# Patient Record
Sex: FEMALE | Race: WHITE | NOT HISPANIC OR LATINO | Employment: OTHER | URBAN - NONMETROPOLITAN AREA
[De-identification: names, ages, dates, MRNs, and addresses within clinical notes are randomized per-mention and may not be internally consistent; named-entity substitution may affect disease eponyms.]

---

## 2017-02-21 RX ORDER — LANCETS 33 GAUGE
EACH MISCELLANEOUS
Qty: 100 EACH | Refills: 3 | Status: SHIPPED | OUTPATIENT
Start: 2017-02-21 | End: 2018-04-10 | Stop reason: SDUPTHER

## 2017-04-18 ENCOUNTER — LAB (OUTPATIENT)
Dept: LAB | Facility: OTHER | Age: 67
End: 2017-04-18

## 2017-04-18 DIAGNOSIS — D51.8 VITAMIN B12 DEFICIENCY (DIETARY) ANEMIA: ICD-10-CM

## 2017-04-18 DIAGNOSIS — D50.9 IRON DEFICIENCY ANEMIA, UNSPECIFIED IRON DEFICIENCY ANEMIA TYPE: ICD-10-CM

## 2017-04-18 DIAGNOSIS — E78.5 HYPERLIPIDEMIA, UNSPECIFIED HYPERLIPIDEMIA TYPE: ICD-10-CM

## 2017-04-18 DIAGNOSIS — E11.9 TYPE 2 DIABETES MELLITUS WITHOUT COMPLICATION, WITHOUT LONG-TERM CURRENT USE OF INSULIN (HCC): ICD-10-CM

## 2017-04-18 LAB
ALBUMIN SERPL-MCNC: 4.1 G/DL (ref 3.2–5.5)
ALBUMIN/GLOB SERPL: 1.4 G/DL (ref 1–3)
ALP SERPL-CCNC: 51 U/L (ref 15–121)
ALT SERPL W P-5'-P-CCNC: 20 U/L (ref 10–60)
ANION GAP SERPL CALCULATED.3IONS-SCNC: 10 MMOL/L (ref 5–15)
AST SERPL-CCNC: 24 U/L (ref 10–60)
BASOPHILS # BLD AUTO: 0.02 10*3/MM3 (ref 0–0.2)
BASOPHILS NFR BLD AUTO: 0.4 % (ref 0–2)
BILIRUB SERPL-MCNC: 0.4 MG/DL (ref 0.2–1)
BUN BLD-MCNC: 36 MG/DL (ref 8–25)
BUN/CREAT SERPL: 30 (ref 7–25)
CALCIUM SPEC-SCNC: 9.2 MG/DL (ref 8.4–10.8)
CHLORIDE SERPL-SCNC: 100 MMOL/L (ref 100–112)
CHOLEST SERPL-MCNC: 228 MG/DL (ref 150–200)
CO2 SERPL-SCNC: 28 MMOL/L (ref 20–32)
CREAT BLD-MCNC: 1.2 MG/DL (ref 0.4–1.3)
DEPRECATED RDW RBC AUTO: 42.5 FL (ref 36.4–46.3)
EOSINOPHIL # BLD AUTO: 0.13 10*3/MM3 (ref 0–0.7)
EOSINOPHIL NFR BLD AUTO: 2.6 % (ref 0–7)
ERYTHROCYTE [DISTWIDTH] IN BLOOD BY AUTOMATED COUNT: 13 % (ref 11.5–14.5)
GFR SERPL CREATININE-BSD FRML MDRD: 45 ML/MIN/1.73 (ref 45–104)
GLOBULIN UR ELPH-MCNC: 2.9 GM/DL (ref 2.5–4.6)
GLUCOSE BLD-MCNC: 157 MG/DL (ref 70–100)
HCT VFR BLD AUTO: 37.1 % (ref 35–45)
HDLC SERPL-MCNC: 63 MG/DL (ref 35–100)
HGB BLD-MCNC: 12.1 G/DL (ref 12–15.5)
LDLC SERPL CALC-MCNC: 143 MG/DL
LDLC/HDLC SERPL: 2.28 {RATIO}
LYMPHOCYTES # BLD AUTO: 1.45 10*3/MM3 (ref 0.6–4.2)
LYMPHOCYTES NFR BLD AUTO: 28.7 % (ref 10–50)
MCH RBC QN AUTO: 30 PG (ref 26.5–34)
MCHC RBC AUTO-ENTMCNC: 32.6 G/DL (ref 31.4–36)
MCV RBC AUTO: 91.8 FL (ref 80–98)
MONOCYTES # BLD AUTO: 0.39 10*3/MM3 (ref 0–0.9)
MONOCYTES NFR BLD AUTO: 7.7 % (ref 0–12)
NEUTROPHILS # BLD AUTO: 3.07 10*3/MM3 (ref 2–8.6)
NEUTROPHILS NFR BLD AUTO: 60.6 % (ref 37–80)
PLATELET # BLD AUTO: 344 10*3/MM3 (ref 150–450)
PMV BLD AUTO: 10.6 FL (ref 8–12)
POTASSIUM BLD-SCNC: 4.1 MMOL/L (ref 3.4–5.4)
PROT SERPL-MCNC: 7 G/DL (ref 6.7–8.2)
RBC # BLD AUTO: 4.04 10*6/MM3 (ref 3.77–5.16)
SODIUM BLD-SCNC: 138 MMOL/L (ref 134–146)
TRIGL SERPL-MCNC: 108 MG/DL (ref 35–160)
VLDLC SERPL-MCNC: 21.6 MG/DL
WBC NRBC COR # BLD: 5.06 10*3/MM3 (ref 3.2–9.8)

## 2017-04-18 PROCEDURE — 80061 LIPID PANEL: CPT | Performed by: INTERNAL MEDICINE

## 2017-04-18 PROCEDURE — 82607 VITAMIN B-12: CPT | Performed by: INTERNAL MEDICINE

## 2017-04-18 PROCEDURE — 36415 COLL VENOUS BLD VENIPUNCTURE: CPT | Performed by: INTERNAL MEDICINE

## 2017-04-18 PROCEDURE — 82728 ASSAY OF FERRITIN: CPT | Performed by: INTERNAL MEDICINE

## 2017-04-18 PROCEDURE — 83036 HEMOGLOBIN GLYCOSYLATED A1C: CPT | Performed by: INTERNAL MEDICINE

## 2017-04-18 PROCEDURE — 85025 COMPLETE CBC W/AUTO DIFF WBC: CPT | Performed by: INTERNAL MEDICINE

## 2017-04-18 PROCEDURE — 80053 COMPREHEN METABOLIC PANEL: CPT | Performed by: INTERNAL MEDICINE

## 2017-04-19 LAB
FERRITIN SERPL-MCNC: 23.5 NG/ML (ref 11.1–264)
HBA1C MFR BLD: 7.73 % (ref 4–5.6)
VIT B12 BLD-MCNC: 710 PG/ML (ref 239–931)

## 2017-04-26 ENCOUNTER — OFFICE VISIT (OUTPATIENT)
Dept: FAMILY MEDICINE CLINIC | Facility: CLINIC | Age: 67
End: 2017-04-26

## 2017-04-26 VITALS
SYSTOLIC BLOOD PRESSURE: 120 MMHG | TEMPERATURE: 98.8 F | BODY MASS INDEX: 30.97 KG/M2 | DIASTOLIC BLOOD PRESSURE: 70 MMHG | WEIGHT: 181.4 LBS | HEIGHT: 64 IN | HEART RATE: 72 BPM

## 2017-04-26 DIAGNOSIS — D50.9 IRON DEFICIENCY ANEMIA, UNSPECIFIED IRON DEFICIENCY ANEMIA TYPE: ICD-10-CM

## 2017-04-26 DIAGNOSIS — E78.1 HYPERTRIGLYCERIDEMIA: ICD-10-CM

## 2017-04-26 DIAGNOSIS — F32.5 MAJOR DEPRESSION IN COMPLETE REMISSION (HCC): ICD-10-CM

## 2017-04-26 DIAGNOSIS — Z13.820 OSTEOPOROSIS SCREENING: ICD-10-CM

## 2017-04-26 DIAGNOSIS — D51.8 VITAMIN B12 DEFICIENCY (DIETARY) ANEMIA: ICD-10-CM

## 2017-04-26 DIAGNOSIS — E11.9 TYPE 2 DIABETES MELLITUS WITHOUT COMPLICATION, WITHOUT LONG-TERM CURRENT USE OF INSULIN (HCC): Primary | ICD-10-CM

## 2017-04-26 DIAGNOSIS — Z12.31 ENCOUNTER FOR SCREENING MAMMOGRAM FOR MALIGNANT NEOPLASM OF BREAST: ICD-10-CM

## 2017-04-26 DIAGNOSIS — N18.2 CHRONIC KIDNEY DISEASE, STAGE II (MILD): ICD-10-CM

## 2017-04-26 DIAGNOSIS — K21.9 GASTROESOPHAGEAL REFLUX DISEASE WITHOUT ESOPHAGITIS: ICD-10-CM

## 2017-04-26 DIAGNOSIS — E78.01 FAMILIAL HYPERCHOLESTEROLEMIA: ICD-10-CM

## 2017-04-26 DIAGNOSIS — I10 ESSENTIAL HYPERTENSION: ICD-10-CM

## 2017-04-26 PROCEDURE — 99214 OFFICE O/P EST MOD 30 MIN: CPT | Performed by: INTERNAL MEDICINE

## 2017-04-26 RX ORDER — LISINOPRIL 20 MG/1
20 TABLET ORAL DAILY
Qty: 90 TABLET | Refills: 3 | Status: SHIPPED | OUTPATIENT
Start: 2017-04-26 | End: 2018-04-06 | Stop reason: SDUPTHER

## 2017-04-26 RX ORDER — AMLODIPINE BESYLATE 5 MG/1
5 TABLET ORAL DAILY
COMMUNITY
End: 2017-09-17 | Stop reason: SDUPTHER

## 2017-04-26 RX ORDER — ROSUVASTATIN CALCIUM 20 MG/1
20 TABLET, COATED ORAL DAILY
Qty: 90 TABLET | Refills: 3 | Status: SHIPPED | OUTPATIENT
Start: 2017-04-26 | End: 2018-04-06 | Stop reason: SDUPTHER

## 2017-04-26 NOTE — PROGRESS NOTES
Subjective     Otilia Kenny is a 67 y.o. female.     History of Present Illness   And is here for six-month follow-up of multiple medical issues including type 2 diabetes, high cholesterol, high triglycerides, hypertension, GERD, depression, and other issues.    In fall 2016, I added low-dose Actos.  Her A1c has improved, but she feels that Actos is increasing her appetite and producing weight gain.  She describes a sedentary lifestyle and describes excessive calories.  I recommended medication change, but also strongly recommended the patient intensify her efforts at compliance with diabetic diet, exercise, and weight loss.  She feels that she cannot tolerate metformin, which makes her management difficult.    Her BUN and creatinine are creeping higher on the Invokana.  I recommend discontinuing the HCTZ from her lisinopril.  Her blood pressure is well controlled today.    Her LDL cholesterol is not at goal with Zocor.  She could not tolerate Lipitor.  We are going to try Crestor.    He scored 8 on depression screening today.  I discussed this with the patient.  She feels that her depression symptoms are actually well controlled with Celexa.    Her labs are all reviewed with results listed below.  A1c has improved from 8.6-7.7.  LDL is 143 and HDL was 63.  BUN and creatinine are 36 and 1.2 respectively.  Triglycerides are 108 on fenofibrate.    Review of Systems   Constitutional: Negative for chills, fatigue and fever.   HENT: Negative for congestion, ear pain, postnasal drip, sinus pressure and sore throat.    Respiratory: Negative for cough, shortness of breath and wheezing.    Cardiovascular: Negative for chest pain, palpitations and leg swelling.   Gastrointestinal: Negative for abdominal pain, blood in stool, constipation, diarrhea, nausea and vomiting.   Endocrine: Negative for cold intolerance, heat intolerance, polydipsia and polyuria.   Genitourinary: Negative for dysuria, frequency, hematuria and  "urgency.   Skin: Negative for rash.   Neurological: Negative for syncope and weakness.       Objective     /70  Pulse 72  Temp 98.8 °F (37.1 °C) (Oral)   Ht 64\" (162.6 cm)  Wt 181 lb 6.4 oz (82.3 kg)  BMI 31.14 kg/m2    Physical Exam   Constitutional: She is oriented to person, place, and time. She appears well-developed and well-nourished. No distress.   HENT:   Head: Normocephalic and atraumatic.   Nose: Right sinus exhibits no maxillary sinus tenderness and no frontal sinus tenderness. Left sinus exhibits no maxillary sinus tenderness and no frontal sinus tenderness.   Mouth/Throat: Uvula is midline, oropharynx is clear and moist and mucous membranes are normal. No oral lesions. No tonsillar exudate.   Eyes: Conjunctivae and EOM are normal. Pupils are equal, round, and reactive to light.   Neck: Trachea normal. Neck supple. No JVD present. Carotid bruit is not present. No tracheal deviation present. No thyroid mass and no thyromegaly present.   Cardiovascular: Normal rate, regular rhythm and normal heart sounds.   No extrasystoles are present. PMI is not displaced.    No murmur heard.  Pulmonary/Chest: Effort normal and breath sounds normal. No accessory muscle usage. No respiratory distress. She has no decreased breath sounds. She has no wheezes. She has no rhonchi. She has no rales.   Abdominal: Soft. Bowel sounds are normal. She exhibits no distension. There is no hepatosplenomegaly. There is no tenderness.   Overweight abdomen       Vascular Status -  Her exam exhibits right foot vasculature normal. Her exam exhibits no right foot edema. Her exam exhibits left foot vasculature normal. Her exam exhibits no left foot edema.  Lymphadenopathy:     She has no cervical adenopathy.   Neurological: She is alert and oriented to person, place, and time. No cranial nerve deficit. Coordination normal.   Skin: Skin is warm, dry and intact. No rash noted. No cyanosis. Nails show no clubbing.   Psychiatric: She " has a normal mood and affect. Her speech is normal and behavior is normal. Judgment and thought content normal.   Vitals reviewed.      PHQ-9 Depression Screening 4/26/2017   Little interest or pleasure in doing things 1   Feeling down, depressed, or hopeless 1   Trouble falling or staying asleep, or sleeping too much 0   Feeling tired or having little energy 3   Poor appetite or overeating 3   Feeling bad about yourself - or that you are a failure or have let yourself or your family down 0   Trouble concentrating on things, such as reading the newspaper or watching television 0   Moving or speaking so slowly that other people could have noticed. Or the opposite - being so fidgety or restless that you have been moving around a lot more than usual 0   Thoughts that you would be better off dead, or of hurting yourself in some way 0   PHQ-9 Total Score 8   If you checked off any problems, how difficult have these problems made it for you to do your work, take care of things at home, or get along with other people? Somewhat difficult       Assessment/Plan   Change lisinopril HCT to lisinopril due to the increasing BUN and creatinine.  Avoid NSAIDs and other nephrotoxic drugs.    Change Zocor to Crestor 20 mg daily.  Continue current dose for now.  Intensify dietary efforts.  Pursue weight loss.  If renal function continues to deteriorate, I would want to reduce or eliminate the fenofibrate.    Stop the pioglitazone, as the patient feels that is promoting weight gain and increased appetite.  Continue the current dose of Invokana and add Trulicity 0.75 mg weekly.  We instructed the patient on how to administer her first dose of Trulicity today.  She feels that she cannot tolerate any metformin or Amaryl.  Monitor glucose 1-2 times daily and notify me if not consistently at goal.  We could add some Januvia.      Diagnoses and all orders for this visit:    Type 2 diabetes mellitus without complication, without long-term  current use of insulin  -     CBC Auto Differential; Future  -     Comprehensive Metabolic Panel; Future  -     Hemoglobin A1c; Future  -     LDL Cholesterol, Direct; Future  -     Microalbumin / Creatinine Urine Ratio; Future  -     TSH; Future    Hypertriglyceridemia  -     Triglycerides; Future    Familial hypercholesterolemia  -     LDL Cholesterol, Direct; Future    Essential hypertension    Gastroesophageal reflux disease without esophagitis    Vitamin B12 deficiency (dietary) anemia  -     Vitamin B12; Future    Iron deficiency anemia, unspecified iron deficiency anemia type  -     Ferritin; Future    Major depression in complete remission    Encounter for screening mammogram for malignant neoplasm of breast  -     Mammo Screening Digital Tomosynthesis Bilateral With CAD; Future    Osteoporosis screening  -     DEXA Bone Density Axial; Future    Chronic kidney disease, stage II (mild)    Other orders  -     amLODIPine (NORVASC) 5 MG tablet; Take 5 mg by mouth Daily. 1/2 tab daily  -     lisinopril (PRINIVIL,ZESTRIL) 20 MG tablet; Take 1 tablet by mouth Daily.  -     Dulaglutide 0.75 MG/0.5ML solution pen-injector; Inject 1 pen under the skin 1 (One) Time Per Week.  -     rosuvastatin (CRESTOR) 20 MG tablet; Take 1 tablet by mouth Daily.        Lab on 04/18/2017   Component Date Value Ref Range Status   • WBC 04/18/2017 5.06  3.20 - 9.80 10*3/mm3 Final   • RBC 04/18/2017 4.04  3.77 - 5.16 10*6/mm3 Final   • Hemoglobin 04/18/2017 12.1  12.0 - 15.5 g/dL Final   • Hematocrit 04/18/2017 37.1  35.0 - 45.0 % Final   • MCV 04/18/2017 91.8  80.0 - 98.0 fL Final   • MCH 04/18/2017 30.0  26.5 - 34.0 pg Final   • MCHC 04/18/2017 32.6  31.4 - 36.0 g/dL Final   • RDW 04/18/2017 13.0  11.5 - 14.5 % Final   • RDW-SD 04/18/2017 42.5  36.4 - 46.3 fl Final   • MPV 04/18/2017 10.6  8.0 - 12.0 fL Final   • Platelets 04/18/2017 344  150 - 450 10*3/mm3 Final   • Neutrophil % 04/18/2017 60.6  37.0 - 80.0 % Final   • Lymphocyte %  04/18/2017 28.7  10.0 - 50.0 % Final   • Monocyte % 04/18/2017 7.7  0.0 - 12.0 % Final   • Eosinophil % 04/18/2017 2.6  0.0 - 7.0 % Final   • Basophil % 04/18/2017 0.4  0.0 - 2.0 % Final   • Neutrophils, Absolute 04/18/2017 3.07  2.00 - 8.60 10*3/mm3 Final   • Lymphocytes, Absolute 04/18/2017 1.45  0.60 - 4.20 10*3/mm3 Final   • Monocytes, Absolute 04/18/2017 0.39  0.00 - 0.90 10*3/mm3 Final   • Eosinophils, Absolute 04/18/2017 0.13  0.00 - 0.70 10*3/mm3 Final   • Basophils, Absolute 04/18/2017 0.02  0.00 - 0.20 10*3/mm3 Final   • Vitamin B-12 04/18/2017 710  239 - 931 pg/mL Final   • Ferritin 04/18/2017 23.50  11.10 - 264.00 ng/mL Final   • Glucose 04/18/2017 157* 70 - 100 mg/dL Final   • BUN 04/18/2017 36* 8 - 25 mg/dL Final   • Creatinine 04/18/2017 1.20  0.40 - 1.30 mg/dL Final   • Sodium 04/18/2017 138  134 - 146 mmol/L Final   • Potassium 04/18/2017 4.1  3.4 - 5.4 mmol/L Final   • Chloride 04/18/2017 100  100 - 112 mmol/L Final   • CO2 04/18/2017 28.0  20.0 - 32.0 mmol/L Final   • Calcium 04/18/2017 9.2  8.4 - 10.8 mg/dL Final   • Total Protein 04/18/2017 7.0  6.7 - 8.2 g/dL Final   • Albumin 04/18/2017 4.10  3.20 - 5.50 g/dL Final   • ALT (SGPT) 04/18/2017 20  10 - 60 U/L Final   • AST (SGOT) 04/18/2017 24  10 - 60 U/L Final   • Alkaline Phosphatase 04/18/2017 51  15 - 121 U/L Final   • Total Bilirubin 04/18/2017 0.4  0.2 - 1.0 mg/dL Final   • eGFR Non African Amer 04/18/2017 45  45 - 104 mL/min/1.73 Final   • Globulin 04/18/2017 2.9  2.5 - 4.6 gm/dL Final   • A/G Ratio 04/18/2017 1.4  1.0 - 3.0 g/dL Final   • BUN/Creatinine Ratio 04/18/2017 30.0* 7.0 - 25.0 Final   • Anion Gap 04/18/2017 10.0  5.0 - 15.0 mmol/L Final   • Hemoglobin A1C 04/18/2017 7.73* 4 - 5.6 % Final   • Total Cholesterol 04/18/2017 228* 150 - 200 mg/dL Final   • Triglycerides 04/18/2017 108  35 - 160 mg/dL Final   • HDL Cholesterol 04/18/2017 63  35 - 100 mg/dL Final   • LDL Cholesterol  04/18/2017 143  mg/dL Final   • VLDL Cholesterol  04/18/2017 21.6  mg/dL Final   • LDL/HDL Ratio 04/18/2017 2.28   Final   ]

## 2017-06-02 ENCOUNTER — OFFICE VISIT (OUTPATIENT)
Dept: FAMILY MEDICINE CLINIC | Facility: CLINIC | Age: 67
End: 2017-06-02

## 2017-06-02 VITALS
TEMPERATURE: 98.6 F | BODY MASS INDEX: 30.73 KG/M2 | HEART RATE: 80 BPM | SYSTOLIC BLOOD PRESSURE: 150 MMHG | DIASTOLIC BLOOD PRESSURE: 78 MMHG | WEIGHT: 180 LBS | HEIGHT: 64 IN

## 2017-06-02 DIAGNOSIS — Z00.00 INITIAL MEDICARE ANNUAL WELLNESS VISIT: Primary | ICD-10-CM

## 2017-06-02 PROCEDURE — G0438 PPPS, INITIAL VISIT: HCPCS | Performed by: INTERNAL MEDICINE

## 2017-06-02 NOTE — PATIENT INSTRUCTIONS
ppp    Medicare Wellness  Personal Prevention Plan of Service     Date of Office Visit:  2017  Encounter Provider:  Alexis Zabala MD  Place of Service:  [facility]  Patient Name: Otilia Kenny  :  1950    As part of the Medicare Wellness portion of your visit today, we are providing you with this personalized preventive plan of services (PPPS). This plan is based upon recommendations of the [place] Preventive Services Task Force (USPSTF) and the Advisory Committee on Immunization Practices (ACIP).    This lists the preventive care services that should be considered, and provides dates of when you are due. Items listed as completed are up-to-date and do not require any further intervention.    Health Maintenance   Topic Date Due   • TDAP/TD VACCINES (1 - Tdap) 01/10/1967   • HEPATITIS C SCREENING  2016   • ZOSTER VACCINE  2016   • DIABETIC FOOT EXAM  2016   • INFLUENZA VACCINE  2017   • URINE MICROALBUMIN  10/13/2017   • HEMOGLOBIN A1C  10/18/2017   • PNEUMOCOCCAL VACCINES (65+ LOW/MEDIUM RISK) (2 of 2 - PPSV23) 10/24/2017   • DIABETIC EYE EXAM  2018   • LIPID PANEL  2018   • MAMMOGRAM  2019   • COLONOSCOPY  2026       No orders of the defined types were placed in this encounter.      No Follow-up on file.        Exercising to Lose Weight  Exercising can help you to lose weight. In order to lose weight through exercise, you need to do vigorous-intensity exercise. You can tell that you are exercising with vigorous intensity if you are breathing very hard and fast and cannot hold a conversation while exercising.  Moderate-intensity exercise helps to maintain your current weight. You can tell that you are exercising at a moderate level if you have a higher heart rate and faster breathing, but you are still able to hold a conversation.  HOW OFTEN SHOULD I EXERCISE?  Choose an activity that you enjoy and set  realistic goals. Your health care provider can help you to make an activity plan that works for you. Exercise regularly as directed by your health care provider. This may include:  · Doing resistance training twice each week, such as:    Push-ups.    Sit-ups.    Lifting weights.    Using resistance bands.  · Doing a given intensity of exercise for a given amount of time. Choose from these options:    150 minutes of moderate-intensity exercise every week.    75 minutes of vigorous-intensity exercise every week.    A mix of moderate-intensity and vigorous-intensity exercise every week.  Children, pregnant women, people who are out of shape, people who are overweight, and older adults may need to consult a health care provider for individual recommendations. If you have any sort of medical condition, be sure to consult your health care provider before starting a new exercise program.  WHAT ARE SOME ACTIVITIES THAT CAN HELP ME TO LOSE WEIGHT?   · Walking at a rate of at least 4.5 miles an hour.  · Jogging or running at a rate of 5 miles per hour.  · Biking at a rate of at least 10 miles per hour.  · Lap swimming.  · Roller-skating or in-line skating.  · Cross-country skiing.  · Vigorous competitive sports, such as football, basketball, and soccer.  · Jumping rope.  · Aerobic dancing.  HOW CAN I BE MORE ACTIVE IN MY DAY-TO-DAY ACTIVITIES?  · Use the stairs instead of the elevator.  · Take a walk during your lunch break.  · If you drive, park your car farther away from work or school.  · If you take public transportation, get off one stop early and walk the rest of the way.  · Make all of your phone calls while standing up and walking around.  · Get up, stretch, and walk around every 30 minutes throughout the day.  WHAT GUIDELINES SHOULD I FOLLOW WHILE EXERCISING?  · Do not exercise so much that you hurt yourself, feel dizzy, or get very short of breath.  · Consult your health care provider prior to starting a new  exercise program.  · Wear comfortable clothes and shoes with good support.  · Drink plenty of water while you exercise to prevent dehydration or heat stroke. Body water is lost during exercise and must be replaced.  · Work out until you breathe faster and your heart beats faster.     This information is not intended to replace advice given to you by your health care provider. Make sure you discuss any questions you have with your health care provider.     Document Released: 01/20/2012 Document Revised: 01/08/2016 Document Reviewed: 05/21/2015  [employer] Interactive Patient Education ©2017 [employer]  Calorie Counting for Weight Loss  Calories are energy you get from the things you eat and drink. Your body uses this energy to keep you going throughout the day. The number of calories you eat affects your weight. When you eat more calories than your body needs, your body stores the extra calories as fat. When you eat fewer calories than your body needs, your body burns fat to get the energy it needs.  Calorie counting means keeping track of how many calories you eat and drink each day. If you make sure to eat fewer calories than your body needs, you should lose weight. In order for calorie counting to work, you will need to eat the number of calories that are right for you in a day to lose a healthy amount of weight per week. A healthy amount of weight to lose per week is usually 1-2 lb (0.5-0.9 kg). A dietitian can determine how many calories you need in a day and give you suggestions on how to reach your calorie goal.   WHAT IS MY MY PLAN?  My goal is to have __________ calories per day.   If I have this many calories per day, I should lose around __________ pounds per week.  WHAT DO I NEED TO KNOW ABOUT CALORIE COUNTING?  In order to meet your daily calorie goal, you will need to:  · Find out how many calories are in each food you would like to eat. Try to do this before you eat.  · Decide how much of the food you  can eat.  · Write down what you ate and how many calories it had. Doing this is called keeping a food log.  WHERE DO I FIND CALORIE INFORMATION?  The number of calories in a food can be found on a Nutrition Facts label. Note that all the information on a label is based on a specific serving of the food. If a food does not have a Nutrition Facts label, try to look up the calories online or ask your dietitian for help.  HOW DO I DECIDE HOW MUCH TO EAT?  To decide how much of the food you can eat, you will need to consider both the number of calories in one serving and the size of one serving. This information can be found on the Nutrition Facts label. If a food does not have a Nutrition Facts label, look up the information online or ask your dietitian for help.  Remember that calories are listed per serving. If you choose to have more than one serving of a food, you will have to multiply the calories per serving by the amount of servings you plan to eat. For example, the label on a package of bread might say that a serving size is 1 slice and that there are 90 calories in a serving. If you eat 1 slice, you will have eaten 90 calories. If you eat 2 slices, you will have eaten 180 calories.  HOW DO I KEEP A FOOD LOG?  After each meal, record the following information in your food log:  · What you ate.  · How much of it you ate.  · How many calories it had.  · Then, add up your calories.  Keep your food log near you, such as in a small notebook in your pocket. Another option is to use a mobile katerin or website. Some programs will calculate calories for you and show you how many calories you have left each time you add an item to the log.  WHAT ARE SOME CALORIE COUNTING TIPS?  · Use your calories on foods and drinks that will fill you up and not leave you hungry. Some examples of this include foods like nuts and nut butters, vegetables, lean proteins, and high-fiber foods (more than 5 g fiber per serving).  · Eat nutritious  foods and avoid empty calories. Empty calories are calories you get from foods or beverages that do not have many nutrients, such as candy and soda. It is better to have a nutritious high-calorie food (such as an avocado) than a food with few nutrients (such as a bag of chips).  · Know how many calories are in the foods you eat most often. This way, you do not have to look up how many calories they have each time you eat them.  · Look out for foods that may seem like low-calorie foods but are really high-calorie foods, such as baked goods, soda, and fat-free candy.  · Pay attention to calories in drinks. Drinks such as sodas, specialty coffee drinks, alcohol, and juices have a lot of calories yet do not fill you up. Choose low-calorie drinks like water and diet drinks.  · Focus your calorie counting efforts on higher calorie items. Logging the calories in a garden salad that contains only vegetables is less important than calculating the calories in a milk shake.  · Find a way of tracking calories that works for you. Get creative. Most people who are successful find ways to keep track of how much they eat in a day, even if they do not count every calorie.  WHAT ARE SOME PORTION CONTROL TIPS?  · Know how many calories are in a serving. This will help you know how many servings of a certain food you can have.  · Use a measuring cup to measure serving sizes. This is helpful when you start out. With time, you will be able to estimate serving sizes for some foods.  · Take some time to put servings of different foods on your favorite plates, bowls, and cups so you know what a serving looks like.  · Try not to eat straight from a bag or box. Doing this can lead to overeating. Put the amount you would like to eat in a cup or on a plate to make sure you are eating the right portion.  · Use smaller plates, glasses, and bowls to prevent overeating. This is a quick and easy way to practice portion control. If your plate is  "smaller, less food can fit on it.  · Try not to multitask while eating, such as watching TV or using your computer. If it is time to eat, sit down at a table and enjoy your food. Doing this will help you to start recognizing when you are full. It will also make you more aware of what and how much you are eating.  HOW CAN I CALORIE COUNT WHEN EATING OUT?  · Ask for smaller portion sizes or child-sized portions.  · Consider sharing an entree and sides instead of getting your own entree.  · If you get your own entree, eat only half. Ask for a box at the beginning of your meal and put the rest of your entree in it so you are not tempted to eat it.  · Look for the calories on the menu. If calories are listed, choose the lower calorie options.  · Choose dishes that include vegetables, fruits, whole grains, low-fat dairy products, and lean protein. Focusing on smart food choices from each of the 5 food groups can help you stay on track at restaurants.  · Choose items that are boiled, broiled, grilled, or steamed.  · Choose water, milk, unsweetened iced tea, or other drinks without added sugars. If you want an alcoholic beverage, choose a lower calorie option. For example, a regular alvaro can have up to 700 calories and a glass of wine has around 150.  · Stay away from items that are buttered, battered, fried, or served with cream sauce. Items labeled \"crispy\" are usually fried, unless stated otherwise.  · Ask for dressings, sauces, and syrups on the side. These are usually very high in calories, so do not eat much of them.  · Watch out for salads. Many people think salads are a healthy option, but this is often not the case. Many salads come with yu, fried chicken, lots of cheese, fried chips, and dressing. All of these items have a lot of calories. If you want a salad, choose a garden salad and ask for grilled meats or steak. Ask for the dressing on the side, or ask for olive oil and vinegar or lemon to use as " dressing.  · Estimate how many servings of a food you are given. For example, a serving of cooked rice is ½ cup or about the size of half a tennis ball or one cupcake wrapper. Knowing serving sizes will help you be aware of how much food you are eating at restaurants. The list below tells you how big or small some common portion sizes are based on everyday objects.    1 oz--4 stacked dice.    3 oz--1 deck of cards.    1 tsp--1 dice.    1 Tbsp--½ a Ping-Pong ball.    2 Tbsp--1 Ping-Pong ball.    ½ cup--1 tennis ball or 1 cupcake wrapper.    1 cup--1 baseball.     This information is not intended to replace advice given to you by your health care provider. Make sure you discuss any questions you have with your health care provider.     Document Released: 2006 Document Revised: 2016 Document Reviewed: 10/23/2014  [employer] Interactive Patient Education © [employer]    Medicare Wellness  Personal Prevention Plan of Service     Date of Office Visit:  2017  Encounter Provider:  Alexis Zabala MD  Place of Service:  [facility]  Patient Name: Otilia Kenny  :  1950    As part of the Medicare Wellness portion of your visit today, we are providing you with this personalized preventive plan of services (PPPS). This plan is based upon recommendations of the [place] Preventive Services Task Force (USPSTF) and the Advisory Committee on Immunization Practices (ACIP).    This lists the preventive care services that should be considered, and provides dates of when you are due. Items listed as completed are up-to-date and do not require any further intervention.    Health Maintenance   Topic Date Due   • TDAP/TD VACCINES (1 - Tdap) 01/10/1967   • HEPATITIS C SCREENING  2016   • ZOSTER VACCINE  2016   • DIABETIC FOOT EXAM  2016   • INFLUENZA VACCINE  2017   • URINE MICROALBUMIN  10/13/2017   • HEMOGLOBIN A1C  10/18/2017   •  PNEUMOCOCCAL VACCINES (65+ LOW/MEDIUM RISK) (2 of 2 - PPSV23) 10/24/2017   • DIABETIC EYE EXAM  03/29/2018   • LIPID PANEL  04/18/2018   • MAMMOGRAM  05/16/2019   • COLONOSCOPY  01/05/2026       No orders of the defined types were placed in this encounter.      Return in about 1 year (around 6/2/2018) for Medicare Wellness.

## 2017-06-06 ENCOUNTER — OFFICE VISIT (OUTPATIENT)
Dept: ORTHOPEDIC SURGERY | Facility: CLINIC | Age: 67
End: 2017-06-06

## 2017-06-06 VITALS — HEIGHT: 64 IN | BODY MASS INDEX: 30.56 KG/M2 | WEIGHT: 179 LBS

## 2017-06-06 DIAGNOSIS — G89.29 CHRONIC PAIN OF BOTH KNEES: ICD-10-CM

## 2017-06-06 DIAGNOSIS — M25.562 CHRONIC PAIN OF BOTH KNEES: ICD-10-CM

## 2017-06-06 DIAGNOSIS — M17.0 PRIMARY OSTEOARTHRITIS OF BOTH KNEES: Primary | ICD-10-CM

## 2017-06-06 DIAGNOSIS — M25.561 CHRONIC PAIN OF BOTH KNEES: ICD-10-CM

## 2017-06-06 PROCEDURE — 20610 DRAIN/INJ JOINT/BURSA W/O US: CPT | Performed by: NURSE PRACTITIONER

## 2017-06-06 PROCEDURE — 99214 OFFICE O/P EST MOD 30 MIN: CPT | Performed by: NURSE PRACTITIONER

## 2017-06-06 RX ORDER — TRIAMCINOLONE ACETONIDE 40 MG/ML
40 INJECTION, SUSPENSION INTRA-ARTICULAR; INTRAMUSCULAR
Status: COMPLETED | OUTPATIENT
Start: 2017-06-06 | End: 2017-06-06

## 2017-06-06 RX ORDER — LIDOCAINE HYDROCHLORIDE 10 MG/ML
2 INJECTION, SOLUTION INFILTRATION; PERINEURAL
Status: COMPLETED | OUTPATIENT
Start: 2017-06-06 | End: 2017-06-06

## 2017-06-06 RX ADMIN — TRIAMCINOLONE ACETONIDE 40 MG: 40 INJECTION, SUSPENSION INTRA-ARTICULAR; INTRAMUSCULAR at 14:20

## 2017-06-06 RX ADMIN — LIDOCAINE HYDROCHLORIDE 2 ML: 10 INJECTION, SOLUTION INFILTRATION; PERINEURAL at 14:20

## 2017-06-06 NOTE — PROGRESS NOTES
"Otilia Kenny is a 67 y.o. female returns for     Chief Complaint   Patient presents with   • Right Knee - Follow-up   • Left Knee - Follow-up       HISTORY OF PRESENT ILLNESS: patient requesting steroid injection, last injection done on 12/9/2016 left knee by [facility] Rapp. HPI Comments: Complains of bilateral knee pain that has been worse over the last 2 weeks since starting a walking exercise program. Complains of pain to lateral aspect of right knee that is milder. Complains of more severe pain to left knee, worse with weight-bearing and ambulation. Improved with rest.          CONCURRENT MEDICAL HISTORY:    Past Medical History:   Diagnosis Date   • Abdominal pain     Most likely related to functional colonic spasm     • Abnormal liver enzymes     Improved, 3/2015      • Allergic rhinitis     seasonal   • Anemia    • Cervical disc disorder     S/P cervical surgeries x2-3. Extensive fusion C 3-7. [place] [facility]      • Diarrhea, unspecified     Resolved with discontinuation of metformin.    • Diverticular disease of colon    • Dysfunction of eustachian tube    • Essential hypertension     Increase lisinopril HCT, 10/12.5.      • Gastritis    • Generalized anxiety disorder    • GERD (gastroesophageal reflux disease)    • Hyperlipidemia     Intolerant of Lipitor. The patient stopped Zocor due to \"liver pain\", summer 2015. patient instructed to reattempt Zocor every other day, 10/2015    • Hypertriglyceridemia     Holding simvastatin 2/2015 due to slightly elevated LFTs.      • Iron deficiency anemia    • Irritable bowel syndrome     Dr. Estrada   • Osteoarthritis of knee     Dr. Reynolds    • Sleep disorder    • Spasm of back muscles     Dr. Castro changed Soma to Zanaflex.     • Vitamin B12 deficiency (dietary) anemia     Currently on multivitamin daily         Allergies   Allergen Reactions   • Allegra [Fexofenadine]    • Amaryl [Glimepiride] Myalgia   • Dyazide [Triamterene-Hctz]    • Hydrochlorothiazide " W-Triamterene    • Lipitor [Atorvastatin]    • Metformin And Related Diarrhea   • Naproxen          Current Outpatient Prescriptions:   •  amLODIPine (NORVASC) 5 MG tablet, Take 5 mg by mouth Daily. 1/2 tab daily, Disp: , Rfl:   •  aspirin 81 MG chewable tablet, Chew 81 mg daily., Disp: , Rfl:   •  Blood Glucose Monitoring Suppl (ONETOUCH VERIO IQ SYSTEM) W/DEVICE kit, daily., Disp: , Rfl:   •  CALCIUM CARB-CHOLECALCIFEROL PO, Take 1 tablet by mouth 2 (two) times a day., Disp: , Rfl:   •  Canagliflozin (INVOKANA) 300 MG tablet, 1/2-1 qam (Patient taking differently: 300 mg Daily.), Disp: 90 tablet, Rfl: 3  •  citalopram (CELEXA) 20 MG tablet, Take 1 tablet by mouth daily., Disp: 90 tablet, Rfl: 3  •  Dulaglutide 0.75 MG/0.5ML solution pen-injector, Inject 1 pen under the skin 1 (One) Time Per Week., Disp: 13 pen, Rfl: 3  •  FENOFIBRATE PO, Take 145 mg by mouth Daily., Disp: , Rfl:   •  glucose blood (ONETOUCH VERIO) test strip, 1 each by Other route daily. Use as instructed, Disp: , Rfl:   •  lisinopril (PRINIVIL,ZESTRIL) 20 MG tablet, Take 1 tablet by mouth Daily., Disp: 90 tablet, Rfl: 3  •  Multiple Vitamins-Minerals (MULTIVITAMIN PO), Take 1 tablet by mouth daily., Disp: , Rfl:   •  omeprazole (PriLOSEC) 40 MG capsule, Take 40 mg by mouth daily., Disp: , Rfl:   •  ONETOUCH DELICA LANCETS 33G misc, Test every day, Disp: 100 each, Rfl: 3  •  rosuvastatin (CRESTOR) 20 MG tablet, Take 1 tablet by mouth Daily., Disp: 90 tablet, Rfl: 3  •  VITAMINS A & D PO, Take 1 tablet by mouth 2 (two) times a day., Disp: , Rfl:     Past Surgical History:   Procedure Laterality Date   • BACK SURGERY      Laminectomy   • CERVICAL DISC SURGERY      Laminectomy 2011. Fusion 2013.   • COLONOSCOPY      Diverticulosis found in the sigmoid colon.A single diminutive polyp found in the colon.Mul.biopsies taken.Hemorrhoids found in the anus.   • ENDOSCOPY      Normal hypopharynx and esophagus.Marked irregularity of the Z-line,compatable with  "chronic reflux.Mul.biopsies.A hiatus hernia found in GE junction.Mild nonerosive gastritis.Mul.biopsies.Polyps in fundus.Mul.biopsies.Normal pylorus.Normal duodenum.   • ENDOSCOPY AND COLONOSCOPY     • HYSTERECTOMY     • KNEE ARTHROSCOPY      Arthroscopy of the left knee with debridement of medial meniscus.   • KNEE SURGERY      Right unicompartmental arthroplasty.       ROS  No fevers or chills.  No chest pain or shortness of air.  No GI or  disturbances.    PHYSICAL EXAMINATION:       Ht 64\" (162.6 cm)  Wt 179 lb (81.2 kg)  BMI 30.73 kg/m2    Physical Exam   Constitutional: She is oriented to person, place, and time. Vital signs are normal. She appears well-developed and well-nourished.   HENT:   Head: Normocephalic.   Pulmonary/Chest: Effort normal. No respiratory distress.   Abdominal: Soft. She exhibits no distension.   Musculoskeletal:        Right knee: She exhibits no effusion.        Left knee: She exhibits no effusion.   Neurological: She is alert and oriented to person, place, and time. GCS eye subscore is 4. GCS verbal subscore is 5. GCS motor subscore is 6.   Skin: Skin is warm, dry and intact.   Psychiatric: She has a normal mood and affect. Her speech is normal and behavior is normal. Judgment and thought content normal. Cognition and memory are normal.   Vitals reviewed.      GAIT:     []  Normal  [x]  Antalgic    Assistive device: [x]  None  []  Walker     []  Crutches  []  Cane     []  Wheelchair  []  Stretcher    Right Knee Exam     Tenderness   The patient is experiencing tenderness in the lateral joint line.    Range of Motion   The patient has normal right knee ROM.    Muscle Strength     The patient has normal right knee strength.    Other   Erythema: absent  Scars: present (Post hemiarthroplasty right knee, well-healed surgical incision noted)  Sensation: normal  Pulse: present  Swelling: none  Other tests: no effusion present      Left Knee Exam     Tenderness   The patient is " experiencing tenderness in the medial joint line, patella and pes anserinus.    Range of Motion   Extension: abnormal   Flexion: normal     Muscle Strength     The patient has normal left knee strength.    Other   Erythema: absent  Scars: absent  Sensation: normal  Pulse: present  Swelling: mild  Effusion: no effusion present        Large Joint Arthrocentesis  Date/Time: 6/6/2017 2:20 PM  Consent given by: patient  Timeout: Immediately prior to procedure a time out was called to verify the correct patient, procedure, equipment, support staff and site/side marked as required   Supporting Documentation  Indications: pain   Procedure Details  Location: knee - L knee  Preparation: Patient was prepped and draped in the usual sterile fashion  Needle size: 22 G  Approach: anterolateral  Medications administered: 2 mL lidocaine 1 %; 40 mg triamcinolone acetonide 40 MG/ML  Patient tolerance: patient tolerated the procedure well with no immediate complications                      ASSESSMENT:    Diagnoses and all orders for this visit:    Primary osteoarthritis of both knees  -     XR Knee Bilateral AP Standing  -     XR Knee 1 or 2 View Bilateral    Chronic pain of both knees  -     XR Knee Bilateral AP Standing  -     XR Knee 1 or 2 View Bilateral    Other orders  -     Large Joint Arthrocentesis        PLAN    Recommend intra articular injection today of left knee. Recommend progressive activity and weight-bearing based on pain level. Follow up when needed for exacerbations of pain.     Return as needed.    Morena Garcia APRN

## 2017-07-03 RX ORDER — CANAGLIFLOZIN 300 MG/1
TABLET, FILM COATED ORAL
Qty: 90 TABLET | Refills: 1 | Status: SHIPPED | OUTPATIENT
Start: 2017-07-03 | End: 2017-12-14 | Stop reason: SDUPTHER

## 2017-07-03 RX ORDER — OMEPRAZOLE 40 MG/1
CAPSULE, DELAYED RELEASE ORAL
Qty: 90 CAPSULE | Refills: 1 | Status: SHIPPED | OUTPATIENT
Start: 2017-07-03 | End: 2017-12-14 | Stop reason: SDUPTHER

## 2017-08-22 RX ORDER — CITALOPRAM 20 MG/1
TABLET ORAL
Qty: 90 TABLET | Refills: 1 | Status: SHIPPED | OUTPATIENT
Start: 2017-08-22 | End: 2018-02-22 | Stop reason: SDUPTHER

## 2017-08-22 RX ORDER — FENOFIBRATE 145 MG/1
TABLET, COATED ORAL
Qty: 90 TABLET | Refills: 1 | Status: SHIPPED | OUTPATIENT
Start: 2017-08-22 | End: 2017-12-14 | Stop reason: SDUPTHER

## 2017-08-22 RX ORDER — BLOOD SUGAR DIAGNOSTIC
STRIP MISCELLANEOUS
Qty: 100 EACH | Refills: 11 | Status: SHIPPED | OUTPATIENT
Start: 2017-08-22 | End: 2018-04-10 | Stop reason: SDUPTHER

## 2017-09-18 RX ORDER — AMLODIPINE BESYLATE 5 MG/1
TABLET ORAL
Qty: 90 TABLET | Refills: 3 | Status: SHIPPED | OUTPATIENT
Start: 2017-09-18 | End: 2018-04-06 | Stop reason: SDUPTHER

## 2017-10-23 ENCOUNTER — LAB (OUTPATIENT)
Dept: LAB | Facility: OTHER | Age: 67
End: 2017-10-23

## 2017-10-23 DIAGNOSIS — E78.01 FAMILIAL HYPERCHOLESTEROLEMIA: ICD-10-CM

## 2017-10-23 DIAGNOSIS — D50.9 IRON DEFICIENCY ANEMIA, UNSPECIFIED IRON DEFICIENCY ANEMIA TYPE: ICD-10-CM

## 2017-10-23 DIAGNOSIS — E11.9 TYPE 2 DIABETES MELLITUS WITHOUT COMPLICATION, WITHOUT LONG-TERM CURRENT USE OF INSULIN (HCC): ICD-10-CM

## 2017-10-23 DIAGNOSIS — D51.8 VITAMIN B12 DEFICIENCY (DIETARY) ANEMIA: ICD-10-CM

## 2017-10-23 DIAGNOSIS — E78.1 HYPERTRIGLYCERIDEMIA: ICD-10-CM

## 2017-10-23 LAB
ALBUMIN SERPL-MCNC: 4.3 G/DL (ref 3.2–5.5)
ALBUMIN UR-MCNC: 2 MG/L
ALBUMIN/GLOB SERPL: 1.3 G/DL (ref 1–3)
ALP SERPL-CCNC: 48 U/L (ref 15–121)
ALT SERPL W P-5'-P-CCNC: 23 U/L (ref 10–60)
ANION GAP SERPL CALCULATED.3IONS-SCNC: 12 MMOL/L (ref 5–15)
ARTICHOKE IGE QN: 124 MG/DL (ref 0–129)
AST SERPL-CCNC: 27 U/L (ref 10–60)
BASOPHILS # BLD AUTO: 0.02 10*3/MM3 (ref 0–0.2)
BASOPHILS NFR BLD AUTO: 0.5 % (ref 0–2)
BILIRUB SERPL-MCNC: 0.8 MG/DL (ref 0.2–1)
BUN BLD-MCNC: 26 MG/DL (ref 8–25)
BUN/CREAT SERPL: 23.6 (ref 7–25)
CALCIUM SPEC-SCNC: 9.4 MG/DL (ref 8.4–10.8)
CHLORIDE SERPL-SCNC: 103 MMOL/L (ref 100–112)
CO2 SERPL-SCNC: 26 MMOL/L (ref 20–32)
CREAT BLD-MCNC: 1.1 MG/DL (ref 0.4–1.3)
CREAT UR-MCNC: 119.1 MG/DL
DEPRECATED RDW RBC AUTO: 40.9 FL (ref 36.4–46.3)
EOSINOPHIL # BLD AUTO: 0.11 10*3/MM3 (ref 0–0.7)
EOSINOPHIL NFR BLD AUTO: 2.5 % (ref 0–7)
ERYTHROCYTE [DISTWIDTH] IN BLOOD BY AUTOMATED COUNT: 12.5 % (ref 11.5–14.5)
FERRITIN SERPL-MCNC: 27.7 NG/ML (ref 11.1–264)
GFR SERPL CREATININE-BSD FRML MDRD: 49 ML/MIN/1.73 (ref 45–104)
GLOBULIN UR ELPH-MCNC: 3.2 GM/DL (ref 2.5–4.6)
GLUCOSE BLD-MCNC: 133 MG/DL (ref 70–100)
HBA1C MFR BLD: 6.8 % (ref 4–5.6)
HCT VFR BLD AUTO: 39.2 % (ref 35–45)
HGB BLD-MCNC: 12.7 G/DL (ref 12–15.5)
LYMPHOCYTES # BLD AUTO: 1.44 10*3/MM3 (ref 0.6–4.2)
LYMPHOCYTES NFR BLD AUTO: 32.9 % (ref 10–50)
MCH RBC QN AUTO: 30.1 PG (ref 26.5–34)
MCHC RBC AUTO-ENTMCNC: 32.4 G/DL (ref 31.4–36)
MCV RBC AUTO: 92.9 FL (ref 80–98)
MICROALBUMIN/CREAT UR: 16.8 MG/G (ref 0–30)
MONOCYTES # BLD AUTO: 0.34 10*3/MM3 (ref 0–0.9)
MONOCYTES NFR BLD AUTO: 7.8 % (ref 0–12)
NEUTROPHILS # BLD AUTO: 2.47 10*3/MM3 (ref 2–8.6)
NEUTROPHILS NFR BLD AUTO: 56.3 % (ref 37–80)
PLATELET # BLD AUTO: 325 10*3/MM3 (ref 150–450)
PMV BLD AUTO: 10.8 FL (ref 8–12)
POTASSIUM BLD-SCNC: 4 MMOL/L (ref 3.4–5.4)
PROT SERPL-MCNC: 7.5 G/DL (ref 6.7–8.2)
RBC # BLD AUTO: 4.22 10*6/MM3 (ref 3.77–5.16)
SODIUM BLD-SCNC: 141 MMOL/L (ref 134–146)
TRIGL SERPL-MCNC: 112 MG/DL (ref 35–160)
TSH SERPL DL<=0.05 MIU/L-ACNC: 1.69 MIU/ML (ref 0.46–4.68)
VIT B12 BLD-MCNC: 806 PG/ML (ref 239–931)
WBC NRBC COR # BLD: 4.38 10*3/MM3 (ref 3.2–9.8)

## 2017-10-23 PROCEDURE — 82043 UR ALBUMIN QUANTITATIVE: CPT | Performed by: INTERNAL MEDICINE

## 2017-10-23 PROCEDURE — 36415 COLL VENOUS BLD VENIPUNCTURE: CPT | Performed by: INTERNAL MEDICINE

## 2017-10-23 PROCEDURE — 85025 COMPLETE CBC W/AUTO DIFF WBC: CPT | Performed by: INTERNAL MEDICINE

## 2017-10-23 PROCEDURE — 82728 ASSAY OF FERRITIN: CPT | Performed by: INTERNAL MEDICINE

## 2017-10-23 PROCEDURE — 83721 ASSAY OF BLOOD LIPOPROTEIN: CPT | Performed by: INTERNAL MEDICINE

## 2017-10-23 PROCEDURE — 82570 ASSAY OF URINE CREATININE: CPT | Performed by: INTERNAL MEDICINE

## 2017-10-23 PROCEDURE — 82607 VITAMIN B-12: CPT | Performed by: INTERNAL MEDICINE

## 2017-10-23 PROCEDURE — 84443 ASSAY THYROID STIM HORMONE: CPT | Performed by: INTERNAL MEDICINE

## 2017-10-23 PROCEDURE — 80053 COMPREHEN METABOLIC PANEL: CPT | Performed by: INTERNAL MEDICINE

## 2017-10-23 PROCEDURE — 83036 HEMOGLOBIN GLYCOSYLATED A1C: CPT | Performed by: INTERNAL MEDICINE

## 2017-10-23 PROCEDURE — 84478 ASSAY OF TRIGLYCERIDES: CPT | Performed by: INTERNAL MEDICINE

## 2017-10-25 PROBLEM — N18.2 CHRONIC KIDNEY DISEASE, STAGE II (MILD): Chronic | Status: ACTIVE | Noted: 2017-04-26

## 2017-10-25 NOTE — PROGRESS NOTES
Subjective        History of Present Illness     Otilia Kenny is a 67 y.o. female who presents for six-month follow-up of multiple medical issues including type 2 diabetes, high cholesterol, high triglycerides, hypertension, GERD, depression, and other issues.    When she was here six months ago, I changed lisinopril HCT to lisinopril due to the increasing BUN and creatinine.  Renal function has improved as well as BUN.     Her blood pressure control is reasonable today.  He reports systolic blood pressure usually in the 120s and 130s at home.    She reports that she has been exercising with her recumbent exercise bike 5-6 days weekly.  She has lost 11 pounds in the past 6 months.  Her energy is better.  Her strength and conditioning are better.  She helped her  rebuild the deck recently.    Diabetic control has improved since her visit six months ago.  At that time, I had her stop the pioglitazone, as the patient felt this was causing weight gain and increased appetite.  I had her continue the current dose of Invokana and add Trulicity 0.75 mg weekly.    She feels that depression and anxiety symptoms are well controlled with the Celexa.    Past about any symptoms of memory loss, but she denies any memory difficulties.    The patient's relevant past medical, surgical, and social history was reviewed in Epic.   Lab results are reviewed with the patient today.  CBC unremarkable. Fasting glucose 133.  A1c is 6.8.  Liver function normal.  Renal function improved with creatinine 1.1 improved from 1.2.     .    Review of Systems   Constitutional: Negative for chills, fatigue and fever.   HENT: Negative for congestion, ear pain, postnasal drip, sinus pressure and sore throat.    Respiratory: Negative for cough, shortness of breath and wheezing.    Cardiovascular: Negative for chest pain, palpitations and leg swelling.   Gastrointestinal: Negative for abdominal pain, blood in stool, constipation, diarrhea, nausea  "and vomiting.   Endocrine: Negative for cold intolerance, heat intolerance, polydipsia and polyuria.   Genitourinary: Negative for dysuria, frequency, hematuria and urgency.   Skin: Negative for rash.   Neurological: Negative for syncope and weakness.        Objective     /70  Pulse 68  Temp 98.9 °F (37.2 °C) (Oral)   Ht 64\" (162.6 cm)  Wt 168 lb 3.2 oz (76.3 kg)  BMI 28.87 kg/m2    Physical Exam   Constitutional: She is oriented to person, place, and time. She appears well-developed and well-nourished. No distress.   Obese male.    HENT:   Head: Normocephalic and atraumatic.   Nose: Right sinus exhibits no maxillary sinus tenderness and no frontal sinus tenderness. Left sinus exhibits no maxillary sinus tenderness and no frontal sinus tenderness.   Mouth/Throat: Uvula is midline, oropharynx is clear and moist and mucous membranes are normal. No oral lesions. No tonsillar exudate.   Eyes: Conjunctivae and EOM are normal. Pupils are equal, round, and reactive to light.   Neck: Trachea normal. Neck supple. No JVD present. Carotid bruit is not present. No tracheal deviation present. No thyroid mass and no thyromegaly present.   Cardiovascular: Normal rate, regular rhythm and normal heart sounds.   No extrasystoles are present. PMI is not displaced.    No murmur heard.  Pulmonary/Chest: Effort normal and breath sounds normal. No accessory muscle usage. No respiratory distress. She has no decreased breath sounds. She has no wheezes. She has no rhonchi. She has no rales.   Abdominal: Soft. Bowel sounds are normal. She exhibits no distension. There is no hepatosplenomegaly. There is no tenderness.   Obese abdomen limits exam.        Vascular Status -  Her exam exhibits right foot vasculature normal. Her exam exhibits no right foot edema. Her exam exhibits left foot vasculature normal. Her exam exhibits no left foot edema.  Lymphadenopathy:     She has no cervical adenopathy.   Neurological: She is alert and " oriented to person, place, and time. No cranial nerve deficit. Coordination normal.   Skin: Skin is warm, dry and intact. No rash noted. No cyanosis. Nails show no clubbing.   Psychiatric: She has a normal mood and affect. Her speech is normal and behavior is normal. Thought content normal.   Vitals reviewed.      Assessment/Plan      Continue the current diabetic management  with Invokana and Trulicity.  She had loose stool with moderate doses of metformin previously.  Continue the diabetic diet, exercise, and weight loss efforts.    Continue Crestor and dietary efforts.    Continue the lisinopril.  I encouraged her to continue to drink adequate amounts of water and liquids.    Continue other medications and vitamin and mineral supplements to treat additional medical problems which we addressed today.  She will return in six months for follow up with fasting labs on week prior.         Diagnoses and all orders for this visit:    Type 2 diabetes mellitus without complication, without long-term current use of insulin  -     CBC Auto Differential; Future  -     Comprehensive Metabolic Panel; Future  -     Ferritin; Future  -     Hemoglobin A1c; Future  -     Lipid Panel; Future  -     Vitamin D 25 Hydroxy; Future    Essential hypertension    Mixed hyperlipidemia  -     Lipid Panel; Future    Gastroesophageal reflux disease without esophagitis    Chronic kidney disease, stage II (mild)    Iron deficiency anemia secondary to inadequate dietary iron intake  -     Ferritin; Future    Vitamin B12 deficiency (dietary) anemia    Generalized anxiety disorder    Major depression in complete remission    Sleep disorder    Vitamin D deficiency  -     Vitamin D 25 Hydroxy; Future      Lab on 10/23/2017   Component Date Value Ref Range Status   • Vitamin B-12 10/23/2017 806  239 - 931 pg/mL Final   • WBC 10/23/2017 4.38  3.20 - 9.80 10*3/mm3 Final   • RBC 10/23/2017 4.22  3.77 - 5.16 10*6/mm3 Final   • Hemoglobin 10/23/2017 12.7   12.0 - 15.5 g/dL Final   • Hematocrit 10/23/2017 39.2  35.0 - 45.0 % Final   • MCV 10/23/2017 92.9  80.0 - 98.0 fL Final   • MCH 10/23/2017 30.1  26.5 - 34.0 pg Final   • MCHC 10/23/2017 32.4  31.4 - 36.0 g/dL Final   • RDW 10/23/2017 12.5  11.5 - 14.5 % Final   • RDW-SD 10/23/2017 40.9  36.4 - 46.3 fl Final   • MPV 10/23/2017 10.8  8.0 - 12.0 fL Final   • Platelets 10/23/2017 325  150 - 450 10*3/mm3 Final   • Neutrophil % 10/23/2017 56.3  37.0 - 80.0 % Final   • Lymphocyte % 10/23/2017 32.9  10.0 - 50.0 % Final   • Monocyte % 10/23/2017 7.8  0.0 - 12.0 % Final   • Eosinophil % 10/23/2017 2.5  0.0 - 7.0 % Final   • Basophil % 10/23/2017 0.5  0.0 - 2.0 % Final   • Neutrophils, Absolute 10/23/2017 2.47  2.00 - 8.60 10*3/mm3 Final   • Lymphocytes, Absolute 10/23/2017 1.44  0.60 - 4.20 10*3/mm3 Final   • Monocytes, Absolute 10/23/2017 0.34  0.00 - 0.90 10*3/mm3 Final   • Eosinophils, Absolute 10/23/2017 0.11  0.00 - 0.70 10*3/mm3 Final   • Basophils, Absolute 10/23/2017 0.02  0.00 - 0.20 10*3/mm3 Final   • Glucose 10/23/2017 133* 70 - 100 mg/dL Final   • BUN 10/23/2017 26* 8 - 25 mg/dL Final   • Creatinine 10/23/2017 1.10  0.40 - 1.30 mg/dL Final   • Sodium 10/23/2017 141  134 - 146 mmol/L Final   • Potassium 10/23/2017 4.0  3.4 - 5.4 mmol/L Final   • Chloride 10/23/2017 103  100 - 112 mmol/L Final   • CO2 10/23/2017 26.0  20.0 - 32.0 mmol/L Final   • Calcium 10/23/2017 9.4  8.4 - 10.8 mg/dL Final   • Total Protein 10/23/2017 7.5  6.7 - 8.2 g/dL Final   • Albumin 10/23/2017 4.30  3.20 - 5.50 g/dL Final   • ALT (SGPT) 10/23/2017 23  10 - 60 U/L Final   • AST (SGOT) 10/23/2017 27  10 - 60 U/L Final   • Alkaline Phosphatase 10/23/2017 48  15 - 121 U/L Final   • Total Bilirubin 10/23/2017 0.8  0.2 - 1.0 mg/dL Final   • eGFR Non  Amer 10/23/2017 49  45 - 104 mL/min/1.73 Final   • Globulin 10/23/2017 3.2  2.5 - 4.6 gm/dL Final   • A/G Ratio 10/23/2017 1.3  1.0 - 3.0 g/dL Final   • BUN/Creatinine Ratio 10/23/2017 23.6   7.0 - 25.0 Final   • Anion Gap 10/23/2017 12.0  5.0 - 15.0 mmol/L Final   • Ferritin 10/23/2017 27.70  11.10 - 264.00 ng/mL Final   • Hemoglobin A1C 10/23/2017 6.8* 4 - 5.6 % Final   • LDL Cholesterol  10/23/2017 124  0 - 129 mg/dL Final   • Microalbumin/Creatinine Ratio 10/23/2017 16.8  0.0 - 30.0 mg/g Final   • Creatinine, Urine 10/23/2017 119.1  mg/dL Final   • Microalbumin, Urine 10/23/2017 2.0  mg/L Final   • TSH 10/23/2017 1.690  0.460 - 4.680 mIU/mL Final   • Triglycerides 10/23/2017 112  35 - 160 mg/dL Final   ]

## 2017-10-25 NOTE — PATIENT INSTRUCTIONS
Exercising to Lose Weight  Exercising can help you to lose weight. In order to lose weight through exercise, you need to do vigorous-intensity exercise. You can tell that you are exercising with vigorous intensity if you are breathing very hard and fast and cannot hold a conversation while exercising.  Moderate-intensity exercise helps to maintain your current weight. You can tell that you are exercising at a moderate level if you have a higher heart rate and faster breathing, but you are still able to hold a conversation.  HOW OFTEN SHOULD I EXERCISE?  Choose an activity that you enjoy and set realistic goals. Your health care provider can help you to make an activity plan that works for you. Exercise regularly as directed by your health care provider. This may include:  · Doing resistance training twice each week, such as:    Push-ups.    Sit-ups.    Lifting weights.    Using resistance bands.  · Doing a given intensity of exercise for a given amount of time. Choose from these options:    150 minutes of moderate-intensity exercise every week.    75 minutes of vigorous-intensity exercise every week.    A mix of moderate-intensity and vigorous-intensity exercise every week.  Children, pregnant women, people who are out of shape, people who are overweight, and older adults may need to consult a health care provider for individual recommendations. If you have any sort of medical condition, be sure to consult your health care provider before starting a new exercise program.  WHAT ARE SOME ACTIVITIES THAT CAN HELP ME TO LOSE WEIGHT?   · Walking at a rate of at least 4.5 miles an hour.  · Jogging or running at a rate of 5 miles per hour.  · Biking at a rate of at least 10 miles per hour.  · Lap swimming.  · Roller-skating or in-line skating.  · Cross-country skiing.  · Vigorous competitive sports, such as football, basketball, and soccer.  · Jumping rope.  · Aerobic dancing.  HOW CAN I BE MORE ACTIVE IN MY DAY-TO-DAY  ACTIVITIES?  · Use the stairs instead of the elevator.  · Take a walk during your lunch break.  · If you drive, park your car farther away from work or school.  · If you take public transportation, get off one stop early and walk the rest of the way.  · Make all of your phone calls while standing up and walking around.  · Get up, stretch, and walk around every 30 minutes throughout the day.  WHAT GUIDELINES SHOULD I FOLLOW WHILE EXERCISING?  · Do not exercise so much that you hurt yourself, feel dizzy, or get very short of breath.  · Consult your health care provider prior to starting a new exercise program.  · Wear comfortable clothes and shoes with good support.  · Drink plenty of water while you exercise to prevent dehydration or heat stroke. Body water is lost during exercise and must be replaced.  · Work out until you breathe faster and your heart beats faster.     This information is not intended to replace advice given to you by your health care provider. Make sure you discuss any questions you have with your health care provider.     Document Released: 01/20/2012 Document Revised: 01/08/2016 Document Reviewed: 05/21/2015  [employer] Interactive Patient Education ©2017 [employer]      Calorie Counting for Weight Loss  Calories are energy you get from the things you eat and drink. Your body uses this energy to keep you going throughout the day. The number of calories you eat affects your weight. When you eat more calories than your body needs, your body stores the extra calories as fat. When you eat fewer calories than your body needs, your body burns fat to get the energy it needs.  Calorie counting means keeping track of how many calories you eat and drink each day. If you make sure to eat fewer calories than your body needs, you should lose weight. In order for calorie counting to work, you will need to eat the number of calories that are right for you in a day to lose a healthy amount of weight per week.  A healthy amount of weight to lose per week is usually 1-2 lb (0.5-0.9 kg). A dietitian can determine how many calories you need in a day and give you suggestions on how to reach your calorie goal.   WHAT IS MY MY PLAN?  My goal is to have __________ calories per day.   If I have this many calories per day, I should lose around __________ pounds per week.  WHAT DO I NEED TO KNOW ABOUT CALORIE COUNTING?  In order to meet your daily calorie goal, you will need to:  · Find out how many calories are in each food you would like to eat. Try to do this before you eat.  · Decide how much of the food you can eat.  · Write down what you ate and how many calories it had. Doing this is called keeping a food log.  WHERE DO I FIND CALORIE INFORMATION?  The number of calories in a food can be found on a Nutrition Facts label. Note that all the information on a label is based on a specific serving of the food. If a food does not have a Nutrition Facts label, try to look up the calories online or ask your dietitian for help.  HOW DO I DECIDE HOW MUCH TO EAT?  To decide how much of the food you can eat, you will need to consider both the number of calories in one serving and the size of one serving. This information can be found on the Nutrition Facts label. If a food does not have a Nutrition Facts label, look up the information online or ask your dietitian for help.  Remember that calories are listed per serving. If you choose to have more than one serving of a food, you will have to multiply the calories per serving by the amount of servings you plan to eat. For example, the label on a package of bread might say that a serving size is 1 slice and that there are 90 calories in a serving. If you eat 1 slice, you will have eaten 90 calories. If you eat 2 slices, you will have eaten 180 calories.  HOW DO I KEEP A FOOD LOG?  After each meal, record the following information in your food log:  · What you ate.  · How much of it you  ate.  · How many calories it had.  · Then, add up your calories.  Keep your food log near you, such as in a small notebook in your pocket. Another option is to use a mobile katerin or website. Some programs will calculate calories for you and show you how many calories you have left each time you add an item to the log.  WHAT ARE SOME CALORIE COUNTING TIPS?  · Use your calories on foods and drinks that will fill you up and not leave you hungry. Some examples of this include foods like nuts and nut butters, vegetables, lean proteins, and high-fiber foods (more than 5 g fiber per serving).  · Eat nutritious foods and avoid empty calories. Empty calories are calories you get from foods or beverages that do not have many nutrients, such as candy and soda. It is better to have a nutritious high-calorie food (such as an avocado) than a food with few nutrients (such as a bag of chips).  · Know how many calories are in the foods you eat most often. This way, you do not have to look up how many calories they have each time you eat them.  · Look out for foods that may seem like low-calorie foods but are really high-calorie foods, such as baked goods, soda, and fat-free candy.  · Pay attention to calories in drinks. Drinks such as sodas, specialty coffee drinks, alcohol, and juices have a lot of calories yet do not fill you up. Choose low-calorie drinks like water and diet drinks.  · Focus your calorie counting efforts on higher calorie items. Logging the calories in a garden salad that contains only vegetables is less important than calculating the calories in a milk shake.  · Find a way of tracking calories that works for you. Get creative. Most people who are successful find ways to keep track of how much they eat in a day, even if they do not count every calorie.  WHAT ARE SOME PORTION CONTROL TIPS?  · Know how many calories are in a serving. This will help you know how many servings of a certain food you can have.  · Use a  measuring cup to measure serving sizes. This is helpful when you start out. With time, you will be able to estimate serving sizes for some foods.  · Take some time to put servings of different foods on your favorite plates, bowls, and cups so you know what a serving looks like.  · Try not to eat straight from a bag or box. Doing this can lead to overeating. Put the amount you would like to eat in a cup or on a plate to make sure you are eating the right portion.  · Use smaller plates, glasses, and bowls to prevent overeating. This is a quick and easy way to practice portion control. If your plate is smaller, less food can fit on it.  · Try not to multitask while eating, such as watching TV or using your computer. If it is time to eat, sit down at a table and enjoy your food. Doing this will help you to start recognizing when you are full. It will also make you more aware of what and how much you are eating.  HOW CAN I CALORIE COUNT WHEN EATING OUT?  · Ask for smaller portion sizes or child-sized portions.  · Consider sharing an entree and sides instead of getting your own entree.  · If you get your own entree, eat only half. Ask for a box at the beginning of your meal and put the rest of your entree in it so you are not tempted to eat it.  · Look for the calories on the menu. If calories are listed, choose the lower calorie options.  · Choose dishes that include vegetables, fruits, whole grains, low-fat dairy products, and lean protein. Focusing on smart food choices from each of the 5 food groups can help you stay on track at restaurants.  · Choose items that are boiled, broiled, grilled, or steamed.  · Choose water, milk, unsweetened iced tea, or other drinks without added sugars. If you want an alcoholic beverage, choose a lower calorie option. For example, a regular alvaro can have up to 700 calories and a glass of wine has around 150.  · Stay away from items that are buttered, battered, fried, or served with  "cream sauce. Items labeled \"crispy\" are usually fried, unless stated otherwise.  · Ask for dressings, sauces, and syrups on the side. These are usually very high in calories, so do not eat much of them.  · Watch out for salads. Many people think salads are a healthy option, but this is often not the case. Many salads come with yu, fried chicken, lots of cheese, fried chips, and dressing. All of these items have a lot of calories. If you want a salad, choose a garden salad and ask for grilled meats or steak. Ask for the dressing on the side, or ask for olive oil and vinegar or lemon to use as dressing.  · Estimate how many servings of a food you are given. For example, a serving of cooked rice is ½ cup or about the size of half a tennis ball or one cupcake wrapper. Knowing serving sizes will help you be aware of how much food you are eating at restaurants. The list below tells you how big or small some common portion sizes are based on everyday objects.    1 oz--4 stacked dice.    3 oz--1 deck of cards.    1 tsp--1 dice.    1 Tbsp--½ a Ping-Pong ball.    2 Tbsp--1 Ping-Pong ball.    ½ cup--1 tennis ball or 1 cupcake wrapper.    1 cup--1 baseball.     This information is not intended to replace advice given to you by your health care provider. Make sure you discuss any questions you have with your health care provider.     Document Released: 12/18/2006 Document Revised: 01/08/2016 Document Reviewed: 10/23/2014  [employer] Interactive Patient Education ©2017 [employer]    "

## 2017-10-30 ENCOUNTER — OFFICE VISIT (OUTPATIENT)
Dept: FAMILY MEDICINE CLINIC | Facility: CLINIC | Age: 67
End: 2017-10-30

## 2017-10-30 VITALS
HEART RATE: 68 BPM | BODY MASS INDEX: 28.71 KG/M2 | TEMPERATURE: 98.9 F | HEIGHT: 64 IN | SYSTOLIC BLOOD PRESSURE: 140 MMHG | WEIGHT: 168.2 LBS | DIASTOLIC BLOOD PRESSURE: 70 MMHG

## 2017-10-30 DIAGNOSIS — E78.2 MIXED HYPERLIPIDEMIA: Chronic | ICD-10-CM

## 2017-10-30 DIAGNOSIS — D50.8 IRON DEFICIENCY ANEMIA SECONDARY TO INADEQUATE DIETARY IRON INTAKE: Chronic | ICD-10-CM

## 2017-10-30 DIAGNOSIS — G47.9 SLEEP DISORDER: Chronic | ICD-10-CM

## 2017-10-30 DIAGNOSIS — E55.9 VITAMIN D DEFICIENCY: ICD-10-CM

## 2017-10-30 DIAGNOSIS — N18.2 CHRONIC KIDNEY DISEASE, STAGE II (MILD): Chronic | ICD-10-CM

## 2017-10-30 DIAGNOSIS — K21.9 GASTROESOPHAGEAL REFLUX DISEASE WITHOUT ESOPHAGITIS: Chronic | ICD-10-CM

## 2017-10-30 DIAGNOSIS — D51.8 VITAMIN B12 DEFICIENCY (DIETARY) ANEMIA: Chronic | ICD-10-CM

## 2017-10-30 DIAGNOSIS — F41.1 GENERALIZED ANXIETY DISORDER: Chronic | ICD-10-CM

## 2017-10-30 DIAGNOSIS — I10 ESSENTIAL HYPERTENSION: Chronic | ICD-10-CM

## 2017-10-30 DIAGNOSIS — F32.5 MAJOR DEPRESSION IN COMPLETE REMISSION (HCC): Chronic | ICD-10-CM

## 2017-10-30 DIAGNOSIS — E11.9 TYPE 2 DIABETES MELLITUS WITHOUT COMPLICATION, WITHOUT LONG-TERM CURRENT USE OF INSULIN (HCC): Primary | Chronic | ICD-10-CM

## 2017-10-30 PROCEDURE — 99214 OFFICE O/P EST MOD 30 MIN: CPT | Performed by: INTERNAL MEDICINE

## 2017-12-14 RX ORDER — OMEPRAZOLE 40 MG/1
CAPSULE, DELAYED RELEASE ORAL
Qty: 90 CAPSULE | Refills: 3 | Status: SHIPPED | OUTPATIENT
Start: 2017-12-14 | End: 2018-04-06 | Stop reason: SDUPTHER

## 2017-12-14 RX ORDER — FENOFIBRATE 145 MG/1
TABLET, COATED ORAL
Qty: 90 TABLET | Refills: 3 | Status: SHIPPED | OUTPATIENT
Start: 2017-12-14 | End: 2018-01-17 | Stop reason: SDUPTHER

## 2017-12-14 RX ORDER — CANAGLIFLOZIN 300 MG/1
TABLET, FILM COATED ORAL
Qty: 90 TABLET | Refills: 3 | Status: SHIPPED | OUTPATIENT
Start: 2017-12-14 | End: 2018-01-17 | Stop reason: SDUPTHER

## 2017-12-21 ENCOUNTER — OFFICE VISIT (OUTPATIENT)
Dept: ORTHOPEDIC SURGERY | Facility: CLINIC | Age: 67
End: 2017-12-21

## 2017-12-21 VITALS — HEIGHT: 64 IN | BODY MASS INDEX: 28.51 KG/M2 | WEIGHT: 167 LBS

## 2017-12-21 DIAGNOSIS — M23.92 INTERNAL DERANGEMENT OF LEFT KNEE: ICD-10-CM

## 2017-12-21 DIAGNOSIS — G89.29 CHRONIC PAIN OF BOTH KNEES: Primary | ICD-10-CM

## 2017-12-21 DIAGNOSIS — M25.562 CHRONIC PAIN OF BOTH KNEES: Primary | ICD-10-CM

## 2017-12-21 DIAGNOSIS — M17.12 PRIMARY OSTEOARTHRITIS OF LEFT KNEE: ICD-10-CM

## 2017-12-21 DIAGNOSIS — M25.561 CHRONIC PAIN OF BOTH KNEES: Primary | ICD-10-CM

## 2017-12-21 PROCEDURE — 20610 DRAIN/INJ JOINT/BURSA W/O US: CPT | Performed by: ORTHOPAEDIC SURGERY

## 2017-12-21 PROCEDURE — 99214 OFFICE O/P EST MOD 30 MIN: CPT | Performed by: ORTHOPAEDIC SURGERY

## 2017-12-21 RX ADMIN — TRIAMCINOLONE ACETONIDE 40 MG: 40 INJECTION, SUSPENSION INTRA-ARTICULAR; INTRAMUSCULAR at 15:20

## 2017-12-21 RX ADMIN — LIDOCAINE HYDROCHLORIDE 2 ML: 20 INJECTION, SOLUTION INFILTRATION; PERINEURAL at 15:20

## 2017-12-21 NOTE — PROGRESS NOTES
"Otilia Kenny is a 67 y.o. female returns for     Chief Complaint   Patient presents with   • Left Knee - Follow-up       HISTORY OF PRESENT ILLNESS:   steroid injection done on 6/6/2017 by Morena merrill but is worn off.   Knee just gives out at times  Dull aches and sharp pains in left knee  Using cane for balance most of the time.  Intermittent pain that is severe at times.       CONCURRENT MEDICAL HISTORY:    Past Medical History:   Diagnosis Date   • Abdominal pain     Most likely related to functional colonic spasm     • Abnormal liver enzymes     Improved, 3/2015      • Allergic rhinitis     seasonal   • Anemia    • Cervical disc disorder     S/P cervical surgeries x2-3. Extensive fusion C 3-7. [facility]      • Diarrhea, unspecified     Resolved with discontinuation of metformin.    • Diverticular disease of colon    • Dysfunction of eustachian tube    • Essential hypertension     Increase lisinopril HCT, 10/12.5.      • Gastritis    • Generalized anxiety disorder    • GERD (gastroesophageal reflux disease)    • Hyperlipidemia     Intolerant of Lipitor. The patient stopped Zocor due to \"liver pain\", summer 2015. patient instructed to reattempt Zocor every other day, 10/2015    • Hypertriglyceridemia     Holding simvastatin 2/2015 due to slightly elevated LFTs.      • Iron deficiency anemia    • Irritable bowel syndrome     Dr. Estrada   • Osteoarthritis of knee     Dr. Reynolds    • Sleep disorder    • Spasm of back muscles     Dr. Castro changed Soma to Zanaflex.     • Vitamin B12 deficiency (dietary) anemia     Currently on multivitamin daily         Allergies   Allergen Reactions   • Allegra [Fexofenadine]    • Amaryl [Glimepiride] Myalgia   • Dyazide [Triamterene-Hctz]    • Hydrochlorothiazide W-Triamterene    • Lipitor [Atorvastatin]    • Metformin And Related Diarrhea   • Naproxen          Current Outpatient Prescriptions:   •  amLODIPine (NORVASC) 5 MG tablet, Take 1 tablet by mouth once " daily, Disp: 90 tablet, Rfl: 3  •  aspirin 81 MG chewable tablet, Chew 81 mg daily., Disp: , Rfl:   •  Blood Glucose Monitoring Suppl (ONETOUCH VERIO IQ SYSTEM) W/DEVICE kit, daily., Disp: , Rfl:   •  CALCIUM CARB-CHOLECALCIFEROL PO, Take 1 tablet by mouth 2 (two) times a day., Disp: , Rfl:   •  citalopram (CeleXA) 20 MG tablet, Take 1 tablet by mouth  daily, Disp: 90 tablet, Rfl: 1  •  Dulaglutide 0.75 MG/0.5ML solution pen-injector, Inject 1 pen under the skin 1 (One) Time Per Week., Disp: 13 pen, Rfl: 3  •  fenofibrate (TRICOR) 145 MG tablet, TAKE 1/2 TO 1 TABLET BY  MOUTH DAILY, Disp: 90 tablet, Rfl: 3  •  INVOKANA 300 MG tablet, TAKE 1/2 TO 1 TABLET BY  MOUTH EVERY MORNING, Disp: 90 tablet, Rfl: 3  •  lisinopril (PRINIVIL,ZESTRIL) 20 MG tablet, Take 1 tablet by mouth Daily., Disp: 90 tablet, Rfl: 3  •  Multiple Vitamins-Minerals (MULTIVITAMIN PO), Take 1 tablet by mouth daily., Disp: , Rfl:   •  omeprazole (priLOSEC) 40 MG capsule, TAKE 1 CAPSULE BY MOUTH  ONCE DAILY IN THE MORNING, Disp: 90 capsule, Rfl: 3  •  ONETOUCH DELICA LANCETS 33G misc, Test every day, Disp: 100 each, Rfl: 3  •  ONETOUCH VERIO test strip, Test every day, Disp: 100 each, Rfl: 11  •  rosuvastatin (CRESTOR) 20 MG tablet, Take 1 tablet by mouth Daily., Disp: 90 tablet, Rfl: 3  •  VITAMINS A & D PO, Take 1 tablet by mouth 2 (two) times a day., Disp: , Rfl:     Past Surgical History:   Procedure Laterality Date   • BACK SURGERY      Laminectomy   • CERVICAL DISC SURGERY      Laminectomy 2011. Fusion 2013.   • COLONOSCOPY      Diverticulosis found in the sigmoid colon.A single diminutive polyp found in the colon.Mul.biopsies taken.Hemorrhoids found in the anus.   • ENDOSCOPY      Normal hypopharynx and esophagus.Marked irregularity of the Z-line,compatable with chronic reflux.Mul.biopsies.A hiatus hernia found in GE junction.Mild nonerosive gastritis.Mul.biopsies.Polyps in fundus.Mul.biopsies.Normal pylorus.Normal duodenum.   • ENDOSCOPY AND  "COLONOSCOPY     • HYSTERECTOMY     • KNEE ARTHROSCOPY      Arthroscopy of the left knee with debridement of medial meniscus.   • KNEE SURGERY      Right unicompartmental arthroplasty.       ROS  No fevers or chills.  No chest pain or shortness of air.  No GI or  disturbances.    PHYSICAL EXAMINATION:       Ht 162.6 cm (64\")  Wt 75.8 kg (167 lb)  BMI 28.67 kg/m2    Physical Exam   Constitutional: She is oriented to person, place, and time. She appears well-developed and well-nourished.   Neurological: She is alert and oriented to person, place, and time.   Psychiatric: She has a normal mood and affect. Her behavior is normal. Judgment and thought content normal.       GAIT:     []  Normal  [x]  Antalgic    Assistive device: []  None  []  Walker     []  Crutches  [x]  Cane     []  Wheelchair  []  Stretcher    Left Knee Exam     Tenderness   The patient is experiencing tenderness in the medial joint line and medial retinaculum.    Range of Motion   Extension: 0   Flexion: 110     Muscle Strength     The patient has normal left knee strength.    Tests   Damaris:  Medial - positive   Drawer:       Anterior - negative       Varus: negative  Valgus: negative    Other   Erythema: absent  Sensation: normal  Pulse: present  Swelling: none              Large Joint Arthrocentesis  Date/Time: 12/21/2017 3:20 PM  Consent given by: patient  Site marked: site marked  Timeout: Immediately prior to procedure a time out was called to verify the correct patient, procedure, equipment, support staff and site/side marked as required   Supporting Documentation  Indications: pain   Procedure Details  Location: knee - L knee  Preparation: Patient was prepped and draped in the usual sterile fashion  Needle size: 22 G  Approach: anteromedial  Medications administered: 40 mg triamcinolone acetonide 40 MG/ML; 2 mL lidocaine 2%  Patient tolerance: patient tolerated the procedure well with no immediate " complications                  ASSESSMENT:    Diagnoses and all orders for this visit:    Chronic pain of both knees  -     MRI Knee Left Without Contrast; Future  -     Large Joint Arthrocentesis    Primary osteoarthritis of left knee  -     MRI Knee Left Without Contrast; Future  -     Large Joint Arthrocentesis    Internal derangement of left knee  -     MRI Knee Left Without Contrast; Future  -     Large Joint Arthrocentesis          PLAN    Try a steroid injection today for some immediate relief.  Also discussed MRI to assess for internal derangement or possible stress fracture around the knee.  Use of cane or walker as needed to limit weight bearing  Reassess after MRI.     Return for recheck for MRI results.    Baron Reynolds MD

## 2017-12-23 RX ORDER — TRIAMCINOLONE ACETONIDE 40 MG/ML
40 INJECTION, SUSPENSION INTRA-ARTICULAR; INTRAMUSCULAR
Status: COMPLETED | OUTPATIENT
Start: 2017-12-21 | End: 2017-12-21

## 2017-12-23 RX ORDER — LIDOCAINE HYDROCHLORIDE 20 MG/ML
2 INJECTION, SOLUTION INFILTRATION; PERINEURAL
Status: COMPLETED | OUTPATIENT
Start: 2017-12-21 | End: 2017-12-21

## 2017-12-27 DIAGNOSIS — G89.29 CHRONIC PAIN OF BOTH KNEES: ICD-10-CM

## 2017-12-27 DIAGNOSIS — M17.12 PRIMARY OSTEOARTHRITIS OF LEFT KNEE: ICD-10-CM

## 2017-12-27 DIAGNOSIS — M25.562 CHRONIC PAIN OF BOTH KNEES: ICD-10-CM

## 2017-12-27 DIAGNOSIS — M23.92 INTERNAL DERANGEMENT OF LEFT KNEE: ICD-10-CM

## 2017-12-27 DIAGNOSIS — M25.561 CHRONIC PAIN OF BOTH KNEES: ICD-10-CM

## 2018-01-17 RX ORDER — FENOFIBRATE 145 MG/1
TABLET, COATED ORAL
Qty: 90 TABLET | Refills: 3 | Status: SHIPPED | OUTPATIENT
Start: 2018-01-17 | End: 2018-05-30

## 2018-01-30 ENCOUNTER — OFFICE VISIT (OUTPATIENT)
Dept: ORTHOPEDIC SURGERY | Facility: CLINIC | Age: 68
End: 2018-01-30

## 2018-01-30 VITALS — BODY MASS INDEX: 28.85 KG/M2 | WEIGHT: 169 LBS | HEIGHT: 64 IN

## 2018-01-30 DIAGNOSIS — M25.561 CHRONIC PAIN OF BOTH KNEES: Primary | ICD-10-CM

## 2018-01-30 DIAGNOSIS — M17.12 PRIMARY OSTEOARTHRITIS OF LEFT KNEE: ICD-10-CM

## 2018-01-30 DIAGNOSIS — M25.562 CHRONIC PAIN OF BOTH KNEES: Primary | ICD-10-CM

## 2018-01-30 DIAGNOSIS — G89.29 CHRONIC PAIN OF BOTH KNEES: Primary | ICD-10-CM

## 2018-01-30 PROCEDURE — 20610 DRAIN/INJ JOINT/BURSA W/O US: CPT | Performed by: ORTHOPAEDIC SURGERY

## 2018-01-30 PROCEDURE — 99213 OFFICE O/P EST LOW 20 MIN: CPT | Performed by: ORTHOPAEDIC SURGERY

## 2018-01-30 RX ORDER — LIDOCAINE HYDROCHLORIDE 10 MG/ML
2 INJECTION, SOLUTION INFILTRATION; PERINEURAL
Status: COMPLETED | OUTPATIENT
Start: 2018-01-30 | End: 2018-01-30

## 2018-01-30 RX ORDER — TRIAMCINOLONE ACETONIDE 40 MG/ML
40 INJECTION, SUSPENSION INTRA-ARTICULAR; INTRAMUSCULAR
Status: COMPLETED | OUTPATIENT
Start: 2018-01-30 | End: 2018-01-30

## 2018-01-30 RX ADMIN — LIDOCAINE HYDROCHLORIDE 2 ML: 10 INJECTION, SOLUTION INFILTRATION; PERINEURAL at 10:36

## 2018-01-30 RX ADMIN — TRIAMCINOLONE ACETONIDE 40 MG: 40 INJECTION, SUSPENSION INTRA-ARTICULAR; INTRAMUSCULAR at 10:36

## 2018-01-30 NOTE — PROGRESS NOTES
"Otilia Kenny is a 68 y.o. female returns for     Chief Complaint   Patient presents with   • Left Knee - Follow-up   • Results     mri left knee done on 12/26/2017 @ [facility].        HISTORY OF PRESENT ILLNESS:  Still having pain with activity.  Pain with weightbearing.  No numbness or tingling.     CONCURRENT MEDICAL HISTORY:    Past Medical History:   Diagnosis Date   • Abdominal pain     Most likely related to functional colonic spasm     • Abnormal liver enzymes     Improved, 3/2015      • Allergic rhinitis     seasonal   • Anemia    • Cervical disc disorder     S/P cervical surgeries x2-3. Extensive fusion C 3-7. [facility]      • Diarrhea, unspecified     Resolved with discontinuation of metformin.    • Diverticular disease of colon    • Dysfunction of eustachian tube    • Essential hypertension     Increase lisinopril HCT, 10/12.5.      • Gastritis    • Generalized anxiety disorder    • GERD (gastroesophageal reflux disease)    • Hyperlipidemia     Intolerant of Lipitor. The patient stopped Zocor due to \"liver pain\", summer 2015. patient instructed to reattempt Zocor every other day, 10/2015    • Hypertriglyceridemia     Holding simvastatin 2/2015 due to slightly elevated LFTs.      • Iron deficiency anemia    • Irritable bowel syndrome     Dr. Estrada   • Osteoarthritis of knee     Dr. Reynolds    • Sleep disorder    • Spasm of back muscles     Dr. Castro changed Soma to Zanaflex.     • Vitamin B12 deficiency (dietary) anemia     Currently on multivitamin daily         Allergies   Allergen Reactions   • Allegra [Fexofenadine]    • Amaryl [Glimepiride] Myalgia   • Dyazide [Triamterene-Hctz]    • Hydrochlorothiazide W-Triamterene    • Lipitor [Atorvastatin]    • Metformin And Related Diarrhea   • Naproxen          Current Outpatient Prescriptions:   •  amLODIPine (NORVASC) 5 MG tablet, Take 1 tablet by mouth once daily, Disp: 90 tablet, Rfl: 3  •  aspirin 81 MG chewable tablet, Chew 81 mg daily., " Disp: , Rfl:   •  Blood Glucose Monitoring Suppl (ONETOUCH VERIO IQ SYSTEM) W/DEVICE kit, daily., Disp: , Rfl:   •  CALCIUM CARB-CHOLECALCIFEROL PO, Take 1 tablet by mouth 2 (two) times a day., Disp: , Rfl:   •  Canagliflozin (INVOKANA) 300 MG tablet, Take 1/2 to 1 tab daily, Disp: 90 tablet, Rfl: 3  •  citalopram (CeleXA) 20 MG tablet, Take 1 tablet by mouth  daily, Disp: 90 tablet, Rfl: 1  •  Dulaglutide 0.75 MG/0.5ML solution pen-injector, Inject 1 pen under the skin 1 (One) Time Per Week., Disp: 13 pen, Rfl: 3  •  fenofibrate (TRICOR) 145 MG tablet, Take 1/2 to 1 tab daily, Disp: 90 tablet, Rfl: 3  •  lisinopril (PRINIVIL,ZESTRIL) 20 MG tablet, Take 1 tablet by mouth Daily., Disp: 90 tablet, Rfl: 3  •  Multiple Vitamins-Minerals (MULTIVITAMIN PO), Take 1 tablet by mouth daily., Disp: , Rfl:   •  omeprazole (priLOSEC) 40 MG capsule, TAKE 1 CAPSULE BY MOUTH  ONCE DAILY IN THE MORNING, Disp: 90 capsule, Rfl: 3  •  ONETOUCH DELICA LANCETS 33G misc, Test every day, Disp: 100 each, Rfl: 3  •  ONETOUCH VERIO test strip, Test every day, Disp: 100 each, Rfl: 11  •  rosuvastatin (CRESTOR) 20 MG tablet, Take 1 tablet by mouth Daily., Disp: 90 tablet, Rfl: 3  •  VITAMINS A & D PO, Take 1 tablet by mouth 2 (two) times a day., Disp: , Rfl:     Past Surgical History:   Procedure Laterality Date   • BACK SURGERY      Laminectomy   • CERVICAL DISC SURGERY      Laminectomy 2011. Fusion 2013.   • COLONOSCOPY      Diverticulosis found in the sigmoid colon.A single diminutive polyp found in the colon.Mul.biopsies taken.Hemorrhoids found in the anus.   • ENDOSCOPY      Normal hypopharynx and esophagus.Marked irregularity of the Z-line,compatable with chronic reflux.Mul.biopsies.A hiatus hernia found in GE junction.Mild nonerosive gastritis.Mul.biopsies.Polyps in fundus.Mul.biopsies.Normal pylorus.Normal duodenum.   • ENDOSCOPY AND COLONOSCOPY     • HYSTERECTOMY     • KNEE ARTHROSCOPY      Arthroscopy of the left knee with debridement  "of medial meniscus.   • KNEE SURGERY      Right unicompartmental arthroplasty.       ROS  No fevers or chills.  No chest pain or shortness of air.  No GI or  disturbances.    PHYSICAL EXAMINATION:       Ht 162.6 cm (64\")  Wt 76.7 kg (169 lb)  BMI 29.01 kg/m2    Physical Exam   Constitutional: She is oriented to person, place, and time. She appears well-developed and well-nourished.   Neurological: She is alert and oriented to person, place, and time.   Psychiatric: She has a normal mood and affect. Her behavior is normal. Judgment and thought content normal.       GAIT:     []  Normal  [x]  Antalgic    Assistive device: []  None  []  Walker     []  Crutches  [x]  Cane     []  Wheelchair  []  Stretcher    Left Knee Exam     Tenderness   The patient is experiencing tenderness in the medial joint line (diffuse).    Range of Motion   Extension: -5   Flexion: 120     Muscle Strength     The patient has normal left knee strength.    Tests   Drawer:       Anterior - negative     Posterior - negative  Varus: negative  Valgus: negative    Other   Sensation: normal  Pulse: present  Swelling: none    Comments:  Crepitation with motion  Mild to moderate pain through arc of motion                [facility]  Result Narrative   Earlier surgery in the medial left knee . No specific incident but progressive pain aggravated with weightbearing .     MRI left knee: No marrow edema or sign of bony injury . Anterior cruciate ligament is chronically torn at its femoral attachment. On the May 2013 study there was no redundance of the posterior cruciate ligament but this is changed and the posterior   cruciate ligament now has significant redundance . The lateral meniscus has a normal appearance . Most of the posterior half of the medial meniscus is been resected and there is subchondral irregularity and signal change in the medial aspect of the   medial compartment and there is less " cartilage in the compartment now than on the earlier study . The patella is normally seated and there is thinning of the cartilage  without  subchondral irregularity or signal change . There is a small joint effusion      impression  1. Since May 2013 much of the posterior half of the medial meniscus has been resected and there is now less cartilage and more degenerative change than on the earlier study .  2. The patient has a chronic anterior cruciate ligament tear and since May 2013 the posterior cruciate ligament has become redundant  3. The patellofemoral compartment of the knee has very little cartilage but as of yet there is no signal change in the underlying bone             ASSESSMENT:    Diagnoses and all orders for this visit:    Chronic pain of both knees    Primary osteoarthritis of left knee    Other orders  -     Large Joint Arthrocentesis      Large Joint Arthrocentesis  Date/Time: 1/30/2018 10:36 AM  Consent given by: patient  Site marked: site marked  Timeout: Immediately prior to procedure a time out was called to verify the correct patient, procedure, equipment, support staff and site/side marked as required   Supporting Documentation  Indications: pain   Procedure Details  Location: knee - L knee  Preparation: Patient was prepped and draped in the usual sterile fashion  Needle size: 22 G  Approach: anteromedial  Medications administered: 40 mg triamcinolone acetonide 40 MG/ML; 2 mL lidocaine 1 %  Patient tolerance: patient tolerated the procedure well with no immediate complications            PLAN    Activity as tolerated.  Continue with range of motion and strengthening as pain allows.  Strongly encouraged to continuing endurance and conditioning exercises.  Encouraged her to continue to use her cane for balance and support.  We discussed using a Visco supplementation for continued attempts at pain relief.  We also began the discussion for total knee arthroplasty on the left.  Repeat steroid  injection on today's date.    Return for visco-supplementation.    Baron Reynolds MD

## 2018-02-22 RX ORDER — CITALOPRAM 20 MG/1
20 TABLET ORAL DAILY
Qty: 90 TABLET | Refills: 3 | Status: SHIPPED | OUTPATIENT
Start: 2018-02-22 | End: 2018-06-07 | Stop reason: SDUPTHER

## 2018-03-01 ENCOUNTER — CLINICAL SUPPORT (OUTPATIENT)
Dept: ORTHOPEDIC SURGERY | Facility: CLINIC | Age: 68
End: 2018-03-01

## 2018-03-01 VITALS — BODY MASS INDEX: 28.85 KG/M2 | HEIGHT: 64 IN | WEIGHT: 169 LBS

## 2018-03-01 DIAGNOSIS — M25.562 CHRONIC PAIN OF LEFT KNEE: ICD-10-CM

## 2018-03-01 DIAGNOSIS — M17.12 PRIMARY OSTEOARTHRITIS OF LEFT KNEE: Primary | ICD-10-CM

## 2018-03-01 DIAGNOSIS — G89.29 CHRONIC PAIN OF LEFT KNEE: ICD-10-CM

## 2018-03-01 DIAGNOSIS — M23.92 INTERNAL DERANGEMENT OF LEFT KNEE: ICD-10-CM

## 2018-03-01 PROCEDURE — 20610 DRAIN/INJ JOINT/BURSA W/O US: CPT | Performed by: ORTHOPAEDIC SURGERY

## 2018-03-01 NOTE — PROGRESS NOTES
"Knee Joint Injection      Patient: Otilia Kenny    YOB: 1950    Chief Complaint   Patient presents with   • Left Knee - Follow-up     Synvisc One injection         History of Present Illness:  Patient is here for an injection.  No new complaints.    Physical Exam: 68 y.o. female  General Appearance:    Alert, cooperative, in no acute distress                   Vitals:    03/01/18 0802   Weight: 76.7 kg (169 lb)   Height: 162.6 cm (64\")        Patient is alert and oriented, ×3 no acute distress.  Affect is normal respiratory rate is normal unlabored.  Exam and complaints are unchanged.    Procedure:  Large Joint Arthrocentesis  Date/Time: 3/1/2018 8:02 AM  Consent given by: patient  Site marked: site marked  Timeout: Immediately prior to procedure a time out was called to verify the correct patient, procedure, equipment, support staff and site/side marked as required   Supporting Documentation  Indications: pain   Procedure Details  Location: knee - L knee  Preparation: Patient was prepped and draped in the usual sterile fashion  Needle size: 20 G  Approach: anteromedial  Medications administered: 48 mg hylan 48 MG/6ML  Patient tolerance: patient tolerated the procedure well with no immediate complications          Assessment:    Diagnoses and all orders for this visit:    Primary osteoarthritis of left knee  -     Large Joint Arthrocentesis    Chronic pain of left knee  -     Large Joint Arthrocentesis    Internal derangement of left knee  -     Large Joint Arthrocentesis    Other orders  -     Dulaglutide (TRULICITY) 0.75 MG/0.5ML solution pen-injector; Inject  under the skin.        Plan:   Slowly increase activity as tolerated.  Discussed importance of leg strengthening and general conditioning.  Discussed warning signs of injection.  Discussed that purpose of injections was symptom improvement and improved activity.  Also discussed that further treatment options depended on symptoms at followup " and length of time of improvement after injections.    Return if symptoms worsen or fail to improve, for recheck.    Baron Reynolds MD

## 2018-04-06 RX ORDER — LISINOPRIL 20 MG/1
20 TABLET ORAL DAILY
Qty: 90 TABLET | Refills: 0 | Status: SHIPPED | OUTPATIENT
Start: 2018-04-06 | End: 2018-07-25 | Stop reason: SDUPTHER

## 2018-04-06 RX ORDER — OMEPRAZOLE 40 MG/1
40 CAPSULE, DELAYED RELEASE ORAL DAILY
Qty: 90 CAPSULE | Refills: 0 | Status: SHIPPED | OUTPATIENT
Start: 2018-04-06 | End: 2018-06-30 | Stop reason: SDUPTHER

## 2018-04-06 RX ORDER — ROSUVASTATIN CALCIUM 20 MG/1
20 TABLET, COATED ORAL DAILY
Qty: 90 TABLET | Refills: 0 | Status: SHIPPED | OUTPATIENT
Start: 2018-04-06 | End: 2018-07-14 | Stop reason: SDUPTHER

## 2018-04-06 RX ORDER — AMLODIPINE BESYLATE 5 MG/1
5 TABLET ORAL DAILY
Qty: 90 TABLET | Refills: 0 | Status: SHIPPED | OUTPATIENT
Start: 2018-04-06 | End: 2018-07-14 | Stop reason: SDUPTHER

## 2018-04-10 RX ORDER — LANCETS 33 GAUGE
EACH MISCELLANEOUS
Qty: 100 EACH | Refills: 3 | Status: SHIPPED | OUTPATIENT
Start: 2018-04-10 | End: 2019-04-03 | Stop reason: SDUPTHER

## 2018-04-19 ENCOUNTER — OFFICE VISIT (OUTPATIENT)
Dept: ORTHOPEDIC SURGERY | Facility: CLINIC | Age: 68
End: 2018-04-19

## 2018-04-19 VITALS — BODY MASS INDEX: 29.11 KG/M2 | WEIGHT: 170.5 LBS | HEIGHT: 64 IN

## 2018-04-19 DIAGNOSIS — N18.2 CHRONIC KIDNEY DISEASE, STAGE II (MILD): ICD-10-CM

## 2018-04-19 DIAGNOSIS — F41.1 GENERALIZED ANXIETY DISORDER: ICD-10-CM

## 2018-04-19 DIAGNOSIS — G89.29 CHRONIC PAIN OF LEFT KNEE: ICD-10-CM

## 2018-04-19 DIAGNOSIS — M23.92 INTERNAL DERANGEMENT OF LEFT KNEE: ICD-10-CM

## 2018-04-19 DIAGNOSIS — E11.9 TYPE 2 DIABETES MELLITUS WITHOUT COMPLICATION, WITHOUT LONG-TERM CURRENT USE OF INSULIN (HCC): ICD-10-CM

## 2018-04-19 DIAGNOSIS — M25.562 CHRONIC PAIN OF LEFT KNEE: ICD-10-CM

## 2018-04-19 DIAGNOSIS — F32.5 MAJOR DEPRESSION IN COMPLETE REMISSION (HCC): ICD-10-CM

## 2018-04-19 DIAGNOSIS — M17.12 PRIMARY OSTEOARTHRITIS OF LEFT KNEE: Primary | ICD-10-CM

## 2018-04-19 DIAGNOSIS — I10 ESSENTIAL HYPERTENSION: ICD-10-CM

## 2018-04-19 PROCEDURE — 99214 OFFICE O/P EST MOD 30 MIN: CPT | Performed by: ORTHOPAEDIC SURGERY

## 2018-04-19 NOTE — PROGRESS NOTES
"Otilia Kenny is a 68 y.o. female returns for     Chief Complaint   Patient presents with   • Left Knee - Follow-up       HISTORY OF PRESENT ILLNESS:  Patient here for recheck following a Synvisc One injection on 3/1/18.  Injection did not improve the pain.  She has also had steroid shots which did not improve her pain.She has pain with activity of daily living.  She has a chronic dull ache all the time and has occasional sharp stabbing pains as well.  She has tried use of a walker or cane, use of home exercise program, has been through physical therapy, tried anti-inflammatory medications, intra-articular steroid injections, as well as viscose supplementation.  She is unhappy with her quality of life and wishes to pursue other options that would afford her improve her lifestyle.  She is having pain at night that is affecting her sleeping.     CONCURRENT MEDICAL HISTORY:    Past Medical History:   Diagnosis Date   • Abdominal pain     Most likely related to functional colonic spasm     • Abnormal liver enzymes     Improved, 3/2015      • Allergic rhinitis     seasonal   • Anemia    • Cervical disc disorder     S/P cervical surgeries x2-3. Extensive fusion C 3-7. [place] [facility]      • Diarrhea, unspecified     Resolved with discontinuation of metformin.    • Diverticular disease of colon    • Dysfunction of eustachian tube    • Essential hypertension     Increase lisinopril HCT, 10/12.5.      • Gastritis    • Generalized anxiety disorder    • GERD (gastroesophageal reflux disease)    • Hyperlipidemia     Intolerant of Lipitor. The patient stopped Zocor due to \"liver pain\", summer 2015. patient instructed to reattempt Zocor every other day, 10/2015    • Hypertriglyceridemia     Holding simvastatin 2/2015 due to slightly elevated LFTs.      • Iron deficiency anemia    • Irritable bowel syndrome     Dr. Estrada   • Osteoarthritis of knee     Dr. Reynolds    • Sleep disorder    • Spasm of back muscles     Dr." Gloria changed Soma to Zanaflex.     • Vitamin B12 deficiency (dietary) anemia     Currently on multivitamin daily         Allergies   Allergen Reactions   • Allegra [Fexofenadine]    • Amaryl [Glimepiride] Myalgia   • Dyazide [Triamterene-Hctz]    • Hydrochlorothiazide W-Triamterene    • Lipitor [Atorvastatin]    • Metformin And Related Diarrhea   • Naproxen          Current Outpatient Prescriptions:   •  amLODIPine (NORVASC) 5 MG tablet, Take 1 tablet by mouth Daily., Disp: 90 tablet, Rfl: 0  •  aspirin 81 MG chewable tablet, Chew 81 mg daily., Disp: , Rfl:   •  Blood Glucose Monitoring Suppl (ONETOUCH VERIO IQ SYSTEM) W/DEVICE kit, daily., Disp: , Rfl:   •  CALCIUM CARB-CHOLECALCIFEROL PO, Take 1 tablet by mouth 2 (two) times a day., Disp: , Rfl:   •  Canagliflozin (INVOKANA) 300 MG tablet, Take 1/2 to 1 tab daily, Disp: 90 tablet, Rfl: 3  •  citalopram (CeleXA) 20 MG tablet, Take 1 tablet by mouth Daily., Disp: 90 tablet, Rfl: 3  •  Dulaglutide (TRULICITY) 0.75 MG/0.5ML solution pen-injector, Inject 0.75 mg under the skin 1 (One) Time Per Week., Disp: 12 pen, Rfl: 3  •  Dulaglutide 0.75 MG/0.5ML solution pen-injector, Inject 1 pen under the skin 1 (One) Time Per Week., Disp: 13 pen, Rfl: 3  •  fenofibrate (TRICOR) 145 MG tablet, Take 1/2 to 1 tab daily, Disp: 90 tablet, Rfl: 3  •  glucose blood (ONETOUCH VERIO) test strip, Test daily as directed. DX: E11.9, Disp: 100 each, Rfl: 3  •  lisinopril (PRINIVIL,ZESTRIL) 20 MG tablet, Take 1 tablet by mouth Daily., Disp: 90 tablet, Rfl: 0  •  Multiple Vitamins-Minerals (MULTIVITAMIN PO), Take 1 tablet by mouth daily., Disp: , Rfl:   •  omeprazole (priLOSEC) 40 MG capsule, Take 1 capsule by mouth Daily., Disp: 90 capsule, Rfl: 0  •  ONETOUCH DELICA LANCETS 33G misc, Test daily as directed DX: E11.9, Disp: 100 each, Rfl: 3  •  rosuvastatin (CRESTOR) 20 MG tablet, Take 1 tablet by mouth Daily., Disp: 90 tablet, Rfl: 0  •  VITAMINS A & D PO, Take 1 tablet by mouth 2 (two)  "times a day., Disp: , Rfl:     Past Surgical History:   Procedure Laterality Date   • BACK SURGERY      Laminectomy   • CERVICAL DISC SURGERY      Laminectomy 2011. Fusion 2013.   • COLONOSCOPY      Diverticulosis found in the sigmoid colon.A single diminutive polyp found in the colon.Mul.biopsies taken.Hemorrhoids found in the anus.   • ENDOSCOPY      Normal hypopharynx and esophagus.Marked irregularity of the Z-line,compatable with chronic reflux.Mul.biopsies.A hiatus hernia found in GE junction.Mild nonerosive gastritis.Mul.biopsies.Polyps in fundus.Mul.biopsies.Normal pylorus.Normal duodenum.   • ENDOSCOPY AND COLONOSCOPY     • HYSTERECTOMY     • KNEE ARTHROSCOPY      Arthroscopy of the left knee with debridement of medial meniscus.   • KNEE SURGERY      Right unicompartmental arthroplasty.     Family History   Problem Relation Age of Onset   • Cancer Other      lung   • Diabetes Other    • Hypertension Other    • Stroke Other      Social History   Substance Use Topics   • Smoking status: Former Smoker     Types: Cigarettes     Quit date: 6/2/1985   • Smokeless tobacco: Never Used   • Alcohol use No       ROS  No fevers or chills.  No chest pain or shortness of air.  No GI or  disturbances.  All other systems reviewed as negative    PHYSICAL EXAMINATION:       Ht 162.6 cm (64\")   Wt 77.3 kg (170 lb 8 oz)   BMI 29.27 kg/m²     Physical Exam   Constitutional: She is oriented to person, place, and time. She appears well-developed and well-nourished. No distress.   Eyes: Conjunctivae are normal. Pupils are equal, round, and reactive to light.   Neck: Neck supple. No tracheal deviation present. No thyromegaly present.   Cardiovascular: Normal rate, regular rhythm, normal heart sounds and intact distal pulses.    Pulmonary/Chest: Effort normal and breath sounds normal. She exhibits no tenderness.   Abdominal: Soft. Bowel sounds are normal. She exhibits no distension and no mass. There is no tenderness. "   Neurological: She is alert and oriented to person, place, and time.   Skin: Skin is warm. No rash noted.   Psychiatric: She has a normal mood and affect. Her behavior is normal. Judgment and thought content normal.   Vitals reviewed.      GAIT:     [x]  Normal  []  Antalgic    Assistive device: [x]  None  []  Walker     []  Crutches  []  Cane     []  Wheelchair  []  Stretcher    Right Knee Exam     Tenderness   The patient is experiencing no tenderness.         Range of Motion   Extension: -5   Flexion: 120     Muscle Strength     The patient has normal right knee strength.    Tests   Drawer:       Anterior - negative    Posterior - negative  Varus: negative  Valgus: negative    Other   Sensation: normal  Pulse: present  Swelling: mild      Left Knee Exam     Tenderness   Left knee tenderness location: diffuse.    Range of Motion   Extension: -5   Flexion: 110     Muscle Strength     The patient has normal left knee strength.    Tests   Drawer:       Anterior - negative     Posterior - negative  Varus: negative  Valgus: negative    Other   Sensation: normal  Pulse: present  Swelling: none    Comments:  Crepitation with motion  Mild to moderate pain through arc of motion              Xr Knee 1 Or 2 View Left    Result Date: 4/20/2018  Narrative: AP bilateral standing with lateral of the left knee shows essentially complete bony collapse of the medial compartment of the left knee with tricompartmental osteoarthritic changes.  No acute bony abnormality is noted.  The right knee shows acceptable position and alignment of a medial compartment unicompartmental arthroplasty.  She shows degenerative changes in the lateral compartment with marginal osteophytes. Lateral of the left knee shows moderate to severe arthritic changes in the patellofemoral compartment and posterior osteophytes. Progressive change is noted in both knees in comparison to x-rays June 6, 2017. 04/20/18 at 6:48 AM by Baron Reynolds MD      Xr  Knee Bilateral Ap Standing    Result Date: 4/20/2018  Narrative: AP bilateral standing with lateral of the left knee shows essentially complete bony collapse of the medial compartment of the left knee with tricompartmental osteoarthritic changes.  No acute bony abnormality is noted.  The right knee shows acceptable position and alignment of a medial compartment unicompartmental arthroplasty.  She shows degenerative changes in the lateral compartment with marginal osteophytes. Lateral of the left knee shows moderate to severe arthritic changes in the patellofemoral compartment and posterior osteophytes. Progressive change is noted in both knees in comparison to x-rays June 6, 2017. 04/20/18 at 6:48 AM by Baron Reynolds MD              ASSESSMENT:    Diagnoses and all orders for this visit:    Primary osteoarthritis of left knee  -     XR Knee 1 or 2 View Left  -     XR Knee Bilateral AP Standing  -     Case Request; Standing  -     Type and screen; Future  -     Tranexamic Acid 1,000 mg in sodium chloride 0.9 % 100 mL; Infuse 1,000 mg into a venous catheter 1 (One) Time.  -     Tranexamic Acid 1,000 mg in sodium chloride 0.9 % 100 mL; Infuse 1,000 mg into a venous catheter 1 (One) Time.  -     sodium chloride 0.9 % flush 1-10 mL; Infuse 1-10 mL into a venous catheter As Needed for Line Care.  -     ceFAZolin (ANCEF) 2 g in sodium chloride 0.9 % 100 mL IVPB; Infuse 2 g into a venous catheter 1 (One) Time.  -     acetaminophen (TYLENOL) tablet 975 mg; Take 3 tablets by mouth 1 (One) Time.  -     pregabalin (LYRICA) capsule 75 mg; Take 3 capsules by mouth 1 (One) Time.  -     oxyCODONE (oxyCONTIN) 12 hr tablet 10 mg; Take 1 tablet by mouth 1 (One) Time.  -     Case Request    Chronic pain of left knee  -     XR Knee 1 or 2 View Left  -     XR Knee Bilateral AP Standing  -     Case Request; Standing  -     Type and screen; Future  -     Tranexamic Acid 1,000 mg in sodium chloride 0.9 % 100 mL; Infuse 1,000 mg  into a venous catheter 1 (One) Time.  -     Tranexamic Acid 1,000 mg in sodium chloride 0.9 % 100 mL; Infuse 1,000 mg into a venous catheter 1 (One) Time.  -     sodium chloride 0.9 % flush 1-10 mL; Infuse 1-10 mL into a venous catheter As Needed for Line Care.  -     ceFAZolin (ANCEF) 2 g in sodium chloride 0.9 % 100 mL IVPB; Infuse 2 g into a venous catheter 1 (One) Time.  -     acetaminophen (TYLENOL) tablet 975 mg; Take 3 tablets by mouth 1 (One) Time.  -     pregabalin (LYRICA) capsule 75 mg; Take 3 capsules by mouth 1 (One) Time.  -     oxyCODONE (oxyCONTIN) 12 hr tablet 10 mg; Take 1 tablet by mouth 1 (One) Time.  -     Case Request    Internal derangement of left knee  -     XR Knee 1 or 2 View Left  -     XR Knee Bilateral AP Standing    Essential hypertension  -     Case Request; Standing  -     Type and screen; Future  -     Tranexamic Acid 1,000 mg in sodium chloride 0.9 % 100 mL; Infuse 1,000 mg into a venous catheter 1 (One) Time.  -     Tranexamic Acid 1,000 mg in sodium chloride 0.9 % 100 mL; Infuse 1,000 mg into a venous catheter 1 (One) Time.  -     sodium chloride 0.9 % flush 1-10 mL; Infuse 1-10 mL into a venous catheter As Needed for Line Care.  -     ceFAZolin (ANCEF) 2 g in sodium chloride 0.9 % 100 mL IVPB; Infuse 2 g into a venous catheter 1 (One) Time.  -     acetaminophen (TYLENOL) tablet 975 mg; Take 3 tablets by mouth 1 (One) Time.  -     pregabalin (LYRICA) capsule 75 mg; Take 3 capsules by mouth 1 (One) Time.  -     oxyCODONE (oxyCONTIN) 12 hr tablet 10 mg; Take 1 tablet by mouth 1 (One) Time.  -     Case Request    Chronic kidney disease, stage II (mild)  -     Case Request; Standing  -     Type and screen; Future  -     Tranexamic Acid 1,000 mg in sodium chloride 0.9 % 100 mL; Infuse 1,000 mg into a venous catheter 1 (One) Time.  -     Tranexamic Acid 1,000 mg in sodium chloride 0.9 % 100 mL; Infuse 1,000 mg into a venous catheter 1 (One) Time.  -     sodium chloride 0.9 % flush 1-10  mL; Infuse 1-10 mL into a venous catheter As Needed for Line Care.  -     ceFAZolin (ANCEF) 2 g in sodium chloride 0.9 % 100 mL IVPB; Infuse 2 g into a venous catheter 1 (One) Time.  -     acetaminophen (TYLENOL) tablet 975 mg; Take 3 tablets by mouth 1 (One) Time.  -     pregabalin (LYRICA) capsule 75 mg; Take 3 capsules by mouth 1 (One) Time.  -     oxyCODONE (oxyCONTIN) 12 hr tablet 10 mg; Take 1 tablet by mouth 1 (One) Time.  -     Case Request    Major depression in complete remission  -     Case Request; Standing  -     Type and screen; Future  -     Tranexamic Acid 1,000 mg in sodium chloride 0.9 % 100 mL; Infuse 1,000 mg into a venous catheter 1 (One) Time.  -     Tranexamic Acid 1,000 mg in sodium chloride 0.9 % 100 mL; Infuse 1,000 mg into a venous catheter 1 (One) Time.  -     sodium chloride 0.9 % flush 1-10 mL; Infuse 1-10 mL into a venous catheter As Needed for Line Care.  -     ceFAZolin (ANCEF) 2 g in sodium chloride 0.9 % 100 mL IVPB; Infuse 2 g into a venous catheter 1 (One) Time.  -     acetaminophen (TYLENOL) tablet 975 mg; Take 3 tablets by mouth 1 (One) Time.  -     pregabalin (LYRICA) capsule 75 mg; Take 3 capsules by mouth 1 (One) Time.  -     oxyCODONE (oxyCONTIN) 12 hr tablet 10 mg; Take 1 tablet by mouth 1 (One) Time.  -     Case Request    Generalized anxiety disorder  -     Case Request; Standing  -     Type and screen; Future  -     Tranexamic Acid 1,000 mg in sodium chloride 0.9 % 100 mL; Infuse 1,000 mg into a venous catheter 1 (One) Time.  -     Tranexamic Acid 1,000 mg in sodium chloride 0.9 % 100 mL; Infuse 1,000 mg into a venous catheter 1 (One) Time.  -     sodium chloride 0.9 % flush 1-10 mL; Infuse 1-10 mL into a venous catheter As Needed for Line Care.  -     ceFAZolin (ANCEF) 2 g in sodium chloride 0.9 % 100 mL IVPB; Infuse 2 g into a venous catheter 1 (One) Time.  -     acetaminophen (TYLENOL) tablet 975 mg; Take 3 tablets by mouth 1 (One) Time.  -     pregabalin (LYRICA)  capsule 75 mg; Take 3 capsules by mouth 1 (One) Time.  -     oxyCODONE (oxyCONTIN) 12 hr tablet 10 mg; Take 1 tablet by mouth 1 (One) Time.  -     Case Request    Type 2 diabetes mellitus without complication, without long-term current use of insulin  -     Case Request; Standing  -     Type and screen; Future  -     Tranexamic Acid 1,000 mg in sodium chloride 0.9 % 100 mL; Infuse 1,000 mg into a venous catheter 1 (One) Time.  -     Tranexamic Acid 1,000 mg in sodium chloride 0.9 % 100 mL; Infuse 1,000 mg into a venous catheter 1 (One) Time.  -     sodium chloride 0.9 % flush 1-10 mL; Infuse 1-10 mL into a venous catheter As Needed for Line Care.  -     ceFAZolin (ANCEF) 2 g in sodium chloride 0.9 % 100 mL IVPB; Infuse 2 g into a venous catheter 1 (One) Time.  -     acetaminophen (TYLENOL) tablet 975 mg; Take 3 tablets by mouth 1 (One) Time.  -     pregabalin (LYRICA) capsule 75 mg; Take 3 capsules by mouth 1 (One) Time.  -     oxyCODONE (oxyCONTIN) 12 hr tablet 10 mg; Take 1 tablet by mouth 1 (One) Time.  -     Case Request    Other orders  -     Outpatient In A Bed; Standing  -     Follow Anesthesia Guidelines / Standing Orders; Future  -     Orthopedic Discharge Planning for PT & Case Management - Inpatient With Post-Op Day 2 or 3 Discharge  -     Provide instructions to patient regarding NPO status  -     Follow Anesthesia Guidelines / Standing Orders; Standing  -     Verify NPO Status; Standing  -     FRACISCO hose- To be placed on patient in pre-op; Standing  -     POC Glucose Once; Standing  -     Clip operative site; Standing  -     Obtain informed consent (if not collected inpatient or PAT); Standing  -     MRSA Screen, PCR - Swab, Nares; Standing  -     Insert Peripheral IV; Standing  -     Saline Lock & Maintain IV Access; Standing  -     PT Consult: Eval & Treat; Standing  -     OT Consult: Eval & Treat ADL's, mobility; Standing  -     Inpatient Consult to Case Management ;  Standing          PLAN    Patient's Body mass index is 29.27 kg/m². BMI is above normal parameters. Follow-up plan includes:  exercise counseling and nutrition counseling.    The patient voiced understanding of the risks, benefits, and alternative forms of treatment that were discussed and the patient consents to proceed with surgery.  All risks, benefits and alternatives were discussed.  Risks including to but not exclusive to anesthetic complications, including death, MI, CVA, infection, bleeding DVT, fracture, residual pain and need for future surgery.  This discussion was held with the patient by Baron Reynolds MD and all questions were answered.    Plan total knee arthroplasty on the left  Good candidate for TXA    She is more complicated due to diabetes mellitus type 2, essential hypertension, stage II chronic renal disease, and anxiety.    We went over the total joint booklet and all questions were answered.  She'll see her primary care physician and a couple of weeks and will ensure that she is in good medical health for surgical intervention.    Return for Post-operative eval.    Baron Reynolds MD

## 2018-04-20 RX ORDER — PREGABALIN 75 MG/1
75 CAPSULE ORAL ONCE
Status: CANCELLED | OUTPATIENT
Start: 2018-06-04 | End: 2018-06-04

## 2018-04-20 RX ORDER — SODIUM CHLORIDE 0.9 % (FLUSH) 0.9 %
1-10 SYRINGE (ML) INJECTION AS NEEDED
Status: CANCELLED | OUTPATIENT
Start: 2018-06-04

## 2018-04-20 RX ORDER — OXYCODONE HCL 10 MG/1
10 TABLET, FILM COATED, EXTENDED RELEASE ORAL ONCE
Status: CANCELLED | OUTPATIENT
Start: 2018-06-04 | End: 2018-06-04

## 2018-04-20 RX ORDER — ACETAMINOPHEN 325 MG/1
1000 TABLET ORAL ONCE
Status: CANCELLED | OUTPATIENT
Start: 2018-06-04 | End: 2018-06-04

## 2018-04-26 ENCOUNTER — LAB (OUTPATIENT)
Dept: LAB | Facility: OTHER | Age: 68
End: 2018-04-26

## 2018-04-26 DIAGNOSIS — N18.2 CHRONIC KIDNEY DISEASE, STAGE II (MILD): ICD-10-CM

## 2018-04-26 DIAGNOSIS — D50.8 IRON DEFICIENCY ANEMIA SECONDARY TO INADEQUATE DIETARY IRON INTAKE: Chronic | ICD-10-CM

## 2018-04-26 DIAGNOSIS — M17.12 PRIMARY OSTEOARTHRITIS OF LEFT KNEE: ICD-10-CM

## 2018-04-26 DIAGNOSIS — M25.562 CHRONIC PAIN OF LEFT KNEE: ICD-10-CM

## 2018-04-26 DIAGNOSIS — E55.9 VITAMIN D DEFICIENCY: ICD-10-CM

## 2018-04-26 DIAGNOSIS — E78.2 MIXED HYPERLIPIDEMIA: Chronic | ICD-10-CM

## 2018-04-26 DIAGNOSIS — F41.1 GENERALIZED ANXIETY DISORDER: ICD-10-CM

## 2018-04-26 DIAGNOSIS — G89.29 CHRONIC PAIN OF LEFT KNEE: ICD-10-CM

## 2018-04-26 DIAGNOSIS — I10 ESSENTIAL HYPERTENSION: ICD-10-CM

## 2018-04-26 DIAGNOSIS — E11.9 TYPE 2 DIABETES MELLITUS WITHOUT COMPLICATION, WITHOUT LONG-TERM CURRENT USE OF INSULIN (HCC): ICD-10-CM

## 2018-04-26 DIAGNOSIS — F32.5 MAJOR DEPRESSION IN COMPLETE REMISSION (HCC): ICD-10-CM

## 2018-04-26 LAB
25(OH)D3 SERPL-MCNC: 33.9 NG/ML (ref 30–100)
ABO GROUP BLD: NORMAL
ALBUMIN SERPL-MCNC: 4.2 G/DL (ref 3.2–5.5)
ALBUMIN/GLOB SERPL: 1.5 G/DL (ref 1–3)
ALP SERPL-CCNC: 53 U/L (ref 15–121)
ALT SERPL W P-5'-P-CCNC: 20 U/L (ref 10–60)
ANION GAP SERPL CALCULATED.3IONS-SCNC: 8 MMOL/L (ref 5–15)
AST SERPL-CCNC: 24 U/L (ref 10–60)
BASOPHILS # BLD AUTO: 0.04 10*3/MM3 (ref 0–0.2)
BASOPHILS NFR BLD AUTO: 0.8 % (ref 0–2)
BILIRUB SERPL-MCNC: 0.7 MG/DL (ref 0.2–1)
BLD GP AB SCN SERPL QL: NEGATIVE
BUN BLD-MCNC: 26 MG/DL (ref 8–25)
BUN/CREAT SERPL: 23.6 (ref 7–25)
CALCIUM SPEC-SCNC: 9.1 MG/DL (ref 8.4–10.8)
CHLORIDE SERPL-SCNC: 102 MMOL/L (ref 100–112)
CHOLEST SERPL-MCNC: 197 MG/DL (ref 150–200)
CO2 SERPL-SCNC: 27 MMOL/L (ref 20–32)
CREAT BLD-MCNC: 1.1 MG/DL (ref 0.4–1.3)
DEPRECATED RDW RBC AUTO: 39.7 FL (ref 36.4–46.3)
EOSINOPHIL # BLD AUTO: 0.14 10*3/MM3 (ref 0–0.7)
EOSINOPHIL NFR BLD AUTO: 2.7 % (ref 0–7)
ERYTHROCYTE [DISTWIDTH] IN BLOOD BY AUTOMATED COUNT: 12 % (ref 11.5–14.5)
FERRITIN SERPL-MCNC: 31.1 NG/ML (ref 11.1–264)
GFR SERPL CREATININE-BSD FRML MDRD: 49 ML/MIN/1.73 (ref 39–90)
GLOBULIN UR ELPH-MCNC: 2.8 GM/DL (ref 2.5–4.6)
GLUCOSE BLD-MCNC: 132 MG/DL (ref 70–100)
HBA1C MFR BLD: 6.9 % (ref 4–5.6)
HCT VFR BLD AUTO: 38.8 % (ref 35–45)
HDLC SERPL-MCNC: 49 MG/DL (ref 35–100)
HGB BLD-MCNC: 12.7 G/DL (ref 12–15.5)
LDLC SERPL CALC-MCNC: 124 MG/DL
LDLC/HDLC SERPL: 2.53 {RATIO}
LYMPHOCYTES # BLD AUTO: 1.63 10*3/MM3 (ref 0.6–4.2)
LYMPHOCYTES NFR BLD AUTO: 31.7 % (ref 10–50)
Lab: NORMAL
MCH RBC QN AUTO: 30.4 PG (ref 26.5–34)
MCHC RBC AUTO-ENTMCNC: 32.7 G/DL (ref 31.4–36)
MCV RBC AUTO: 92.8 FL (ref 80–98)
MONOCYTES # BLD AUTO: 0.52 10*3/MM3 (ref 0–0.9)
MONOCYTES NFR BLD AUTO: 10.1 % (ref 0–12)
NEUTROPHILS # BLD AUTO: 2.82 10*3/MM3 (ref 2–8.6)
NEUTROPHILS NFR BLD AUTO: 54.7 % (ref 37–80)
PLATELET # BLD AUTO: 361 10*3/MM3 (ref 150–450)
PMV BLD AUTO: 9.8 FL (ref 8–12)
POTASSIUM BLD-SCNC: 4 MMOL/L (ref 3.4–5.4)
PROT SERPL-MCNC: 7 G/DL (ref 6.7–8.2)
RBC # BLD AUTO: 4.18 10*6/MM3 (ref 3.77–5.16)
RH BLD: POSITIVE
SODIUM BLD-SCNC: 137 MMOL/L (ref 134–146)
T&S EXPIRATION DATE: NORMAL
TRIGL SERPL-MCNC: 121 MG/DL (ref 35–160)
VLDLC SERPL-MCNC: 24.2 MG/DL
WBC NRBC COR # BLD: 5.15 10*3/MM3 (ref 3.2–9.8)

## 2018-04-26 PROCEDURE — 85025 COMPLETE CBC W/AUTO DIFF WBC: CPT | Performed by: INTERNAL MEDICINE

## 2018-04-26 PROCEDURE — 82728 ASSAY OF FERRITIN: CPT | Performed by: INTERNAL MEDICINE

## 2018-04-26 PROCEDURE — 86850 RBC ANTIBODY SCREEN: CPT | Performed by: ORTHOPAEDIC SURGERY

## 2018-04-26 PROCEDURE — 80053 COMPREHEN METABOLIC PANEL: CPT | Performed by: INTERNAL MEDICINE

## 2018-04-26 PROCEDURE — 86900 BLOOD TYPING SEROLOGIC ABO: CPT | Performed by: ORTHOPAEDIC SURGERY

## 2018-04-26 PROCEDURE — 86901 BLOOD TYPING SEROLOGIC RH(D): CPT | Performed by: ORTHOPAEDIC SURGERY

## 2018-04-26 PROCEDURE — 80061 LIPID PANEL: CPT | Performed by: INTERNAL MEDICINE

## 2018-04-26 PROCEDURE — 82306 VITAMIN D 25 HYDROXY: CPT | Performed by: INTERNAL MEDICINE

## 2018-04-26 PROCEDURE — 83036 HEMOGLOBIN GLYCOSYLATED A1C: CPT | Performed by: INTERNAL MEDICINE

## 2018-04-26 PROCEDURE — 36415 COLL VENOUS BLD VENIPUNCTURE: CPT | Performed by: INTERNAL MEDICINE

## 2018-05-02 NOTE — PROGRESS NOTES
Subjective        History of Present Illness     Otilia Kenny is a 68 y.o. female who presents for six-month follow-up of multiple medical issues including type 2 diabetes, high cholesterol, high triglycerides, hypertension, GERD, depression, and other issues.  A combination of Trulicity and Invokana has helped her lose weight and maintain weight loss.    Dr. Reynolds, orthopedist, plans to do total knee arthroplasty on the left later this month.  Prior steroid shots and Synvisc One injection on 3/1/18 have not improved her knee pain.  We discussed her operative risk.  It is my opinion, that she is average risk for perioperative complications.  She specifically denies chest pains, palpitations, PND or orthopnea, syncope or presyncope.  She understands the risks of surgery and voices her opinion to proceed.    Her diabetic management includes Invokana and Trulicity.  She had tolerability issues due to loose stool with moderate doses of metformin previously.  She is requesting a cheaper GLP-1 to replace Trulicity, which is now tier 3 on her plan.  Victoza is clear to.  Ozempic is not covered.    The patient's relevant past medical, surgical, and social history was reviewed in Epic.   Lab results are reviewed with the patient today.  CBC unremarkable.  Fasting glucose 132.  A1c is 6.9.  Renal and liver function normal.  Vitamin D is 34 on her current calcium/vitamin D supplement..  Total cholesterol 197. HDL 49.  .  Triglycerides 121.        Review of Systems   Constitutional: Negative for chills, fatigue and fever.   HENT: Negative for congestion, ear pain, postnasal drip, sinus pressure and sore throat.    Respiratory: Negative for cough, shortness of breath and wheezing.    Cardiovascular: Negative for chest pain, palpitations and leg swelling.   Gastrointestinal: Negative for abdominal pain, blood in stool, constipation, diarrhea, nausea and vomiting.   Endocrine: Negative for cold intolerance, heat  "intolerance, polydipsia and polyuria.   Genitourinary: Negative for dysuria, frequency, hematuria and urgency.   Musculoskeletal: Positive for arthralgias.   Skin: Negative for rash.   Neurological: Negative for syncope and weakness.          Objective     /70   Pulse 64   Temp 99.2 °F (37.3 °C) (Oral)   Ht 162.6 cm (64\")   Wt 75.8 kg (167 lb)   BMI 28.67 kg/m²     Physical Exam   Constitutional: She is oriented to person, place, and time. She appears well-developed and well-nourished. No distress.   Pleasant female.  Currently ambulating with a cane.   HENT:   Head: Normocephalic and atraumatic.   Nose: Right sinus exhibits no maxillary sinus tenderness and no frontal sinus tenderness. Left sinus exhibits no maxillary sinus tenderness and no frontal sinus tenderness.   Mouth/Throat: Uvula is midline, oropharynx is clear and moist and mucous membranes are normal. No oral lesions. No tonsillar exudate.   Eyes: Conjunctivae and EOM are normal. Pupils are equal, round, and reactive to light.   Neck: Trachea normal. Neck supple. No JVD present. Carotid bruit is not present. No tracheal deviation present. No thyroid mass and no thyromegaly present.   Cardiovascular: Normal rate, regular rhythm, normal heart sounds and intact distal pulses.   No extrasystoles are present. PMI is not displaced.    No murmur heard.  Pulmonary/Chest: Effort normal and breath sounds normal. No accessory muscle usage. No respiratory distress. She has no decreased breath sounds. She has no wheezes. She has no rhonchi. She has no rales.   Abdominal: Soft. Bowel sounds are normal. She exhibits no distension. There is no hepatosplenomegaly. There is no tenderness.   Obese abdomen limits exam.    Musculoskeletal:   Right knee is status post TKR.  Left knee with arthritic changes     Vascular Status -  Her right foot exhibits normal foot vasculature  and no edema. Her left foot exhibits normal foot vasculature  and no " edema.  Lymphadenopathy:     She has no cervical adenopathy.   Neurological: She is alert and oriented to person, place, and time. No cranial nerve deficit. Coordination normal.   Skin: Skin is warm, dry and intact. No rash noted. No cyanosis. Nails show no clubbing.   Psychiatric: She has a normal mood and affect. Her speech is normal and behavior is normal. Judgment and thought content normal.   Vitals reviewed.          Assessment/Plan      Change Trulicity to Victoza, gradually working up to 1.2 mg daily.  Continue Invokana  as well as intensified diabetic diet, exercise, and weight loss efforts.  I encouraged her to start walking daily for exercise after she has recovered from her left TKR.     Continue Crestor and TriCor, and intensify dietary efforts.     Continue the lisinopril and Norvasc.  Pursue sodium restriction and weight loss.  She denies orthostatic symptoms, but she loses more weight, we will probably be able to stop the Norvasc.     Continue other medications and vitamin and mineral supplements to treat additional medical problems which we addressed today.      She will return in six months for follow up with fasting labs on week prior.   In my opinion, she is a reasonable surgical risk regarding the upcoming left TKR.    Diagnoses and all orders for this visit:    Type 2 diabetes mellitus without complication, without long-term current use of insulin  -     CBC Auto Differential; Future  -     TSH; Future  -     Microalbumin / Creatinine Urine Ratio - Urine, Clean Catch; Future    Mixed hyperlipidemia  -     LDL Cholesterol, Direct; Future    Hypertriglyceridemia  -     Triglycerides; Future    Essential hypertension    Gastroesophageal reflux disease without esophagitis    Primary osteoarthritis of both knees    Chronic kidney disease, stage II (mild)    Vitamin B12 deficiency (dietary) anemia  -     Vitamin B12; Future    Iron deficiency anemia secondary to inadequate dietary iron intake  -      Ferritin; Future  -     Iron Profile; Future    Vitamin D deficiency  -     Vitamin D 25 Hydroxy; Future    Other orders  -     Liraglutide (VICTOZA) 18 MG/3ML solution pen-injector injection; Inject 1.2 mg under the skin Daily.        Lab on 04/26/2018   Component Date Value Ref Range Status   • WBC 04/26/2018 5.15  3.20 - 9.80 10*3/mm3 Final   • RBC 04/26/2018 4.18  3.77 - 5.16 10*6/mm3 Final   • Hemoglobin 04/26/2018 12.7  12.0 - 15.5 g/dL Final   • Hematocrit 04/26/2018 38.8  35.0 - 45.0 % Final   • MCV 04/26/2018 92.8  80.0 - 98.0 fL Final   • MCH 04/26/2018 30.4  26.5 - 34.0 pg Final   • MCHC 04/26/2018 32.7  31.4 - 36.0 g/dL Final   • RDW 04/26/2018 12.0  11.5 - 14.5 % Final   • RDW-SD 04/26/2018 39.7  36.4 - 46.3 fl Final   • MPV 04/26/2018 9.8  8.0 - 12.0 fL Final   • Platelets 04/26/2018 361  150 - 450 10*3/mm3 Final   • Neutrophil % 04/26/2018 54.7  37.0 - 80.0 % Final   • Lymphocyte % 04/26/2018 31.7  10.0 - 50.0 % Final   • Monocyte % 04/26/2018 10.1  0.0 - 12.0 % Final   • Eosinophil % 04/26/2018 2.7  0.0 - 7.0 % Final   • Basophil % 04/26/2018 0.8  0.0 - 2.0 % Final   • Neutrophils, Absolute 04/26/2018 2.82  2.00 - 8.60 10*3/mm3 Final   • Lymphocytes, Absolute 04/26/2018 1.63  0.60 - 4.20 10*3/mm3 Final   • Monocytes, Absolute 04/26/2018 0.52  0.00 - 0.90 10*3/mm3 Final   • Eosinophils, Absolute 04/26/2018 0.14  0.00 - 0.70 10*3/mm3 Final   • Basophils, Absolute 04/26/2018 0.04  0.00 - 0.20 10*3/mm3 Final   • Glucose 04/26/2018 132* 70 - 100 mg/dL Final   • BUN 04/26/2018 26* 8 - 25 mg/dL Final   • Creatinine 04/26/2018 1.10  0.40 - 1.30 mg/dL Final   • Sodium 04/26/2018 137  134 - 146 mmol/L Final   • Potassium 04/26/2018 4.0  3.4 - 5.4 mmol/L Final   • Chloride 04/26/2018 102  100 - 112 mmol/L Final   • CO2 04/26/2018 27.0  20.0 - 32.0 mmol/L Final   • Calcium 04/26/2018 9.1  8.4 - 10.8 mg/dL Final   • Total Protein 04/26/2018 7.0  6.7 - 8.2 g/dL Final   • Albumin 04/26/2018 4.20  3.20 - 5.50 g/dL  Final   • ALT (SGPT) 04/26/2018 20  10 - 60 U/L Final   • AST (SGOT) 04/26/2018 24  10 - 60 U/L Final   • Alkaline Phosphatase 04/26/2018 53  15 - 121 U/L Final   • Total Bilirubin 04/26/2018 0.7  0.2 - 1.0 mg/dL Final   • eGFR Non African Amer 04/26/2018 49  39 - 90 mL/min/1.73 Final   • Globulin 04/26/2018 2.8  2.5 - 4.6 gm/dL Final   • A/G Ratio 04/26/2018 1.5  1.0 - 3.0 g/dL Final   • BUN/Creatinine Ratio 04/26/2018 23.6  7.0 - 25.0 Final   • Anion Gap 04/26/2018 8.0  5.0 - 15.0 mmol/L Final   • Ferritin 04/26/2018 31.10  11.10 - 264.00 ng/mL Final   • Hemoglobin A1C 04/26/2018 6.9* 4 - 5.6 % Final   • Total Cholesterol 04/26/2018 197  150 - 200 mg/dL Final   • Triglycerides 04/26/2018 121  35 - 160 mg/dL Final   • HDL Cholesterol 04/26/2018 49  35 - 100 mg/dL Final   • LDL Cholesterol  04/26/2018 124  mg/dL Final   • VLDL Cholesterol 04/26/2018 24.2  mg/dL Final   • LDL/HDL Ratio 04/26/2018 2.53   Final   • 25 Hydroxy, Vitamin D 04/26/2018 33.9  30.0 - 100.0 ng/ml Final   • ABO Type 04/26/2018 O   Final   • RH type 04/26/2018 Positive   Final   • Antibody Screen 04/26/2018 Negative   Final   • T&S Expiration Date 04/26/2018 4/29/2018 11:59:59 PM   Final   • Previous History 04/26/2018 No record on File   Final   ]

## 2018-05-03 ENCOUNTER — OFFICE VISIT (OUTPATIENT)
Dept: FAMILY MEDICINE CLINIC | Facility: CLINIC | Age: 68
End: 2018-05-03

## 2018-05-03 VITALS
WEIGHT: 167 LBS | BODY MASS INDEX: 28.51 KG/M2 | DIASTOLIC BLOOD PRESSURE: 70 MMHG | SYSTOLIC BLOOD PRESSURE: 118 MMHG | TEMPERATURE: 99.2 F | HEIGHT: 64 IN | HEART RATE: 64 BPM

## 2018-05-03 DIAGNOSIS — I10 ESSENTIAL HYPERTENSION: Chronic | ICD-10-CM

## 2018-05-03 DIAGNOSIS — E11.9 TYPE 2 DIABETES MELLITUS WITHOUT COMPLICATION, WITHOUT LONG-TERM CURRENT USE OF INSULIN (HCC): Primary | Chronic | ICD-10-CM

## 2018-05-03 DIAGNOSIS — N18.2 CHRONIC KIDNEY DISEASE, STAGE II (MILD): Chronic | ICD-10-CM

## 2018-05-03 DIAGNOSIS — E78.2 MIXED HYPERLIPIDEMIA: Chronic | ICD-10-CM

## 2018-05-03 DIAGNOSIS — K21.9 GASTROESOPHAGEAL REFLUX DISEASE WITHOUT ESOPHAGITIS: Chronic | ICD-10-CM

## 2018-05-03 DIAGNOSIS — E78.1 HYPERTRIGLYCERIDEMIA: Chronic | ICD-10-CM

## 2018-05-03 DIAGNOSIS — D51.8 VITAMIN B12 DEFICIENCY (DIETARY) ANEMIA: Chronic | ICD-10-CM

## 2018-05-03 DIAGNOSIS — M17.0 PRIMARY OSTEOARTHRITIS OF BOTH KNEES: ICD-10-CM

## 2018-05-03 DIAGNOSIS — D50.8 IRON DEFICIENCY ANEMIA SECONDARY TO INADEQUATE DIETARY IRON INTAKE: Chronic | ICD-10-CM

## 2018-05-03 DIAGNOSIS — E55.9 VITAMIN D DEFICIENCY: ICD-10-CM

## 2018-05-03 PROCEDURE — 99214 OFFICE O/P EST MOD 30 MIN: CPT | Performed by: INTERNAL MEDICINE

## 2018-05-07 PROBLEM — G89.29 CHRONIC PAIN OF LEFT KNEE: Status: ACTIVE | Noted: 2018-05-07

## 2018-05-07 PROBLEM — M17.12 PRIMARY OSTEOARTHRITIS OF LEFT KNEE: Status: ACTIVE | Noted: 2018-05-07

## 2018-05-07 PROBLEM — E11.9 TYPE 2 DIABETES MELLITUS WITHOUT COMPLICATION, WITHOUT LONG-TERM CURRENT USE OF INSULIN: Status: ACTIVE | Noted: 2018-05-07

## 2018-05-07 PROBLEM — M25.562 CHRONIC PAIN OF LEFT KNEE: Status: ACTIVE | Noted: 2018-05-07

## 2018-05-30 ENCOUNTER — OFFICE VISIT (OUTPATIENT)
Dept: ORTHOPEDIC SURGERY | Facility: CLINIC | Age: 68
End: 2018-05-30

## 2018-05-30 ENCOUNTER — APPOINTMENT (OUTPATIENT)
Dept: PREADMISSION TESTING | Facility: HOSPITAL | Age: 68
End: 2018-05-30

## 2018-05-30 VITALS
BODY MASS INDEX: 27.66 KG/M2 | OXYGEN SATURATION: 96 % | HEART RATE: 75 BPM | HEIGHT: 65 IN | DIASTOLIC BLOOD PRESSURE: 64 MMHG | WEIGHT: 166 LBS | SYSTOLIC BLOOD PRESSURE: 100 MMHG | RESPIRATION RATE: 14 BRPM

## 2018-05-30 VITALS — WEIGHT: 166 LBS | HEIGHT: 65 IN | BODY MASS INDEX: 27.66 KG/M2

## 2018-05-30 DIAGNOSIS — E11.9 TYPE 2 DIABETES MELLITUS WITHOUT COMPLICATION, WITHOUT LONG-TERM CURRENT USE OF INSULIN (HCC): ICD-10-CM

## 2018-05-30 DIAGNOSIS — N18.2 CHRONIC KIDNEY DISEASE, STAGE II (MILD): ICD-10-CM

## 2018-05-30 DIAGNOSIS — G89.29 CHRONIC PAIN OF LEFT KNEE: ICD-10-CM

## 2018-05-30 DIAGNOSIS — I10 ESSENTIAL HYPERTENSION: ICD-10-CM

## 2018-05-30 DIAGNOSIS — M25.562 CHRONIC PAIN OF LEFT KNEE: ICD-10-CM

## 2018-05-30 DIAGNOSIS — M17.12 PRIMARY OSTEOARTHRITIS OF LEFT KNEE: Primary | ICD-10-CM

## 2018-05-30 DIAGNOSIS — M23.92 INTERNAL DERANGEMENT OF LEFT KNEE: ICD-10-CM

## 2018-05-30 LAB
ABO GROUP BLD: NORMAL
ANION GAP SERPL CALCULATED.3IONS-SCNC: 12 MMOL/L (ref 5–15)
BLD GP AB SCN SERPL QL: NEGATIVE
BUN BLD-MCNC: 21 MG/DL (ref 7–21)
BUN/CREAT SERPL: 20.6 (ref 7–25)
CALCIUM SPEC-SCNC: 10 MG/DL (ref 8.4–10.2)
CHLORIDE SERPL-SCNC: 101 MMOL/L (ref 95–110)
CO2 SERPL-SCNC: 26 MMOL/L (ref 22–31)
CREAT BLD-MCNC: 1.02 MG/DL (ref 0.5–1)
DEPRECATED RDW RBC AUTO: 38.7 FL (ref 36.4–46.3)
ERYTHROCYTE [DISTWIDTH] IN BLOOD BY AUTOMATED COUNT: 12.1 % (ref 11.5–14.5)
GFR SERPL CREATININE-BSD FRML MDRD: 54 ML/MIN/1.73 (ref 39–90)
GLUCOSE BLD-MCNC: 85 MG/DL (ref 60–100)
HCT VFR BLD AUTO: 39.4 % (ref 35–45)
HGB BLD-MCNC: 13.4 G/DL (ref 12–15.5)
Lab: NORMAL
MCH RBC QN AUTO: 30 PG (ref 26.5–34)
MCHC RBC AUTO-ENTMCNC: 34 G/DL (ref 31.4–36)
MCV RBC AUTO: 88.1 FL (ref 80–98)
MRSA DNA SPEC QL NAA+PROBE: NEGATIVE
PLATELET # BLD AUTO: 351 10*3/MM3 (ref 150–450)
PMV BLD AUTO: 10.5 FL (ref 8–12)
POTASSIUM BLD-SCNC: 4.5 MMOL/L (ref 3.5–5.1)
RBC # BLD AUTO: 4.47 10*6/MM3 (ref 3.77–5.16)
RH BLD: POSITIVE
SODIUM BLD-SCNC: 139 MMOL/L (ref 137–145)
T&S EXPIRATION DATE: NORMAL
WBC NRBC COR # BLD: 5.56 10*3/MM3 (ref 3.2–9.8)

## 2018-05-30 PROCEDURE — 99214 OFFICE O/P EST MOD 30 MIN: CPT | Performed by: ORTHOPAEDIC SURGERY

## 2018-05-30 PROCEDURE — 93005 ELECTROCARDIOGRAM TRACING: CPT

## 2018-05-30 PROCEDURE — 80048 BASIC METABOLIC PNL TOTAL CA: CPT | Performed by: ANESTHESIOLOGY

## 2018-05-30 PROCEDURE — 86850 RBC ANTIBODY SCREEN: CPT | Performed by: ANESTHESIOLOGY

## 2018-05-30 PROCEDURE — 36415 COLL VENOUS BLD VENIPUNCTURE: CPT

## 2018-05-30 PROCEDURE — 86900 BLOOD TYPING SEROLOGIC ABO: CPT | Performed by: ANESTHESIOLOGY

## 2018-05-30 PROCEDURE — 87641 MR-STAPH DNA AMP PROBE: CPT | Performed by: ORTHOPAEDIC SURGERY

## 2018-05-30 PROCEDURE — 93010 ELECTROCARDIOGRAM REPORT: CPT | Performed by: INTERNAL MEDICINE

## 2018-05-30 PROCEDURE — 86901 BLOOD TYPING SEROLOGIC RH(D): CPT | Performed by: ANESTHESIOLOGY

## 2018-05-30 PROCEDURE — 85027 COMPLETE CBC AUTOMATED: CPT | Performed by: ANESTHESIOLOGY

## 2018-05-30 RX ORDER — ASPIRIN 81 MG/1
81 TABLET ORAL DAILY
COMMUNITY

## 2018-05-30 RX ORDER — SODIUM CHLORIDE 9 MG/ML
1000 INJECTION, SOLUTION INTRAVENOUS CONTINUOUS
Status: CANCELLED | OUTPATIENT
Start: 2018-06-04

## 2018-05-30 RX ORDER — FENOFIBRATE 145 MG/1
145 TABLET, COATED ORAL DAILY
COMMUNITY
End: 2019-02-08 | Stop reason: SDUPTHER

## 2018-05-30 ASSESSMENT — KOOS JR
KOOS JR SCORE: 21
KOOS JR SCORE: 34.174

## 2018-06-01 ENCOUNTER — ANESTHESIA EVENT (OUTPATIENT)
Dept: PERIOP | Facility: HOSPITAL | Age: 68
End: 2018-06-01

## 2018-06-04 ENCOUNTER — ANESTHESIA (OUTPATIENT)
Dept: PERIOP | Facility: HOSPITAL | Age: 68
End: 2018-06-04

## 2018-06-04 ENCOUNTER — HOSPITAL ENCOUNTER (INPATIENT)
Facility: HOSPITAL | Age: 68
LOS: 1 days | Discharge: HOME | End: 2018-06-05
Attending: ORTHOPAEDIC SURGERY | Admitting: ORTHOPAEDIC SURGERY

## 2018-06-04 ENCOUNTER — APPOINTMENT (OUTPATIENT)
Dept: GENERAL RADIOLOGY | Facility: HOSPITAL | Age: 68
End: 2018-06-04

## 2018-06-04 DIAGNOSIS — N18.2 CHRONIC KIDNEY DISEASE, STAGE II (MILD): ICD-10-CM

## 2018-06-04 DIAGNOSIS — Z78.9 IMPAIRED MOBILITY AND ADLS: ICD-10-CM

## 2018-06-04 DIAGNOSIS — G89.29 CHRONIC PAIN OF LEFT KNEE: ICD-10-CM

## 2018-06-04 DIAGNOSIS — Z79.01 LONG-TERM (CURRENT) USE OF ANTICOAGULANTS: ICD-10-CM

## 2018-06-04 DIAGNOSIS — F32.5 MAJOR DEPRESSION IN COMPLETE REMISSION (HCC): ICD-10-CM

## 2018-06-04 DIAGNOSIS — M17.12 PRIMARY OSTEOARTHRITIS OF LEFT KNEE: Primary | ICD-10-CM

## 2018-06-04 DIAGNOSIS — I10 ESSENTIAL HYPERTENSION: ICD-10-CM

## 2018-06-04 DIAGNOSIS — M25.562 CHRONIC PAIN OF LEFT KNEE: ICD-10-CM

## 2018-06-04 DIAGNOSIS — Z74.09 IMPAIRED PHYSICAL MOBILITY: ICD-10-CM

## 2018-06-04 DIAGNOSIS — Z74.09 IMPAIRED MOBILITY AND ADLS: ICD-10-CM

## 2018-06-04 DIAGNOSIS — F41.1 GENERALIZED ANXIETY DISORDER: ICD-10-CM

## 2018-06-04 DIAGNOSIS — E11.9 TYPE 2 DIABETES MELLITUS WITHOUT COMPLICATION, WITHOUT LONG-TERM CURRENT USE OF INSULIN (HCC): ICD-10-CM

## 2018-06-04 LAB
ABO GROUP BLD: NORMAL
BLD GP AB SCN SERPL QL: NEGATIVE
GLUCOSE BLDC GLUCOMTR-MCNC: 128 MG/DL (ref 70–130)
GLUCOSE BLDC GLUCOMTR-MCNC: 133 MG/DL (ref 70–130)
GLUCOSE BLDC GLUCOMTR-MCNC: 220 MG/DL (ref 70–130)
HCT VFR BLD AUTO: 33.9 % (ref 35–45)
HGB BLD-MCNC: 11.3 G/DL (ref 12–15.5)
Lab: NORMAL
RH BLD: POSITIVE
T&S EXPIRATION DATE: NORMAL

## 2018-06-04 PROCEDURE — A9270 NON-COVERED ITEM OR SERVICE: HCPCS | Performed by: ORTHOPAEDIC SURGERY

## 2018-06-04 PROCEDURE — 0SRD0J9 REPLACEMENT OF LEFT KNEE JOINT WITH SYNTHETIC SUBSTITUTE, CEMENTED, OPEN APPROACH: ICD-10-PCS | Performed by: ORTHOPAEDIC SURGERY

## 2018-06-04 PROCEDURE — 63710000001 INSULIN ASPART PER 5 UNITS: Performed by: ORTHOPAEDIC SURGERY

## 2018-06-04 PROCEDURE — 86901 BLOOD TYPING SEROLOGIC RH(D): CPT | Performed by: ANESTHESIOLOGY

## 2018-06-04 PROCEDURE — 25010000003 CEFAZOLIN PER 500 MG: Performed by: ORTHOPAEDIC SURGERY

## 2018-06-04 PROCEDURE — 25010000002 ROPIVACAINE PER 1 MG: Performed by: NURSE ANESTHETIST, CERTIFIED REGISTERED

## 2018-06-04 PROCEDURE — C1713 ANCHOR/SCREW BN/BN,TIS/BN: HCPCS | Performed by: ORTHOPAEDIC SURGERY

## 2018-06-04 PROCEDURE — 85018 HEMOGLOBIN: CPT | Performed by: ORTHOPAEDIC SURGERY

## 2018-06-04 PROCEDURE — 25010000002 ONDANSETRON PER 1 MG: Performed by: NURSE ANESTHETIST, CERTIFIED REGISTERED

## 2018-06-04 PROCEDURE — G8978 MOBILITY CURRENT STATUS: HCPCS | Performed by: PHYSICAL THERAPIST

## 2018-06-04 PROCEDURE — 27447 TOTAL KNEE ARTHROPLASTY: CPT | Performed by: ORTHOPAEDIC SURGERY

## 2018-06-04 PROCEDURE — 63710000001 OXYCODONE 10 MG TABLET EXTENDED-RELEASE 12 HOUR: Performed by: ORTHOPAEDIC SURGERY

## 2018-06-04 PROCEDURE — 86850 RBC ANTIBODY SCREEN: CPT | Performed by: ANESTHESIOLOGY

## 2018-06-04 PROCEDURE — 82962 GLUCOSE BLOOD TEST: CPT

## 2018-06-04 PROCEDURE — 63710000001 OXYCODONE 5 MG TABLET: Performed by: ORTHOPAEDIC SURGERY

## 2018-06-04 PROCEDURE — 63710000001 ASPIRIN 81 MG TABLET DELAYED-RELEASE: Performed by: ORTHOPAEDIC SURGERY

## 2018-06-04 PROCEDURE — 63710000001 ACETAMINOPHEN 325 MG TABLET: Performed by: ORTHOPAEDIC SURGERY

## 2018-06-04 PROCEDURE — 25010000002 MIDAZOLAM PER 1 MG: Performed by: NURSE ANESTHETIST, CERTIFIED REGISTERED

## 2018-06-04 PROCEDURE — 25010000002 PROPOFOL 10 MG/ML EMULSION: Performed by: NURSE ANESTHETIST, CERTIFIED REGISTERED

## 2018-06-04 PROCEDURE — 97162 PT EVAL MOD COMPLEX 30 MIN: CPT | Performed by: PHYSICAL THERAPIST

## 2018-06-04 PROCEDURE — 88305 TISSUE EXAM BY PATHOLOGIST: CPT | Performed by: PATHOLOGY

## 2018-06-04 PROCEDURE — C1776 JOINT DEVICE (IMPLANTABLE): HCPCS | Performed by: ORTHOPAEDIC SURGERY

## 2018-06-04 PROCEDURE — 25010000002 FENTANYL CITRATE (PF) 100 MCG/2ML SOLUTION: Performed by: NURSE ANESTHETIST, CERTIFIED REGISTERED

## 2018-06-04 PROCEDURE — 97165 OT EVAL LOW COMPLEX 30 MIN: CPT

## 2018-06-04 PROCEDURE — 25010000002 PHENYLEPHRINE PER 1 ML: Performed by: NURSE ANESTHETIST, CERTIFIED REGISTERED

## 2018-06-04 PROCEDURE — 97535 SELF CARE MNGMENT TRAINING: CPT

## 2018-06-04 PROCEDURE — 63710000001 ACETAMINOPHEN 500 MG TABLET: Performed by: ORTHOPAEDIC SURGERY

## 2018-06-04 PROCEDURE — 73560 X-RAY EXAM OF KNEE 1 OR 2: CPT

## 2018-06-04 PROCEDURE — 85014 HEMATOCRIT: CPT | Performed by: ORTHOPAEDIC SURGERY

## 2018-06-04 PROCEDURE — G8979 MOBILITY GOAL STATUS: HCPCS | Performed by: PHYSICAL THERAPIST

## 2018-06-04 PROCEDURE — G8987 SELF CARE CURRENT STATUS: HCPCS

## 2018-06-04 PROCEDURE — 63710000001 PREGABALIN 75 MG CAPSULE: Performed by: ORTHOPAEDIC SURGERY

## 2018-06-04 PROCEDURE — G8988 SELF CARE GOAL STATUS: HCPCS

## 2018-06-04 PROCEDURE — 63710000001 WARFARIN 2.5 MG TABLET: Performed by: ORTHOPAEDIC SURGERY

## 2018-06-04 PROCEDURE — 63710000001 ROSUVASTATIN 20 MG TABLET: Performed by: ORTHOPAEDIC SURGERY

## 2018-06-04 PROCEDURE — 86900 BLOOD TYPING SEROLOGIC ABO: CPT | Performed by: ANESTHESIOLOGY

## 2018-06-04 PROCEDURE — 94799 UNLISTED PULMONARY SVC/PX: CPT

## 2018-06-04 PROCEDURE — 88305 TISSUE EXAM BY PATHOLOGIST: CPT | Performed by: ORTHOPAEDIC SURGERY

## 2018-06-04 DEVICE — ATTUNE PATELLA MEDIALIZED DOME 32MM CEMENTED AOX
Type: IMPLANTABLE DEVICE | Site: KNEE | Status: FUNCTIONAL
Brand: ATTUNE

## 2018-06-04 DEVICE — ATTUNE KNEE SYSTEM FEMORAL POSTERIOR STABILIZED SIZE 4 LEFT CEMENTED
Type: IMPLANTABLE DEVICE | Site: KNEE | Status: FUNCTIONAL
Brand: ATTUNE

## 2018-06-04 DEVICE — TOTL KN ATTUNE DEPUY 9527038: Type: IMPLANTABLE DEVICE | Site: KNEE | Status: FUNCTIONAL

## 2018-06-04 DEVICE — ATTUNE KNEE SYSTEM TIBIAL INSERT ROTATING PLATFORM POSTERIOR STABILIZED 4 6MM AOX
Type: IMPLANTABLE DEVICE | Site: KNEE | Status: FUNCTIONAL
Brand: ATTUNE

## 2018-06-04 DEVICE — SMARTSET HV HIGH VISCOSITY BONE CEMENT 40G
Type: IMPLANTABLE DEVICE | Site: KNEE | Status: FUNCTIONAL
Brand: SMARTSET

## 2018-06-04 DEVICE — ATTUNE KNEE SYSTEM TIBIAL BASE ROTATING PLATFORM SIZE 4 CEMENTED
Type: IMPLANTABLE DEVICE | Site: KNEE | Status: FUNCTIONAL
Brand: ATTUNE

## 2018-06-04 RX ORDER — PREGABALIN 75 MG/1
75 CAPSULE ORAL ONCE
Status: COMPLETED | OUTPATIENT
Start: 2018-06-04 | End: 2018-06-04

## 2018-06-04 RX ORDER — LISINOPRIL 20 MG/1
20 TABLET ORAL DAILY
Status: DISCONTINUED | OUTPATIENT
Start: 2018-06-04 | End: 2018-06-05 | Stop reason: HOSPADM

## 2018-06-04 RX ORDER — OXYCODONE HCL 10 MG/1
10 TABLET, FILM COATED, EXTENDED RELEASE ORAL ONCE
Status: COMPLETED | OUTPATIENT
Start: 2018-06-04 | End: 2018-06-04

## 2018-06-04 RX ORDER — ONDANSETRON 2 MG/ML
INJECTION INTRAMUSCULAR; INTRAVENOUS AS NEEDED
Status: DISCONTINUED | OUTPATIENT
Start: 2018-06-04 | End: 2018-06-04 | Stop reason: SURG

## 2018-06-04 RX ORDER — OXYCODONE HCL 10 MG/1
10 TABLET, FILM COATED, EXTENDED RELEASE ORAL EVERY 12 HOURS SCHEDULED
Status: DISCONTINUED | OUTPATIENT
Start: 2018-06-04 | End: 2018-06-05 | Stop reason: HOSPADM

## 2018-06-04 RX ORDER — MEPERIDINE HYDROCHLORIDE 50 MG/ML
12.5 INJECTION INTRAMUSCULAR; INTRAVENOUS; SUBCUTANEOUS
Status: DISCONTINUED | OUTPATIENT
Start: 2018-06-04 | End: 2018-06-04 | Stop reason: HOSPADM

## 2018-06-04 RX ORDER — NICOTINE POLACRILEX 4 MG
15 LOZENGE BUCCAL
Status: DISCONTINUED | OUTPATIENT
Start: 2018-06-04 | End: 2018-06-05 | Stop reason: HOSPADM

## 2018-06-04 RX ORDER — ONDANSETRON 2 MG/ML
4 INJECTION INTRAMUSCULAR; INTRAVENOUS ONCE AS NEEDED
Status: DISCONTINUED | OUTPATIENT
Start: 2018-06-04 | End: 2018-06-04 | Stop reason: HOSPADM

## 2018-06-04 RX ORDER — FERROUS SULFATE TAB EC 324 MG (65 MG FE EQUIVALENT) 324 (65 FE) MG
324 TABLET DELAYED RESPONSE ORAL
Status: DISCONTINUED | OUTPATIENT
Start: 2018-06-05 | End: 2018-06-05 | Stop reason: HOSPADM

## 2018-06-04 RX ORDER — OXYCODONE HYDROCHLORIDE 5 MG/1
5 TABLET ORAL EVERY 4 HOURS PRN
Status: DISCONTINUED | OUTPATIENT
Start: 2018-06-04 | End: 2018-06-05 | Stop reason: HOSPADM

## 2018-06-04 RX ORDER — SODIUM CHLORIDE 0.9 % (FLUSH) 0.9 %
1-10 SYRINGE (ML) INJECTION AS NEEDED
Status: DISCONTINUED | OUTPATIENT
Start: 2018-06-04 | End: 2018-06-04 | Stop reason: HOSPADM

## 2018-06-04 RX ORDER — ONDANSETRON 2 MG/ML
4 INJECTION INTRAMUSCULAR; INTRAVENOUS EVERY 6 HOURS PRN
Status: DISCONTINUED | OUTPATIENT
Start: 2018-06-04 | End: 2018-06-05 | Stop reason: HOSPADM

## 2018-06-04 RX ORDER — CITALOPRAM 20 MG/1
20 TABLET ORAL DAILY
Status: DISCONTINUED | OUTPATIENT
Start: 2018-06-05 | End: 2018-06-05 | Stop reason: HOSPADM

## 2018-06-04 RX ORDER — ACETAMINOPHEN 325 MG/1
650 TABLET ORAL ONCE AS NEEDED
Status: DISCONTINUED | OUTPATIENT
Start: 2018-06-04 | End: 2018-06-04 | Stop reason: HOSPADM

## 2018-06-04 RX ORDER — ROPIVACAINE HYDROCHLORIDE 5 MG/ML
INJECTION, SOLUTION EPIDURAL; INFILTRATION; PERINEURAL AS NEEDED
Status: DISCONTINUED | OUTPATIENT
Start: 2018-06-04 | End: 2018-06-04 | Stop reason: SURG

## 2018-06-04 RX ORDER — AMLODIPINE BESYLATE 5 MG/1
5 TABLET ORAL DAILY
Status: DISCONTINUED | OUTPATIENT
Start: 2018-06-05 | End: 2018-06-05 | Stop reason: HOSPADM

## 2018-06-04 RX ORDER — LIDOCAINE HYDROCHLORIDE 20 MG/ML
INJECTION, SOLUTION INFILTRATION; PERINEURAL AS NEEDED
Status: DISCONTINUED | OUTPATIENT
Start: 2018-06-04 | End: 2018-06-04 | Stop reason: SURG

## 2018-06-04 RX ORDER — BISACODYL 5 MG/1
10 TABLET, DELAYED RELEASE ORAL DAILY PRN
Status: DISCONTINUED | OUTPATIENT
Start: 2018-06-05 | End: 2018-06-05 | Stop reason: HOSPADM

## 2018-06-04 RX ORDER — FLUMAZENIL 0.1 MG/ML
0.2 INJECTION INTRAVENOUS AS NEEDED
Status: DISCONTINUED | OUTPATIENT
Start: 2018-06-04 | End: 2018-06-04 | Stop reason: HOSPADM

## 2018-06-04 RX ORDER — ONDANSETRON 4 MG/1
4 TABLET, FILM COATED ORAL EVERY 6 HOURS PRN
Status: DISCONTINUED | OUTPATIENT
Start: 2018-06-04 | End: 2018-06-05 | Stop reason: HOSPADM

## 2018-06-04 RX ORDER — ROSUVASTATIN CALCIUM 20 MG/1
20 TABLET, COATED ORAL DAILY
Status: DISCONTINUED | OUTPATIENT
Start: 2018-06-04 | End: 2018-06-05 | Stop reason: HOSPADM

## 2018-06-04 RX ORDER — ACETAMINOPHEN 650 MG/1
650 SUPPOSITORY RECTAL ONCE AS NEEDED
Status: DISCONTINUED | OUTPATIENT
Start: 2018-06-04 | End: 2018-06-04 | Stop reason: HOSPADM

## 2018-06-04 RX ORDER — SODIUM CHLORIDE 0.9 % (FLUSH) 0.9 %
1-10 SYRINGE (ML) INJECTION AS NEEDED
Status: DISCONTINUED | OUTPATIENT
Start: 2018-06-04 | End: 2018-06-05 | Stop reason: HOSPADM

## 2018-06-04 RX ORDER — PROPOFOL 10 MG/ML
VIAL (ML) INTRAVENOUS AS NEEDED
Status: DISCONTINUED | OUTPATIENT
Start: 2018-06-04 | End: 2018-06-04 | Stop reason: SURG

## 2018-06-04 RX ORDER — ONDANSETRON 4 MG/1
4 TABLET, ORALLY DISINTEGRATING ORAL EVERY 6 HOURS PRN
Status: DISCONTINUED | OUTPATIENT
Start: 2018-06-04 | End: 2018-06-05 | Stop reason: HOSPADM

## 2018-06-04 RX ORDER — WARFARIN SODIUM 2.5 MG/1
2.5 TABLET ORAL
Status: DISCONTINUED | OUTPATIENT
Start: 2018-06-04 | End: 2018-06-05 | Stop reason: HOSPADM

## 2018-06-04 RX ORDER — DOCUSATE SODIUM 100 MG/1
100 CAPSULE, LIQUID FILLED ORAL 2 TIMES DAILY PRN
Status: DISCONTINUED | OUTPATIENT
Start: 2018-06-04 | End: 2018-06-05 | Stop reason: HOSPADM

## 2018-06-04 RX ORDER — MIDAZOLAM HYDROCHLORIDE 1 MG/ML
INJECTION INTRAMUSCULAR; INTRAVENOUS AS NEEDED
Status: DISCONTINUED | OUTPATIENT
Start: 2018-06-04 | End: 2018-06-04 | Stop reason: SURG

## 2018-06-04 RX ORDER — FENTANYL CITRATE 50 UG/ML
INJECTION, SOLUTION INTRAMUSCULAR; INTRAVENOUS AS NEEDED
Status: DISCONTINUED | OUTPATIENT
Start: 2018-06-04 | End: 2018-06-04 | Stop reason: SURG

## 2018-06-04 RX ORDER — EPHEDRINE SULFATE 50 MG/ML
5 INJECTION, SOLUTION INTRAVENOUS ONCE AS NEEDED
Status: DISCONTINUED | OUTPATIENT
Start: 2018-06-04 | End: 2018-06-04 | Stop reason: HOSPADM

## 2018-06-04 RX ORDER — DIPHENHYDRAMINE HYDROCHLORIDE 50 MG/ML
12.5 INJECTION INTRAMUSCULAR; INTRAVENOUS
Status: DISCONTINUED | OUTPATIENT
Start: 2018-06-04 | End: 2018-06-04 | Stop reason: HOSPADM

## 2018-06-04 RX ORDER — DEXTROSE MONOHYDRATE 25 G/50ML
25 INJECTION, SOLUTION INTRAVENOUS
Status: DISCONTINUED | OUTPATIENT
Start: 2018-06-04 | End: 2018-06-05 | Stop reason: HOSPADM

## 2018-06-04 RX ORDER — SODIUM CHLORIDE 9 MG/ML
1000 INJECTION, SOLUTION INTRAVENOUS CONTINUOUS
Status: DISCONTINUED | OUTPATIENT
Start: 2018-06-04 | End: 2018-06-05 | Stop reason: HOSPADM

## 2018-06-04 RX ORDER — ASPIRIN 81 MG/1
81 TABLET ORAL DAILY
Status: DISCONTINUED | OUTPATIENT
Start: 2018-06-04 | End: 2018-06-05 | Stop reason: HOSPADM

## 2018-06-04 RX ORDER — ACETAMINOPHEN 500 MG
1000 TABLET ORAL 4 TIMES DAILY
Status: DISCONTINUED | OUTPATIENT
Start: 2018-06-04 | End: 2018-06-05 | Stop reason: HOSPADM

## 2018-06-04 RX ORDER — FAMOTIDINE 40 MG/1
40 TABLET, FILM COATED ORAL DAILY
Status: DISCONTINUED | OUTPATIENT
Start: 2018-06-04 | End: 2018-06-05 | Stop reason: HOSPADM

## 2018-06-04 RX ORDER — SODIUM CHLORIDE 9 MG/ML
100 INJECTION, SOLUTION INTRAVENOUS CONTINUOUS
Status: DISCONTINUED | OUTPATIENT
Start: 2018-06-04 | End: 2018-06-05 | Stop reason: HOSPADM

## 2018-06-04 RX ORDER — ACETAMINOPHEN 325 MG/1
1000 TABLET ORAL ONCE
Status: COMPLETED | OUTPATIENT
Start: 2018-06-04 | End: 2018-06-04

## 2018-06-04 RX ORDER — NALOXONE HCL 0.4 MG/ML
0.1 VIAL (ML) INJECTION
Status: DISCONTINUED | OUTPATIENT
Start: 2018-06-04 | End: 2018-06-05 | Stop reason: HOSPADM

## 2018-06-04 RX ORDER — LABETALOL HYDROCHLORIDE 5 MG/ML
5 INJECTION, SOLUTION INTRAVENOUS
Status: DISCONTINUED | OUTPATIENT
Start: 2018-06-04 | End: 2018-06-04 | Stop reason: HOSPADM

## 2018-06-04 RX ORDER — NALOXONE HCL 0.4 MG/ML
0.2 VIAL (ML) INJECTION AS NEEDED
Status: DISCONTINUED | OUTPATIENT
Start: 2018-06-04 | End: 2018-06-04 | Stop reason: HOSPADM

## 2018-06-04 RX ADMIN — FENTANYL CITRATE 25 MCG: 50 INJECTION, SOLUTION INTRAMUSCULAR; INTRAVENOUS at 11:22

## 2018-06-04 RX ADMIN — MIDAZOLAM 1 MG: 1 INJECTION INTRAMUSCULAR; INTRAVENOUS at 09:55

## 2018-06-04 RX ADMIN — OXYCODONE HYDROCHLORIDE 5 MG: 5 TABLET ORAL at 17:35

## 2018-06-04 RX ADMIN — ACETAMINOPHEN 975 MG: 325 TABLET ORAL at 07:51

## 2018-06-04 RX ADMIN — FENTANYL CITRATE 25 MCG: 50 INJECTION, SOLUTION INTRAMUSCULAR; INTRAVENOUS at 10:43

## 2018-06-04 RX ADMIN — CEFAZOLIN SODIUM 2 G: 1 INJECTION, POWDER, FOR SOLUTION INTRAMUSCULAR; INTRAVENOUS at 10:30

## 2018-06-04 RX ADMIN — INSULIN ASPART 2 UNITS: 100 INJECTION, SOLUTION INTRAVENOUS; SUBCUTANEOUS at 20:51

## 2018-06-04 RX ADMIN — FENTANYL CITRATE 25 MCG: 50 INJECTION, SOLUTION INTRAMUSCULAR; INTRAVENOUS at 11:34

## 2018-06-04 RX ADMIN — PHENYLEPHRINE HYDROCHLORIDE 50 MCG: 10 INJECTION INTRAVENOUS at 11:58

## 2018-06-04 RX ADMIN — OXYCODONE HYDROCHLORIDE 10 MG: 10 TABLET, FILM COATED, EXTENDED RELEASE ORAL at 15:03

## 2018-06-04 RX ADMIN — OXYCODONE HYDROCHLORIDE 10 MG: 10 TABLET, FILM COATED, EXTENDED RELEASE ORAL at 20:51

## 2018-06-04 RX ADMIN — ACETAMINOPHEN 1000 MG: 500 TABLET ORAL at 17:36

## 2018-06-04 RX ADMIN — PHENYLEPHRINE HYDROCHLORIDE 100 MCG: 10 INJECTION INTRAVENOUS at 11:44

## 2018-06-04 RX ADMIN — LIDOCAINE HYDROCHLORIDE 60 MG: 20 INJECTION, SOLUTION INFILTRATION; PERINEURAL at 10:15

## 2018-06-04 RX ADMIN — ONDANSETRON 4 MG: 2 INJECTION INTRAMUSCULAR; INTRAVENOUS at 11:52

## 2018-06-04 RX ADMIN — PHENYLEPHRINE HYDROCHLORIDE 50 MCG: 10 INJECTION INTRAVENOUS at 12:15

## 2018-06-04 RX ADMIN — WARFARIN SODIUM 2.5 MG: 2.5 TABLET ORAL at 17:36

## 2018-06-04 RX ADMIN — PHENYLEPHRINE HYDROCHLORIDE 50 MCG: 10 INJECTION INTRAVENOUS at 12:07

## 2018-06-04 RX ADMIN — ROSUVASTATIN CALCIUM 20 MG: 20 TABLET, FILM COATED ORAL at 17:35

## 2018-06-04 RX ADMIN — PREGABALIN 75 MG: 75 CAPSULE ORAL at 07:52

## 2018-06-04 RX ADMIN — SODIUM CHLORIDE: 900 INJECTION, SOLUTION INTRAVENOUS at 11:30

## 2018-06-04 RX ADMIN — INSULIN ASPART 4 UNITS: 100 INJECTION, SOLUTION INTRAVENOUS; SUBCUTANEOUS at 17:36

## 2018-06-04 RX ADMIN — CEFAZOLIN SODIUM 2 G: 1 INJECTION, POWDER, FOR SOLUTION INTRAMUSCULAR; INTRAVENOUS at 17:37

## 2018-06-04 RX ADMIN — OXYCODONE HYDROCHLORIDE 10 MG: 10 TABLET, FILM COATED, EXTENDED RELEASE ORAL at 07:52

## 2018-06-04 RX ADMIN — SODIUM CHLORIDE 100 ML/HR: 9 INJECTION, SOLUTION INTRAVENOUS at 14:48

## 2018-06-04 RX ADMIN — PROPOFOL 120 MG: 10 INJECTION, EMULSION INTRAVENOUS at 10:15

## 2018-06-04 RX ADMIN — TRANEXAMIC ACID 1000 MG: 100 INJECTION, SOLUTION INTRAVENOUS at 11:35

## 2018-06-04 RX ADMIN — ACETAMINOPHEN 1000 MG: 500 TABLET ORAL at 20:51

## 2018-06-04 RX ADMIN — MIDAZOLAM 1 MG: 1 INJECTION INTRAMUSCULAR; INTRAVENOUS at 10:07

## 2018-06-04 RX ADMIN — FENTANYL CITRATE 25 MCG: 50 INJECTION, SOLUTION INTRAMUSCULAR; INTRAVENOUS at 11:02

## 2018-06-04 RX ADMIN — ASPIRIN 81 MG: 81 TABLET, COATED ORAL at 15:03

## 2018-06-04 RX ADMIN — SODIUM CHLORIDE 1000 ML: 900 INJECTION, SOLUTION INTRAVENOUS at 07:57

## 2018-06-04 RX ADMIN — ROPIVACAINE HYDROCHLORIDE 30 ML: 5 INJECTION, SOLUTION EPIDURAL; INFILTRATION; PERINEURAL at 10:10

## 2018-06-04 RX ADMIN — TRANEXAMIC ACID 1000 MG: 100 INJECTION, SOLUTION INTRAVENOUS at 08:05

## 2018-06-04 NOTE — PLAN OF CARE
Problem: Patient Care Overview  Goal: Plan of Care Review  Outcome: Ongoing (interventions implemented as appropriate)   06/04/18 1070   Coping/Psychosocial   Plan of Care Reviewed With patient   OTHER   Outcome Summary admited to floor after surgery, no falls, drain intact, no s/s infection, up ambullating to bathroom with assist pain controled with meds   Plan of Care Review   Progress improving

## 2018-06-04 NOTE — INTERVAL H&P NOTE
H&P reviewed. The patient was examined and there are no changes to the H&P.     The patient voiced understanding of the risks, benefits, and alternative forms of treatment that were discussed and the patient consents to proceed with surgery.  All risks, benefits and alternatives were discussed.  Risks including to but not exclusive to anesthetic complications, including death, MI, CVA, infection, bleeding DVT, fracture, residual pain and need for future surgery.  This discussion was held with the patient by Baron Reynolds MD and all questions were answered.

## 2018-06-04 NOTE — PLAN OF CARE
Problem: Patient Care Overview  Goal: Plan of Care Review  Outcome: Ongoing (interventions implemented as appropriate)   06/04/18 1501   Coping/Psychosocial   Plan of Care Reviewed With spouse;patient   OTHER   Outcome Summary PT alice completed, was able to come to sit EOB with HOB up and bedrails with supervision , able to come to stand with rolling wlaker and CGA of one, able to ambulate to and from toilet and sit ontoilet and back to stand with rolling wlaker with CGA to supervision of one, able to ambulate totatl of 12 feet and then get dressed in her pajamas, could benefit from skilled PT to regain and return to highest level of function in bed mobility, transfers, gait and ROM, ROM L knee wea - 4 extension and 104 flexion.     Goal: Discharge Needs Assessment  Outcome: Ongoing (interventions implemented as appropriate)   06/04/18 1501   Discharge Needs Assessment   Concerns to be Addressed no discharge needs identified   Patient/Family Anticipates Transition to home with family   Patient/Family Anticipated Services at Transition rehabilitation services   Transportation Concerns car, none   Transportation Anticipated family or friend will provide   Anticipated Changes Related to Illness none   Equipment Needed After Discharge (has equipment to use at home including roll walker)   Outpatient/Agency/Support Group Needs other (see comments)  (outpatient PT)   Discharge Facility/Level of Care Needs (outpatient PT)   Current Discharge Risk physical impairment   Disability   Equipment Currently Used at Home cane, straight

## 2018-06-04 NOTE — OP NOTE
TOTAL KNEE ARTHROPLASTY ATTUNE  Procedure Note    Name:    Otilia Kenny  YOB: 1950  Date of surgery:   6/4/2018    Pre-op Diagnosis:   Generalized anxiety disorder [F41.1]  Chronic kidney disease, stage II (mild) [N18.2]  Major depression in complete remission [F32.5]  Essential hypertension [I10]  Primary osteoarthritis of left knee [M17.12]  Chronic pain of left knee [M25.562, G89.29]  Type 2 diabetes mellitus without complication, without long-term current use of insulin [E11.9]    Post-op Diagnosis:    Post-Op Diagnosis Codes:     * Generalized anxiety disorder [F41.1]     * Chronic kidney disease, stage II (mild) [N18.2]     * Major depression in complete remission [F32.5]     * Essential hypertension [I10]     * Primary osteoarthritis of left knee [M17.12]     * Chronic pain of left knee [M25.562, G89.29]     * Type 2 diabetes mellitus without complication, without long-term current use of insulin [E11.9]    Procedure:  Procedure(s):  TOTAL KNEE ARTHROPLASTY ATTUNE with adductor canal block - left    Surgeon(s):  Baron Reynolds MD    SURGEON: Baron Reynolds MD    ASSISTANT:  LUIS ANGEL Mosquera    Anesthesia: Spinal with regional    Staff:   Circulator: Jennifer Dodson RN; Shireen Joel RN  Scrub Person: Margarette Scott  Vendor Representative: Blake Morse  Assistant: Margarette Glover CSA    Estimated Blood Loss: 200 mL    Specimens:                ID Type Source Tests Collected by Time   A : bone and soft tissue from left knee Bone Knee, Left TISSUE PATHOLOGY EXAM Baron Reynolds MD 6/4/2018 1059         Drains:   Closed/Suction Drain 1 Left;Lateral Knee Accordion 10 Fr. (Active)   Dressing Status Clean;Dry;Intact 6/4/2018 11:57 AM       Findings:  End-stage osteoarthritis Left knee    Complications: None    IMPLANTS:     Implant Name Type Inv. Item Serial No.  Lot No. LRB No. Used   CMT BONE SMARTSET  40GM - [id number] Implant CMT BONE SMARTSET  HV 40GM  DEPUY [id number] Left 2   BASE TIB ATTUNE CMT RP SZ4 - [id number] Implant BASE TIB ATTUNE CMT RP SZ4  DEPUY [id number] Left 1   COMP PAT ATTUNE CMT MEDL/DOME 32MM - [id number] Implant COMP PAT ATTUNE CMT MEDL/DOME 32MM  DEPUY [id number] Left 1   COMP FEM ATTUNE CMT PS SZ4 LT - [id number] Implant COMP FEM ATTUNE CMT PS SZ4 LT  DEPUY [id number] Left 1   INSRT TIB ATTUNE PS RP SZ4 6MM - [id number] Implant INSRT TIB ATTUNE PS RP SZ4 6MM   DEPUY [id number] Left 1         PROCEDURE:    The patient was taken to the operating room and placed in the supine position. Preoperative antibiotics were administered. Surgical time out was performed. After adequate induction of anesthesia, the leg was prepped and draped in the usual sterile fashion, exsanguinated with an Ace bandage and the tourniquet inflated to 300 mmHg. A midline incision was performed followed by a medial parapatellar arthrotomy.    Attention was then placed to the patella. The patella was noted to track centrally through range of motion. The patella was then sized with the trials. The thickness of the patella was then measured and the cut was made in a free-hand technique. The patella protector was then placed and the surrounding osteophytes were removed.   The patella was subluxed laterally in a non-everted position.  A portion of the fat pad, ACL, and anterior horns of the meniscus were excised.     The drill hole was placed in the distal femur and the canal was the irrigated and suctioned. The IM андрей was placed and a 5 degree distal valgus cut was performed on the femur. The femur was then sized with a sizing guide. The femoral cutting block was placed and all femoral cuts were performed. The proximal tibia was exposed. We used the extramedullary tibial cutting guide set for removal of an appropriate amount of proximal tibia. The tibial cut was performed. The posterior horns of the menisci were excised. The posterior osteophytes were removed. Flexion extension  blocks were then used to balance the knee. The tibial cut surface was then sized with the sizing templates and the tibial and femoral trial were then placed. The knee was placed in full extension and then the patella was re-addressed. The patella was resurfaced and the preoperative thickness was reproduced. The patella tracked centrally through range of motion.     At this point all trial components were removed, the knee was copiously irrigated with pulsed lavage.. The cut surfaces were then dried with clean lap sponges, and the components were cemented tibia, followed by femur, then patella. The knee was held in full extension and all excess cement was removed. The knee was held still until the cement had completely hardened. We then placed the trial polyethylene spacer which resulted in full extension and excellent flexion-extension balance. We placed the final polyethylene spacer.    The knee was then copiously irrigated. The tourniquet was then released. There was excellent hemostasis. We placed a 10 [place] Hemovac drain. We closed the knee in multiple layers in standard fashion. Sterile dressing were applied. At the end of the case, the sponge and needle counts were reported as being correct. There were no known complications. The patient was then transported to the recovery room in satisfactory condition..      Baron Reynolds MD     Date: 6/4/2018  Time: 12:30 PM

## 2018-06-04 NOTE — ANESTHESIA PREPROCEDURE EVALUATION
Anesthesia Evaluation     no history of anesthetic complications:  NPO Solid Status: > 8 hours  NPO Liquid Status: > 2 hours           Airway   Mallampati: II  TM distance: >3 FB  Neck ROM: limited  Possible difficult intubation  Dental    (+) lower dentures and upper dentures    Pulmonary     breath sounds clear to auscultation  (+) decreased breath sounds,   Cardiovascular   Exercise tolerance: good (4-7 METS)    ECG reviewed  Rhythm: regular  Rate: normal    (+) hypertension 2 medications or greater, hyperlipidemia,   (-) angina, murmur    ROS comment: Normal sinus rhythm  Normal ECG  When compared with ECG of 14-MAY-2014 13:16,  No significant change was found  Confirmed by VEJEANETTE     Neuro/Psych  (+) psychiatric history Anxiety and Depression,     GI/Hepatic/Renal/Endo    (+)  GERD well controlled,  diabetes mellitus type 2,     Musculoskeletal     (+) arthralgias, back pain, neck pain,   Abdominal    Substance History      OB/GYN          Other   (+) arthritis (left knee)                     Anesthesia Plan    ASA 3     MAC, regional and spinal   (Previous back and neck surgery.  Will attempt spinal with Gen as a back up)  intravenous induction   Anesthetic plan and risks discussed with patient and spouse/significant other.

## 2018-06-04 NOTE — PLAN OF CARE
Problem: Patient Care Overview  Goal: Plan of Care Review  Outcome: Ongoing (interventions implemented as appropriate)   06/04/18 7975   Coping/Psychosocial   Plan of Care Reviewed With patient;spouse   OTHER   Outcome Summary OT eval complete. Pt required min safety vc throughout session. Pt CGA for sit-stand-sit, toilet t/f, & grooming at sink. Pt SBA for upper/lower body dressing EOB & toileting. Pt would benefit from skilled OT services to increase independence and safety with ADLs. Anticipate return home with assistance and outpatient therapy.

## 2018-06-04 NOTE — THERAPY EVALUATION
Acute Care - Physical Therapy Initial Evaluation  [facility]     Patient Name: Otilia Kenny  : 1950  MRN: [id number]  Today's Date: 2018   Onset of Illness/Injury or Date of Surgery: 18  Date of Referral to PT: 18  Referring Physician: Dr Reynolds      Admit Date: 2018    Visit Dx:     ICD-10-CM ICD-9-CM   1. Primary osteoarthritis of left knee M17.12 715.16   2. Chronic pain of left knee M25.562 719.46    G89.29 338.29   3. Essential hypertension I10 401.9   4. Chronic kidney disease, stage II (mild) N18.2 585.2   5. Major depression in complete remission F32.5 296.26   6. Generalized anxiety disorder F41.1 300.02   7. Type 2 diabetes mellitus without complication, without long-term current use of insulin E11.9 250.00   8. Impaired physical mobility Z74.09 781.99     Patient Active Problem List   Diagnosis   • Vitamin B12 deficiency (dietary) anemia   • Type 2 diabetes mellitus without complication   • Spasm of back muscles   • Sleep disorder   • Osteoarthritis of knee   • Irritable bowel syndrome   • Iron deficiency anemia   • Hypertriglyceridemia   • Hyperlipidemia   • GERD (gastroesophageal reflux disease)   • Generalized anxiety disorder   • Gastritis   • Essential hypertension   • Dysfunction of eustachian tube   • Diverticular disease of colon   • Cervical disc disorder   • Diarrhea, unspecified   • Anemia   • Allergic rhinitis   • Abnormal liver enzymes   • Abdominal pain   • Major depression in complete remission   • Chronic kidney disease, stage II (mild)   • Chronic pain of both knees   • Internal derangement of left knee   • Primary osteoarthritis of left knee   • Chronic pain of left knee   • Type 2 diabetes mellitus without complication, without long-term current use of insulin     Past Medical History:   Diagnosis Date   • Abdominal pain     Most likely related to functional colonic spasm     • Abnormal liver enzymes     Improved, 3/2015      • Allergic rhinitis      "seasonal   • Allergic rhinitis    • Anemia    • Anesthesia complication     states sometime B/P drops   • Arthritis    • Cervical disc disorder     S/P cervical surgeries x2-3. Extensive fusion C 3-7. [facility]      • Depression    • Diarrhea, unspecified     Resolved with discontinuation of metformin.    • Diverticular disease of colon    • Dysfunction of eustachian tube    • Essential hypertension     Increase lisinopril HCT, 10/12.5.      • Gastritis    • Generalized anxiety disorder    • GERD (gastroesophageal reflux disease)    • Hyperlipidemia     Intolerant of Lipitor. The patient stopped Zocor due to \"liver pain\", summer 2015. patient instructed to reattempt Zocor every other day, 10/2015    • Hypertriglyceridemia     Holding simvastatin 2/2015 due to slightly elevated LFTs.      • Iron deficiency anemia    • Irritable bowel syndrome     Dr. Estrada   • Osteoarthritis of knee     Dr. Reynolds    • Sleep disorder    • Spasm of back muscles     Dr. Castro changed Soma to Zanaflex.     • Vitamin B12 deficiency (dietary) anemia     Currently on multivitamin daily       Past Surgical History:   Procedure Laterality Date   • BACK SURGERY      Laminectomy   • BUNIONECTOMY     • CARPAL TUNNEL RELEASE Bilateral    • CERVICAL DISC SURGERY      Laminectomy 2011. Fusion 2013.   • CHOLECYSTECTOMY     • COLONOSCOPY      Diverticulosis found in the sigmoid colon.A single diminutive polyp found in the colon.Mul.biopsies taken.Hemorrhoids found in the anus.   • ENDOSCOPY      Normal hypopharynx and esophagus.Marked irregularity of the Z-line,compatable with chronic reflux.Mul.biopsies.A hiatus hernia found in GE junction.Mild nonerosive gastritis.Mul.biopsies.Polyps in fundus.Mul.biopsies.Normal pylorus.Normal duodenum.   • ENDOSCOPY AND COLONOSCOPY     • FINGER/THUMB LESION/CYST EXCISION     • HEEL SPUR EXCISION     • HYSTERECTOMY     • KNEE ARTHROSCOPY Bilateral     Arthroscopy of the left knee with debridement " of medial meniscus.   • KNEE SURGERY      Right unicompartmental arthroplasty.        PT ASSESSMENT (last 12 hours)      Physical Therapy Evaluation     Row Name 06/04/18 1501          PT Evaluation Time/Intention    Subjective Information complains of;pain  -CB     Document Type evaluation  -CB     Mode of Treatment co-treatment;physical therapy;occupational therapy  -CB     Total Evaluation Minutes, Physical Therapy 37  -CB     Patient Effort excellent  -CB     Symptoms Noted During/After Treatment none  -CB     Row Name 06/04/18 1501          General Information    Patient Profile Reviewed? yes  -CB     Onset of Illness/Injury or Date of Surgery 06/04/18  -CB     Referring Physician Dr Reynolds  -CB     Patient Observations alert;cooperative;agree to therapy  -CB     Patient/Family Observations  and son present  -CB     General Observations of Patient laying in bed with IV, drain and SCD in place  -CB     Prior Level of Function independent:;gait;ADL's  -CB     Equipment Currently Used at Home cane, straight   has rolling walker, w/c, tall toilet and long handle sponge   -CB     Existing Precautions/Restrictions fall   total knee precautions  -CB     Equipment Issued to Patient gait belt  -CB     Risks Reviewed increased discomfort;LOB  -CB     Benefits Reviewed patient and family:;improve function;decrease risk of DVT  -CB     Row Name 06/04/18 1501          Relationship/Environment    Lives With spouse  -CB     Row Name 06/04/18 1501          Resource/Environmental Concerns    Current Living Arrangements home/apartment/condo  -CB     Transportation Concerns car, none  -CB     Row Name 06/04/18 1501          Cognitive Assessment/Interventions    Additional Documentation Cognitive Assessment/Intervention (Group)  -CB     Row Name 06/04/18 1501          Cognitive Assessment/Intervention- PT/OT    Affect/Mental Status (Cognitive) WFL  -CB     Orientation Status (Cognition) oriented x 4  -CB     Follows Commands  (Cognition) WFL  -CB     Cognitive Function (Cognitive) WFL  -CB     Personal Safety Interventions fall prevention program maintained;gait belt;nonskid shoes/slippers when out of bed  -CB     Row Name 06/04/18 1501          Safety Issues, Functional Mobility    Impairments Affecting Function (Mobility) endurance/activity tolerance;pain  -CB     Row Name 06/04/18 1501          Mobility Assessment/Treatment    Extremity Weight-bearing Status left lower extremity;right lower extremity  -CB     Left Lower Extremity (Weight-bearing Status) weight-bearing as tolerated (WBAT)  -CB     Right Lower Extremity (Weight-bearing Status) weight-bearing as tolerated (WBAT)  -CB     Row Name 06/04/18 1501          Bed Mobility Assessment/Treatment    Bed Mobility Assessment/Treatment supine-sit;sit-supine  -CB     Supine-Sit [place] (Bed Mobility) contact guard  -CB     Sit-Supine [place] (Bed Mobility) contact guard  -CB     Supine-Sit-Supine [place] (Bed Mobility) contact guard  -CB     Assistive Device (Bed Mobility) bed rails;head of bed elevated  -CB     Row Name 06/04/18 1501          Transfer Assessment/Treatment    Transfer Assessment/Treatment sit-stand transfer;stand-sit transfer  -CB     Sit-Stand [place] (Transfers) contact guard  -CB     Stand-Sit [place] (Transfers) contact guard  -CB     [place] Level (Toilet Transfer) contact guard;verbal cues  -CB     Assistive Device (Toilet Transfer) walker, front-wheeled  -CB     Row Name 06/04/18 1501          Sit-Stand Transfer    Assistive Device (Sit-Stand Transfers) walker, front-wheeled  -CB     Row Name 06/04/18 1501          Stand-Sit Transfer    Assistive Device (Stand-Sit Transfers) walker, front-wheeled  -CB     Row Name 06/04/18 1501          Toilet Transfer    Type (Toilet Transfer) sit-stand;stand-sit  -CB     Row Name 06/04/18 1501          Gait/Stairs Assessment/Training    Gait/Stairs Assessment/Training gait/ambulation  independence;gait/ambulation assistive device;distance ambulated  -CB     [place] Level (Gait) contact guard;1 person assist  -CB     Assistive Device (Gait) walker, front-wheeled  -CB     Distance in Feet (Gait) 12  -CB     Row Name 06/04/18 1501          General ROM    GENERAL ROM COMMENTS AROM WFL hip and ankle, knee -4 to 104 flexion  -CB     Row Name 06/04/18 1501          General Assessment (Manual Muscle Testing)    Comment, General Manual Muscle Testing (MMT) Assessment grossly 3/5 hip and knee,4/5 ankle on L  -CB     Row Name 06/04/18 1501          Sensory Assessment/Intervention    Sensory General Assessment light touch sensation deficits identified  -CB     Row Name 06/04/18 1501          Vision Assessment/Intervention    Visual Impairment/Limitations corrective lenses full time  -CB     Row Name 06/04/18 1501          Light Touch Sensation Assessment    Left Lower Extremity: Light Touch Sensation Assessment mild impairment, 75% or more correct responses  -CB     Row Name 06/04/18 1501          Pain Assessment    Additional Documentation Pain Scale: Numbers Pre/Post-Treatment (Group)  -CB     Row Name 06/04/18 1501          Pain Scale: Numbers Pre/Post-Treatment    Pain Scale: Numbers, Pretreatment 6/10  -CB     Pain Scale: Numbers, Post-Treatment 4/10  -CB     Pain Location - Side Left  -CB     Pain Location knee  -CB     Row Name             Wound 06/04/18 1107 Left knee incision;surgical    Wound - Properties Group Date first assessed: 06/04/18  -CF Time first assessed: 1107  -CF Present On Admission : no  -CF Side: Left  -CF Location: knee  -CF Type: incision;surgical  -CF    Row Name 06/04/18 1501          Physical Therapy Clinical Impression    Date of Referral to PT 06/04/18  -CB     PT Diagnosis (PT Clinical Impression) impaired physical mobility due to L TKA  -CB     Criteria for Skilled Interventions Met (PT Clinical Impression) treatment indicated;yes  -CB     Pathology/Pathophysiology  Noted (Describe Specifically for Each System) musculoskeletal  -CB     Impairments Found (describe specific impairments) aerobic capacity/endurance;gait, locomotion, and balance;ROM  -CB     Rehab Potential (PT Clinical Summary) good, to achieve stated therapy goals  -CB     Predicted Duration of Therapy (PT) until goals met or discharged  -CB     Care Plan Review (PT) care plan/treatment goals reviewed  -CB     Referral Needed to Another Service (PT) other (see comments)   outpatient PT  -CB     Row Name 06/04/18 1501          Vital Signs    Pre Systolic BP Rehab 101  -CB     Pre Treatment Diastolic BP 71  -CB     Post Systolic BP Rehab 113  -CB     Post Treatment Diastolic BP 64  -CB     Pretreatment Heart Rate (beats/min) 87  -CB     Intratreatment Heart Rate (beats/min) 105  -CB     Posttreatment Heart Rate (beats/min) 92  -CB     Pre SpO2 (%) 96  -CB     O2 Delivery Pre Treatment supplemental O2  -CB     Intra SpO2 (%) 99  -CB     O2 Delivery Intra Treatment room air  -CB     Post SpO2 (%) 95  -CB     O2 Delivery Post Treatment room air  -CB     Pre Patient Position Supine  -CB     Intra Patient Position Standing  -CB     Post Patient Position Supine  -CB     Row Name 06/04/18 1501          Physical Therapy Goals    Bed Mobility Goal Selection (PT) bed mobility, PT goal 1  -CB     Transfer Goal Selection (PT) transfer, PT goal 1  -CB     Gait Training Goal Selection (PT) gait training, PT goal 1  -CB     ROM Goal Selection (PT) ROM, PT goal 1  -CB     Additional Documentation ROM Goal Selection (PT) (Row)  -CB     Row Name 06/04/18 1501          Bed Mobility Goal 1 (PT)    Activity/Assistive Device (Bed Mobility Goal 1, PT) sit to supine;supine to sit   HOB down and no rails  -CB     [place] Level/Cues Needed (Bed Mobility Goal 1, PT) independent  -CB     Time Frame (Bed Mobility Goal 1, PT) 3 days  -CB     Progress/Outcomes (Bed Mobility Goal 1, PT) goal not met  -CB     Row Name 06/04/18 1501           Transfer Goal 1 (PT)    Activity/Assistive Device (Transfer Goal 1, PT) sit-to-stand/stand-to-sit;bed-to-chair/chair-to-bed;walker, rolling  -CB     [place] Level/Cues Needed (Transfer Goal 1, PT) supervision required  -CB     Time Frame (Transfer Goal 1, PT) 2 - 3 days  -CB     Progress/Outcome (Transfer Goal 1, PT) goal not met  -CB     Row Name 06/04/18 1501          Gait Training Goal 1 (PT)    Activity/Assistive Device (Gait Training Goal 1, PT) gait (walking locomotion);assistive device use;increase endurance/gait distance;turning, left;turning, right;walker, rolling  -CB     [place] Level (Gait Training Goal 1, PT) supervision required  -CB     Distance (Gait Goal 1, PT) 200 feet  -CB     Time Frame (Gait Training Goal 1, PT) 2 - 3 days  -CB     Progress/Outcome (Gait Training Goal 1, PT) goal not met  -CB     Row Name 06/04/18 1501          ROM Goal 1 (PT)    ROM Goal 1 (PT) 0-90 L knee  -CB     Time Frame (ROM Goal 1, PT) 1 day  -CB     Progress/Outcome (ROM Goal 1, PT) goal not met  -CB     Row Name 06/04/18 1501          Patient Education Goal (PT)    Activity (Patient Education Goal, PT) HEP  -CB     [place]/Cues/Accuracy (Memory Goal 2, PT) demonstrates adequately;verbalizes understanding  -CB     Time Frame (Patient Education Goal, PT) 2 days  -CB     Progress/Outcome (Patient Education Goal, PT) goal not met  -CB     Row Name 06/04/18 1501          Positioning and Restraints    Pre-Treatment Position in bed  -CB     Post Treatment Position bed  -CB     In Bed supine;call light within reach;encouraged to call for assist;exit alarm on;with family/caregiver;side rails up x2  -CB     Row Name 06/04/18 1501          Living Environment    Home Accessibility wheelchair accessible  -CB       User Key  (r) = Recorded By, (t) = Taken By, (c) = Cosigned By    Initials Name Provider Type    CB Suzanne Morse, PT Physical Therapist    CF Shireen Joel, RN Registered Nurse          Physical  Therapy Education     Title: PT OT SLP Therapies (Active)     Topic: Physical Therapy (Active)     Point: Mobility training (Active)    Learning Progress Summary     Learner Status Readiness Method Response Comment Documented by    Patient Active Acceptance D,E NR   06/04/18 1611          Point: Precautions (Active)    Learning Progress Summary     Learner Status Readiness Method Response Comment Documented by    Patient Active Acceptance D,E NR   06/04/18 1611                      User Key     Initials Effective Dates Name Provider Type Discipline     04/06/17 -  Suzanne Morse, PT Physical Therapist PT                PT Recommendation and Plan  Anticipated Discharge Disposition (PT): home with OP services  Planned Therapy Interventions (PT Eval): bed mobility training, gait training, home exercise program, transfer training, strengthening, ROM (range of motion), patient/family education  Therapy Frequency (PT Clinical Impression): other (see comments) (5-14)  Outcome Summary/Treatment Plan (PT)  Anticipated Equipment Needs at Discharge (PT):  (none)  Anticipated Discharge Disposition (PT): home with OP services  Referral Needed to Another Service (PT): other (see comments) (outpatient PT)  Plan of Care Reviewed With: spouse, patient  Outcome Summary: PT eval completed, was able to come to sit EOB with HOB up and bedrails with supervision , able to come to stand with rolling wlaker and CGA of one, able to ambulate to and from toilet and sit ontoilet and back to stand with rolling wlaker with CGA to supervision of one, able to ambulate totatl of 12 feet and then get dressed in her pajamas, could benefit from skilled PT to regain and return to highest level of function in bed mobility, transfers, gait and ROM, ROM L knee wea - 4 extension and 104 flexion.          Outcome Measures     Row Name 06/04/18 1501 06/04/18 1500          How much help from another person do you currently need...    Turning from your  back to your side while in flat bed without using bedrails? 3  -CB  --     Moving from lying on back to sitting on the side of a flat bed without bedrails? 3  -CB  --     Moving to and from a bed to a chair (including a wheelchair)? 3  -CB  --     Standing up from a chair using your arms (e.g., wheelchair, bedside chair)? 3  -CB  --     Climbing 3-5 steps with a railing? 3  -CB  --     To walk in hospital room? 3  -CB  --     AM-PAC 6 Clicks Score 18  -CB  --        How much help from another is currently needed...    Putting on and taking off regular lower body clothing?  -- (P)  3  -AK     Bathing (including washing, rinsing, and drying)  -- (P)  3  -AK     Toileting (which includes using toilet bed pan or urinal)  -- (P)  3  -AK     Putting on and taking off regular upper body clothing  -- (P)  3  -AK     Taking care of personal grooming (such as brushing teeth)  -- (P)  4  -AK     Eating meals  -- (P)  4  -AK     Score  -- (P)  20  -AK        Functional Assessment    Outcome Measure Options AM-PAC 6 Clicks Basic Mobility (PT)  -CB (P)  AM-PAC 6 Clicks Daily Activity (OT)  -AK       User Key  (r) = Recorded By, (t) = Taken By, (c) = Cosigned By    Initials Name Provider Type    CB Suzanne Morse, PT Physical Therapist    GRABIEL Bentley, OT Student OT Student           Time Calculation:         PT Charges     Row Name 06/04/18 1617             Time Calculation    Start Time 1501  -CB      Stop Time 1538  -CB      Time Calculation (min) 37 min  -CB      PT Received On 06/04/18  -CB      PT Goal Re-Cert Due Date 06/17/18  -CB        User Key  (r) = Recorded By, (t) = Taken By, (c) = Cosigned By    Initials Name Provider Type    JOSE Morse PT Physical Therapist          Therapy Charges for Today     Code Description Service Date Service Provider Modifiers Qty    91820510201 HC PT MOBILITY CURRENT 6/4/2018 Suzanne Morse, PT GP, CK 1    94634059423 HC PT MOBILITY PROJECTED 6/4/2018 Suzanne Morse, PT  GP, CJ 1    [id number]  PT EVAL MOD COMPLEXITY 2 6/4/2018 Suzanne Morse, PT GP 1          PT G-Codes  PT Professional Judgement Used?: Yes  Outcome Measure Options: AM-PAC 6 Clicks Basic Mobility (PT)  Score: 18  Functional Limitation: Mobility: Walking and moving around  Mobility: Walking and Moving Around Current Status (): At least 40 percent but less than 60 percent impaired, limited or restricted  Mobility: Walking and Moving Around Goal Status (): At least 20 percent but less than 40 percent impaired, limited or restricted      Suzanne Morse, PT  6/4/2018

## 2018-06-04 NOTE — H&P (VIEW-ONLY)
"Otilia Kenny is a 68 y.o. female   Primary Care Provider Alexis Zabala MD     Chief Complaint   Patient presents with   • Left Knee - Follow-up       HISTORY OF PRESENT ILLNESS:  Otilia Kenny is here for followup of left knee pain.  The pain has not improved despite long term treatment with multiple conservative management trials.  Surgery is scheduled on 06/04/2018    Prior Arthritis treatments: [x]  PT    [x]  HEP      [x]  Attempt weight loss  [x]  NSAIDS      [x]  Cane   [x]  Intra-articular steroid inj      [x]  Viscosupplementation    Pain is chronic dull ache that is severe at times and worse with activity.  Difficulty with ADL's.  Patient is not happy with current quality of life and wants to discuss proceeding with surgical intervention.     CONCURRENT MEDICAL HISTORY:    Past Medical History:   Diagnosis Date   • Abdominal pain     Most likely related to functional colonic spasm     • Abnormal liver enzymes     Improved, 3/2015      • Allergic rhinitis     seasonal   • Allergic rhinitis    • Anemia    • Anesthesia complication     states sometime B/P drops   • Arthritis    • Cervical disc disorder     S/P cervical surgeries x2-3. Extensive fusion C 3-7. [facility]      • Depression    • Diarrhea, unspecified     Resolved with discontinuation of metformin.    • Diverticular disease of colon    • Dysfunction of eustachian tube    • Essential hypertension     Increase lisinopril HCT, 10/12.5.      • Gastritis    • Generalized anxiety disorder    • GERD (gastroesophageal reflux disease)    • Hyperlipidemia     Intolerant of Lipitor. The patient stopped Zocor due to \"liver pain\", summer 2015. patient instructed to reattempt Zocor every other day, 10/2015    • Hypertriglyceridemia     Holding simvastatin 2/2015 due to slightly elevated LFTs.      • Iron deficiency anemia    • Irritable bowel syndrome     Dr. Estrada   • Osteoarthritis of knee     Dr. Reynolds    • Sleep disorder    • Spasm of " back muscles     Dr. Castro changed Soma to Zanaflex.     • Vitamin B12 deficiency (dietary) anemia     Currently on multivitamin daily         Allergies   Allergen Reactions   • Allegra [Fexofenadine] Itching   • Amaryl [Glimepiride] Myalgia   • Dyazide [Triamterene-Hctz] Itching   • Hydrochlorothiazide W-Triamterene Itching   • Lipitor [Atorvastatin] Myalgia   • Metformin And Related Diarrhea   • Naproxen Itching   • Sulfamethoxazole-Trimethoprim Rash     dizziness       No current outpatient prescriptions on file.    Past Surgical History:   Procedure Laterality Date   • BACK SURGERY      Laminectomy   • BUNIONECTOMY     • CARPAL TUNNEL RELEASE Bilateral    • CERVICAL DISC SURGERY      Laminectomy 2011. Fusion 2013.   • CHOLECYSTECTOMY     • COLONOSCOPY      Diverticulosis found in the sigmoid colon.A single diminutive polyp found in the colon.Mul.biopsies taken.Hemorrhoids found in the anus.   • ENDOSCOPY      Normal hypopharynx and esophagus.Marked irregularity of the Z-line,compatable with chronic reflux.Mul.biopsies.A hiatus hernia found in GE junction.Mild nonerosive gastritis.Mul.biopsies.Polyps in fundus.Mul.biopsies.Normal pylorus.Normal duodenum.   • ENDOSCOPY AND COLONOSCOPY     • FINGER/THUMB LESION/CYST EXCISION     • HEEL SPUR EXCISION     • HYSTERECTOMY     • KNEE ARTHROSCOPY Bilateral     Arthroscopy of the left knee with debridement of medial meniscus.   • KNEE SURGERY      Right unicompartmental arthroplasty.       Family History   Problem Relation Age of Onset   • Cancer Other         lung   • Diabetes Other    • Hypertension Other    • Stroke Other        Social History     Social History   • Marital status:      Spouse name: N/A   • Number of children: N/A   • Years of education: N/A     Occupational History   • Not on file.     Social History Main Topics   • Smoking status: Former Smoker     Types: Cigarettes     Quit date: 6/2/1985   • Smokeless tobacco: Never Used   • Alcohol use No   •  "Drug use: No   • Sexual activity: Defer     Other Topics Concern   • Not on file     Social History Narrative   • No narrative on file        Review of Systems   Constitutional: Negative for chills and fever.   Respiratory: Negative.    Cardiovascular: Negative.    Gastrointestinal: Negative.    Musculoskeletal: Positive for arthralgias.   All other systems reviewed and are negative.      PHYSICAL EXAMINATION:       Ht 163.8 cm (64.5\")   Wt 75.3 kg (166 lb)   BMI 28.05 kg/m²     Physical Exam   Constitutional: She is oriented to person, place, and time. She appears well-developed and well-nourished. No distress.   Cardiovascular: Normal rate and regular rhythm.    Pulmonary/Chest: Effort normal and breath sounds normal.   Abdominal: Soft. Bowel sounds are normal.   Neurological: She is alert and oriented to person, place, and time.   Psychiatric: She has a normal mood and affect. Her behavior is normal. Judgment and thought content normal.   Vitals reviewed.      GAIT:     []  Normal  [x]  Antalgic    Assistive device: []  None  []  Walker     []  Crutches  [x]  Cane     []  Wheelchair  []  Stretcher    Left Knee Exam     Tenderness   Left knee tenderness location: diffuse.    Range of Motion   Extension: -5   Flexion: 120     Muscle Strength     The patient has normal left knee strength.    Tests   Drawer:       Anterior - negative     Posterior - negative  Varus: negative  Valgus: negative    Other   Sensation: normal  Pulse: present  Swelling: none    Comments:  Crepitation with motion  Mild to moderate pain through arc of motion                      PRIOR XRAYS FOR REVIEW:     AP bilateral standing with lateral of the left knee shows essentially complete bony collapse of the medial compartment of the left knee with tricompartmental osteoarthritic changes.  No acute bony abnormality is noted.  The right knee shows acceptable position and alignment of a medial compartment unicompartmental arthroplasty.  She " shows degenerative changes in the lateral compartment with marginal osteophytes.  Lateral of the left knee shows moderate to severe arthritic changes in the patellofemoral compartment and posterior osteophytes.  Progressive change is noted in both knees in comparison to x-rays June 6, 2017.  04/20/18 at 6:48 AM by Baron Reynolds MD      MRI knee left without [id number]  [facility]  Result Narrative   Earlier surgery in the medial left knee . No specific incident but progressive pain aggravated with weightbearing .     MRI left knee: No marrow edema or sign of bony injury . Anterior cruciate ligament is chronically torn at its femoral attachment. On the May 2013 study there was no redundance of the posterior cruciate ligament but this is changed and the posterior   cruciate ligament now has significant redundance . The lateral meniscus has a normal appearance . Most of the posterior half of the medial meniscus is been resected and there is subchondral irregularity and signal change in the medial aspect of the   medial compartment and there is less cartilage in the compartment now than on the earlier study . The patella is normally seated and there is thinning of the cartilage  without  subchondral irregularity or signal change . There is a small joint effusion      impression  1. Since May 2013 much of the posterior half of the medial meniscus has been resected and there is now less cartilage and more degenerative change than on the earlier study .  2. The patient has a chronic anterior cruciate ligament tear and since May 2013 the posterior cruciate ligament has become redundant  3. The patellofemoral compartment of the knee has very little cartilage but as of yet there is no signal change in the underlying bone          Previous or Active DVT/PE: NO  Cardiac Stent within 1 year: NO  Embolic/Ischemic stroke within 1 year: NO  NOTE:  TXA  tranexemic acid summary: THIS PATIENT IS A CANDIDATE FOR  IV TXA DURING SURGERY.    ASSESSMENT:    Diagnoses and all orders for this visit:    Primary osteoarthritis of left knee    Chronic pain of left knee    Internal derangement of left knee    Type 2 diabetes mellitus without complication, without long-term current use of insulin    Chronic kidney disease, stage II (mild)    Essential hypertension          PLAN    The patient voiced understanding of the risks, benefits, and alternative forms of treatment that were discussed and the patient consents to proceed with surgery.  All risks, benefits and alternatives were discussed.  Risks including to but not exclusive to anesthetic complications, including death, MI, CVA, infection, bleeding DVT, fracture, residual pain and need for future surgery.    Plan TKA left knee    Return for Post-operative eval.    Baron Reynolds MD

## 2018-06-04 NOTE — ANESTHESIA PROCEDURE NOTES
Peripheral Block    Patient location during procedure: OR  Start time: 6/4/2018 10:12 AM  Stop time: 6/4/2018 10:20 AM  Reason for block: at surgeon's request and post-op pain management  Performed by  Anesthesiologist: PETRA LOPEZ  Assisted by: EMMIE MERCADO  Preanesthetic Checklist  Completed: patient identified, site marked, surgical consent, pre-op evaluation, timeout performed, IV checked, risks and benefits discussed and monitors and equipment checked  Prep:  Pt Position: supine  Sterile barriers:cap, gloves and mask  Prep: ChloraPrep  Patient monitoring: blood pressure monitoring, continuous pulse oximetry and EKG  Procedure  Sedation:yes  Performed under: MAC  Guidance:ultrasound guided  ULTRASOUND INTERPRETATION.  Using ultrasound guidance a 21 G gauge needle was placed in close proximity to the femoral nerve, at which point, under ultrasound guidance anesthetic was injected in the area of the nerve and spread of the anesthesia was seen on ultrasound in close proximity thereto.  There were no abnormalities seen on ultrasound; a digital image was taken; and the patient tolerated the procedure with no complications. Images:still images obtained    Laterality:left  Block Type:adductor canal block  Injection Technique:single-shot  Needle Type:echogenic  Needle Gauge:21 G    Medications  Comment:Pt ID'd  Ultrasound guided  Needle seen throughout  Local infiltration appropriate  Local Injected:ropivacaine 0.5% Local Amount Injected:30mL  Post Assessment  Injection Assessment: negative aspiration for heme, no paresthesia on injection and incremental injection  Patient Tolerance:comfortable throughout block  Complications:no

## 2018-06-04 NOTE — THERAPY EVALUATION
Acute Care - Occupational Therapy Initial Evaluation  [facility]     Patient Name: Otilia Kenny  : 1950  MRN: [id number]  Today's Date: 2018  Onset of Illness/Injury or Date of Surgery: 18  Date of Referral to OT: 18  Referring Physician: Dr Reynolds    Admit Date: 2018       ICD-10-CM ICD-9-CM   1. Primary osteoarthritis of left knee M17.12 715.16   2. Chronic pain of left knee M25.562 719.46    G89.29 338.29   3. Essential hypertension I10 401.9   4. Chronic kidney disease, stage II (mild) N18.2 585.2   5. Major depression in complete remission F32.5 296.26   6. Generalized anxiety disorder F41.1 300.02   7. Type 2 diabetes mellitus without complication, without long-term current use of insulin E11.9 250.00   8. Impaired physical mobility Z74.09 781.99   9. Impaired mobility and ADLs Z74.09 799.89     Patient Active Problem List   Diagnosis   • Vitamin B12 deficiency (dietary) anemia   • Type 2 diabetes mellitus without complication   • Spasm of back muscles   • Sleep disorder   • Osteoarthritis of knee   • Irritable bowel syndrome   • Iron deficiency anemia   • Hypertriglyceridemia   • Hyperlipidemia   • GERD (gastroesophageal reflux disease)   • Generalized anxiety disorder   • Gastritis   • Essential hypertension   • Dysfunction of eustachian tube   • Diverticular disease of colon   • Cervical disc disorder   • Diarrhea, unspecified   • Anemia   • Allergic rhinitis   • Abnormal liver enzymes   • Abdominal pain   • Major depression in complete remission   • Chronic kidney disease, stage II (mild)   • Chronic pain of both knees   • Internal derangement of left knee   • Primary osteoarthritis of left knee   • Chronic pain of left knee   • Type 2 diabetes mellitus without complication, without long-term current use of insulin     Past Medical History:   Diagnosis Date   • Abdominal pain     Most likely related to functional colonic spasm     • Abnormal liver enzymes     Improved,  "3/2015      • Allergic rhinitis     seasonal   • Allergic rhinitis    • Anemia    • Anesthesia complication     states sometime B/P drops   • Arthritis    • Cervical disc disorder     S/P cervical surgeries x2-3. Extensive fusion C 3-7. [facility]      • Depression    • Diarrhea, unspecified     Resolved with discontinuation of metformin.    • Diverticular disease of colon    • Dysfunction of eustachian tube    • Essential hypertension     Increase lisinopril HCT, 10/12.5.      • Gastritis    • Generalized anxiety disorder    • GERD (gastroesophageal reflux disease)    • Hyperlipidemia     Intolerant of Lipitor. The patient stopped Zocor due to \"liver pain\", summer 2015. patient instructed to reattempt Zocor every other day, 10/2015    • Hypertriglyceridemia     Holding simvastatin 2/2015 due to slightly elevated LFTs.      • Iron deficiency anemia    • Irritable bowel syndrome     Dr. Estrada   • Osteoarthritis of knee     Dr. Reynolds    • Sleep disorder    • Spasm of back muscles     Dr. Castro changed Soma to Zanaflex.     • Vitamin B12 deficiency (dietary) anemia     Currently on multivitamin daily       Past Surgical History:   Procedure Laterality Date   • BACK SURGERY      Laminectomy   • BUNIONECTOMY     • CARPAL TUNNEL RELEASE Bilateral    • CERVICAL DISC SURGERY      Laminectomy 2011. Fusion 2013.   • CHOLECYSTECTOMY     • COLONOSCOPY      Diverticulosis found in the sigmoid colon.A single diminutive polyp found in the colon.Mul.biopsies taken.Hemorrhoids found in the anus.   • ENDOSCOPY      Normal hypopharynx and esophagus.Marked irregularity of the Z-line,compatable with chronic reflux.Mul.biopsies.A hiatus hernia found in GE junction.Mild nonerosive gastritis.Mul.biopsies.Polyps in fundus.Mul.biopsies.Normal pylorus.Normal duodenum.   • ENDOSCOPY AND COLONOSCOPY     • FINGER/THUMB LESION/CYST EXCISION     • HEEL SPUR EXCISION     • HYSTERECTOMY     • KNEE ARTHROSCOPY Bilateral     " Arthroscopy of the left knee with debridement of medial meniscus.   • KNEE SURGERY      Right unicompartmental arthroplasty.          OT ASSESSMENT FLOWSHEET (last 72 hours)      Occupational Therapy Evaluation     Row Name 06/04/18 1500                   OT Evaluation Time/Intention    Subjective Information complains of;pain  -RW (r) AK (t) RW (c)        Document Type evaluation  -RW (r) AK (t) RW (c)        Mode of Treatment co-treatment;occupational therapy  -RW (r) AK (t) RW (c)        Total Evaluation Minutes, Occupational Therapy 38  -RW (r) AK (t) RW (c)        Patient Effort excellent  -RW (r) AK (t) RW (c)           General Information    Patient Profile Reviewed? yes  -RW (r) AK (t) RW (c)        Onset of Illness/Injury or Date of Surgery 06/04/18  -RW (r) AK (t) RW (c)        Referring Physician Dr. Reynolds  -RW (r) AK (t) RW (c)        Patient Observations alert;cooperative;agree to therapy  -RW (r) AK (t) RW (c)        Patient/Family Observations  and son present  -RW (r) AK (t) RW (c)        General Observations of Patient supine in bed, IV, O2 on 2L, Hemovac, SCD disconnected  -RW (r) AK (t) RW (c)        Prior Level of Function independent:;ADL's;driving;all household mobility;community mobility  -RW (r) AK (t) RW (c)        Equipment Currently Used at Home shower chair;cane, straight;raised toilet   have a rolling walker, wheelchairs x2  -RW (r) AK (t) RW (c)        Pertinent History of Current Functional Problem L TKA  -RW (r) AK (t) RW (c)        Existing Precautions/Restrictions fall  -RW (r) AK (t) RW (c)        Limitations/Impairments safety/cognitive  -RW (r) AK (t) RW (c)        Equipment Issued to Patient gait belt  -RW (r) AK (t) RW (c)        Risks Reviewed patient and family:;LOB;nausea/vomiting;dizziness;increased discomfort;lines disloged  -RW (r) AK (t) RW (c)        Benefits Reviewed patient and family:;improve function;increase independence;increase strength;increase  balance;decrease pain;decrease risk of DVT;increase knowledge  -RW (r) AK (t) RW (c)        Barriers to Rehab none identified  -RW (r) AK (t) RW (c)           Relationship/Environment    Primary Source of Support/Comfort spouse  -RW (r) AK (t) RW (c)        Lives With spouse  -RW (r) AK (t) RW (c)        Family Caregiver if Needed spouse  -RW (r) AK (t) RW (c)        Primary Roles/Responsibilities homemaker  -RW (r) AK (t) RW (c)           Resource/Environmental Concerns    Current Living Arrangements home/apartment/condo  -RW (r) AK (t) RW (c)        Resource/Environmental Concerns none  -RW (r) AK (t) RW (c)        Transportation Concerns car, none  -RW (r) AK (t) RW (c)           Cognitive Assessment/Interventions    Additional Documentation Cognitive Assessment/Intervention (Group)  -RW (r) AK (t) RW (c)           Cognitive Assessment/Intervention- PT/OT    Affect/Mental Status (Cognitive) WFL  -RW (r) AK (t) RW (c)        Orientation Status (Cognition) oriented x 4  -RW (r) AK (t) RW (c)        Follows Commands (Cognition) WFL  -RW (r) AK (t) RW (c)        Cognitive Function (Cognitive) WFL  -RW (r) AK (t) RW (c)        Safety Deficit (Cognitive) mild deficit;awareness of need for assistance  -RW (r) AK (t) RW (c)        Personal Safety Interventions fall prevention program maintained;gait belt;supervised activity;nonskid shoes/slippers when out of bed  -RW (r) AK (t) RW (c)           Safety Issues, Functional Mobility    Safety Issues Affecting Function (Mobility) awareness of need for assistance  -RW (r) AK (t) RW (c)        Impairments Affecting Function (Mobility) pain;strength  -RW (r) AK (t) RW (c)           Mobility Assessment/Treatment    Extremity Weight-bearing Status left lower extremity  -RW (r) AK (t) RW (c)        Left Lower Extremity (Weight-bearing Status) weight-bearing as tolerated (WBAT)  -RW (r) AK (t) RW (c)           Bed Mobility Assessment/Treatment    Bed Mobility Assessment/Treatment  supine-sit-supine  -RW (r) AK (t) RW (c)        Supine-Sit-Supine [place] (Bed Mobility) supervision  -RW (r) AK (t) RW (c)        Assistive Device (Bed Mobility) bed rails;head of bed elevated  -RW (r) AK (t) RW (c)           Functional Mobility    Functional Mobility- Ind. Level contact guard assist  -RW (r) AK (t) RW (c)        Functional Mobility- Device rolling walker  -RW (r) AK (t) RW (c)        Functional Mobility-Distance (Feet) 12  -RW (r) AK (t) RW (c)        Functional Mobility-Maintain WBing able to maintain weight bearing status  -RW (r) AK (t) RW (c)           Transfer Assessment/Treatment    Transfer Assessment/Treatment stand-sit transfer;sit-stand transfer;toilet transfer  -RW (r) AK (t) RW (c)        Maintains Weight-bearing Status (Transfers) able to maintain  -RW (r) AK (t) RW (c)        Sit-Stand [place] (Transfers) contact guard  -RW (r) AK (t) RW (c)        Stand-Sit [place] (Transfers) contact guard  -RW (r) AK (t) RW (c)        [place] Level (Toilet Transfer) contact guard  -RW (r) AK (t) RW (c)        Assistive Device (Toilet Transfer) commode, 3-in-1;walker, front-wheeled  -RW (r) AK (t) RW (c)           Sit-Stand Transfer    Assistive Device (Sit-Stand Transfers) walker, front-wheeled  -RW (r) AK (t) RW (c)           Stand-Sit Transfer    Assistive Device (Stand-Sit Transfers) walker, front-wheeled  -RW (r) AK (t) RW (c)           Toilet Transfer    Type (Toilet Transfer) sit-stand;stand-sit  -RW (r) AK (t) RW (c)           ADL Assessment/Intervention    BADL Assessment/Intervention upper body dressing;lower body dressing;grooming;toileting;clothing fastener management  -RW (r) AK (t) RW (c)           Upper Body Dressing Assessment/Training    Upper Body Dressing [place] Level set up;supervision   OT obtained clothes for pt  -RW (r) AK (t) RW (c)        Upper Body Dressing Position edge of bed sitting  -RW (r) AK (t) RW (c)           Lower Body Dressing  Assessment/Training    Lower Body Dressing [place] Level set up;supervision   OT obtained clothes  -RW (r) AK (t) RW (c)        Lower Body Dressing Position edge of bed sitting  -RW (r) AK (t) RW (c)           Clothes Fastener Management    [place] Level (Clothes Fastener Management) buttons;supervision   untied back of gown, top string  -RW (r) AK (t) RW (c)        Position (Clothes Fastener Management) edge of bed sitting  -RW (r) AK (t) RW (c)           Grooming Assessment/Training    [place] Level (Grooming) supervision;wash face, hands  -RW (r) AK (t) RW (c)        Grooming Position supported standing  -RW (r) AK (t) RW (c)           Toileting Assessment/Training    [place] Level (Toileting) supervision  -RW (r) AK (t) RW (c)        Assistive Devices (Toileting) commode, 3-in-1  -RW (r) AK (t) RW (c)        Toileting Position unsupported sitting  -RW (r) AK (t) RW (c)           BADL Safety/Performance    Impairments, BADL Safety/Performance pain;range of motion;strength  -RW (r) AK (t) RW (c)        Skilled BADL Treatment/Intervention BADL process/adaptation training  -RW (r) AK (t) RW (c)           General ROM    GENERAL ROM COMMENTS BUE WFL  -RW (r) AK (t) RW (c)           MMT (Manual Muscle Testing)    Additional Documentation General Assessment (Manual Muscle Testing) (Group)  -RW (r) AK (t) RW (c)           General Assessment (Manual Muscle Testing)    General Manual Muscle Testing (MMT) Assessment upper extremity strength deficits identified  -RW (r) AK (t) RW (c)        Comment, General Manual Muscle Testing (MMT) Assessment BUE grossly 4-/5  -RW (r) AK (t) RW (c)           Motor Assessment/Interventions    Additional Documentation Balance (Group)  -RW (r) AK (t) RW (c)           Balance    Balance static sitting balance;static standing balance;dynamic standing balance;dynamic sitting balance  -RW (r) AK (t) RW (c)           Static Sitting Balance    Level of [place] (Unsupported  Sitting, Static Balance) supervision  -RW (r) AK (t) RW (c)        Sitting Position (Unsupported Sitting, Static Balance) sitting on edge of bed  -RW (r) AK (t) RW (c)        Time Able to Maintain Position (Unsupported Sitting, Static Balance) more than 5 minutes  -RW (r) AK (t) RW (c)           Dynamic Sitting Balance    Level of [place], Reaches Outside Midline (Sitting, Dynamic Balance) supervision  -RW (r) AK (t) RW (c)        Sitting Position, Reaches Outside Midline (Sitting, Dynamic Balance) sitting on edge of bed  -RW (r) AK (t) RW (c)        Comment, Reaches Outside Midline (Sitting, Dynamic Balance) while dressing  -RW (r) AK (t) RW (c)           Static Standing Balance    Level of [place] (Supported Standing, Static Balance) supervision  -RW (r) AK (t) RW (c)        Time Able to Maintain Position (Supported Standing, Static Balance) 15 to 30 seconds  -RW (r) AK (t) RW (c)        Assistive Device Utilized (Supported Standing, Static Balance) rolling walker  -RW (r) AK (t) RW (c)           Dynamic Standing Balance    Level of [place], Reaches Outside Midline (Standing, Dynamic Balance) supervision  -RW (r) AK (t) RW (c)        Time Able to Maintain Position, Reaches Outside Midline (Standing, Dynamic Balance) 45 to 60 seconds  -RW (r) AK (t) RW (c)        Comment, Reaches Outside Midline (Standing, Dynamic Balance) washing hands at sink  -RW (r) AK (t) RW (c)           Sensory Assessment/Intervention    Sensory General Assessment other (see comments)   no BUE light touch sensory deficits identified  -RW (r) AK (t) RW (c)        Additional Documentation Vision Assessment/Intervention (Group)  -RW (r) AK (t) RW (c)           Vision Assessment/Intervention    Visual Impairment/Limitations corrective lenses full time  -RW (r) AK (t) RW (c)           Positioning and Restraints    Pre-Treatment Position in bed  -RW (r) AK (t) RW (c)        Post Treatment Position bed  -RW (r) AK (t) RW (c)         In Bed supine;fowlers;call light within reach;encouraged to call for assist;exit alarm on;with family/caregiver;side rails up x2  -RW (r) AK (t) RW (c)           Pain Assessment    Additional Documentation Pain Scale: Numbers Pre/Post-Treatment (Group)  -RW (r) AK (t) RW (c)           Pain Scale: Numbers Pre/Post-Treatment    Pain Scale: Numbers, Pretreatment 6/10  -RW (r) AK (t) RW (c)        Pain Scale: Numbers, Post-Treatment 4/10  -RW (r) AK (t) RW (c)        Pain Location - Side Left  -RW (r) AK (t) RW (c)        Pain Location knee  -RW (r) AK (t) RW (c)           Wound 06/04/18 1107 Left knee incision;surgical    Wound - Properties Group Date first assessed: 06/04/18  -CF Time first assessed: 1107  -CF Present On Admission : no  -CF Side: Left  -CF Location: knee  -CF Type: incision;surgical  -CF       Clinical Impression (OT)    Date of Referral to OT 06/04/18  -RW (r) AK (t) RW (c)        OT Diagnosis impaired mobility and ADLs  -RW (r) AK (t) RW (c)        Patient/Family Goals Statement (OT Eval) return home  -RW (r) AK (t) RW (c)        Criteria for Skilled Therapeutic Interventions Met (OT Eval) yes;treatment indicated  -RW (r) AK (t) RW (c)        Rehab Potential (OT Eval) good, to achieve stated therapy goals  -RW (r) AK (t) RW (c)        Therapy Frequency (OT Eval) daily  -RW (r) AK (t) RW (c)        Predicted Duration of Therapy Intervention (OT Eval) until discharge from facility  -RW (r) AK (t) RW (c)        Care Plan Review (OT) evaluation/treatment results reviewed;care plan/treatment goals reviewed;risks/benefits reviewed;current/potential barriers reviewed;patient/other agree to care plan  -RW (r) AK (t) RW (c)        Care Plan Review, Other Participant (OT Eval) spouse  -RW (r) AK (t) RW (c)        Anticipated Equipment Needs at Discharge (OT) tub bench  -RW (r) AK (t) RW (c)        Anticipated Discharge Disposition (OT) home with 24/7 care;home with home health  -RW (r) AK (t) RW (c)            Vital Signs    Pre Systolic BP Rehab 101  -RW (r) AK (t) RW (c)        Pre Treatment Diastolic BP 71  -RW (r) AK (t) RW (c)        Post Systolic BP Rehab 113  -RW (r) AK (t) RW (c)        Post Treatment Diastolic BP 64  -RW (r) AK (t) RW (c)        Pretreatment Heart Rate (beats/min) 91  -RW (r) AK (t) RW (c)        Intratreatment Heart Rate (beats/min) 105  -RW (r) AK (t) RW (c)        Posttreatment Heart Rate (beats/min) 92  -RW (r) AK (t) RW (c)        Pre SpO2 (%) 96  -RW (r) AK (t) RW (c)        O2 Delivery Pre Treatment supplemental O2  -RW (r) AK (t) RW (c)        Intra SpO2 (%) 99  -RW (r) AK (t) RW (c)        O2 Delivery Intra Treatment room air  -RW (r) AK (t) RW (c)        Post SpO2 (%) 95  -RW (r) AK (t) RW (c)        O2 Delivery Post Treatment room air  -RW (r) AK (t) RW (c)        Pre Patient Position Supine  -RW (r) AK (t) RW (c)        Intra Patient Position Standing  -RW (r) AK (t) RW (c)        Post Patient Position Supine  -RW (r) AK (t) RW (c)           Planned OT Interventions    Planned Therapy Interventions (OT Eval) adaptive equipment training;activity tolerance training;BADL retraining;functional balance retraining;occupation/activity based interventions;ROM/therapeutic exercise;strengthening exercise;transfer/mobility retraining  -RW (r) AK (t) RW (c)           OT Goals    Transfer Goal Selection (OT) transfer, OT goal 1  -RW (r) AK (t) RW (c)        Bathing Goal Selection (OT) bathing, OT goal 1  -RW (r) AK (t) RW (c)        Dressing Goal Selection (OT) dressing, OT goal 1  -RW (r) AK (t) RW (c)        Toileting Goal Selection (OT) toileting, OT goal 1  -RW (r) AK (t) RW (c)           Transfer Goal 1 (OT)    Activity/Assistive Device (Transfer Goal 1, OT) toilet  -RW (r) AK (t) RW (c)        [place] Level/Cues Needed (Transfer Goal 1, OT) conditional independence  -RW (r) AK (t) RW (c)        Time Frame (Transfer Goal 1, OT) long term goal (LTG);by discharge  -RW (r) AK (t) RW (c)         Progress/Outcome (Transfer Goal 1, OT) goal not met  -RW (r) AK (t) RW (c)           Bathing Goal 1 (OT)    Activity/Assistive Device (Bathing Goal 1, OT) bathing skills, all  -RW (r) AK (t) RW (c)        [place] Level/Cues Needed (Bathing Goal 1, OT) conditional independence  -RW (r) AK (t) RW (c)        Time Frame (Bathing Goal 1, OT) long term goal (LTG);by discharge  -RW (r) AK (t) RW (c)        Progress/Outcomes (Bathing Goal 1, OT) goal not met  -RW (r) AK (t) RW (c)           Dressing Goal 1 (OT)    Activity/Assistive Device (Dressing Goal 1, OT) dressing skills, all  -RW (r) AK (t) RW (c)        [place]/Cues Needed (Dressing Goal 1, OT) conditional independence  -RW (r) AK (t) RW (c)        Time Frame (Dressing Goal 1, OT) long term goal (LTG);by discharge  -RW (r) AK (t) RW (c)        Progress/Outcome (Dressing Goal 1, OT) goal not met  -RW (r) AK (t) RW (c)           Toileting Goal 1 (OT)    Activity/Device (Toileting Goal 1, OT) toileting skills, all  -RW (r) AK (t) RW (c)        [place] Level/Cues Needed (Toileting Goal 1, OT) conditional independence  -RW (r) AK (t) RW (c)        Time Frame (Toileting Goal 1, OT) long term goal (LTG);by discharge  -RW (r) AK (t) RW (c)        Progress/Outcome (Toileting Goal 1, OT) goal not met  -RW (r) AK (t) RW (c)           Living Environment    Home Accessibility tub/shower is not walk in  -RW (r) AK (t) RW (c)          User Key  (r) = Recorded By, (t) = Taken By, (c) = Cosigned By    Initials Name Effective Dates    RW Prisca Calle, OTSANTINO/L 04/03/18 -     CF Shireen Joel RN 04/17/17 -     AK Bessie Bentley OT Student 05/09/18 -            Non-skid socks and gait belt in place. Toileting offered. Call light and needs within reach. Pt advised to not get up alone and call the nurse for assistance.  Bed alarm on.       Occupational Therapy Education     Title: PT OT SLP Therapies (Active)     Topic: Occupational Therapy (Active)      Point: ADL training (Active)     Description: Instruct learner(s) on proper safety adaptation and remediation techniques during self care or transfers.   Instruct in proper use of assistive devices.   Learning Progress Summary     Learner Status Readiness Method Response Comment Documented by    Patient Active Acceptance E NR fall precautions, transfer safety/technique, ADL safety/technique, OT role/POC AK 06/04/18 1618    Family Active Acceptance E NR fall precautions, transfer safety/technique, ADL safety/technique, OT role/POC AK 06/04/18 1618          Point: Precautions (Active)     Description: Instruct learner(s) on prescribed precautions during self-care and functional transfers.   Learning Progress Summary     Learner Status Readiness Method Response Comment Documented by    Patient Active Acceptance E NR fall precautions, transfer safety/technique, ADL safety/technique, OT role/POC AK 06/04/18 1618    Family Active Acceptance E NR fall precautions, transfer safety/technique, ADL safety/technique, OT role/POC AK 06/04/18 1618                      User Key     Initials Effective Dates Name Provider Type Discipline    AK 05/09/18 -  eBssie Bentley, OT Student OT Student OT                  OT Recommendation and Plan                Outcome Measures     Row Name 06/04/18 1501 06/04/18 1500          How much help from another person do you currently need...    Turning from your back to your side while in flat bed without using bedrails? 3  -CB  --     Moving from lying on back to sitting on the side of a flat bed without bedrails? 3  -CB  --     Moving to and from a bed to a chair (including a wheelchair)? 3  -CB  --     Standing up from a chair using your arms (e.g., wheelchair, bedside chair)? 3  -CB  --     Climbing 3-5 steps with a railing? 3  -CB  --     To walk in hospital room? 3  -CB  --     AM-PAC 6 Clicks Score 18  -CB  --        How much help from another is currently needed...    Putting on and  taking off regular lower body clothing?  -- 3  -RW (r) AK (t) RW (c)     Bathing (including washing, rinsing, and drying)  -- 3  -RW (r) AK (t) RW (c)     Toileting (which includes using toilet bed pan or urinal)  -- 3  -RW (r) AK (t) RW (c)     Putting on and taking off regular upper body clothing  -- 3  -RW (r) AK (t) RW (c)     Taking care of personal grooming (such as brushing teeth)  -- 4  -RW (r) AK (t) RW (c)     Eating meals  -- 4  -RW (r) AK (t) RW (c)     Score  -- 20  -RW (r) AK (t)        Functional Assessment    Outcome Measure Options AM-PAC 6 Clicks Basic Mobility (PT)  -CB AM-PAC 6 Clicks Daily Activity (OT)  -RW (r) AK (t) RW (c)       User Key  (r) = Recorded By, (t) = Taken By, (c) = Cosigned By    Initials Name Provider Type    CB Suzanne Morse, PT Physical Therapist    RW Prisca Calle, OTR/L Occupational Therapist    GRABIEL Bentley, OT Student OT Student          Time Calculation:   OT Start Time: 1500  OT Stop Time: 1538  OT Time Calculation (min): 38 min    Therapy Charges for Today     Code Description Service Date Service Provider Modifiers Qty    [id number]  OT SELFCARE CURRENT 6/4/2018 Prisca Calle, OTR/L GO, CJ 1    [id number]  OT SELFCARE PROJECTED 6/4/2018 Prisca Calle, OTR/L GO, CI 1    [id number]  OT EVAL LOW COMPLEXITY 2 6/4/2018 Prisca Calle, OTR/L GO 1    [id number]  OT SELF CARE/MGMT/TRAIN EA 15 MIN 6/4/2018 Prisca Calle, OTR/L GO 1          OT G-codes  OT Professional Judgement Used?: Yes  OT Functional Scales Options: AM-PAC 6 Clicks Daily Activity (OT)  Score: 20  Functional Limitation: Self care  Self Care Current Status (): At least 20 percent but less than 40 percent impaired, limited or restricted  Self Care Goal Status (): At least 1 percent but less than 20 percent impaired, limited or restricted    Prisca E Wieling, OTR/L  6/4/2018

## 2018-06-05 ENCOUNTER — ANTICOAGULATION VISIT (OUTPATIENT)
Dept: PHARMACY | Facility: HOSPITAL | Age: 68
End: 2018-06-05

## 2018-06-05 VITALS
SYSTOLIC BLOOD PRESSURE: 169 MMHG | RESPIRATION RATE: 18 BRPM | HEIGHT: 65 IN | OXYGEN SATURATION: 95 % | BODY MASS INDEX: 27.22 KG/M2 | DIASTOLIC BLOOD PRESSURE: 70 MMHG | WEIGHT: 163.36 LBS | HEART RATE: 79 BPM | TEMPERATURE: 99.5 F

## 2018-06-05 LAB
GLUCOSE BLDC GLUCOMTR-MCNC: 138 MG/DL (ref 70–130)
GLUCOSE BLDC GLUCOMTR-MCNC: 155 MG/DL (ref 70–130)
GLUCOSE BLDC GLUCOMTR-MCNC: 166 MG/DL (ref 70–130)
HOLD SPECIMEN: NORMAL
INR PPP: 1.03 (ref 0.8–1.2)
LAB AP CASE REPORT: NORMAL
Lab: NORMAL
PATH REPORT.FINAL DX SPEC: NORMAL
PATH REPORT.GROSS SPEC: NORMAL
PROTHROMBIN TIME: 13.3 SECONDS (ref 11.1–15.3)

## 2018-06-05 PROCEDURE — 25010000003 CEFAZOLIN PER 500 MG: Performed by: ORTHOPAEDIC SURGERY

## 2018-06-05 PROCEDURE — 99024 POSTOP FOLLOW-UP VISIT: CPT | Performed by: ORTHOPAEDIC SURGERY

## 2018-06-05 PROCEDURE — 97110 THERAPEUTIC EXERCISES: CPT

## 2018-06-05 PROCEDURE — 85610 PROTHROMBIN TIME: CPT | Performed by: ORTHOPAEDIC SURGERY

## 2018-06-05 PROCEDURE — 97535 SELF CARE MNGMENT TRAINING: CPT

## 2018-06-05 PROCEDURE — 82962 GLUCOSE BLOOD TEST: CPT

## 2018-06-05 PROCEDURE — 97116 GAIT TRAINING THERAPY: CPT

## 2018-06-05 PROCEDURE — 97530 THERAPEUTIC ACTIVITIES: CPT

## 2018-06-05 PROCEDURE — 63710000001 INSULIN ASPART PER 5 UNITS: Performed by: ORTHOPAEDIC SURGERY

## 2018-06-05 RX ORDER — OXYCODONE AND ACETAMINOPHEN 7.5; 325 MG/1; MG/1
1 TABLET ORAL EVERY 4 HOURS PRN
Qty: 40 TABLET | Refills: 0 | Status: SHIPPED | OUTPATIENT
Start: 2018-06-05 | End: 2018-08-20

## 2018-06-05 RX ORDER — WARFARIN SODIUM 2.5 MG/1
2.5 TABLET ORAL NIGHTLY
Qty: 30 TABLET | Refills: 1 | Status: SHIPPED | OUTPATIENT
Start: 2018-06-05 | End: 2018-07-17

## 2018-06-05 RX ADMIN — FAMOTIDINE 40 MG: 40 TABLET ORAL at 08:21

## 2018-06-05 RX ADMIN — INSULIN ASPART 2 UNITS: 100 INJECTION, SOLUTION INTRAVENOUS; SUBCUTANEOUS at 12:08

## 2018-06-05 RX ADMIN — SODIUM CHLORIDE 100 ML/HR: 9 INJECTION, SOLUTION INTRAVENOUS at 01:49

## 2018-06-05 RX ADMIN — CITALOPRAM HYDROBROMIDE 20 MG: 20 TABLET ORAL at 08:21

## 2018-06-05 RX ADMIN — SODIUM CHLORIDE 100 ML/HR: 9 INJECTION, SOLUTION INTRAVENOUS at 08:20

## 2018-06-05 RX ADMIN — ACETAMINOPHEN 1000 MG: 500 TABLET ORAL at 08:21

## 2018-06-05 RX ADMIN — OXYCODONE HYDROCHLORIDE 5 MG: 5 TABLET ORAL at 04:57

## 2018-06-05 RX ADMIN — OXYCODONE HYDROCHLORIDE 10 MG: 10 TABLET, FILM COATED, EXTENDED RELEASE ORAL at 08:21

## 2018-06-05 RX ADMIN — ASPIRIN 81 MG: 81 TABLET, COATED ORAL at 08:21

## 2018-06-05 RX ADMIN — OXYCODONE HYDROCHLORIDE 5 MG: 5 TABLET ORAL at 14:46

## 2018-06-05 RX ADMIN — ACETAMINOPHEN 1000 MG: 500 TABLET ORAL at 12:08

## 2018-06-05 RX ADMIN — CEFAZOLIN SODIUM 2 G: 1 INJECTION, POWDER, FOR SOLUTION INTRAMUSCULAR; INTRAVENOUS at 01:45

## 2018-06-05 RX ADMIN — FERROUS SULFATE TAB EC 324 MG (65 MG FE EQUIVALENT) 324 MG: 324 (65 FE) TABLET DELAYED RESPONSE at 08:21

## 2018-06-05 NOTE — THERAPY TREATMENT NOTE
Acute Care - Physical Therapy Treatment Note  [facility]     Patient Name: Otilia Kenny  : 1950  MRN: [id number]  Today's Date: 2018  Onset of Illness/Injury or Date of Surgery: 18  Date of Referral to PT: 18  Referring Physician: Dr Reynolds    Admit Date: 2018    Visit Dx:    ICD-10-CM ICD-9-CM   1. Primary osteoarthritis of left knee M17.12 715.16   2. Chronic pain of left knee M25.562 719.46    G89.29 338.29   3. Essential hypertension I10 401.9   4. Chronic kidney disease, stage II (mild) N18.2 585.2   5. Major depression in complete remission F32.5 296.26   6. Generalized anxiety disorder F41.1 300.02   7. Type 2 diabetes mellitus without complication, without long-term current use of insulin E11.9 250.00   8. Impaired physical mobility Z74.09 781.99   9. Impaired mobility and ADLs Z74.09 799.89     Patient Active Problem List   Diagnosis   • Vitamin B12 deficiency (dietary) anemia   • Type 2 diabetes mellitus without complication   • Spasm of back muscles   • Sleep disorder   • Osteoarthritis of knee   • Irritable bowel syndrome   • Iron deficiency anemia   • Hypertriglyceridemia   • Hyperlipidemia   • GERD (gastroesophageal reflux disease)   • Generalized anxiety disorder   • Gastritis   • Essential hypertension   • Dysfunction of eustachian tube   • Diverticular disease of colon   • Cervical disc disorder   • Diarrhea, unspecified   • Anemia   • Allergic rhinitis   • Abnormal liver enzymes   • Abdominal pain   • Major depression in complete remission   • Chronic kidney disease, stage II (mild)   • Chronic pain of both knees   • Internal derangement of left knee   • Primary osteoarthritis of left knee   • Chronic pain of left knee   • Type 2 diabetes mellitus without complication, without long-term current use of insulin       Therapy Treatment          Rehabilitation Treatment Summary     Row Name 18 1008 18 0906          Treatment Time/Intention    Discipline  occupational therapy assistant  -CS physical therapy assistant  -CA     Document Type therapy note (daily note)  -CS therapy note (daily note)  -CA     Subjective Information no complaints  -CS  --     Mode of Treatment occupational therapy  -CS individual therapy;physical therapy  -CA     Patient/Family Observations  -- no family present  -CA     Therapy Frequency (PT Clinical Impression) daily  -CS 2 times/day  -CA     Total Minutes, Occupational Therapy Treatment 53  -CS2  --     Patient Effort --  -CA excellent  -CA2     Existing Precautions/Restrictions fall  -CS fall  -CA     Recorded by [CA] Tima Hanna, PTA 06/05/18 1208  [CS] Adriana Bravo BROWNING/L 06/05/18 1012  [CS2] Adriana Bravo BROWNING/L 06/05/18 1158 [CA] Tima Hanna, PTA 06/05/18 0921  [CA2] Tima Hanna, PTA 06/05/18 1208     Row Name 06/05/18 1008 06/05/18 0906          Vital Signs    Pre Systolic BP Rehab  -- 122  -CA     Pre Treatment Diastolic BP  -- 66  -CA     Post Systolic BP Rehab 141  -  -CA2     Post Treatment Diastolic BP 70  -CS 66  -CA2     Pretreatment Heart Rate (beats/min) 79  -CS2 87  -CA     Posttreatment Heart Rate (beats/min) 83  -CS 85  -CA2     Pre SpO2 (%) 95  -CS2 94  -CA     O2 Delivery Pre Treatment room air  -CS2 supplemental O2  -CA     Post SpO2 (%) 95  -CS 94  -CA2     O2 Delivery Post Treatment room air  -CS room air  -CA2     Pre Patient Position Sitting  -CS2 Supine  -CA     Intra Patient Position Standing  -CS  --     Post Patient Position Sitting  -CS  --     Recorded by [CS] Adriana Bravo BROWNING/L 06/05/18 1046  [CS2] Adriana Bravo BROWNING/L 06/05/18 1012 [CA] Tima Hanna, PTA 06/05/18 0922  [CA2] Tima Hanna, PTA 06/05/18 1003     Row Name 06/05/18 1008 06/05/18 0906          Cognitive Assessment/Intervention- PT/OT    Affect/Mental Status (Cognitive) --  -CA WFL  -CA     Orientation Status (Cognition) --  -CA oriented x 4  -CA     Follows Commands (Cognition) --  -CA WFL  -CA     Cognitive  Function (Cognitive) --  -CA WFL  -CA     Safety Deficit (Cognitive) --  -CA ability to follow commands  -CA     Personal Safety Interventions --  -CA fall prevention program maintained;gait belt;nonskid shoes/slippers when out of bed  -CA     Recorded by [CA] Tima Hanna, PTA 06/05/18 1208 [CA] Tima Hanna, PTA 06/05/18 1208     Row Name 06/05/18 1008 06/05/18 0906          Mobility Assessment/Intervention    Extremity Weight-bearing Status --  -CA left lower extremity  -CA     Left Lower Extremity (Weight-bearing Status) --  -CA weight-bearing as tolerated (WBAT)  -CA     Right Lower Extremity (Weight-bearing Status) --  -CA weight-bearing as tolerated (WBAT)  -CA     Recorded by [CA] Tima Hanna, PTA 06/05/18 1208 [CA] Tima Hanna, PTA 06/05/18 1208     Row Name 06/05/18 1008 06/05/18 0906          Bed Mobility Assessment/Treatment    Bed Mobility Assessment/Treatment --  -CA supine-sit;sit-supine  -CA     Supine-Sit [place] (Bed Mobility) --  -CA contact guard  -CA     Assistive Device (Bed Mobility) --  -CA overhead trapeze;head of bed elevated  -CA     Recorded by [CA] Tima Hanna, PTA 06/05/18 1208 [CA] Tima Hanna, PTA 06/05/18 1208     Row Name 06/05/18 1008 06/05/18 0906          Transfer Assessment/Treatment    Transfer Assessment/Treatment --  -CA sit-stand transfer;stand-sit transfer;bed-chair transfer  -CA     Maintains Weight-bearing Status (Transfers) --  -CA  --     Bed-Chair [place] (Transfers) --  -CA stand by assist  -CA     Assistive Device (Bed-Chair Transfers) --  -CA walker, front-wheeled  -CA     Sit-Stand [place] (Transfers) --  -CA stand by assist  -CA     Stand-Sit [place] (Transfers) --  -CA stand by assist  -CA     Recorded by [CA] Tima Hanna, PTA 06/05/18 1208 [CA] Tima Hanna, PTA 06/05/18 1208     Row Name 06/05/18 1008 06/05/18 8036          Sit-Stand Transfer    Assistive Device (Sit-Stand Transfers) --  -CA walker, front-wheeled  -CA     Recorded by  [CA] Tima Hanna, PTA 06/05/18 1208 [CA] Tima Hanna, PTA 06/05/18 1208     Row Name 06/05/18 1008 06/05/18 0906          Stand-Sit Transfer    Assistive Device (Stand-Sit Transfers) --  -CA walker, front-wheeled  -CA     Recorded by [CA] Tima Hanna, PTA 06/05/18 1208 [CA] Tima Hanna, [facility] 06/05/18 1208     Row Name 06/05/18 1008 06/05/18 0906          Gait/Stairs Assessment/Training    Gait/Stairs Assessment/Training --  -CA gait/ambulation independence  -CA     [place] Level (Gait) --  -CA stand by assist  -CA     Assistive Device (Gait) --  -CA walker, front-wheeled  -CA     Distance in Feet (Gait) --  -  -CA     Recorded by [CA] Tima Hanna, [facility] 06/05/18 1208 [CA] Tima Hanna, [facility] 06/05/18 1208     Row Name 06/05/18 1008             Bathing Assessment/Intervention    Bathing [place] Level bathing skills;independent  -CS      Assistive Devices (Bathing) bath mitt  -CS      Bathing Position supported sitting;supported standing  -CS      Recorded by [CS] DANY Jules 06/05/18 1158      Row Name 06/05/18 1008             Upper Body Dressing Assessment/Training    Upper Body Dressing [place] doff;don;pull-over garment;independent  -CS      Upper Body Dressing Position supported sitting  -CS      Recorded by [CS] DANY Jules 06/05/18 1158      Row Name 06/05/18 1008             Lower Body Dressing Assessment/Training    Lower Body Dressing [place] doff;don;pants/bottoms;undergarment;socks;supervision  -CS      Lower Body Dressing Position supported sitting;supported standing  -CS      Recorded by [CS] DANY Jules 06/05/18 1158      Row Name 06/05/18 1008             Grooming Assessment/Training    [place] Level (Grooming) grooming skills;hair care, combing/brushing;oral care regimen;wash face, hands;independent  -CS      Grooming Position supported sitting  -CS      Recorded by [CS] NALLELY Jules/L 06/05/18 1158      Row  Name 06/05/18 1008             Toileting Assessment/Training    [place] Level (Toileting) toileting skills;supervision  -CS      Assistive Devices (Toileting) raised toilet seat  -CS      Toileting Position supported sitting;supported standing  -CS      Recorded by [CS] DANY Jules 06/05/18 1158      Row Name 06/05/18 0906             General ROM    RT Lower Ext Rt Knee Extension/Flexion  -CA      Recorded by [CA] Tima Hanna PTA 06/05/18 0954      Row Name 06/05/18 0906             Right Lower Ext    Rt Knee Extension/Flexion AROM 8  -CA      Rt Knee Extension/Flexion PROM 104  -CA      RT Lower Extremity Comments pt. was a little more stiff this am and increased pain  -CA      Recorded by [CA] Tima Hanna PTA 06/05/18 1138      Row Name 06/05/18 0906             Therapeutic Exercise    Therapeutic Exercise seated, lower extremities;supine, lower extremities  -CA      Recorded by [CA] Tima Hanna PTA 06/05/18 1138      Row Name 06/05/18 0906             Lower Extremity Seated Therapeutic Exercise    Performed, Seated Lower Extremity (Therapeutic Exercise) knee flexion/extension  -CA      Exercise Type, Seated Lower Extremity (Therapeutic Exercise) AROM (active range of motion)  -CA      Sets/Reps Detail, Seated Lower Extremity (Therapeutic Exercise) --   pt. performed for 2 mins  -CA      Recorded by [CA] Tima Hanna PTA 06/05/18 1138      Row Name 06/05/18 0906             Lower Extremity Supine Therapeutic Exercise    Performed, Supine Lower Extremity (Therapeutic Exercise) quadriceps sets  -CA      Exercise Type, Supine Lower Extremity (Therapeutic Exercise) AROM (active range of motion)  -CA      Sets/Reps Detail, Supine Lower Extremity (Therapeutic Exercise) 1x20  -CA      Recorded by [CA] Tima Hanna PTA 06/05/18 1138      Row Name 06/05/18 1008 06/05/18 0906          Positioning and Restraints    Pre-Treatment Position sitting in chair/recliner  -CS in bed  -CA     Post Treatment  Position chair  -CS chair  -CA     In Bed  -- sitting;call light within reach;encouraged to call for assist  -CA     In Chair reclined;call light within reach;with family/caregiver;encouraged to call for assist  -CS  --     Recorded by [CS] NALLELY Jules/L 06/05/18 1158 [CA] Tima Hanna, PTA 06/05/18 1138     Row Name 06/05/18 1008 06/05/18 0906          Pain Scale: Numbers Pre/Post-Treatment    Pain Scale: Numbers, Pretreatment 5/10  -CS 7/10  -CA     Pain Scale: Numbers, Post-Treatment 5/10  -CS2  --     Pain Location - Side Left  -CS Left  -CA     Pain Location knee  -CS knee  -CA     Recorded by [CS] NALLELY Jules/L 06/05/18 1012  [CS2] NALLELY Jules/L 06/05/18 1158 [CA] Tima Hanna, PTA 06/05/18 0954     Row Name                Wound 06/04/18 1107 Left knee incision;surgical    Wound - Properties Group Date first assessed: 06/04/18 [CF] Time first assessed: 1107 [CF] Present On Admission : no [CF] Side: Left [CF] Location: knee [CF] Type: incision;surgical [CF] Recorded by:  [CF] Shireen Joel RN 06/04/18 1107    Row Name 06/05/18 1008             Outcome Summary/Treatment Plan (OT)    Daily Summary of Progress (OT) progress toward functional goals as expected  -CS      Plan for Continued Treatment (OT) cont OT POC  -CS      Anticipated Discharge Disposition (OT) home with home health  -CS      Recorded by [CS] NALLELY Jules/RUIZ 06/05/18 1158        User Key  (r) = Recorded By, (t) = Taken By, (c) = Cosigned By    Initials Name Effective Dates Discipline    CA Tima Hanna, PTA 03/07/18 -  PT    CS NALLELY Jules/L 03/07/18 -  OT    CF Shireen Joel RN 04/17/17 -  Nurse          Wound 06/04/18 1107 Left knee incision;surgical (Active)   Dressing Appearance dry;intact 6/5/2018  8:21 AM   Closure ASHLEE 6/5/2018  8:21 AM   Base dressing in place, unable to visualize 6/5/2018  8:21 AM   Drainage Amount none 6/5/2018  8:21 AM             Physical Therapy Education      Title: PT OT SLP Therapies (Active)     Topic: Physical Therapy (Active)     Point: Mobility training (Active)    Learning Progress Summary     Learner Status Readiness Method Response Comment Documented by    Patient Active Acceptance D,E NR   06/04/18 1611          Point: Precautions (Active)    Learning Progress Summary     Learner Status Readiness Method Response Comment Documented by    Patient Active Acceptance D,E NR   06/04/18 1611                      User Key     Initials Effective Dates Name Provider Type Discipline     04/06/17 -  Suzanne Morse, PT Physical Therapist PT                    PT Recommendation and Plan  Therapy Frequency (PT Clinical Impression): 2 times/day  Plan of Care Reviewed With: patient  Progress: improving  Outcome Summary: pt. tolerated tx. very well and showed great mobility and safety. pt. ambulated 150' with SBA and no LOB. pt. had  L knee ROM 8-104 degrees          Outcome Measures     Row Name 06/05/18 0906 06/04/18 1501 06/04/18 1500       How much help from another person do you currently need...    Turning from your back to your side while in flat bed without using bedrails? 3  -CA 3  -CB  --    Moving from lying on back to sitting on the side of a flat bed without bedrails? 3  -CA 3  -CB  --    Moving to and from a bed to a chair (including a wheelchair)? 3  -CA 3  -CB  --    Standing up from a chair using your arms (e.g., wheelchair, bedside chair)? 3  -CA 3  -CB  --    Climbing 3-5 steps with a railing? 3  -CA 3  -CB  --    To walk in hospital room? 3  -CA 3  -CB  --    AM-PAC 6 Clicks Score 18  -CA 18  -CB  --       How much help from another is currently needed...    Putting on and taking off regular lower body clothing?  --  -- 3  -RW (r) AK (t) RW (c)    Bathing (including washing, rinsing, and drying)  --  -- 3  -RW (r) AK (t) RW (c)    Toileting (which includes using toilet bed pan or urinal)  --  -- 3  -RW (r) AK (t) RW (c)    Putting on and taking off  regular upper body clothing  --  -- 3  -RW (r) AK (t) RW (c)    Taking care of personal grooming (such as brushing teeth)  --  -- 4  -RW (r) AK (t) RW (c)    Eating meals  --  -- 4  -RW (r) AK (t) RW (c)    Score  --  -- 20  -RW (r) AK (t)       Functional Assessment    Outcome Measure Options AM-PAC 6 Clicks Basic Mobility (PT)  -CA AM-PAC 6 Clicks Basic Mobility (PT)  -CB AM-PAC 6 Clicks Daily Activity (OT)  -RW (r) AK (t) RW (c)      User Key  (r) = Recorded By, (t) = Taken By, (c) = Cosigned By    Initials Name Provider Type    CB Suzanne Morse, PT Physical Therapist    RW Prisca Calle, OTR/L Occupational Therapist    ELIF Hanna PTA Physical Therapy Assistant    GRABIEL Bentley, OT Student OT Student           Time Calculation:         PT Charges     Row Name 06/05/18 1139             Time Calculation    Start Time 0906  -CA      Stop Time 1006  -CA      Time Calculation (min) 60 min  -CA      PT Received On 06/05/18  -CA         Time Calculation- PT    Total Timed Code Minutes- PT 60 minute(s)  -CA        User Key  (r) = Recorded By, (t) = Taken By, (c) = Cosigned By    Initials Name Provider Type    ELIF Hanna PTA Physical Therapy Assistant          Therapy Charges for Today     Code Description Service Date Service Provider Modifiers Qty    44097159692 HC GAIT TRAINING EA 15 MIN 6/5/2018 Tima Hanna PTA GP 1    83741163591 HC PT THER PROC EA 15 MIN 6/5/2018 Tima Hanna PTA GP 1    16936879753 HC PT THERAPEUTIC ACT EA 15 MIN 6/5/2018 Tima Hanna PTA GP 2          PT G-Codes  PT Professional Judgement Used?: Yes  Outcome Measure Options: AM-PAC 6 Clicks Basic Mobility (PT)  Score: 18  Functional Limitation: Mobility: Walking and moving around  Mobility: Walking and Moving Around Current Status (): At least 40 percent but less than 60 percent impaired, limited or restricted  Mobility: Walking and Moving Around Goal Status (): At least 20 percent but less than 40 percent  impaired, limited or restricted    Tima Hanna, PTA  6/5/2018

## 2018-06-05 NOTE — DISCHARGE INSTR - APPOINTMENTS
June 6, 2018 at 2:30PM  [facility]  [phone]   PLEASE BRING INSURANCE CARD AND ORDER PRINT OUT TO APPOINTMENT

## 2018-06-05 NOTE — PROGRESS NOTES
"Orthopedic Total Knee Progress Note      Patient: Otilia Kenny  Date of Admission: 6/4/2018  YOB: 1950  Medical Record Number: [id number]      Status Post: Procedure(s) (LRB):  TOTAL KNEE ARTHROPLASTY ATTUNE with adductor canal block  (Left)        Systemic or Specific Complaints: No Complaints except mild headache, described as her \"typical headaches from neck tension from past neck surgeries.\"  Headache is not worsening or causing any other symptoms.  Complications: None  Pain Relief: some relief; doing well on current pain meds with mild, tolerable pain per patient.    Physical Exam:  68 y.o. female alert and oriented  Temp:  [97.3 °F (36.3 °C)-99.5 °F (37.5 °C)] 99.5 °F (37.5 °C)  Heart Rate:  [71-92] 91  Resp:  [14-18] 18  BP: ()/(51-69) 117/58  Chest: Clear to auscultation; no wheezes/rhonchi.  CV: Regular Rate and Rhythm, no murmurs/gallops/rubs.  Abd: Soft, NT, with BS +  Ext: Distal pulses intact; able to move toes; ambulated with minimal assistance yesterday throughout her room. Calf is soft and nontender.  Skin: Bandage is clean dry and intact. Left leg drain in place with minimal drainage.  Wearing SCD's in bed.  No nausea.    Activity: Mobilizing Per P.T.   Weight Bearing: As Tolerated    Data Review  Admission on 06/04/2018   Component Date Value Ref Range Status   • ABO Type 06/04/2018 O   Final   • RH type 06/04/2018 Positive   Final   • Antibody Screen 06/04/2018 Negative   Final   • T&S Expiration Date 06/04/2018 6/7/2018 11:59:59 PM   Final   • Previous History 06/04/2018 Previous Record on File   Final   • Glucose 06/04/2018 133* 70 - 130 mg/dL Final    RN NotifiedMeter: [id number]: [id number] GILSON JUNIOR   • Glucose 06/04/2018 128  70 - 130 mg/dL Final    RN NotifiedMeter: [id number]: [id number] DEBBI HYDE   • Hemoglobin 06/04/2018 11.3* 12.0 - 15.5 g/dL Final   • Hematocrit 06/04/2018 33.9* 35.0 - 45.0 % Final   • Glucose 06/04/2018 220* 70 - 130 " mg/dL Final    RN NotifiedMeter: [id number]: [id number] BILL OKEEFE       No results found.    Medications    acetaminophen 1,000 mg Oral 4x Daily   amLODIPine 5 mg Oral Daily   aspirin 81 mg Oral Daily   citalopram 20 mg Oral Daily   famotidine 40 mg Oral Daily   ferrous sulfate 324 mg Oral Daily With Breakfast   insulin aspart 0-9 Units Subcutaneous 4x Daily AC & at Bedtime   lisinopril 20 mg Oral Daily   oxyCODONE 10 mg Oral Q12H   rosuvastatin 20 mg Oral Daily   warfarin 2.5 mg Oral Daily     bisacodyl  •  dextrose  •  dextrose  •  docusate sodium  •  glucagon (human recombinant)  •  HYDROmorphone **AND** naloxone  •  ondansetron **OR** ondansetron ODT **OR** ondansetron  •  oxyCODONE  •  Pharmacy to dose warfarin  •  sodium chloride  •  BH OR ANTIBIOTIC IRRIGATION BUILDER  •  BH OR ANTIBIOTIC IRRIGATION BUILDER    Assessment:  Doing well following total knee replacement  Patient Active Problem List   Diagnosis   • Vitamin B12 deficiency (dietary) anemia   • Type 2 diabetes mellitus without complication   • Spasm of back muscles   • Sleep disorder   • Osteoarthritis of knee   • Irritable bowel syndrome   • Iron deficiency anemia   • Hypertriglyceridemia   • Hyperlipidemia   • GERD (gastroesophageal reflux disease)   • Generalized anxiety disorder   • Gastritis   • Essential hypertension   • Dysfunction of eustachian tube   • Diverticular disease of colon   • Cervical disc disorder   • Diarrhea, unspecified   • Anemia   • Allergic rhinitis   • Abnormal liver enzymes   • Abdominal pain   • Major depression in complete remission   • Chronic kidney disease, stage II (mild)   • Chronic pain of both knees   • Internal derangement of left knee   • Primary osteoarthritis of left knee   • Chronic pain of left knee   • Type 2 diabetes mellitus without complication, without long-term current use of insulin         Plan:   Continue efforts to mobilize  Continue Pain Control Measures  Continue incisional Care  DVT  prophylaxis        Malinda Hassan    Date: 6/5/2018   Time: 5:41 AM     As above  Progress as tolerated.  Anticipate home 1-2 days    06/05/18 at 7:09 AM by Baron Reynolds MD

## 2018-06-05 NOTE — THERAPY TREATMENT NOTE
Acute Care - Physical Therapy Treatment Note  [facility]     Patient Name: Otilia Kenny  : 1950  MRN: [id number]  Today's Date: 2018  Onset of Illness/Injury or Date of Surgery: 18  Date of Referral to PT: 18  Referring Physician: Dr Reynolds    Admit Date: 2018    Visit Dx:    ICD-10-CM ICD-9-CM   1. Primary osteoarthritis of left knee M17.12 715.16   2. Chronic pain of left knee M25.562 719.46    G89.29 338.29   3. Essential hypertension I10 401.9   4. Chronic kidney disease, stage II (mild) N18.2 585.2   5. Major depression in complete remission F32.5 296.26   6. Generalized anxiety disorder F41.1 300.02   7. Type 2 diabetes mellitus without complication, without long-term current use of insulin E11.9 250.00   8. Impaired physical mobility Z74.09 781.99   9. Impaired mobility and ADLs Z74.09 799.89     Patient Active Problem List   Diagnosis   • Vitamin B12 deficiency (dietary) anemia   • Type 2 diabetes mellitus without complication   • Spasm of back muscles   • Sleep disorder   • Osteoarthritis of knee   • Irritable bowel syndrome   • Iron deficiency anemia   • Hypertriglyceridemia   • Hyperlipidemia   • GERD (gastroesophageal reflux disease)   • Generalized anxiety disorder   • Gastritis   • Essential hypertension   • Dysfunction of eustachian tube   • Diverticular disease of colon   • Cervical disc disorder   • Diarrhea, unspecified   • Anemia   • Allergic rhinitis   • Abnormal liver enzymes   • Abdominal pain   • Major depression in complete remission   • Chronic kidney disease, stage II (mild)   • Chronic pain of both knees   • Internal derangement of left knee   • Primary osteoarthritis of left knee   • Chronic pain of left knee   • Type 2 diabetes mellitus without complication, without long-term current use of insulin       Therapy Treatment          Rehabilitation Treatment Summary     Row Name 18 1008 18 0906          Treatment Time/Intention    Discipline  occupational therapy assistant  -CS physical therapy assistant  -CA     Document Type therapy note (daily note)  -CS therapy note (daily note)  -CA     Subjective Information no complaints  -CS  --     Mode of Treatment occupational therapy  -CS individual therapy;physical therapy  -CA     Patient/Family Observations  -- no family present  -CA     Therapy Frequency (PT Clinical Impression) daily  -CS 2 times/day  -CA     Patient Effort excellent  -CA  --     Existing Precautions/Restrictions fall  -CS fall  -CA     Recorded by [CA] Tima Hanna, KATY 06/05/18 1138  [CS] NALLELY Jules/L 06/05/18 1012 [CA] Tima Hanna, PTA 06/05/18 0921     Row Name 06/05/18 1008 06/05/18 0906          Vital Signs    Pre Systolic BP Rehab  -- 122  -CA     Pre Treatment Diastolic BP  -- 66  -CA     Post Systolic BP Rehab 141  -  -CA2     Post Treatment Diastolic BP 70  -CS 66  -CA2     Pretreatment Heart Rate (beats/min) 79  -CS2 87  -CA     Posttreatment Heart Rate (beats/min) 83  -CS 85  -CA2     Pre SpO2 (%) 95  -CS2 94  -CA     O2 Delivery Pre Treatment room air  -CS2 supplemental O2  -CA     Post SpO2 (%) 95  -CS 94  -CA2     O2 Delivery Post Treatment room air  -CS room air  -CA2     Pre Patient Position Sitting  -CS2 Supine  -CA     Intra Patient Position Standing  -CS  --     Post Patient Position Sitting  -CS  --     Recorded by [CS] NALLELY Jules/L 06/05/18 1046  [CS2] NALLELY Jules/L 06/05/18 1012 [CA] Tima Hanna, PTA 06/05/18 0922  [CA2] Tima Hanna, PTA 06/05/18 1003     Row Name 06/05/18 1008             Cognitive Assessment/Intervention- PT/OT    Affect/Mental Status (Cognitive) WFL  -CA      Orientation Status (Cognition) oriented x 4  -CA      Follows Commands (Cognition) WFL  -CA      Cognitive Function (Cognitive) WFL  -CA      Safety Deficit (Cognitive) ability to follow commands  -CA      Personal Safety Interventions fall prevention program maintained;gait belt;nonskid  shoes/slippers when out of bed  -CA      Recorded by [CA] Tima Hanna, PTA 06/05/18 1138      Row Name 06/05/18 1008             Mobility Assessment/Intervention    Extremity Weight-bearing Status left lower extremity  -CA      Left Lower Extremity (Weight-bearing Status) weight-bearing as tolerated (WBAT)  -CA      Right Lower Extremity (Weight-bearing Status) weight-bearing as tolerated (WBAT)  -CA      Recorded by [CA] Tima Hanna, KATY 06/05/18 1138      Row Name 06/05/18 1008             Bed Mobility Assessment/Treatment    Bed Mobility Assessment/Treatment supine-sit;sit-supine  -CA      Supine-Sit [place] (Bed Mobility) contact guard  -CA      Assistive Device (Bed Mobility) overhead trapeze;head of bed elevated  -CA      Recorded by [CA] Tima Hanna, KATY 06/05/18 1138      Row Name 06/05/18 1008             Transfer Assessment/Treatment    Transfer Assessment/Treatment sit-stand transfer;stand-sit transfer;bed-chair transfer  -CA      Maintains Weight-bearing Status (Transfers) able to maintain  -CA      Bed-Chair [place] (Transfers) stand by assist  -CA      Assistive Device (Bed-Chair Transfers) walker, front-wheeled  -CA      Sit-Stand [place] (Transfers) stand by assist  -CA      Stand-Sit [place] (Transfers) stand by assist  -CA      Recorded by [CA] Tima Hanna, KATY 06/05/18 1138      Row Name 06/05/18 1008             Sit-Stand Transfer    Assistive Device (Sit-Stand Transfers) walker, front-wheeled  -CA      Recorded by [CA] Tima Hanna, KATY 06/05/18 1138      Row Name 06/05/18 1008             Stand-Sit Transfer    Assistive Device (Stand-Sit Transfers) walker, front-wheeled  -CA      Recorded by [CA] Tima Hanna, PTA 06/05/18 1138      Row Name 06/05/18 1008             Gait/Stairs Assessment/Training    Gait/Stairs Assessment/Training gait/ambulation independence  -CA      [place] Level (Gait) stand by assist  -CA      Assistive Device (Gait) walker, front-wheeled   -CA      Distance in Feet (Gait) 150  -CA      Recorded by [CA] Tima Hanna PTA 06/05/18 1138      Row Name 06/05/18 0906             General ROM    RT Lower Ext Rt Knee Extension/Flexion  -CA      Recorded by [CA] Tima Hanna PTA 06/05/18 0954      Row Name 06/05/18 0906             Right Lower Ext    Rt Knee Extension/Flexion AROM 8  -CA      Rt Knee Extension/Flexion PROM 104  -CA      RT Lower Extremity Comments pt. was a little more stiff this am and increased pain  -CA      Recorded by [CA] Tima Hanna PTA 06/05/18 1138      Row Name 06/05/18 0906             Therapeutic Exercise    Therapeutic Exercise seated, lower extremities;supine, lower extremities  -CA      Recorded by [CA] Tima Hanna PTA 06/05/18 1138      Row Name 06/05/18 0906             Lower Extremity Seated Therapeutic Exercise    Performed, Seated Lower Extremity (Therapeutic Exercise) knee flexion/extension  -CA      Exercise Type, Seated Lower Extremity (Therapeutic Exercise) AROM (active range of motion)  -CA      Sets/Reps Detail, Seated Lower Extremity (Therapeutic Exercise) --   pt. performed for 2 mins  -CA      Recorded by [CA] Tima Hanna PTA 06/05/18 1138      Row Name 06/05/18 0906             Lower Extremity Supine Therapeutic Exercise    Performed, Supine Lower Extremity (Therapeutic Exercise) quadriceps sets  -CA      Exercise Type, Supine Lower Extremity (Therapeutic Exercise) AROM (active range of motion)  -CA      Sets/Reps Detail, Supine Lower Extremity (Therapeutic Exercise) 1x20  -CA      Recorded by [CA] Tima Hanna PTA 06/05/18 1138      Row Name 06/05/18 0906             Positioning and Restraints    Pre-Treatment Position in bed  -CA      Post Treatment Position chair  -CA      In Bed sitting;call light within reach;encouraged to call for assist  -CA      Recorded by [CA] Tima Hanna PTA 06/05/18 1138      Row Name 06/05/18 1008 06/05/18 0906          Pain Scale: Numbers Pre/Post-Treatment    Pain Scale:  Numbers, Pretreatment 5/10  -CS 7/10  -CA     Pain Location - Side Left  -CS Left  -CA     Pain Location knee  -CS knee  -CA     Recorded by [CS] NALLELY Jules/L 06/05/18 1012 [CA] Tima Hanna, PTA 06/05/18 0954     Row Name                Wound 06/04/18 1107 Left knee incision;surgical    Wound - Properties Group Date first assessed: 06/04/18 [CF] Time first assessed: 1107 [CF] Present On Admission : no [CF] Side: Left [CF] Location: knee [CF] Type: incision;surgical [CF] Recorded by:  [CF] Shireen Joel RN 06/04/18 1107      User Key  (r) = Recorded By, (t) = Taken By, (c) = Cosigned By    Initials Name Effective Dates Discipline    CA Tima Hanna, PTA 03/07/18 -  PT    CS NALLELY Jules/L 03/07/18 -  OT    CF Shireen Joel RN 04/17/17 -  Nurse          Wound 06/04/18 1107 Left knee incision;surgical (Active)   Dressing Appearance dry;intact 6/5/2018  8:21 AM   Closure ASHLEE 6/5/2018  8:21 AM   Base dressing in place, unable to visualize 6/5/2018  8:21 AM   Drainage Amount none 6/5/2018  8:21 AM   Care, Wound cleansed with;sterile normal saline 6/4/2018 11:53 AM   Dressing Care, Wound dressing applied;border dressing;petroleum-based 6/4/2018 11:53 AM             Physical Therapy Education     Title: PT OT SLP Therapies (Active)     Topic: Physical Therapy (Active)     Point: Mobility training (Active)    Learning Progress Summary     Learner Status Readiness Method Response Comment Documented by    Patient Active Acceptance D,E NR   06/04/18 1611          Point: Precautions (Active)    Learning Progress Summary     Learner Status Readiness Method Response Comment Documented by    Patient Active Acceptance D,E NR   06/04/18 1611                      User Key     Initials Effective Dates Name Provider Type Discipline     04/06/17 -  Suzanne Morse, PT Physical Therapist PT                    PT Recommendation and Plan  Therapy Frequency (PT Clinical Impression): 2 times/day  Plan of  Care Reviewed With: patient  Progress: improving  Outcome Summary: pt. tolerated tx. very well and showed great mobility and safety. pt. ambulated 150' with SBA and no LOB. pt. had  L knee ROM 8-104 degrees          Outcome Measures     Row Name 06/05/18 0906 06/04/18 1501 06/04/18 1500       How much help from another person do you currently need...    Turning from your back to your side while in flat bed without using bedrails? 3  -CA 3  -CB  --    Moving from lying on back to sitting on the side of a flat bed without bedrails? 3  -CA 3  -CB  --    Moving to and from a bed to a chair (including a wheelchair)? 3  -CA 3  -CB  --    Standing up from a chair using your arms (e.g., wheelchair, bedside chair)? 3  -CA 3  -CB  --    Climbing 3-5 steps with a railing? 3  -CA 3  -CB  --    To walk in hospital room? 3  -CA 3  -CB  --    AM-PAC 6 Clicks Score 18  -CA 18  -CB  --       How much help from another is currently needed...    Putting on and taking off regular lower body clothing?  --  -- 3  -RW (r) AK (t) RW (c)    Bathing (including washing, rinsing, and drying)  --  -- 3  -RW (r) AK (t) RW (c)    Toileting (which includes using toilet bed pan or urinal)  --  -- 3  -RW (r) AK (t) RW (c)    Putting on and taking off regular upper body clothing  --  -- 3  -RW (r) AK (t) RW (c)    Taking care of personal grooming (such as brushing teeth)  --  -- 4  -RW (r) AK (t) RW (c)    Eating meals  --  -- 4  -RW (r) AK (t) RW (c)    Score  --  -- 20  -RW (r) AK (t)       Functional Assessment    Outcome Measure Options AM-PAC 6 Clicks Basic Mobility (PT)  -CA AM-PAC 6 Clicks Basic Mobility (PT)  -CB AM-PAC 6 Clicks Daily Activity (OT)  -RW (r) AK (t) RW (c)      User Key  (r) = Recorded By, (t) = Taken By, (c) = Cosigned By    Initials Name Provider Type    CB Suzanne Morse, PT Physical Therapist    RW Prisca Calle, OTR/L Occupational Therapist    CA Tima Hanna, PTA Physical Therapy Assistant    GRABIEL Bentley,  OT Student OT Student           Time Calculation:         PT Charges     Row Name 06/05/18 1139             Time Calculation    Start Time 0906  -CA      Stop Time 1006  -CA      Time Calculation (min) 60 min  -CA      PT Received On 06/05/18  -CA         Time Calculation- PT    Total Timed Code Minutes- PT 60 minute(s)  -CA        User Key  (r) = Recorded By, (t) = Taken By, (c) = Cosigned By    Initials Name Provider Type    CA Tima Hanna PTA Physical Therapy Assistant          Therapy Charges for Today     Code Description Service Date Service Provider Modifiers Qty    14310043166 HC GAIT TRAINING EA 15 MIN 6/5/2018 Tima Hanna PTA GP 1    84507570041 HC PT THER PROC EA 15 MIN 6/5/2018 Tima Hanna PTA GP 1    13458451380 HC PT THERAPEUTIC ACT EA 15 MIN 6/5/2018 Tima Hanna PTA GP 2          PT G-Codes  PT Professional Judgement Used?: Yes  Outcome Measure Options: AM-PAC 6 Clicks Basic Mobility (PT)  Score: 18  Functional Limitation: Mobility: Walking and moving around  Mobility: Walking and Moving Around Current Status (): At least 40 percent but less than 60 percent impaired, limited or restricted  Mobility: Walking and Moving Around Goal Status (): At least 20 percent but less than 40 percent impaired, limited or restricted    Tima Hanna PTA  6/5/2018

## 2018-06-05 NOTE — PROGRESS NOTES
Anticoagulation- Warfarin     Completed Discharge Warfarin Counseling    Discussed the followin. Reason for Medication and Length of therapy  2. Drug-Drug Interactions  3. Drug-Diet Interactions  4. Drug- Alcohol Interactions  5. Signs and Symptoms of Bleeding  6. Fall risk- go to the Emergency Room  7. Home procedures:  Patient is going home with outpatient therapy  8. Sent the Rx:  warfarin 2.5, Number: 30  Si tablet every night or As Directed + 1 refills  9. Gave Warfarin booklet  10. Answered all Questions  11. Called in lab orders to [facility]  18  3:06 PM

## 2018-06-05 NOTE — SIGNIFICANT NOTE
06/05/18 0848   Rehab Treatment   Discipline occupational therapy assistant   Reason Treatment Not Performed patient/family declined treatment  (Pt stated that she wanted OT to come back after pain pill. )

## 2018-06-05 NOTE — THERAPY DISCHARGE NOTE
Acute Care - Physical Therapy Discharge Summary  [facility]       Patient Name: Otilia Kenny  : 1950  MRN: [id number]    Today's Date: 2018  Onset of Illness/Injury or Date of Surgery: 18    Date of Referral to PT: 18  Referring Physician: Dr Reynolds      Admit Date: 2018      PT Recommendation and Plan    Visit Dx:    ICD-10-CM ICD-9-CM   1. Primary osteoarthritis of left knee M17.12 715.16   2. Chronic pain of left knee M25.562 719.46    G89.29 338.29   3. Essential hypertension I10 401.9   4. Chronic kidney disease, stage II (mild) N18.2 585.2   5. Major depression in complete remission F32.5 296.26   6. Generalized anxiety disorder F41.1 300.02   7. Type 2 diabetes mellitus without complication, without long-term current use of insulin E11.9 250.00   8. Impaired physical mobility Z74.09 781.99   9. Impaired mobility and ADLs Z74.09 799.89   10. Long-term (current) use of anticoagulants Z79.01 V58.61             Outcome Measures     Row Name 18 1300 18 1255 18 0906       How much help from another person do you currently need...    Turning from your back to your side while in flat bed without using bedrails?  -- 4  -CA 3  -CA    Moving from lying on back to sitting on the side of a flat bed without bedrails?  -- 4  -CA 3  -CA    Moving to and from a bed to a chair (including a wheelchair)?  -- 4  -CA 3  -CA    Standing up from a chair using your arms (e.g., wheelchair, bedside chair)?  -- 4  -CA 3  -CA    Climbing 3-5 steps with a railing?  -- 3  -CA 3  -CA    To walk in hospital room?  -- 4  -CA 3  -CA    AM-PAC 6 Clicks Score  -- 23  -CA 18  -CA       How much help from another is currently needed...    Putting on and taking off regular lower body clothing? 3  -CS  --  --    Bathing (including washing, rinsing, and drying) 3  -CS  --  --    Toileting (which includes using toilet bed pan or urinal) 3  -CS  --  --    Putting on and taking off regular upper body  clothing 3  -CS  --  --    Taking care of personal grooming (such as brushing teeth) 4  -CS  --  --    Eating meals 4  -CS  --  --    Score 20  -CS  --  --       Functional Assessment    Outcome Measure Options AM-PAC 6 Clicks Daily Activity (OT)  -CS AM-PAC 6 Clicks Basic Mobility (PT)  -CA AM-PAC 6 Clicks Basic Mobility (PT)  -CA    Row Name 06/04/18 1501 06/04/18 1500          How much help from another person do you currently need...    Turning from your back to your side while in flat bed without using bedrails? 3  -CB  --     Moving from lying on back to sitting on the side of a flat bed without bedrails? 3  -CB  --     Moving to and from a bed to a chair (including a wheelchair)? 3  -CB  --     Standing up from a chair using your arms (e.g., wheelchair, bedside chair)? 3  -CB  --     Climbing 3-5 steps with a railing? 3  -CB  --     To walk in hospital room? 3  -CB  --     AM-PAC 6 Clicks Score 18  -CB  --        How much help from another is currently needed...    Putting on and taking off regular lower body clothing?  -- 3  -RW (r) AK (t) RW (c)     Bathing (including washing, rinsing, and drying)  -- 3  -RW (r) AK (t) RW (c)     Toileting (which includes using toilet bed pan or urinal)  -- 3  -RW (r) AK (t) RW (c)     Putting on and taking off regular upper body clothing  -- 3  -RW (r) AK (t) RW (c)     Taking care of personal grooming (such as brushing teeth)  -- 4  -RW (r) AK (t) RW (c)     Eating meals  -- 4  -RW (r) AK (t) RW (c)     Score  -- 20  -RW (r) AK (t)        Functional Assessment    Outcome Measure Options AM-PAC 6 Clicks Basic Mobility (PT)  -CB AM-PAC 6 Clicks Daily Activity (OT)  -RW (r) AK (t) RW (c)       User Key  (r) = Recorded By, (t) = Taken By, (c) = Cosigned By    Initials Name Provider Type    CB Suzanne Morse, PT Physical Therapist    RW Prisca MCDANIEL Wieling, OTR/L Occupational Therapist    CA Tima Hanna, PTA Physical Therapy Assistant    CONSTANTINE Bravo, BROWNING/L  Occupational Therapy Assistant    GRABIEL Bentley, OT Student OT Student                PT Charges     Row Name 06/05/18 1438 06/05/18 1139          Time Calculation    Start Time 1255  -CA 0906  -CA     Stop Time 1328  -CA 1006  -CA     Time Calculation (min) 33 min  -CA 60 min  -CA     PT Received On 06/05/18  -CA 06/05/18  -CA        Time Calculation- PT    Total Timed Code Minutes- PT 33 minute(s)  -CA 60 minute(s)  -CA       User Key  (r) = Recorded By, (t) = Taken By, (c) = Cosigned By    Initials Name Provider Type    CA Tima Hanna, PTA Physical Therapy Assistant                  PT Rehab Goals     Row Name 06/05/18 1255             Bed Mobility Goal 1 (PT)    Activity/Assistive Device (Bed Mobility Goal 1, PT) sit to supine;supine to sit   HOB down and no rails  -CA      [place] Level/Cues Needed (Bed Mobility Goal 1, PT) independent  -CA      Time Frame (Bed Mobility Goal 1, PT) 3 days  -CA      Progress/Outcomes (Bed Mobility Goal 1, PT) goal not met  -CA         Transfer Goal 1 (PT)    Activity/Assistive Device (Transfer Goal 1, PT) sit-to-stand/stand-to-sit;bed-to-chair/chair-to-bed;walker, rolling  -CA      [place] Level/Cues Needed (Transfer Goal 1, PT) supervision required  -CA      Time Frame (Transfer Goal 1, PT) 2 - 3 days  -CA      Progress/Outcome (Transfer Goal 1, PT) goal met  -CA         Gait Training Goal 1 (PT)    Activity/Assistive Device (Gait Training Goal 1, PT) gait (walking locomotion);assistive device use;increase endurance/gait distance;turning, left;turning, right;walker, rolling  -CA      [place] Level (Gait Training Goal 1, PT) supervision required  -CA      Distance (Gait Goal 1, PT) 200 feet  -CA      Time Frame (Gait Training Goal 1, PT) 2 - 3 days  -CA      Progress/Outcome (Gait Training Goal 1, PT) goal met  -CA         ROM Goal 1 (PT)    ROM Goal 1 (PT) 0-90 L knee  -CA      Time Frame (ROM Goal 1, PT) 1 day  -CA      Progress/Outcome (ROM Goal 1, PT)  goal partially met  -CA         Patient Education Goal (PT)    Activity (Patient Education Goal, PT) HEP  -CA      [place]/Cues/Accuracy (Memory Goal 2, PT) demonstrates adequately;verbalizes understanding  -CA      Time Frame (Patient Education Goal, PT) 2 days  -CA      Progress/Outcome (Patient Education Goal, PT) goal not met  -CA        User Key  (r) = Recorded By, (t) = Taken By, (c) = Cosigned By    Initials Name Provider Type Discipline    CA Tima Hanna, PTA Physical Therapy Assistant PT              PT Discharge Summary  Anticipated Discharge Disposition (PT): home with OP services  Reason for Discharge: Per MD order, Discharge from facility  Outcomes Achieved: Patient able to partially acheive established goals  Discharge Destination: Home      Dominik Garcia, PT   6/5/2018

## 2018-06-05 NOTE — THERAPY DISCHARGE NOTE
Acute Care - Occupational Therapy Discharge Summary  [facility]     Patient Name: Otilia Kenny  : 1950  MRN: [id number]    Today's Date: 2018  Onset of Illness/Injury or Date of Surgery: 18    Date of Referral to OT: 18  Referring Physician: Dr Reynolds      Admit Date: 2018        OT Recommendation and Plan    Visit Dx:    ICD-10-CM ICD-9-CM   1. Primary osteoarthritis of left knee M17.12 715.16   2. Chronic pain of left knee M25.562 719.46    G89.29 338.29   3. Essential hypertension I10 401.9   4. Chronic kidney disease, stage II (mild) N18.2 585.2   5. Major depression in complete remission F32.5 296.26   6. Generalized anxiety disorder F41.1 300.02   7. Type 2 diabetes mellitus without complication, without long-term current use of insulin E11.9 250.00   8. Impaired physical mobility Z74.09 781.99   9. Impaired mobility and ADLs Z74.09 799.89   10. Long-term (current) use of anticoagulants Z79.01 V58.61               Time Calculation- OT     Row Name 18 1153             Time Calculation- OT    OT Start Time 1008  -CS      OT Stop Time 1101  -CS      OT Time Calculation (min) 53 min  -CS      Total Timed Code Minutes- OT 53 minute(s)  -CS      OT Received On 18  -CS        User Key  (r) = Recorded By, (t) = Taken By, (c) = Cosigned By    Initials Name Provider Type    CS DANY Jules Occupational Therapy Assistant                  OT Rehab Goals     Row Name 18 1100             Transfer Goal 1 (OT)    Activity/Assistive Device (Transfer Goal 1, OT) toilet  -CS      [place] Level/Cues Needed (Transfer Goal 1, OT) conditional independence  -CS      Time Frame (Transfer Goal 1, OT) long term goal (LTG);by discharge  -CS      Progress/Outcome (Transfer Goal 1, OT) goal not met  -CS         Bathing Goal 1 (OT)    Activity/Assistive Device (Bathing Goal 1, OT) bathing skills, all  -CS      [place] Level/Cues Needed (Bathing Goal 1, OT) conditional  independence  -CS      Time Frame (Bathing Goal 1, OT) long term goal (LTG);by discharge  -CS      Progress/Outcomes (Bathing Goal 1, OT) goal partially met  -CS         Dressing Goal 1 (OT)    Activity/Assistive Device (Dressing Goal 1, OT) dressing skills, all  -CS      [place]/Cues Needed (Dressing Goal 1, OT) conditional independence  -CS      Time Frame (Dressing Goal 1, OT) long term goal (LTG);by discharge  -CS      Progress/Outcome (Dressing Goal 1, OT) goal partially met  -CS         Toileting Goal 1 (OT)    Activity/Device (Toileting Goal 1, OT) toileting skills, all  -CS      [place] Level/Cues Needed (Toileting Goal 1, OT) conditional independence  -CS      Time Frame (Toileting Goal 1, OT) long term goal (LTG);by discharge  -CS      Progress/Outcome (Toileting Goal 1, OT) goal not met  -CS        User Key  (r) = Recorded By, (t) = Taken By, (c) = Cosigned By    Initials Name Provider Type Discipline    CS DANY Jules Occupational Therapy Assistant OT                Outcome Measures     Row Name 06/05/18 1300 06/05/18 1255 06/05/18 0906       How much help from another person do you currently need...    Turning from your back to your side while in flat bed without using bedrails?  -- 4  -CA 3  -CA    Moving from lying on back to sitting on the side of a flat bed without bedrails?  -- 4  -CA 3  -CA    Moving to and from a bed to a chair (including a wheelchair)?  -- 4  -CA 3  -CA    Standing up from a chair using your arms (e.g., wheelchair, bedside chair)?  -- 4  -CA 3  -CA    Climbing 3-5 steps with a railing?  -- 3  -CA 3  -CA    To walk in hospital room?  -- 4  -CA 3  -CA    AM-PAC 6 Clicks Score  -- 23  -CA 18  -CA       How much help from another is currently needed...    Putting on and taking off regular lower body clothing? 3  -CS  --  --    Bathing (including washing, rinsing, and drying) 3  -CS  --  --    Toileting (which includes using toilet bed pan or urinal) 3  -CS  --   --    Putting on and taking off regular upper body clothing 3  -CS  --  --    Taking care of personal grooming (such as brushing teeth) 4  -CS  --  --    Eating meals 4  -CS  --  --    Score 20  -CS  --  --       Functional Assessment    Outcome Measure Options AM-[facility] 6 Clicks Daily Activity (OT)  -CS AM-PAC 6 Clicks Basic Mobility (PT)  -CA AM-[facility] 6 Clicks Basic Mobility (PT)  -CA    Row Name 06/04/18 1501 06/04/18 1500          How much help from another person do you currently need...    Turning from your back to your side while in flat bed without using bedrails? 3  -CB  --     Moving from lying on back to sitting on the side of a flat bed without bedrails? 3  -CB  --     Moving to and from a bed to a chair (including a wheelchair)? 3  -CB  --     Standing up from a chair using your arms (e.g., wheelchair, bedside chair)? 3  -CB  --     Climbing 3-5 steps with a railing? 3  -CB  --     To walk in hospital room? 3  -CB  --     AM-[facility] 6 Clicks Score 18  -CB  --        How much help from another is currently needed...    Putting on and taking off regular lower body clothing?  -- 3  -RW (r) AK (t) RW (c)     Bathing (including washing, rinsing, and drying)  -- 3  -RW (r) AK (t) RW (c)     Toileting (which includes using toilet bed pan or urinal)  -- 3  -RW (r) AK (t) RW (c)     Putting on and taking off regular upper body clothing  -- 3  -RW (r) AK (t) RW (c)     Taking care of personal grooming (such as brushing teeth)  -- 4  -RW (r) AK (t) RW (c)     Eating meals  -- 4  -RW (r) AK (t) RW (c)     Score  -- 20  -RW (r) AK (t)        Functional Assessment    Outcome Measure Options AM-[facility] 6 Clicks Basic Mobility (PT)  -CB AM-[facility] 6 Clicks Daily Activity (OT)  -RW (r) AK (t) RW (c)       User Key  (r) = Recorded By, (t) = Taken By, (c) = Cosigned By    Initials Name Provider Type    CB Suzanne Morse, PT Physical Therapist    RW Prisca Calle, OTR/L Occupational Therapist    CA Tima Hanna, PTA Physical Therapy  Assistant    DANY Ramirez Occupational Therapy Assistant    GRABIEL Bentley, OT Student OT Student              OT Discharge Summary  Anticipated Discharge Disposition (OT): home with home health, home with OP services  Reason for Discharge: Discharge from facility, Per MD order  Outcomes Achieved: Refer to plan of care for updates on goals achieved  Discharge Destination: Home      Anna Vences OT  6/5/2018

## 2018-06-05 NOTE — ANESTHESIA POSTPROCEDURE EVALUATION
Patient: Otilia Kenny    Procedure Summary     Date:  06/04/18 Room / Location:  [facility] OR [address] [place] [address] OR    Anesthesia Start:  0957 Anesthesia Stop:  1234    Procedure:  TOTAL KNEE ARTHROPLASTY ATTUNE with adductor canal block  (Left ) Diagnosis:       Generalized anxiety disorder      Chronic kidney disease, stage II (mild)      Major depression in complete remission      Essential hypertension      Primary osteoarthritis of left knee      Chronic pain of left knee      Type 2 diabetes mellitus without complication, without long-term current use of insulin      (Generalized anxiety disorder [F41.1])      (Chronic kidney disease, stage II (mild) [N18.2])      (Major depression in complete remission [F32.5])      (Essential hypertension [I10])      (Primary osteoarthritis of left knee [M17.12])      (Chronic pain of left knee [M25.562, G89.29])      (Type 2 diabetes mellitus without complication, without long-term current use of insulin [E11.9])    Surgeon:  Baron Reynolds MD Provider:  Johan Seymour MD    Anesthesia Type:  MAC, regional, spinal ASA Status:  3          Anesthesia Type: MAC, regional, spinal  Last vitals  BP   117/58 (06/05/18 0452)   Temp   99.5 °F (37.5 °C) (06/05/18 0322)   Pulse   91 (06/05/18 0452)   Resp   18 (06/05/18 0322)     SpO2   90 % (06/05/18 0322)     Post Anesthesia Care and Evaluation    Patient location during evaluation: bedside  Patient participation: complete - patient participated  Level of consciousness: awake and alert  Pain score: 2  Pain management: adequate  Airway patency: patent  Anesthetic complications: No anesthetic complications  PONV Status: none  Cardiovascular status: acceptable and stable  Respiratory status: acceptable and room air  Hydration status: acceptable

## 2018-06-05 NOTE — THERAPY TREATMENT NOTE
Acute Care - Occupational Therapy Treatment Note  [facility]     Patient Name: Otilia Kenny  : 1950  MRN: [id number]  Today's Date: 2018  Onset of Illness/Injury or Date of Surgery: 18  Date of Referral to OT: 18  Referring Physician: Dr Reynolds    Admit Date: 2018       ICD-10-CM ICD-9-CM   1. Primary osteoarthritis of left knee M17.12 715.16   2. Chronic pain of left knee M25.562 719.46    G89.29 338.29   3. Essential hypertension I10 401.9   4. Chronic kidney disease, stage II (mild) N18.2 585.2   5. Major depression in complete remission F32.5 296.26   6. Generalized anxiety disorder F41.1 300.02   7. Type 2 diabetes mellitus without complication, without long-term current use of insulin E11.9 250.00   8. Impaired physical mobility Z74.09 781.99   9. Impaired mobility and ADLs Z74.09 799.89     Patient Active Problem List   Diagnosis   • Vitamin B12 deficiency (dietary) anemia   • Type 2 diabetes mellitus without complication   • Spasm of back muscles   • Sleep disorder   • Osteoarthritis of knee   • Irritable bowel syndrome   • Iron deficiency anemia   • Hypertriglyceridemia   • Hyperlipidemia   • GERD (gastroesophageal reflux disease)   • Generalized anxiety disorder   • Gastritis   • Essential hypertension   • Dysfunction of eustachian tube   • Diverticular disease of colon   • Cervical disc disorder   • Diarrhea, unspecified   • Anemia   • Allergic rhinitis   • Abnormal liver enzymes   • Abdominal pain   • Major depression in complete remission   • Chronic kidney disease, stage II (mild)   • Chronic pain of both knees   • Internal derangement of left knee   • Primary osteoarthritis of left knee   • Chronic pain of left knee   • Type 2 diabetes mellitus without complication, without long-term current use of insulin     Past Medical History:   Diagnosis Date   • Abdominal pain     Most likely related to functional colonic spasm     • Abnormal liver enzymes     Improved, 3/2015   "    • Allergic rhinitis     seasonal   • Allergic rhinitis    • Anemia    • Anesthesia complication     states sometime B/P drops   • Arthritis    • Cervical disc disorder     S/P cervical surgeries x2-3. Extensive fusion C 3-7. [facility]      • Depression    • Diarrhea, unspecified     Resolved with discontinuation of metformin.    • Diverticular disease of colon    • Dysfunction of eustachian tube    • Essential hypertension     Increase lisinopril HCT, 10/12.5.      • Gastritis    • Generalized anxiety disorder    • GERD (gastroesophageal reflux disease)    • Hyperlipidemia     Intolerant of Lipitor. The patient stopped Zocor due to \"liver pain\", summer 2015. patient instructed to reattempt Zocor every other day, 10/2015    • Hypertriglyceridemia     Holding simvastatin 2/2015 due to slightly elevated LFTs.      • Iron deficiency anemia    • Irritable bowel syndrome     Dr. Estrada   • Osteoarthritis of knee     Dr. Reynolds    • Sleep disorder    • Spasm of back muscles     Dr. Castro changed Soma to Zanaflex.     • Vitamin B12 deficiency (dietary) anemia     Currently on multivitamin daily       Past Surgical History:   Procedure Laterality Date   • BACK SURGERY      Laminectomy   • BUNIONECTOMY     • CARPAL TUNNEL RELEASE Bilateral    • CERVICAL DISC SURGERY      Laminectomy 2011. Fusion 2013.   • CHOLECYSTECTOMY     • COLONOSCOPY      Diverticulosis found in the sigmoid colon.A single diminutive polyp found in the colon.Mul.biopsies taken.Hemorrhoids found in the anus.   • ENDOSCOPY      Normal hypopharynx and esophagus.Marked irregularity of the Z-line,compatable with chronic reflux.Mul.biopsies.A hiatus hernia found in GE junction.Mild nonerosive gastritis.Mul.biopsies.Polyps in fundus.Mul.biopsies.Normal pylorus.Normal duodenum.   • ENDOSCOPY AND COLONOSCOPY     • FINGER/THUMB LESION/CYST EXCISION     • HEEL SPUR EXCISION     • HYSTERECTOMY     • KNEE ARTHROSCOPY Bilateral     Arthroscopy of the " left knee with debridement of medial meniscus.   • KNEE SURGERY      Right unicompartmental arthroplasty.       Therapy Treatment          Rehabilitation Treatment Summary     Row Name 06/05/18 1255 06/05/18 1008 06/05/18 0906       Treatment Time/Intention    Discipline physical therapy assistant  -CA occupational therapy assistant  -CS physical therapy assistant  -CA    Document Type therapy note (daily note)  -CA therapy note (daily note)  -CS therapy note (daily note)  -CA    Subjective Information no complaints  -CA no complaints  -CS  --    Mode of Treatment individual therapy;physical therapy  -CA occupational therapy  -CS individual therapy;physical therapy  -CA    Patient/Family Observations  present  -CA  -- no family present  -CA    Therapy Frequency (PT Clinical Impression)  -- daily  -CS 2 times/day  -CA    Total Minutes, Occupational Therapy Treatment  -- 53  -CS2  --    Patient Effort  -- --  -CA excellent  -CA2    Existing Precautions/Restrictions  -- fall  -CS fall  -CA    Recorded by [CA] Tima Hanna, PTA 06/05/18 1300 [CA] Tima Hanna, PTA 06/05/18 1208  [CS] Adriana Bravo, BROWNING/L 06/05/18 1012  [CS2] Adriana Bravo, BROWNING/L 06/05/18 1158 [CA] Tima Hnana, PTA 06/05/18 0921  [CA2] Tima Hanna, PTA 06/05/18 1208    Row Name 06/05/18 1008 06/05/18 0906          Vital Signs    Pre Systolic BP Rehab  -- 122  -CA     Pre Treatment Diastolic BP  -- 66  -CA     Post Systolic BP Rehab 141  -  -CA2     Post Treatment Diastolic BP 70  -CS 66  -CA2     Pretreatment Heart Rate (beats/min) 79  -CS2 87  -CA     Posttreatment Heart Rate (beats/min) 83  -CS 85  -CA2     Pre SpO2 (%) 95  -CS2 94  -CA     O2 Delivery Pre Treatment room air  -CS2 supplemental O2  -CA     Post SpO2 (%) 95  -CS 94  -CA2     O2 Delivery Post Treatment room air  -CS room air  -CA2     Pre Patient Position Sitting  -CS2 Supine  -CA     Intra Patient Position Standing  -CS  --     Post Patient Position Sitting  -CS   --     Recorded by [CS] Adriana Bravo BROWNING/L 06/05/18 1046  [CS2] Adriana Bravo BROWNING/L 06/05/18 1012 [CA] Tima Hanna, PTA 06/05/18 0922  [CA2] Tima Hanna, PTA 06/05/18 1003     Row Name 06/05/18 1255 06/05/18 1008 06/05/18 0906       Cognitive Assessment/Intervention- PT/OT    Affect/Mental Status (Cognitive) WFL  -CA WFL  -CS WFL  -CA    Orientation Status (Cognition) oriented x 4  -CA oriented x 4  -CS oriented x 4  -CA    Follows Commands (Cognition) WFL  -CA WFL  -CS WFL  -CA    Cognitive Function (Cognitive) WFL  -CA WFL  -CS WFL  -CA    Safety Deficit (Cognitive) ability to follow commands  -CA --  -CA ability to follow commands  -CA    Personal Safety Interventions  -- --  -CA fall prevention program maintained;gait belt;nonskid shoes/slippers when out of bed  -CA    Recorded by [CA] Tima Hanna, PTA 06/05/18 1300 [CA] Tima Hanna, PTA 06/05/18 1208  [CS] Adriana Bravo BROWNING/L 06/05/18 1259 [CA] Tima Hanna, PTA 06/05/18 1208    Row Name 06/05/18 1008 06/05/18 0906          Mobility Assessment/Intervention    Extremity Weight-bearing Status --  -CA left lower extremity  -CA     Left Lower Extremity (Weight-bearing Status) --  -CA weight-bearing as tolerated (WBAT)  -CA     Right Lower Extremity (Weight-bearing Status) --  -CA weight-bearing as tolerated (WBAT)  -CA     Recorded by [CA] Tima Hanna, PTA 06/05/18 1208 [CA] Tima Hanna, PTA 06/05/18 1208     Row Name 06/05/18 1008 06/05/18 0906          Bed Mobility Assessment/Treatment    Bed Mobility Assessment/Treatment --  -CA supine-sit;sit-supine  -CA     Supine-Sit [place] (Bed Mobility) --  -CA contact guard  -CA     Assistive Device (Bed Mobility) --  -CA overhead trapeze;head of bed elevated  -CA     Recorded by [CA] Tima Hanna, KATY 06/05/18 1208 [CA] Tima Hanna, PTA 06/05/18 1208     Row Name 06/05/18 1008 06/05/18 0906          Transfer Assessment/Treatment    Transfer Assessment/Treatment sit-stand transfer;stand-sit  transfer;toilet transfer  -CS sit-stand transfer;stand-sit transfer;bed-chair transfer  -CA     Maintains Weight-bearing Status (Transfers) --  -CA  --     Bed-Chair [place] (Transfers) supervision  -CS stand by assist  -CA     Assistive Device (Bed-Chair Transfers) walker, front-wheeled  -CS walker, front-wheeled  -CA     Sit-Stand [place] (Transfers) supervision  -CS stand by assist  -CA     Stand-Sit [place] (Transfers) supervision  -CS stand by assist  -CA     [place] Level (Toilet Transfer) supervision  -CS  --     Assistive Device (Toilet Transfer) walker, front-wheeled  -CS  --     Recorded by [CA] Tima Hanna, PTA 06/05/18 1208  [CS] NALLELY Jules/L 06/05/18 1259 [CA] Tima Hanna, PTA 06/05/18 1208     Row Name 06/05/18 1008 06/05/18 0906          Sit-Stand Transfer    Assistive Device (Sit-Stand Transfers) walker, front-wheeled  -CS walker, front-wheeled  -CA     Recorded by [CS] NALLELY Jules/L 06/05/18 1259 [CA] Tima Hanna, PTA 06/05/18 1208     Row Name 06/05/18 1008 06/05/18 0906          Stand-Sit Transfer    Assistive Device (Stand-Sit Transfers) walker, front-wheeled  -CS walker, front-wheeled  -CA     Recorded by [CS] NALLELY Jules/L 06/05/18 1259 [CA] Tima Hanna, PTA 06/05/18 1208     Row Name 06/05/18 1008             Toilet Transfer    Type (Toilet Transfer) sit-stand;stand-sit  -CS      Recorded by [CS] NALLELY Jules/L 06/05/18 1259      Row Name 06/05/18 1008 06/05/18 0906          Gait/Stairs Assessment/Training    Gait/Stairs Assessment/Training --  -CA gait/ambulation independence  -CA     [place] Level (Gait) --  -CA stand by assist  -CA     Assistive Device (Gait) --  -CA walker, front-wheeled  -CA     Distance in Feet (Gait) --  -  -CA     Recorded by [CA] Tima Hanna, PTA 06/05/18 1208 [CA] Tima Hanna, PTA 06/05/18 1208     Row Name 06/05/18 1008             Bathing Assessment/Intervention    Bathing [place]  Level bathing skills;independent  -CS      Assistive Devices (Bathing) bath mitt  -CS      Bathing Position supported sitting;supported standing  -CS      Recorded by [CS] NALLELY Jules/L 06/05/18 1158      Row Name 06/05/18 1008             Upper Body Dressing Assessment/Training    Upper Body Dressing [place] Level doff;don;pull-over garment;independent  -CS      Upper Body Dressing Position supported sitting  -CS      Recorded by [CS] NALLELY Jules/L 06/05/18 1158      Row Name 06/05/18 1008             Lower Body Dressing Assessment/Training    Lower Body Dressing [place] Level doff;don;pants/bottoms;undergarment;socks;supervision  -CS      Lower Body Dressing Position supported sitting;supported standing  -CS      Recorded by [CS] NALLELY Jules/L 06/05/18 1158      Row Name 06/05/18 1008             Grooming Assessment/Training    [place] Level (Grooming) grooming skills;hair care, combing/brushing;oral care regimen;wash face, hands;independent  -CS      Grooming Position supported sitting  -CS      Recorded by [CS] NALLELY Jules/L 06/05/18 1158      Row Name 06/05/18 1008             Toileting Assessment/Training    [place] Level (Toileting) toileting skills;supervision  -CS      Assistive Devices (Toileting) raised toilet seat  -CS      Toileting Position supported sitting;supported standing  -CS      Recorded by [CS] NALLELY Jules/L 06/05/18 1158      Row Name 06/05/18 0906             General ROM    RT Lower Ext Rt Knee Extension/Flexion  -CA      Recorded by [CA] Tima Hanna PTA 06/05/18 0954      Row Name 06/05/18 0906             Right Lower Ext    Rt Knee Extension/Flexion AROM 8  -CA      Rt Knee Extension/Flexion PROM 104  -CA      RT Lower Extremity Comments pt. was a little more stiff this am and increased pain  -CA      Recorded by [CA] Tima Hanna PTA 06/05/18 1138      Row Name 06/05/18 0906             Therapeutic Exercise     Therapeutic Exercise seated, lower extremities;supine, lower extremities  -CA      Recorded by [CA] Tima Hanna, KATY 06/05/18 1138      Row Name 06/05/18 0906             Lower Extremity Seated Therapeutic Exercise    Performed, Seated Lower Extremity (Therapeutic Exercise) knee flexion/extension  -CA      Exercise Type, Seated Lower Extremity (Therapeutic Exercise) AROM (active range of motion)  -CA      Sets/Reps Detail, Seated Lower Extremity (Therapeutic Exercise) --   pt. performed for 2 mins  -CA      Recorded by [CA] Tima Hanna, KATY 06/05/18 1138      Row Name 06/05/18 0906             Lower Extremity Supine Therapeutic Exercise    Performed, Supine Lower Extremity (Therapeutic Exercise) quadriceps sets  -CA      Exercise Type, Supine Lower Extremity (Therapeutic Exercise) AROM (active range of motion)  -CA      Sets/Reps Detail, Supine Lower Extremity (Therapeutic Exercise) 1x20  -CA      Recorded by [CA] Tima Hanna PTA 06/05/18 1138      Row Name 06/05/18 1008 06/05/18 0906          Positioning and Restraints    Pre-Treatment Position sitting in chair/recliner  -CS in bed  -CA     Post Treatment Position chair  -CS chair  -CA     In Bed  -- sitting;call light within reach;encouraged to call for assist  -CA     In Chair reclined;call light within reach;with family/caregiver;encouraged to call for assist  -CS  --     Recorded by [CS] NALLELY Jules/L 06/05/18 1158 [CA] Tima Hanna, KATY 06/05/18 1138     Row Name 06/05/18 1255 06/05/18 1008 06/05/18 0906       Pain Scale: Numbers Pre/Post-Treatment    Pain Scale: Numbers, Pretreatment 3/10  -CA 5/10  -CS 7/10  -CA    Pain Scale: Numbers, Post-Treatment  -- 5/10  -CS2  --    Pain Location - Side Left  -CA Left  -CS Left  -CA    Pain Location knee  -CA knee  -CS knee  -CA    Recorded by [CA] Tima Hanna, KATY 06/05/18 1300 [CS] ANOOP JulesA/L 06/05/18 1012  [CS2] Adriana Bravo BROWNING/L 06/05/18 1158 [CA] Tima Hanna, KATY 06/05/18 0954     Row Name                Wound 06/04/18 1107 Left knee incision;surgical    Wound - Properties Group Date first assessed: 06/04/18 [CF] Time first assessed: 1107 [CF] Present On Admission : no [CF] Side: Left [CF] Location: knee [CF] Type: incision;surgical [CF] Recorded by:  [CF] Shireen Joel RN 06/04/18 1107    Row Name 06/05/18 1008             Outcome Summary/Treatment Plan (OT)    Daily Summary of Progress (OT) progress toward functional goals as expected  -CS      Plan for Continued Treatment (OT) cont OT POC  -CS      Anticipated Discharge Disposition (OT) home with home health;home with OP services  -CS2      Recorded by [CS] NALLELY Jules/L 06/05/18 1158  [CS2] NALLELY Jules/RUIZ 06/05/18 1259        User Key  (r) = Recorded By, (t) = Taken By, (c) = Cosigned By    Initials Name Effective Dates Discipline    CA Tima Hanna, KATY 03/07/18 -  PT    CS NALLELY Jules/L 03/07/18 -  OT    CF Shireen Joel RN 04/17/17 -  Nurse        Wound 06/04/18 1107 Left knee incision;surgical (Active)   Dressing Appearance dry;intact 6/5/2018  8:21 AM   Closure ASHLEE 6/5/2018  8:21 AM   Base dressing in place, unable to visualize 6/5/2018  8:21 AM   Drainage Amount none 6/5/2018  8:21 AM             OT Rehab Goals     Row Name 06/05/18 1100             Transfer Goal 1 (OT)    Activity/Assistive Device (Transfer Goal 1, OT) toilet  -CS      [place] Level/Cues Needed (Transfer Goal 1, OT) conditional independence  -CS      Time Frame (Transfer Goal 1, OT) long term goal (LTG);by discharge  -CS      Progress/Outcome (Transfer Goal 1, OT) goal not met  -CS         Bathing Goal 1 (OT)    Activity/Assistive Device (Bathing Goal 1, OT) bathing skills, all  -CS      [place] Level/Cues Needed (Bathing Goal 1, OT) conditional independence  -CS      Time Frame (Bathing Goal 1, OT) long term goal (LTG);by discharge  -CS      Progress/Outcomes (Bathing Goal 1, OT) goal partially met  -CS          Dressing Goal 1 (OT)    Activity/Assistive Device (Dressing Goal 1, OT) dressing skills, all  -CS      [place]/Cues Needed (Dressing Goal 1, OT) conditional independence  -CS      Time Frame (Dressing Goal 1, OT) long term goal (LTG);by discharge  -CS      Progress/Outcome (Dressing Goal 1, OT) goal partially met  -CS         Toileting Goal 1 (OT)    Activity/Device (Toileting Goal 1, OT) toileting skills, all  -CS      [place] Level/Cues Needed (Toileting Goal 1, OT) conditional independence  -CS      Time Frame (Toileting Goal 1, OT) long term goal (LTG);by discharge  -CS      Progress/Outcome (Toileting Goal 1, OT) goal not met  -CS        User Key  (r) = Recorded By, (t) = Taken By, (c) = Cosigned By    Initials Name Provider Type Discipline    DANY Ramirez Occupational Therapy Assistant OT        Occupational Therapy Education     Title: PT OT SLP Therapies (Active)     Topic: Occupational Therapy (Active)     Point: ADL training (Active)     Description: Instruct learner(s) on proper safety adaptation and remediation techniques during self care or transfers.   Instruct in proper use of assistive devices.   Learning Progress Summary     Learner Status Readiness Method Response Comment Documented by    Patient Done Acceptance CARRI MCDANIEL D VU Pt was educated on LH AE and home safety.  06/05/18 1301     Active Acceptance E NR fall precautions, transfer safety/technique, ADL safety/technique, OT role/POC AK 06/04/18 1618    Family Active Acceptance E NR fall precautions, transfer safety/technique, ADL safety/technique, OT role/POC AK 06/04/18 1618          Point: Home exercise program (Done)     Description: Instruct learner(s) on appropriate technique for monitoring, assisting and/or progressing therapeutic exercises/activities.   Learning Progress Summary     Learner Status Readiness Method Response Comment Documented by    Patient Done Acceptance CARRI MCDANIEL D VU Pt was educated on LH AE and  home safety.  06/05/18 1301          Point: Precautions (Active)     Description: Instruct learner(s) on prescribed precautions during self-care and functional transfers.   Learning Progress Summary     Learner Status Readiness Method Response Comment Documented by    Patient Done Acceptance CARRI MCDANIEL D VU Pt was educated on LH AE and home safety.  06/05/18 1301     Active Acceptance E NR fall precautions, transfer safety/technique, ADL safety/technique, OT role/POC AK 06/04/18 1618    Family Active Acceptance E NR fall precautions, transfer safety/technique, ADL safety/technique, OT role/POC AK 06/04/18 1618          Point: Body mechanics (Done)     Description: Instruct learner(s) on proper positioning and spine alignment during self-care, functional mobility activities and/or exercises.   Learning Progress Summary     Learner Status Readiness Method Response Comment Documented by    Patient Done Acceptance CARRI MCDANIEL D VU Pt was educated on LH AE and home safety.  06/05/18 1301                      User Key     Initials Effective Dates Name Provider Type Discipline     03/07/18 -  DANY Jules Occupational Therapy Assistant OT    AK 05/09/18 -  Bessie Bentley OT Student OT Student OT                OT Recommendation and Plan  Outcome Summary/Treatment Plan (OT)  Daily Summary of Progress (OT): progress toward functional goals as expected  Plan for Continued Treatment (OT): cont OT POC  Anticipated Discharge Disposition (OT): home with home health, home with OP services  Daily Summary of Progress (OT): progress toward functional goals as expected  Plan of Care Review  Plan of Care Reviewed With: patient  Plan of Care Reviewed With: patient  Outcome Summary: Pt tolerated tx well this date. Pt was SBA with toilet t/f. Pt completed an ADL. Pt was educated on home safety and LH AE. Continue OT POC.        Outcome Measures     Row Name 06/05/18 1300 06/05/18 0906 06/04/18 1501       How much help from  another person do you currently need...    Turning from your back to your side while in flat bed without using bedrails?  -- 3  -CA 3  -CB    Moving from lying on back to sitting on the side of a flat bed without bedrails?  -- 3  -CA 3  -CB    Moving to and from a bed to a chair (including a wheelchair)?  -- 3  -CA 3  -CB    Standing up from a chair using your arms (e.g., wheelchair, bedside chair)?  -- 3  -CA 3  -CB    Climbing 3-5 steps with a railing?  -- 3  -CA 3  -CB    To walk in hospital room?  -- 3  -CA 3  -CB    AM-PAC 6 Clicks Score  -- 18  -CA 18  -CB       How much help from another is currently needed...    Putting on and taking off regular lower body clothing? 3  -CS  --  --    Bathing (including washing, rinsing, and drying) 3  -CS  --  --    Toileting (which includes using toilet bed pan or urinal) 3  -CS  --  --    Putting on and taking off regular upper body clothing 3  -CS  --  --    Taking care of personal grooming (such as brushing teeth) 4  -CS  --  --    Eating meals 4  -CS  --  --    Score 20  -CS  --  --       Functional Assessment    Outcome Measure Options AM-PAC 6 Clicks Daily Activity (OT)  -CS AM-PAC 6 Clicks Basic Mobility (PT)  -CA AM-[facility] 6 Clicks Basic Mobility (PT)  -CB    Row Name 06/04/18 1500             How much help from another is currently needed...    Putting on and taking off regular lower body clothing? 3  -RW (r) AK (t) RW (c)      Bathing (including washing, rinsing, and drying) 3  -RW (r) AK (t) RW (c)      Toileting (which includes using toilet bed pan or urinal) 3  -RW (r) AK (t) RW (c)      Putting on and taking off regular upper body clothing 3  -RW (r) AK (t) RW (c)      Taking care of personal grooming (such as brushing teeth) 4  -RW (r) AK (t) RW (c)      Eating meals 4  -RW (r) AK (t) RW (c)      Score 20  -RW (r) AK (t)         Functional Assessment    Outcome Measure Options AM-PAC 6 Clicks Daily Activity (OT)  -RW (r) AK (t) RW (c)        User Key  (r) =  Recorded By, (t) = Taken By, (c) = Cosigned By    Initials Name Provider Type    CB Suzanne oMrse, PT Physical Therapist    RW Prisca Calle, OTR/L Occupational Therapist    ELIF Hanna, PTA Physical Therapy Assistant    CS ANOOP JulesA/RUIZ Occupational Therapy Assistant    GRABIEL Bentley, OT Student OT Student           Time Calculation:         Time Calculation- OT     Row Name 06/05/18 1153             Time Calculation- OT    OT Start Time 1008  -CS      OT Stop Time 1101  -CS      OT Time Calculation (min) 53 min  -CS      Total Timed Code Minutes- OT 53 minute(s)  -CS      OT Received On 06/05/18  -CS        User Key  (r) = Recorded By, (t) = Taken By, (c) = Cosigned By    Initials Name Provider Type    CS ANOOP JulesA/RUIZ Occupational Therapy Assistant           Therapy Charges for Today     Code Description Service Date Service Provider Modifiers Qty    [id number] HC OT SELF CARE/MGMT/TRAIN EA 15 MIN 6/5/2018 NALLELY Jules/L GO 4          OT G-codes  OT Professional Judgement Used?: Yes  OT Functional Scales Options: AM-PAC 6 Clicks Daily Activity (OT)  Score: 20  Functional Limitation: Self care  Self Care Current Status (): At least 20 percent but less than 40 percent impaired, limited or restricted  Self Care Goal Status (): At least 1 percent but less than 20 percent impaired, limited or restricted    DANY Jules  6/5/2018

## 2018-06-05 NOTE — PLAN OF CARE
Problem: Patient Care Overview  Goal: Plan of Care Review  Outcome: Ongoing (interventions implemented as appropriate)   06/05/18 1437   Coping/Psychosocial   Plan of Care Reviewed With patient   OTHER   Outcome Summary pt. met 2 new goals this tx. and pt. was modified Independent with transfers and gait this tx. pt. ambulated 325' with slight antalgic gait   Plan of Care Review   Progress improving

## 2018-06-05 NOTE — PAYOR COMM NOTE
"Driss Kenny (68 y.o. Female)     Date of Birth Social Security Number Address Home Phone MRN    1950  [address] [place] [address] [phone] [id number]    [employer] Marital Status          [employer]        Admission Date Admission Type Admitting Provider Attending Provider Department, Room/Bed    6/4/18 Elective Baron Reynolds MD Dodds, James Carpenter, MD [facility] 3 EAST, 359/1    Discharge Date Discharge Disposition Discharge Destination                       Attending Provider:  Baron Reynolds MD    Allergies:  Allegra [Fexofenadine], Amaryl [Glimepiride], Dyazide [Triamterene-hctz], Hydrochlorothiazide W-triamterene, Lipitor [Atorvastatin], Metformin And Related, Naproxen, Sulfamethoxazole-trimethoprim    Isolation:  None   Infection:  None   Code Status:  FULL    Ht:  163.8 cm (64.5\")   Wt:  74.1 kg (163 lb 5.8 oz)    Admission Cmt:  None   Principal Problem:  Primary osteoarthritis of left knee [M17.12] More...                 Active Insurance as of 6/4/2018     Primary Coverage     Payor Plan Insurance Group Employer/Plan Group    AETNA MEDICARE REPLACEMENT AETNA [id number]     Payor Plan Address Payor Plan Phone Number Effective From Effective To    [address] [phone] 1/1/2018     [facility] [id number]       Subscriber Name Subscriber Birth Date Member ID       DRISS KENNY 1950 [id number]                 Emergency Contacts      (Rel.) Home Phone Work Phone Mobile Phone    Tapan Kenny (Spouse) [phone] -- [phone]         Rosalee Abraham RN [facility]  [phone] phone  [phone] fax    Ref#[id number]       History & Physical      Baron Reynolds MD at 6/4/2018  7:31 AM        H&P reviewed. The patient was examined and there are no changes to the H&P.     The patient voiced understanding of the risks, benefits, and alternative forms of treatment that were " discussed and the patient consents to proceed with surgery.  All risks, benefits and alternatives were discussed.  Risks including to but not exclusive to anesthetic complications, including death, MI, CVA, infection, bleeding DVT, fracture, residual pain and need for future surgery.  This discussion was held with the patient by Baron Reynolds MD and all questions were answered.      Electronically signed by Baron Reynolds MD at 6/4/2018  7:32 AM   Source Note             Otilia Kenny is a 68 y.o. female   Primary Care Provider Alexis Zabala MD     Chief Complaint   Patient presents with   • Left Knee - Follow-up       HISTORY OF PRESENT ILLNESS:  Otilia Kenny is here for followup of left knee pain.  The pain has not improved despite long term treatment with multiple conservative management trials.  Surgery is scheduled on 06/04/2018    Prior Arthritis treatments: [x]  PT    [x]  HEP      [x]  Attempt weight loss  [x]  NSAIDS      [x]  Cane   [x]  Intra-articular steroid inj      [x]  Viscosupplementation    Pain is chronic dull ache that is severe at times and worse with activity.  Difficulty with ADL's.  Patient is not happy with current quality of life and wants to discuss proceeding with surgical intervention.     CONCURRENT MEDICAL HISTORY:    Past Medical History:   Diagnosis Date   • Abdominal pain     Most likely related to functional colonic spasm     • Abnormal liver enzymes     Improved, 3/2015      • Allergic rhinitis     seasonal   • Allergic rhinitis    • Anemia    • Anesthesia complication     states sometime B/P drops   • Arthritis    • Cervical disc disorder     S/P cervical surgeries x2-3. Extensive fusion C 3-7. [place] [facility]      • Depression    • Diarrhea, unspecified     Resolved with discontinuation of metformin.    • Diverticular disease of colon    • Dysfunction of eustachian tube    • Essential hypertension     Increase lisinopril HCT, 10/12.5.      • Gastritis    •  "Generalized anxiety disorder    • GERD (gastroesophageal reflux disease)    • Hyperlipidemia     Intolerant of Lipitor. The patient stopped Zocor due to \"liver pain\", summer 2015. patient instructed to reattempt Zocor every other day, 10/2015    • Hypertriglyceridemia     Holding simvastatin 2/2015 due to slightly elevated LFTs.      • Iron deficiency anemia    • Irritable bowel syndrome     Dr. Estrada   • Osteoarthritis of knee     Dr. Reynolds    • Sleep disorder    • Spasm of back muscles     Dr. Castro changed Soma to Zanaflex.     • Vitamin B12 deficiency (dietary) anemia     Currently on multivitamin daily         Allergies   Allergen Reactions   • Allegra [Fexofenadine] Itching   • Amaryl [Glimepiride] Myalgia   • Dyazide [Triamterene-Hctz] Itching   • Hydrochlorothiazide W-Triamterene Itching   • Lipitor [Atorvastatin] Myalgia   • Metformin And Related Diarrhea   • Naproxen Itching   • Sulfamethoxazole-Trimethoprim Rash     dizziness       No current outpatient prescriptions on file.    Past Surgical History:   Procedure Laterality Date   • BACK SURGERY      Laminectomy   • BUNIONECTOMY     • CARPAL TUNNEL RELEASE Bilateral    • CERVICAL DISC SURGERY      Laminectomy 2011. Fusion 2013.   • CHOLECYSTECTOMY     • COLONOSCOPY      Diverticulosis found in the sigmoid colon.A single diminutive polyp found in the colon.Mul.biopsies taken.Hemorrhoids found in the anus.   • ENDOSCOPY      Normal hypopharynx and esophagus.Marked irregularity of the Z-line,compatable with chronic reflux.Mul.biopsies.A hiatus hernia found in GE junction.Mild nonerosive gastritis.Mul.biopsies.Polyps in fundus.Mul.biopsies.Normal pylorus.Normal duodenum.   • ENDOSCOPY AND COLONOSCOPY     • FINGER/THUMB LESION/CYST EXCISION     • HEEL SPUR EXCISION     • HYSTERECTOMY     • KNEE ARTHROSCOPY Bilateral     Arthroscopy of the left knee with debridement of medial meniscus.   • KNEE SURGERY      Right unicompartmental arthroplasty.       Family " "History   Problem Relation Age of Onset   • Cancer Other         lung   • Diabetes Other    • Hypertension Other    • Stroke Other        Social History     Social History   • Marital status:      Spouse name: N/A   • Number of children: N/A   • Years of education: N/A     Occupational History   • Not on file.     Social History Main Topics   • Smoking status: Former Smoker     Types: Cigarettes     Quit date: 6/2/1985   • Smokeless tobacco: Never Used   • Alcohol use No   • Drug use: No   • Sexual activity: Defer     Other Topics Concern   • Not on file     Social History Narrative   • No narrative on file        Review of Systems   Constitutional: Negative for chills and fever.   Respiratory: Negative.    Cardiovascular: Negative.    Gastrointestinal: Negative.    Musculoskeletal: Positive for arthralgias.   All other systems reviewed and are negative.      PHYSICAL EXAMINATION:       Ht 163.8 cm (64.5\")   Wt 75.3 kg (166 lb)   BMI 28.05 kg/m²      Physical Exam   Constitutional: She is oriented to person, place, and time. She appears well-developed and well-nourished. No distress.   Cardiovascular: Normal rate and regular rhythm.    Pulmonary/Chest: Effort normal and breath sounds normal.   Abdominal: Soft. Bowel sounds are normal.   Neurological: She is alert and oriented to person, place, and time.   Psychiatric: She has a normal mood and affect. Her behavior is normal. Judgment and thought content normal.   Vitals reviewed.      GAIT:     []  Normal  [x]  Antalgic    Assistive device: []  None  []  Walker     []  Crutches  [x]  Cane     []  Wheelchair  []  Stretcher    Left Knee Exam     Tenderness   Left knee tenderness location: diffuse.    Range of Motion   Extension: -5   Flexion: 120     Muscle Strength     The patient has normal left knee strength.    Tests   Drawer:       Anterior - negative     Posterior - negative  Varus: negative  Valgus: negative    Other   Sensation: normal  Pulse: " present  Swelling: none    Comments:  Crepitation with motion  Mild to moderate pain through arc of motion                      PRIOR XRAYS FOR REVIEW:     AP bilateral standing with lateral of the left knee shows essentially complete bony collapse of the medial compartment of the left knee with tricompartmental osteoarthritic changes.  No acute bony abnormality is noted.  The right knee shows acceptable position and alignment of a medial compartment unicompartmental arthroplasty.  She shows degenerative changes in the lateral compartment with marginal osteophytes.  Lateral of the left knee shows moderate to severe arthritic changes in the patellofemoral compartment and posterior osteophytes.  Progressive change is noted in both knees in comparison to x-rays June 6, 2017.  04/20/18 at 6:48 AM by Baron Reynolds MD      MRI knee left without [id number]/26/2017  [facility]  Result Narrative   Earlier surgery in the medial left knee . No specific incident but progressive pain aggravated with weightbearing .     MRI left knee: No marrow edema or sign of bony injury . Anterior cruciate ligament is chronically torn at its femoral attachment. On the May 2013 study there was no redundance of the posterior cruciate ligament but this is changed and the posterior   cruciate ligament now has significant redundance . The lateral meniscus has a normal appearance . Most of the posterior half of the medial meniscus is been resected and there is subchondral irregularity and signal change in the medial aspect of the   medial compartment and there is less cartilage in the compartment now than on the earlier study . The patella is normally seated and there is thinning of the cartilage  without  subchondral irregularity or signal change . There is a small joint effusion      impression  1. Since May 2013 much of the posterior half of the medial meniscus has been resected and there is now less cartilage and more degenerative  change than on the earlier study .  2. The patient has a chronic anterior cruciate ligament tear and since May 2013 the posterior cruciate ligament has become redundant  3. The patellofemoral compartment of the knee has very little cartilage but as of yet there is no signal change in the underlying bone          Previous or Active DVT/PE: NO  Cardiac Stent within 1 year: NO  Embolic/Ischemic stroke within 1 year: NO  NOTE:  TXA  tranexemic acid summary: THIS PATIENT IS A CANDIDATE FOR IV TXA DURING SURGERY.    ASSESSMENT:    Diagnoses and all orders for this visit:    Primary osteoarthritis of left knee    Chronic pain of left knee    Internal derangement of left knee    Type 2 diabetes mellitus without complication, without long-term current use of insulin    Chronic kidney disease, stage II (mild)    Essential hypertension          PLAN    The patient voiced understanding of the risks, benefits, and alternative forms of treatment that were discussed and the patient consents to proceed with surgery.  All risks, benefits and alternatives were discussed.  Risks including to but not exclusive to anesthetic complications, including death, MI, CVA, infection, bleeding DVT, fracture, residual pain and need for future surgery.    Plan TKA left knee    Return for Post-operative eval.    Baron Reynolds MD               Electronically signed by Baron Reynolds MD at 6/4/2018  7:31 AM             Baron Reynolds MD at 6/4/2018  7:31 AM        H&P reviewed. The patient was examined and there are no changes to the H&P.    Electronically signed by Baron Reynolds MD at 6/4/2018  7:31 AM   Source Note             Otilia Kenny is a 68 y.o. female   Primary Care Provider Alexis Zabala MD     Chief Complaint   Patient presents with   • Left Knee - Follow-up       HISTORY OF PRESENT ILLNESS:  Otilia Kenny is here for followup of left knee pain.  The pain has not improved despite long term treatment  "with multiple conservative management trials.  Surgery is scheduled on 06/04/2018    Prior Arthritis treatments: [x]  PT    [x]  HEP      [x]  Attempt weight loss  [x]  NSAIDS      [x]  Cane   [x]  Intra-articular steroid inj      [x]  Viscosupplementation    Pain is chronic dull ache that is severe at times and worse with activity.  Difficulty with ADL's.  Patient is not happy with current quality of life and wants to discuss proceeding with surgical intervention.     CONCURRENT MEDICAL HISTORY:    Past Medical History:   Diagnosis Date   • Abdominal pain     Most likely related to functional colonic spasm     • Abnormal liver enzymes     Improved, 3/2015      • Allergic rhinitis     seasonal   • Allergic rhinitis    • Anemia    • Anesthesia complication     states sometime B/P drops   • Arthritis    • Cervical disc disorder     S/P cervical surgeries x2-3. Extensive fusion C 3-7. [place] [facility]      • Depression    • Diarrhea, unspecified     Resolved with discontinuation of metformin.    • Diverticular disease of colon    • Dysfunction of eustachian tube    • Essential hypertension     Increase lisinopril HCT, 10/12.5.      • Gastritis    • Generalized anxiety disorder    • GERD (gastroesophageal reflux disease)    • Hyperlipidemia     Intolerant of Lipitor. The patient stopped Zocor due to \"liver pain\", summer 2015. patient instructed to reattempt Zocor every other day, 10/2015    • Hypertriglyceridemia     Holding simvastatin 2/2015 due to slightly elevated LFTs.      • Iron deficiency anemia    • Irritable bowel syndrome     Dr. Estrada   • Osteoarthritis of knee     Dr. Reynolds    • Sleep disorder    • Spasm of back muscles     Dr. Castro changed Soma to Zanaflex.     • Vitamin B12 deficiency (dietary) anemia     Currently on multivitamin daily         Allergies   Allergen Reactions   • Allegra [Fexofenadine] Itching   • Amaryl [Glimepiride] Myalgia   • Dyazide [Triamterene-Hctz] Itching   • " Hydrochlorothiazide W-Triamterene Itching   • Lipitor [Atorvastatin] Myalgia   • Metformin And Related Diarrhea   • Naproxen Itching   • Sulfamethoxazole-Trimethoprim Rash     dizziness       No current outpatient prescriptions on file.    Past Surgical History:   Procedure Laterality Date   • BACK SURGERY      Laminectomy   • BUNIONECTOMY     • CARPAL TUNNEL RELEASE Bilateral    • CERVICAL DISC SURGERY      Laminectomy 2011. Fusion 2013.   • CHOLECYSTECTOMY     • COLONOSCOPY      Diverticulosis found in the sigmoid colon.A single diminutive polyp found in the colon.Mul.biopsies taken.Hemorrhoids found in the anus.   • ENDOSCOPY      Normal hypopharynx and esophagus.Marked irregularity of the Z-line,compatable with chronic reflux.Mul.biopsies.A hiatus hernia found in GE junction.Mild nonerosive gastritis.Mul.biopsies.Polyps in fundus.Mul.biopsies.Normal pylorus.Normal duodenum.   • ENDOSCOPY AND COLONOSCOPY     • FINGER/THUMB LESION/CYST EXCISION     • HEEL SPUR EXCISION     • HYSTERECTOMY     • KNEE ARTHROSCOPY Bilateral     Arthroscopy of the left knee with debridement of medial meniscus.   • KNEE SURGERY      Right unicompartmental arthroplasty.       Family History   Problem Relation Age of Onset   • Cancer Other         lung   • Diabetes Other    • Hypertension Other    • Stroke Other        Social History     Social History   • Marital status:      Spouse name: N/A   • Number of children: N/A   • Years of education: N/A     Occupational History   • Not on file.     Social History Main Topics   • Smoking status: Former Smoker     Types: Cigarettes     Quit date: 6/2/1985   • Smokeless tobacco: Never Used   • Alcohol use No   • Drug use: No   • Sexual activity: Defer     Other Topics Concern   • Not on file     Social History Narrative   • No narrative on file        Review of Systems   Constitutional: Negative for chills and fever.   Respiratory: Negative.    Cardiovascular: Negative.   "  Gastrointestinal: Negative.    Musculoskeletal: Positive for arthralgias.   All other systems reviewed and are negative.      PHYSICAL EXAMINATION:       Ht 163.8 cm (64.5\")   Wt 75.3 kg (166 lb)   BMI 28.05 kg/m²      Physical Exam   Constitutional: She is oriented to person, place, and time. She appears well-developed and well-nourished. No distress.   Cardiovascular: Normal rate and regular rhythm.    Pulmonary/Chest: Effort normal and breath sounds normal.   Abdominal: Soft. Bowel sounds are normal.   Neurological: She is alert and oriented to person, place, and time.   Psychiatric: She has a normal mood and affect. Her behavior is normal. Judgment and thought content normal.   Vitals reviewed.      GAIT:     []  Normal  [x]  Antalgic    Assistive device: []  None  []  Walker     []  Crutches  [x]  Cane     []  Wheelchair  []  Stretcher    Left Knee Exam     Tenderness   Left knee tenderness location: diffuse.    Range of Motion   Extension: -5   Flexion: 120     Muscle Strength     The patient has normal left knee strength.    Tests   Drawer:       Anterior - negative     Posterior - negative  Varus: negative  Valgus: negative    Other   Sensation: normal  Pulse: present  Swelling: none    Comments:  Crepitation with motion  Mild to moderate pain through arc of motion                      PRIOR XRAYS FOR REVIEW:     AP bilateral standing with lateral of the left knee shows essentially complete bony collapse of the medial compartment of the left knee with tricompartmental osteoarthritic changes.  No acute bony abnormality is noted.  The right knee shows acceptable position and alignment of a medial compartment unicompartmental arthroplasty.  She shows degenerative changes in the lateral compartment with marginal osteophytes.  Lateral of the left knee shows moderate to severe arthritic changes in the patellofemoral compartment and posterior osteophytes.  Progressive change is noted in both knees in comparison " to x-rays June 6, 2017.  04/20/18 at 6:48 AM by Baron Reynolds MD      MRI knee left without [id number]/26/2017  [facility]  Result Narrative   Earlier surgery in the medial left knee . No specific incident but progressive pain aggravated with weightbearing .     MRI left knee: No marrow edema or sign of bony injury . Anterior cruciate ligament is chronically torn at its femoral attachment. On the May 2013 study there was no redundance of the posterior cruciate ligament but this is changed and the posterior   cruciate ligament now has significant redundance . The lateral meniscus has a normal appearance . Most of the posterior half of the medial meniscus is been resected and there is subchondral irregularity and signal change in the medial aspect of the   medial compartment and there is less cartilage in the compartment now than on the earlier study . The patella is normally seated and there is thinning of the cartilage  without  subchondral irregularity or signal change . There is a small joint effusion      impression  1. Since May 2013 much of the posterior half of the medial meniscus has been resected and there is now less cartilage and more degenerative change than on the earlier study .  2. The patient has a chronic anterior cruciate ligament tear and since May 2013 the posterior cruciate ligament has become redundant  3. The patellofemoral compartment of the knee has very little cartilage but as of yet there is no signal change in the underlying bone          Previous or Active DVT/PE: NO  Cardiac Stent within 1 year: NO  Embolic/Ischemic stroke within 1 year: NO  NOTE:  TXA  tranexemic acid summary: THIS PATIENT IS A CANDIDATE FOR IV TXA DURING SURGERY.    ASSESSMENT:    Diagnoses and all orders for this visit:    Primary osteoarthritis of left knee    Chronic pain of left knee    Internal derangement of left knee    Type 2 diabetes mellitus without complication, without long-term current use  of insulin    Chronic kidney disease, stage II (mild)    Essential hypertension          PLAN    The patient voiced understanding of the risks, benefits, and alternative forms of treatment that were discussed and the patient consents to proceed with surgery.  All risks, benefits and alternatives were discussed.  Risks including to but not exclusive to anesthetic complications, including death, MI, CVA, infection, bleeding DVT, fracture, residual pain and need for future surgery.    Plan TKA left knee    Return for Post-operative eval.    Baron Reynolds MD               Electronically signed by Baron Reynolds MD at 6/4/2018  7:31 AM             Baron Reynolds MD at 5/30/2018  9:40 AM          Otilia Kenny is a 68 y.o. female   Primary Care Provider Alexis Zabala MD     Chief Complaint   Patient presents with   • Left Knee - Follow-up       HISTORY OF PRESENT ILLNESS:  Otilia Lopez Goods is here for followup of left knee pain.  The pain has not improved despite long term treatment with multiple conservative management trials.  Surgery is scheduled on 06/04/2018    Prior Arthritis treatments: [x]  PT    [x]  HEP      [x]  Attempt weight loss  [x]  NSAIDS      [x]  Cane   [x]  Intra-articular steroid inj      [x]  Viscosupplementation    Pain is chronic dull ache that is severe at times and worse with activity.  Difficulty with ADL's.  Patient is not happy with current quality of life and wants to discuss proceeding with surgical intervention.     CONCURRENT MEDICAL HISTORY:    Past Medical History:   Diagnosis Date   • Abdominal pain     Most likely related to functional colonic spasm     • Abnormal liver enzymes     Improved, 3/2015      • Allergic rhinitis     seasonal   • Allergic rhinitis    • Anemia    • Anesthesia complication     states sometime B/P drops   • Arthritis    • Cervical disc disorder     S/P cervical surgeries x2-3. Extensive fusion C 3-7. [place] neuro[facility]      •  "Depression    • Diarrhea, unspecified     Resolved with discontinuation of metformin.    • Diverticular disease of colon    • Dysfunction of eustachian tube    • Essential hypertension     Increase lisinopril HCT, 10/12.5.      • Gastritis    • Generalized anxiety disorder    • GERD (gastroesophageal reflux disease)    • Hyperlipidemia     Intolerant of Lipitor. The patient stopped Zocor due to \"liver pain\", summer 2015. patient instructed to reattempt Zocor every other day, 10/2015    • Hypertriglyceridemia     Holding simvastatin 2/2015 due to slightly elevated LFTs.      • Iron deficiency anemia    • Irritable bowel syndrome     Dr. Estrada   • Osteoarthritis of knee     Dr. Reynolds    • Sleep disorder    • Spasm of back muscles     Dr. Castro changed Soma to Zanaflex.     • Vitamin B12 deficiency (dietary) anemia     Currently on multivitamin daily         Allergies   Allergen Reactions   • Allegra [Fexofenadine] Itching   • Amaryl [Glimepiride] Myalgia   • Dyazide [Triamterene-Hctz] Itching   • Hydrochlorothiazide W-Triamterene Itching   • Lipitor [Atorvastatin] Myalgia   • Metformin And Related Diarrhea   • Naproxen Itching   • Sulfamethoxazole-Trimethoprim Rash     dizziness       No current outpatient prescriptions on file.    Past Surgical History:   Procedure Laterality Date   • BACK SURGERY      Laminectomy   • BUNIONECTOMY     • CARPAL TUNNEL RELEASE Bilateral    • CERVICAL DISC SURGERY      Laminectomy 2011. Fusion 2013.   • CHOLECYSTECTOMY     • COLONOSCOPY      Diverticulosis found in the sigmoid colon.A single diminutive polyp found in the colon.Mul.biopsies taken.Hemorrhoids found in the anus.   • ENDOSCOPY      Normal hypopharynx and esophagus.Marked irregularity of the Z-line,compatable with chronic reflux.Mul.biopsies.A hiatus hernia found in GE junction.Mild nonerosive gastritis.Mul.biopsies.Polyps in fundus.Mul.biopsies.Normal pylorus.Normal duodenum.   • ENDOSCOPY AND COLONOSCOPY     • " "FINGER/THUMB LESION/CYST EXCISION     • HEEL SPUR EXCISION     • HYSTERECTOMY     • KNEE ARTHROSCOPY Bilateral     Arthroscopy of the left knee with debridement of medial meniscus.   • KNEE SURGERY      Right unicompartmental arthroplasty.       Family History   Problem Relation Age of Onset   • Cancer Other         lung   • Diabetes Other    • Hypertension Other    • Stroke Other        Social History     Social History   • Marital status:      Spouse name: N/A   • Number of children: N/A   • Years of education: N/A     Occupational History   • Not on file.     Social History Main Topics   • Smoking status: Former Smoker     Types: Cigarettes     Quit date: 6/2/1985   • Smokeless tobacco: Never Used   • Alcohol use No   • Drug use: No   • Sexual activity: Defer     Other Topics Concern   • Not on file     Social History Narrative   • No narrative on file        Review of Systems   Constitutional: Negative for chills and fever.   Respiratory: Negative.    Cardiovascular: Negative.    Gastrointestinal: Negative.    Musculoskeletal: Positive for arthralgias.   All other systems reviewed and are negative.      PHYSICAL EXAMINATION:       Ht 163.8 cm (64.5\")   Wt 75.3 kg (166 lb)   BMI 28.05 kg/m²      Physical Exam   Constitutional: She is oriented to person, place, and time. She appears well-developed and well-nourished. No distress.   Cardiovascular: Normal rate and regular rhythm.    Pulmonary/Chest: Effort normal and breath sounds normal.   Abdominal: Soft. Bowel sounds are normal.   Neurological: She is alert and oriented to person, place, and time.   Psychiatric: She has a normal mood and affect. Her behavior is normal. Judgment and thought content normal.   Vitals reviewed.      GAIT:     []  Normal  [x]  Antalgic    Assistive device: []  None  []  Walker     []  Crutches  [x]  Cane     []  Wheelchair  []  Stretcher    Left Knee Exam     Tenderness   Left knee tenderness location: diffuse.    Range of " Motion   Extension: -5   Flexion: 120     Muscle Strength     The patient has normal left knee strength.    Tests   Drawer:       Anterior - negative     Posterior - negative  Varus: negative  Valgus: negative    Other   Sensation: normal  Pulse: present  Swelling: none    Comments:  Crepitation with motion  Mild to moderate pain through arc of motion                      PRIOR XRAYS FOR REVIEW:     AP bilateral standing with lateral of the left knee shows essentially complete bony collapse of the medial compartment of the left knee with tricompartmental osteoarthritic changes.  No acute bony abnormality is noted.  The right knee shows acceptable position and alignment of a medial compartment unicompartmental arthroplasty.  She shows degenerative changes in the lateral compartment with marginal osteophytes.  Lateral of the left knee shows moderate to severe arthritic changes in the patellofemoral compartment and posterior osteophytes.  Progressive change is noted in both knees in comparison to x-rays June 6, 2017.  04/20/18 at 6:48 AM by Baron Reynolds MD      MRI knee left without gvafoolk79/26/2017  [facility]  Result Narrative   Earlier surgery in the medial left knee . No specific incident but progressive pain aggravated with weightbearing .     MRI left knee: No marrow edema or sign of bony injury . Anterior cruciate ligament is chronically torn at its femoral attachment. On the May 2013 study there was no redundance of the posterior cruciate ligament but this is changed and the posterior   cruciate ligament now has significant redundance . The lateral meniscus has a normal appearance . Most of the posterior half of the medial meniscus is been resected and there is subchondral irregularity and signal change in the medial aspect of the   medial compartment and there is less cartilage in the compartment now than on the earlier study . The patella is normally seated and there is thinning of the  cartilage  without  subchondral irregularity or signal change . There is a small joint effusion      impression  1. Since May 2013 much of the posterior half of the medial meniscus has been resected and there is now less cartilage and more degenerative change than on the earlier study .  2. The patient has a chronic anterior cruciate ligament tear and since May 2013 the posterior cruciate ligament has become redundant  3. The patellofemoral compartment of the knee has very little cartilage but as of yet there is no signal change in the underlying bone          Previous or Active DVT/PE: NO  Cardiac Stent within 1 year: NO  Embolic/Ischemic stroke within 1 year: NO  NOTE:  TXA  tranexemic acid summary: THIS PATIENT IS A CANDIDATE FOR IV TXA DURING SURGERY.    ASSESSMENT:    Diagnoses and all orders for this visit:    Primary osteoarthritis of left knee    Chronic pain of left knee    Internal derangement of left knee    Type 2 diabetes mellitus without complication, without long-term current use of insulin    Chronic kidney disease, stage II (mild)    Essential hypertension          PLAN    The patient voiced understanding of the risks, benefits, and alternative forms of treatment that were discussed and the patient consents to proceed with surgery.  All risks, benefits and alternatives were discussed.  Risks including to but not exclusive to anesthetic complications, including death, MI, CVA, infection, bleeding DVT, fracture, residual pain and need for future surgery.    Plan TKA left knee    Return for Post-operative eval.    Baron Reynolds MD              Electronically signed by Baron Reynolds MD at 6/4/2018  7:31 AM       Hospital Medications (active)       Dose Frequency Start End    acetaminophen (TYLENOL) tablet 1,000 mg 1,000 mg 4 Times Daily 6/4/2018     Sig - Route: Take 2 tablets by mouth 4 (Four) Times a Day. - Oral    amLODIPine (NORVASC) tablet 5 mg 5 mg Daily 6/5/2018     Sig - Route:  Take 1 tablet by mouth Daily. - Oral    aspirin EC tablet 81 mg 81 mg Daily 6/4/2018     Sig - Route: Take 1 tablet by mouth Daily. - Oral    bisacodyl (DULCOLAX) EC tablet 10 mg 10 mg Daily PRN 6/5/2018     Sig - Route: Take 2 tablets by mouth Daily As Needed for Constipation. - Oral    ceFAZolin (ANCEF) 2 g in sodium chloride 0.9 % 100 mL IVPB 2 g Once 6/4/2018 6/4/2018    Sig - Route: Infuse 2 g into a venous catheter 1 (One) Time. - Intravenous    ceFAZolin (ANCEF) 2 g in sodium chloride 0.9 % 100 mL IVPB 2 g Every 8 Hours 6/5/2018 6/5/2018    Sig - Route: Infuse 2 g into a venous catheter Every 8 (Eight) Hours. - Intravenous    citalopram (CeleXA) tablet 20 mg 20 mg Daily 6/5/2018     Sig - Route: Take 1 tablet by mouth Daily. - Oral    dextrose (D50W) solution 25 g 25 g Every 15 Minutes PRN 6/4/2018     Sig - Route: Infuse 50 mL into a venous catheter Every 15 (Fifteen) Minutes As Needed for Low Blood Sugar (Blood Sugar Less Than 70). - Intravenous    dextrose (GLUTOSE) oral gel 15 g 15 g Every 15 Minutes PRN 6/4/2018     Sig - Route: Take 15 g by mouth Every 15 (Fifteen) Minutes As Needed for Low Blood Sugar (Blood sugar less than 70). - Oral    docusate sodium (COLACE) capsule 100 mg 100 mg 2 Times Daily PRN 6/4/2018     Sig - Route: Take 1 capsule by mouth 2 (Two) Times a Day As Needed for Constipation. - Oral    famotidine (PEPCID) tablet 40 mg 40 mg Daily 6/4/2018     Sig - Route: Take 1 tablet by mouth Daily. - Oral    ferrous sulfate EC tablet 324 mg 324 mg Daily With Breakfast 6/5/2018     Sig - Route: Take 1 tablet by mouth Daily With Breakfast. - Oral    glucagon (human recombinant) (GLUCAGEN DIAGNOSTIC) injection 1 mg 1 mg As Needed 6/4/2018     Sig - Route: Inject 1 mg under the skin As Needed (Blood Glucose Less Than 70). - Subcutaneous    HYDROmorphone (DILAUDID) injection 0.5 mg 0.5 mg Every 2 Hours PRN 6/4/2018 6/14/2018    Sig - Route: Infuse 0.5 mL into a venous catheter Every 2 (Two) Hours  "As Needed for Severe Pain . - Intravenous    Linked Group 1:  \"And\" Linked Group Details        insulin aspart (novoLOG) injection 0-9 Units 0-9 Units 4 Times Daily Before Meals & Nightly 6/4/2018     Sig - Route: Inject 0-9 Units under the skin 4 (Four) Times a Day Before Meals & at Bedtime. - Subcutaneous    lisinopril (PRINIVIL,ZESTRIL) tablet 20 mg 20 mg Daily 6/4/2018     Sig - Route: Take 1 tablet by mouth Daily. - Oral    naloxone (NARCAN) injection 0.1 mg 0.1 mg Every 5 Minutes PRN 6/4/2018     Sig - Route: Infuse 0.25 mL into a venous catheter Every 5 (Five) Minutes As Needed for Respiratory Depression. - Intravenous    Linked Group 1:  \"And\" Linked Group Details        ondansetron (ZOFRAN) injection 4 mg 4 mg Every 6 Hours PRN 6/4/2018     Sig - Route: Infuse 2 mL into a venous catheter Every 6 (Six) Hours As Needed for Nausea or Vomiting. - Intravenous    Linked Group 2:  \"Or\" Linked Group Details        ondansetron (ZOFRAN) tablet 4 mg 4 mg Every 6 Hours PRN 6/4/2018     Sig - Route: Take 1 tablet by mouth Every 6 (Six) Hours As Needed for Nausea or Vomiting. - Oral    Linked Group 2:  \"Or\" Linked Group Details        ondansetron ODT (ZOFRAN-ODT) disintegrating tablet 4 mg 4 mg Every 6 Hours PRN 6/4/2018     Sig - Route: Take 1 tablet by mouth Every 6 (Six) Hours As Needed for Nausea or Vomiting. - Oral    Linked Group 2:  \"Or\" Linked Group Details        oxyCODONE (oxyCONTIN) 12 hr tablet 10 mg 10 mg Every 12 Hours Scheduled 6/4/2018 6/6/2018    Sig - Route: Take 1 tablet by mouth Every 12 (Twelve) Hours. - Oral    oxyCODONE (ROXICODONE) immediate release tablet 5 mg 5 mg Every 4 Hours PRN 6/4/2018 6/14/2018    Sig - Route: Take 1 tablet by mouth Every 4 (Four) Hours As Needed for Moderate Pain . - Oral    Pharmacy to dose warfarin  Continuous PRN 6/4/2018     Sig - Route: Continuous As Needed for Consult. - Does not apply    rosuvastatin (CRESTOR) tablet 20 mg 20 mg Daily 6/4/2018     Sig - Route: Take " "1 tablet by mouth Daily. - Oral    sodium chloride 0.9 % bolus 500 mL 500 mL Once 6/5/2018     Sig - Route: Infuse 500 mL into a venous catheter 1 (One) Time. - Intravenous    sodium chloride 0.9 % flush 1-10 mL 1-10 mL As Needed 6/4/2018     Sig - Route: Infuse 1-10 mL into a venous catheter As Needed for Line Care. - Intravenous    sodium chloride 0.9 % infusion 1,000 mL 1,000 mL Continuous 6/4/2018 6/5/2018    Sig - Route: Infuse 1,000 mL into a venous catheter Continuous. - Intravenous    sodium chloride 0.9 % infusion 100 mL/hr Continuous 6/4/2018     Sig - Route: Infuse 100 mL/hr into a venous catheter Continuous. - Intravenous    sodium chloride 1,000 mL with bacitracin 50,000 Units irrigation  As Needed 6/4/2018     Sig: As Needed.    sodium chloride 3,000 mL with bacitracin 50,000 Units irrigation  As Needed 6/4/2018     Sig: As Needed.    Tranexamic Acid 1,000 mg in sodium chloride 0.9 % 100 mL 1,000 mg Once 6/4/2018 6/4/2018    Sig - Route: Infuse 1,000 mg into a venous catheter 1 (One) Time. - Intravenous    warfarin (COUMADIN) tablet 2.5 mg 2.5 mg Daily Warfarin 6/4/2018     Sig - Route: Take 1 tablet by mouth Daily. - Oral    acetaminophen (TYLENOL) suppository 650 mg (Discontinued) 650 mg Once As Needed 6/4/2018 6/4/2018    Sig - Route: Insert 1 suppository into the rectum 1 (One) Time As Needed for Mild Pain . - Rectal    Reason for Discontinue: Patient Transfer    Linked Group 3:  \"Or\" Linked Group Details        acetaminophen (TYLENOL) tablet 650 mg (Discontinued) 650 mg Once As Needed 6/4/2018 6/4/2018    Sig - Route: Take 2 tablets by mouth 1 (One) Time As Needed for Mild Pain . - Oral    Reason for Discontinue: Patient Transfer    Linked Group 3:  \"Or\" Linked Group Details        ceFAZolin (ANCEF) 2 g in sodium chloride 0.9 % 100 mL IVPB (Discontinued) 2 g Every 8 Hours 6/4/2018 6/5/2018    Sig - Route: Infuse 2 g into a venous catheter Every 8 (Eight) Hours. - Intravenous    Reason for " Discontinue: *Error    diphenhydrAMINE (BENADRYL) injection 12.5 mg (Discontinued) 12.5 mg Every 15 Minutes PRN 6/4/2018 6/4/2018    Sig - Route: Infuse 0.25 mL into a venous catheter Every 15 (Fifteen) Minutes As Needed for Itching (May repeat x 1). - Intravenous    Reason for Discontinue: Patient Transfer    ePHEDrine injection 5 mg (Discontinued) 5 mg Once As Needed 6/4/2018 6/4/2018    Sig - Route: Infuse 0.1 mL into a venous catheter 1 (One) Time As Needed (symptomatic hypotension - Notify attending anesthesiologist). - Intravenous    Reason for Discontinue: Patient Transfer    ePHEDrine injection (Discontinued)  As Needed 6/4/2018 6/4/2018    Sig - Route: Infuse  into a venous catheter As Needed. - Intravenous    Reason for Discontinue: Anesthesia Stop    fentaNYL citrate (PF) (SUBLIMAZE) injection (Discontinued)  As Needed 6/4/2018 6/4/2018    Sig - Route: Infuse  into a venous catheter As Needed for Severe Pain . - Intravenous    Reason for Discontinue: Anesthesia Stop    flumazenil (ROMAZICON) injection 0.2 mg (Discontinued) 0.2 mg As Needed 6/4/2018 6/4/2018    Sig - Route: Infuse 2 mL into a venous catheter As Needed (unresponsiveness). - Intravenous    Reason for Discontinue: Patient Transfer    HYDROmorphone (DILAUDID) injection 0.5 mg (Discontinued) 0.5 mg Every 5 Minutes PRN 6/4/2018 6/4/2018    Sig - Route: Infuse 0.5 mL into a venous catheter Every 5 (Five) Minutes As Needed for Moderate Pain  or Severe Pain . - Intravenous    Reason for Discontinue: Patient Transfer    labetalol (NORMODYNE,TRANDATE) injection 5 mg (Discontinued) 5 mg Every 5 Minutes PRN 6/4/2018 6/4/2018    Sig - Route: Infuse 1 mL into a venous catheter Every 5 (Five) Minutes As Needed for High Blood Pressure (for systolic blood pressure greater than 180 mmHg or diastolic blood pressure greater than 105 mmHg). - Intravenous    Reason for Discontinue: Patient Transfer    lidocaine (XYLOCAINE) 2% injection (Discontinued)  As Needed  6/4/2018 6/4/2018    Sig: As Needed.    Reason for Discontinue: Anesthesia Stop    meperidine (DEMEROL) injection 12.5 mg (Discontinued) 12.5 mg Every 5 Minutes PRN 6/4/2018 6/4/2018    Sig - Route: Infuse 0.25 mL into a venous catheter Every 5 (Five) Minutes As Needed for Shivering (May repeat x 8). - Intravenous    Reason for Discontinue: Patient Transfer    midazolam (VERSED) injection (Discontinued)  As Needed 6/4/2018 6/4/2018    Sig - Route: Infuse  into a venous catheter As Needed. - Intravenous    Reason for Discontinue: Anesthesia Stop    naloxone (NARCAN) injection 0.2 mg (Discontinued) 0.2 mg As Needed 6/4/2018 6/4/2018    Sig - Route: Infuse 0.5 mL into a venous catheter As Needed for Opioid Reversal or Respiratory Depression (unresponsiveness, decrease oxygen saturation). - Intravenous    Reason for Discontinue: Patient Transfer    ondansetron (ZOFRAN) injection 4 mg (Discontinued) 4 mg Once As Needed 6/4/2018 6/4/2018    Sig - Route: Infuse 2 mL into a venous catheter 1 (One) Time As Needed for Nausea or Vomiting. - Intravenous    Reason for Discontinue: Patient Transfer    ondansetron (ZOFRAN) injection (Discontinued)  As Needed 6/4/2018 6/4/2018    Sig - Route: Infuse  into a venous catheter As Needed for Nausea or Vomiting. - Intravenous    Reason for Discontinue: Anesthesia Stop    phenylephrine (MONA-SYNEPHRINE) injection (Discontinued)  As Needed 6/4/2018 6/4/2018    Sig - Route: Infuse  into a venous catheter As Needed. - Intravenous    Reason for Discontinue: Anesthesia Stop    Propofol (DIPRIVAN) injection (Discontinued)  As Needed 6/4/2018 6/4/2018    Sig - Route: Infuse  into a venous catheter As Needed. - Intravenous    Reason for Discontinue: Anesthesia Stop    ropivacaine (NAROPIN) 0.5 % injection (Discontinued)  As Needed 6/4/2018 6/4/2018    Sig: As Needed.    Reason for Discontinue: Anesthesia Stop    sodium chloride 0.9 % flush 1-10 mL (Discontinued) 1-10 mL As Needed 6/4/2018 6/4/2018     Sig - Route: Infuse 1-10 mL into a venous catheter As Needed for Line Care. - Intravenous    Reason for Discontinue: Patient Transfer             Operative/Procedure Notes (most recent note)      Baron Reynolds MD at 6/4/2018  9:44 AM          TOTAL KNEE ARTHROPLASTY ATTUNE  Procedure Note    Name:    Otilia Kenny  YOB: 1950  Date of surgery:   6/4/2018    Pre-op Diagnosis:   Generalized anxiety disorder [F41.1]  Chronic kidney disease, stage II (mild) [N18.2]  Major depression in complete remission [F32.5]  Essential hypertension [I10]  Primary osteoarthritis of left knee [M17.12]  Chronic pain of left knee [M25.562, G89.29]  Type 2 diabetes mellitus without complication, without long-term current use of insulin [E11.9]    Post-op Diagnosis:    Post-Op Diagnosis Codes:     * Generalized anxiety disorder [F41.1]     * Chronic kidney disease, stage II (mild) [N18.2]     * Major depression in complete remission [F32.5]     * Essential hypertension [I10]     * Primary osteoarthritis of left knee [M17.12]     * Chronic pain of left knee [M25.562, G89.29]     * Type 2 diabetes mellitus without complication, without long-term current use of insulin [E11.9]    Procedure:  Procedure(s):  TOTAL KNEE ARTHROPLASTY ATTUNE with adductor canal block - left    Surgeon(s):  Baron Reynolds MD    SURGEON: Baron Reynolds MD    ASSISTANT:  LUIS ANGEL Mosquera    Anesthesia: Spinal with regional    Staff:   Circulator: Jennifer Dodson RN; Shireen Joel RN  Scrub Person: Margarette Scott  Vendor Representative: Blake Morse  Assistant: Margarette Glover CSA    Estimated Blood Loss: 200 mL    Specimens:                ID Type Source Tests Collected by Time   A : bone and soft tissue from left knee Bone Knee, Left TISSUE PATHOLOGY EXAM Baron Reynolds MD 6/4/2018 1059         Drains:   Closed/Suction Drain 1 Left;Lateral Knee Accordion 10 Fr. (Active)   Dressing Status  Clean;Dry;Intact 6/4/2018 11:57 AM       Findings:  End-stage osteoarthritis Left knee    Complications: None    IMPLANTS:     Implant Name Type Inv. Item Serial No.  Lot No. LRB No. Used   CMT BONE SMARTSET HV 40GM - [id number] Implant CMT BONE SMARTSET HV 40GM  DEPUY [id number] Left 2   BASE TIB ATTUNE CMT RP SZ4 - [id number] Implant BASE TIB ATTUNE CMT RP SZ4  DEPUY [id number] Left 1   COMP PAT ATTUNE CMT MEDL/DOME 32MM - [id number] Implant COMP PAT ATTUNE CMT MEDL/DOME 32MM  DEPUY [id number] Left 1   COMP FEM ATTUNE CMT PS SZ4 LT - [id number] Implant COMP FEM ATTUNE CMT PS SZ4 LT  DEPUY [id number] Left 1   INSRT TIB ATTUNE PS RP SZ4 6MM - [id number] Implant INSRT TIB ATTUNE PS RP SZ4 6MM   DEPUY [id number] Left 1         PROCEDURE:    The patient was taken to the operating room and placed in the supine position. Preoperative antibiotics were administered. Surgical time out was performed. After adequate induction of anesthesia, the leg was prepped and draped in the usual sterile fashion, exsanguinated with an Ace bandage and the tourniquet inflated to 300 mmHg. A midline incision was performed followed by a medial parapatellar arthrotomy.    Attention was then placed to the patella. The patella was noted to track centrally through range of motion. The patella was then sized with the trials. The thickness of the patella was then measured and the cut was made in a free-hand technique. The patella protector was then placed and the surrounding osteophytes were removed.   The patella was subluxed laterally in a non-everted position.  A portion of the fat pad, ACL, and anterior horns of the meniscus were excised.     The drill hole was placed in the distal femur and the canal was the irrigated and suctioned. The IM андрей was placed and a 5 degree distal valgus cut was performed on the femur. The femur was then sized with a sizing guide. The femoral cutting block was placed and all femoral cuts were performed. The proximal  tibia was exposed. We used the extramedullary tibial cutting guide set for removal of an appropriate amount of proximal tibia. The tibial cut was performed. The posterior horns of the menisci were excised. The posterior osteophytes were removed. Flexion extension blocks were then used to balance the knee. The tibial cut surface was then sized with the sizing templates and the tibial and femoral trial were then placed. The knee was placed in full extension and then the patella was re-addressed. The patella was resurfaced and the preoperative thickness was reproduced. The patella tracked centrally through range of motion.     At this point all trial components were removed, the knee was copiously irrigated with pulsed lavage.. The cut surfaces were then dried with clean lap sponges, and the components were cemented tibia, followed by femur, then patella. The knee was held in full extension and all excess cement was removed. The knee was held still until the cement had completely hardened. We then placed the trial polyethylene spacer which resulted in full extension and excellent flexion-extension balance. We placed the final polyethylene spacer.    The knee was then copiously irrigated. The tourniquet was then released. There was excellent hemostasis. We placed a 10 Swiss Hemovac drain. We closed the knee in multiple layers in standard fashion. Sterile dressing were applied. At the end of the case, the sponge and needle counts were reported as being correct. There were no known complications. The patient was then transported to the recovery room in satisfactory condition..      Baron Reynolds MD     Date: 6/4/2018  Time: 12:30 PM      Electronically signed by Baron Reynolds MD at 6/4/2018 12:31 PM          Physician Progress Notes (most recent note)      Baron Reynolds MD at 6/5/2018  5:41 AM          Orthopedic Total Knee Progress Note      Patient: Otilia Kenny  Date of Admission:  "6/4/2018  YOB: 1950  Medical Record Number: [id number]      Status Post: Procedure(s) (LRB):  TOTAL KNEE ARTHROPLASTY ATTUNE with adductor canal block  (Left)        Systemic or Specific Complaints: No Complaints except mild headache, described as her \"typical headaches from neck tension from past neck surgeries.\"  Headache is not worsening or causing any other symptoms.  Complications: None  Pain Relief: some relief; doing well on current pain meds with mild, tolerable pain per patient.    Physical Exam:  68 y.o. female alert and oriented  Temp:  [97.3 °F (36.3 °C)-99.5 °F (37.5 °C)] 99.5 °F (37.5 °C)  Heart Rate:  [71-92] 91  Resp:  [14-18] 18  BP: ()/(51-69) 117/58  Chest: Clear to auscultation; no wheezes/rhonchi.  CV: Regular Rate and Rhythm, no murmurs/gallops/rubs.  Abd: Soft, NT, with BS +  Ext: Distal pulses intact; able to move toes; ambulated with minimal assistance yesterday throughout her room. Calf is soft and nontender.  Skin: Bandage is clean dry and intact. Left leg drain in place with minimal drainage.  Wearing SCD's in bed.  No nausea.    Activity: Mobilizing Per P.T.   Weight Bearing: As Tolerated    Data Review  Admission on 06/04/2018   Component Date Value Ref Range Status   • ABO Type 06/04/2018 O   Final   • RH type 06/04/2018 Positive   Final   • Antibody Screen 06/04/2018 Negative   Final   • T&S Expiration Date 06/04/2018 6/7/2018 11:59:59 PM   Final   • Previous History 06/04/2018 Previous Record on File   Final   • Glucose 06/04/2018 133* 70 - 130 mg/dL Final    RN NotifiedMeter: [id number]: [id number] GILSON JUNIOR   • Glucose 06/04/2018 128  70 - 130 mg/dL Final    RN NotifiedMeter: [id number]: [id number] DEBBI HYDE   • Hemoglobin 06/04/2018 11.3* 12.0 - 15.5 g/dL Final   • Hematocrit 06/04/2018 33.9* 35.0 - 45.0 % Final   • Glucose 06/04/2018 220* 70 - 130 mg/dL Final    RN NotifiedMeter: [id number]: [id number] BILL OKEEFE       No " results found.    Medications    acetaminophen 1,000 mg Oral 4x Daily   amLODIPine 5 mg Oral Daily   aspirin 81 mg Oral Daily   citalopram 20 mg Oral Daily   famotidine 40 mg Oral Daily   ferrous sulfate 324 mg Oral Daily With Breakfast   insulin aspart 0-9 Units Subcutaneous 4x Daily AC & at Bedtime   lisinopril 20 mg Oral Daily   oxyCODONE 10 mg Oral Q12H   rosuvastatin 20 mg Oral Daily   warfarin 2.5 mg Oral Daily     bisacodyl  •  dextrose  •  dextrose  •  docusate sodium  •  glucagon (human recombinant)  •  HYDROmorphone **AND** naloxone  •  ondansetron **OR** ondansetron ODT **OR** ondansetron  •  oxyCODONE  •  Pharmacy to dose warfarin  •  sodium chloride  •  BH OR ANTIBIOTIC IRRIGATION BUILDER  •   OR ANTIBIOTIC IRRIGATION BUILDER    Assessment:  Doing well following total knee replacement  Patient Active Problem List   Diagnosis   • Vitamin B12 deficiency (dietary) anemia   • Type 2 diabetes mellitus without complication   • Spasm of back muscles   • Sleep disorder   • Osteoarthritis of knee   • Irritable bowel syndrome   • Iron deficiency anemia   • Hypertriglyceridemia   • Hyperlipidemia   • GERD (gastroesophageal reflux disease)   • Generalized anxiety disorder   • Gastritis   • Essential hypertension   • Dysfunction of eustachian tube   • Diverticular disease of colon   • Cervical disc disorder   • Diarrhea, unspecified   • Anemia   • Allergic rhinitis   • Abnormal liver enzymes   • Abdominal pain   • Major depression in complete remission   • Chronic kidney disease, stage II (mild)   • Chronic pain of both knees   • Internal derangement of left knee   • Primary osteoarthritis of left knee   • Chronic pain of left knee   • Type 2 diabetes mellitus without complication, without long-term current use of insulin         Plan:   Continue efforts to mobilize  Continue Pain Control Measures  Continue incisional Care  DVT prophylaxis        Malinda Hassan    Date: 6/5/2018   Time: 5:41 AM     As  above  Progress as tolerated.  Anticipate home 1-2 days    06/05/18 at 7:09 AM by Baron Reynolds MD          Electronically signed by Baron Reynolds MD at 6/5/2018  7:09 AM       Consult Notes (most recent note)     No notes of this type exist for this encounter.

## 2018-06-05 NOTE — PLAN OF CARE
Problem: Patient Care Overview  Goal: Plan of Care Review  Outcome: Ongoing (interventions implemented as appropriate)   06/05/18 0306   Coping/Psychosocial   Plan of Care Reviewed With patient   OTHER   Outcome Summary VSS, pain controlled with oral pain meds, getting up with standby assist to the bathroom   Plan of Care Review   Progress improving     Goal: Individualization and Mutuality  Outcome: Ongoing (interventions implemented as appropriate)    Goal: Discharge Needs Assessment  Outcome: Ongoing (interventions implemented as appropriate)      Problem: Fall Risk (Adult)  Goal: Identify Related Risk Factors and Signs and Symptoms  Outcome: Outcome(s) achieved Date Met: 06/05/18    Goal: Absence of Fall  Outcome: Ongoing (interventions implemented as appropriate)      Problem: Knee Arthroplasty (Total, Partial) (Adult)  Goal: Signs and Symptoms of Listed Potential Problems Will be Absent, Minimized or Managed (Knee Arthroplasty)  Outcome: Ongoing (interventions implemented as appropriate)

## 2018-06-05 NOTE — THERAPY TREATMENT NOTE
Acute Care - Physical Therapy Treatment Note  [facility]     Patient Name: Otilia Kenny  : 1950  MRN: [id number]  Today's Date: 2018  Onset of Illness/Injury or Date of Surgery: 18  Date of Referral to PT: 18  Referring Physician: Dr Reynolds    Admit Date: 2018    Visit Dx:    ICD-10-CM ICD-9-CM   1. Long-term (current) use of anticoagulants Z79.01 V58.61   2. Primary osteoarthritis of left knee M17.12 715.16   3. Chronic pain of left knee M25.562 719.46    G89.29 338.29   4. Essential hypertension I10 401.9   5. Chronic kidney disease, stage II (mild) N18.2 585.2   6. Major depression in complete remission F32.5 296.26   7. Generalized anxiety disorder F41.1 300.02   8. Type 2 diabetes mellitus without complication, without long-term current use of insulin E11.9 250.00   9. Impaired physical mobility Z74.09 781.99   10. Impaired mobility and ADLs Z74.09 799.89     Patient Active Problem List   Diagnosis   • Vitamin B12 deficiency (dietary) anemia   • Type 2 diabetes mellitus without complication   • Spasm of back muscles   • Sleep disorder   • Osteoarthritis of knee   • Irritable bowel syndrome   • Iron deficiency anemia   • Hypertriglyceridemia   • Hyperlipidemia   • GERD (gastroesophageal reflux disease)   • Generalized anxiety disorder   • Gastritis   • Essential hypertension   • Dysfunction of eustachian tube   • Diverticular disease of colon   • Cervical disc disorder   • Diarrhea, unspecified   • Anemia   • Allergic rhinitis   • Abnormal liver enzymes   • Abdominal pain   • Major depression in complete remission   • Chronic kidney disease, stage II (mild)   • Chronic pain of both knees   • Internal derangement of left knee   • Primary osteoarthritis of left knee   • Chronic pain of left knee   • Type 2 diabetes mellitus without complication, without long-term current use of insulin       Therapy Treatment          Rehabilitation Treatment Summary     Row Name 18 1255  06/05/18 1008 06/05/18 0906       Treatment Time/Intention    Discipline physical therapy assistant  -CA occupational therapy assistant  -CS physical therapy assistant  -CA    Document Type therapy note (daily note)  -CA therapy note (daily note)  -CS therapy note (daily note)  -CA    Subjective Information no complaints  -CA no complaints  -CS  --    Mode of Treatment individual therapy;physical therapy  -CA occupational therapy  -CS individual therapy;physical therapy  -CA    Patient/Family Observations  present  -CA  -- no family present  -CA    Therapy Frequency (PT Clinical Impression)  -- daily  -CS 2 times/day  -CA    Total Minutes, Occupational Therapy Treatment  -- 53  -CS2  --    Therapy Frequency (OT Eval) 2 times/day  -CA2  --  --    Patient Effort excellent  -CA2 --  -CA excellent  -CA2    Existing Precautions/Restrictions  -- fall  -CS fall  -CA    Recorded by [CA] Tima Hanna, PTA 06/05/18 1300  [CA2] Tima Hanna, PTA 06/05/18 1436 [CA] Tima Hanna, PTA 06/05/18 1208  [CS] Adriana Bravo, BROWNING/L 06/05/18 1012  [CS2] Adriana Bravo, BROWNING/L 06/05/18 1158 [CA] Tima Hanna, PTA 06/05/18 0921  [CA2] Tima Hanna, PTA 06/05/18 1208    Row Name 06/05/18 1255 06/05/18 1008 06/05/18 0906       Vital Signs    Pre Systolic BP Rehab 150  -CA  -- 122  -CA    Pre Treatment Diastolic BP 60  -CA  -- 66  -CA    Post Systolic BP Rehab 168  -  -  -CA2    Post Treatment Diastolic BP 82  -CA 70  -CS 66  -CA2    Pretreatment Heart Rate (beats/min) 85  -CA 79  -CS2 87  -CA    Posttreatment Heart Rate (beats/min) 79  -CA 83  -CS 85  -CA2    Pre SpO2 (%) 98  -CA 95  -CS2 94  -CA    O2 Delivery Pre Treatment room air  -CA room air  -CS2 supplemental O2  -CA    Post SpO2 (%) 98  -CA 95  -CS 94  -CA2    O2 Delivery Post Treatment room air  -CA room air  -CS room air  -CA2    Pre Patient Position Supine  -CA Sitting  -CS2 Supine  -CA    Intra Patient Position Standing  -CA Standing  -CS  --    Post  Patient Position Supine  -CA Sitting  -CS  --    Recorded by [CA] Tima Hanna, PTA 06/05/18 1436 [CS] NALLELY Jules/L 06/05/18 1046  [CS2] NALLELY Jules/L 06/05/18 1012 [CA] Tima Hanna, PTA 06/05/18 0922  [CA2] Tima Hanna, PTA 06/05/18 1003    Row Name 06/05/18 1255 06/05/18 1008 06/05/18 0906       Cognitive Assessment/Intervention- PT/OT    Affect/Mental Status (Cognitive) WFL  -CA WFL  -CS WFL  -CA    Orientation Status (Cognition) oriented x 4  -CA oriented x 4  -CS oriented x 4  -CA    Follows Commands (Cognition) WFL  -CA WFL  -CS WFL  -CA    Cognitive Function (Cognitive) WFL  -CA WFL  -CS WFL  -CA    Safety Deficit (Cognitive) ability to follow commands  -CA --  -CA ability to follow commands  -CA    Personal Safety Interventions  -- --  -CA fall prevention program maintained;gait belt;nonskid shoes/slippers when out of bed  -CA    Recorded by [CA] Tima Hanna, PTA 06/05/18 1300 [CA] Tima Hanna, PTA 06/05/18 1208  [CS] NALLELY Jules/L 06/05/18 1259 [CA] Tima Hanna, PTA 06/05/18 1208    Row Name 06/05/18 1255 06/05/18 1008 06/05/18 0906       Mobility Assessment/Intervention    Extremity Weight-bearing Status left lower extremity  -CA --  -CA left lower extremity  -CA    Left Lower Extremity (Weight-bearing Status) weight-bearing as tolerated (WBAT)  -CA --  -CA weight-bearing as tolerated (WBAT)  -CA    Right Lower Extremity (Weight-bearing Status) weight-bearing as tolerated (WBAT)  -CA --  -CA weight-bearing as tolerated (WBAT)  -CA    Recorded by [CA] Tima Hanna, PTA 06/05/18 1436 [CA] Tima Hanna, PTA 06/05/18 1208 [CA] Tima Hanna, PTA 06/05/18 1208    Row Name 06/05/18 1255 06/05/18 1008 06/05/18 0906       Bed Mobility Assessment/Treatment    Bed Mobility Assessment/Treatment supine-sit;sit-supine  -CA --  -CA supine-sit;sit-supine  -CA    Supine-Sit [place] (Bed Mobility) conditional independence  -CA --  -CA contact guard  -CA    Sit-Supine [place]  (Bed Mobility) conditional independence  -CA  --  --    Assistive Device (Bed Mobility)  -- --  -CA overhead trapeze;head of bed elevated  -CA    Recorded by [CA] Tima Hanna, PTA 06/05/18 1436 [CA] Tima Hanna, PTA 06/05/18 1208 [CA] Tima Hanna, PTA 06/05/18 1208    Row Name 06/05/18 1255 06/05/18 1008 06/05/18 0906       Transfer Assessment/Treatment    Transfer Assessment/Treatment sit-stand transfer;stand-sit transfer  -CA sit-stand transfer;stand-sit transfer;toilet transfer  -CS sit-stand transfer;stand-sit transfer;bed-chair transfer  -CA    Maintains Weight-bearing Status (Transfers) able to maintain  -CA --  -CA  --    Bed-Chair [place] (Transfers)  -- supervision  -CS stand by assist  -CA    Assistive Device (Bed-Chair Transfers)  -- walker, front-wheeled  -CS walker, front-wheeled  -CA    Sit-Stand [place] (Transfers) conditional independence  -CA supervision  -CS stand by assist  -CA    Stand-Sit [place] (Transfers) conditional independence  -CA supervision  -CS stand by assist  -CA    [place] Level (Toilet Transfer)  -- supervision  -CS  --    Assistive Device (Toilet Transfer)  -- walker, front-wheeled  -CS  --    Recorded by [CA] Tima Hanna, PTA 06/05/18 1436 [CA] Tima Hanna, PTA 06/05/18 1208  [CS] NALLELY Jules/L 06/05/18 1259 [CA] Tima Hanna, PTA 06/05/18 1208    Row Name 06/05/18 1255 06/05/18 1008 06/05/18 0906       Sit-Stand Transfer    Assistive Device (Sit-Stand Transfers) walker, front-wheeled  -CA walker, front-wheeled  -CS walker, front-wheeled  -CA    Recorded by [CA] Tima Hanna, PTA 06/05/18 1436 [CS] NALLELY Jules/L 06/05/18 1259 [CA] Tima Hanna, PTA 06/05/18 1208    Row Name 06/05/18 1255 06/05/18 1008 06/05/18 0906       Stand-Sit Transfer    Assistive Device (Stand-Sit Transfers) walker, front-wheeled  -CA walker, front-wheeled  -CS walker, front-wheeled  -CA    Recorded by [CA] Tima Hanna, PTA 06/05/18 1436 [CS] Adriana Bravo,  NALLELY/RUIZ 06/05/18 1259 [CA] Tima Hanna, PTA 06/05/18 1208    Row Name 06/05/18 1008             Toilet Transfer    Type (Toilet Transfer) sit-stand;stand-sit  -CS      Recorded by [CS] NALLELY Jules/L 06/05/18 1259      Row Name 06/05/18 1255 06/05/18 1008 06/05/18 0906       Gait/Stairs Assessment/Training    Gait/Stairs Assessment/Training gait/ambulation independence  -CA --  -CA gait/ambulation independence  -CA    [place] Level (Gait) conditional independence  -CA --  -CA stand by assist  -CA    Assistive Device (Gait) walker, front-wheeled  -CA --  -CA walker, front-wheeled  -CA    Distance in Feet (Gait) 325  -CA --  -  -CA    Pattern (Gait) step-through  -CA  --  --    Deviations/Abnormal Patterns (Gait) antalgic  -CA  --  --    Recorded by [CA] Tima Hanna, PTA 06/05/18 1436 [CA] Tima Hanna, PTA 06/05/18 1208 [CA] Tima Hanna, PTA 06/05/18 1208    Row Name 06/05/18 1008             Bathing Assessment/Intervention    Bathing [place] Level bathing skills;independent  -CS      Assistive Devices (Bathing) bath mitt  -CS      Bathing Position supported sitting;supported standing  -CS      Recorded by [CS] NALLELY Jules/L 06/05/18 1158      Row Name 06/05/18 1008             Upper Body Dressing Assessment/Training    Upper Body Dressing [place] Level doff;don;pull-over garment;independent  -CS      Upper Body Dressing Position supported sitting  -CS      Recorded by [CS] NALLELY Jules/L 06/05/18 1158      Row Name 06/05/18 1008             Lower Body Dressing Assessment/Training    Lower Body Dressing [place] Level doff;don;pants/bottoms;undergarment;socks;supervision  -CS      Lower Body Dressing Position supported sitting;supported standing  -CS      Recorded by [CS] NALLELY Jules/L 06/05/18 1158      Row Name 06/05/18 1008             Grooming Assessment/Training    [place] Level (Grooming) grooming skills;hair care, combing/brushing;oral  care regimen;wash face, hands;independent  -CS      Grooming Position supported sitting  -CS      Recorded by [CS] NALLELY Jules/L 06/05/18 1158      Row Name 06/05/18 1008             Toileting Assessment/Training    [place] Level (Toileting) toileting skills;supervision  -CS      Assistive Devices (Toileting) raised toilet seat  -CS      Toileting Position supported sitting;supported standing  -CS      Recorded by [CS] NALLELY Jules/L 06/05/18 1158      Row Name 06/05/18 0906             General ROM    RT Lower Ext Rt Knee Extension/Flexion  -CA      Recorded by [CA] Tima Hanna, PTA 06/05/18 0954      Row Name 06/05/18 0906             Right Lower Ext    Rt Knee Extension/Flexion AROM 8  -CA      Rt Knee Extension/Flexion PROM 104  -CA      RT Lower Extremity Comments pt. was a little more stiff this am and increased pain  -CA      Recorded by [CA] Tima Hanna, PTA 06/05/18 1138      Row Name 06/05/18 0906             Therapeutic Exercise    Therapeutic Exercise seated, lower extremities;supine, lower extremities  -CA      Recorded by [CA] Tima Hanna, KATY 06/05/18 1138      Row Name 06/05/18 1255 06/05/18 0906          Lower Extremity Seated Therapeutic Exercise    Performed, Seated Lower Extremity (Therapeutic Exercise) knee flexion/extension  -CA knee flexion/extension  -CA     Exercise Type, Seated Lower Extremity (Therapeutic Exercise) AROM (active range of motion)  -CA AROM (active range of motion)  -CA     Sets/Reps Detail, Seated Lower Extremity (Therapeutic Exercise) 1x20  -CA --   pt. performed for 2 mins  -CA     Recorded by [CA] Tima Hanna, KATY 06/05/18 1436 [CA] Tima Hanna, KATY 06/05/18 1138     Row Name 06/05/18 1255 06/05/18 0906          Lower Extremity Supine Therapeutic Exercise    Performed, Supine Lower Extremity (Therapeutic Exercise) quadriceps sets;SLR (straight leg raise)  -CA quadriceps sets  -CA     Exercise Type, Supine Lower Extremity (Therapeutic  Exercise) AROM (active range of motion)  -CA AROM (active range of motion)  -CA     Sets/Reps Detail, Supine Lower Extremity (Therapeutic Exercise) 1x20   1x5 for SLR  -CA 1x20  -CA     Recorded by [CA] Tima Hanna, KATY 06/05/18 1436 [CA] Tima Hanna, PTA 06/05/18 1138     Row Name 06/05/18 1255 06/05/18 1008 06/05/18 0906       Positioning and Restraints    Pre-Treatment Position in bed  -CA sitting in chair/recliner  -CS in bed  -CA    Post Treatment Position bed  -CA chair  -CS chair  -CA    In Bed supine;call light within reach;encouraged to call for assist;with family/caregiver  -CA  -- sitting;call light within reach;encouraged to call for assist  -CA    In Chair  -- reclined;call light within reach;with family/caregiver;encouraged to call for assist  -CS  --    Recorded by [CA] Tima Hanna, KATY 06/05/18 1436 [CS] ANOOP JulesA/L 06/05/18 1158 [CA] Tima Hanna, PTA 06/05/18 1138    Row Name 06/05/18 1255 06/05/18 1008 06/05/18 0906       Pain Scale: Numbers Pre/Post-Treatment    Pain Scale: Numbers, Pretreatment 3/10  -CA 5/10  -CS 7/10  -CA    Pain Scale: Numbers, Post-Treatment 4/10  -CA2 5/10  -CS2  --    Pain Location - Side Left  -CA Left  -CS Left  -CA    Pain Location knee  -CA knee  -CS knee  -CA    Recorded by [CA] Tima Hanna, KATY 06/05/18 1300  [CA2] Tima Hanna, PTA 06/05/18 1436 [CS] ANOOP JulesA/L 06/05/18 1012  [CS2] ANOOP JulesA/L 06/05/18 1158 [CA] Tima Hanna, PTA 06/05/18 0954    Row Name                Wound 06/04/18 1107 Left knee incision;surgical    Wound - Properties Group Date first assessed: 06/04/18 [CF] Time first assessed: 1107 [CF] Present On Admission : no [CF] Side: Left [CF] Location: knee [CF] Type: incision;surgical [CF] Recorded by:  [CF] Shireen Joel RN 06/04/18 1107    Row Name 06/05/18 1005             Outcome Summary/Treatment Plan (OT)    Daily Summary of Progress (OT) progress toward functional goals as expected  -CS      Plan  for Continued Treatment (OT) cont OT POC  -CS      Anticipated Discharge Disposition (OT) home with home health;home with OP services  -CS2      Recorded by [CS] DANY Jules 06/05/18 1158  [CS2] NALLELY Jules/RUIZ 06/05/18 1259        User Key  (r) = Recorded By, (t) = Taken By, (c) = Cosigned By    Initials Name Effective Dates Discipline    CA Tima Hanna, KATY 03/07/18 -  PT    CS NALLELY Jules/L 03/07/18 -  OT    CF Shireen Joel RN 04/17/17 -  Nurse          Wound 06/04/18 1107 Left knee incision;surgical (Active)   Dressing Appearance dry;intact 6/5/2018  8:21 AM   Closure ASHLEE 6/5/2018  8:21 AM   Base dressing in place, unable to visualize 6/5/2018  8:21 AM   Drainage Amount none 6/5/2018  8:21 AM               PT Rehab Goals     Row Name 06/05/18 1255             Bed Mobility Goal 1 (PT)    Activity/Assistive Device (Bed Mobility Goal 1, PT) sit to supine;supine to sit   HOB down and no rails  -CA      [place] Level/Cues Needed (Bed Mobility Goal 1, PT) independent  -CA      Time Frame (Bed Mobility Goal 1, PT) 3 days  -CA      Progress/Outcomes (Bed Mobility Goal 1, PT) goal not met  -CA         Transfer Goal 1 (PT)    Activity/Assistive Device (Transfer Goal 1, PT) sit-to-stand/stand-to-sit;bed-to-chair/chair-to-bed;walker, rolling  -CA      [place] Level/Cues Needed (Transfer Goal 1, PT) supervision required  -CA      Time Frame (Transfer Goal 1, PT) 2 - 3 days  -CA      Progress/Outcome (Transfer Goal 1, PT) goal met  -CA         Gait Training Goal 1 (PT)    Activity/Assistive Device (Gait Training Goal 1, PT) gait (walking locomotion);assistive device use;increase endurance/gait distance;turning, left;turning, right;walker, rolling  -CA      [place] Level (Gait Training Goal 1, PT) supervision required  -CA      Distance (Gait Goal 1, PT) 200 feet  -CA      Time Frame (Gait Training Goal 1, PT) 2 - 3 days  -CA      Progress/Outcome (Gait Training Goal 1,  PT) goal met  -CA         ROM Goal 1 (PT)    ROM Goal 1 (PT) 0-90 L knee  -CA      Time Frame (ROM Goal 1, PT) 1 day  -CA      Progress/Outcome (ROM Goal 1, PT) goal partially met  -CA         Patient Education Goal (PT)    Activity (Patient Education Goal, PT) HEP  -CA      [place]/Cues/Accuracy (Memory Goal 2, PT) demonstrates adequately;verbalizes understanding  -CA      Time Frame (Patient Education Goal, PT) 2 days  -CA      Progress/Outcome (Patient Education Goal, PT) goal not met  -CA        User Key  (r) = Recorded By, (t) = Taken By, (c) = Cosigned By    Initials Name Provider Type Discipline    CA Tima Hanna, PTA Physical Therapy Assistant PT          Physical Therapy Education     Title: PT OT SLP Therapies (Active)     Topic: Physical Therapy (Active)     Point: Mobility training (Active)    Learning Progress Summary     Learner Status Readiness Method Response Comment Documented by    Patient Active Acceptance D,E NR   06/04/18 1611          Point: Precautions (Active)    Learning Progress Summary     Learner Status Readiness Method Response Comment Documented by    Patient Active Acceptance D,E NR   06/04/18 1611                      User Key     Initials Effective Dates Name Provider Type Discipline     04/06/17 -  Suzanne Morse, PT Physical Therapist PT                    PT Recommendation and Plan  Therapy Frequency (PT Clinical Impression): 2 times/day  Plan of Care Reviewed With: patient  Progress: improving  Outcome Summary: pt. met 2 new goals this tx. and pt. was modified Independent with transfers and gait this tx. pt. ambulated 325' with slight antalgic gait          Outcome Measures     Row Name 06/05/18 1300 06/05/18 1255 06/05/18 0906       How much help from another person do you currently need...    Turning from your back to your side while in flat bed without using bedrails?  -- 4  -CA 3  -CA    Moving from lying on back to sitting on the side of a flat bed without  bedrails?  -- 4  -CA 3  -CA    Moving to and from a bed to a chair (including a wheelchair)?  -- 4  -CA 3  -CA    Standing up from a chair using your arms (e.g., wheelchair, bedside chair)?  -- 4  -CA 3  -CA    Climbing 3-5 steps with a railing?  -- 3  -CA 3  -CA    To walk in hospital room?  -- 4  -CA 3  -CA    AM-PAC 6 Clicks Score  -- 23  -CA 18  -CA       How much help from another is currently needed...    Putting on and taking off regular lower body clothing? 3  -CS  --  --    Bathing (including washing, rinsing, and drying) 3  -CS  --  --    Toileting (which includes using toilet bed pan or urinal) 3  -CS  --  --    Putting on and taking off regular upper body clothing 3  -CS  --  --    Taking care of personal grooming (such as brushing teeth) 4  -CS  --  --    Eating meals 4  -CS  --  --    Score 20  -CS  --  --       Functional Assessment    Outcome Measure Options AM-PAC 6 Clicks Daily Activity (OT)  -CS AM-PAC 6 Clicks Basic Mobility (PT)  -CA AM-PAC 6 Clicks Basic Mobility (PT)  -CA    Row Name 06/04/18 1501 06/04/18 1500          How much help from another person do you currently need...    Turning from your back to your side while in flat bed without using bedrails? 3  -CB  --     Moving from lying on back to sitting on the side of a flat bed without bedrails? 3  -CB  --     Moving to and from a bed to a chair (including a wheelchair)? 3  -CB  --     Standing up from a chair using your arms (e.g., wheelchair, bedside chair)? 3  -CB  --     Climbing 3-5 steps with a railing? 3  -CB  --     To walk in hospital room? 3  -CB  --     AM-PAC 6 Clicks Score 18  -CB  --        How much help from another is currently needed...    Putting on and taking off regular lower body clothing?  -- 3  -RW (r) AK (t) RW (c)     Bathing (including washing, rinsing, and drying)  -- 3  -RW (r) AK (t) RW (c)     Toileting (which includes using toilet bed pan or urinal)  -- 3  -RW (r) AK (t) RW (c)     Putting on and taking off  regular upper body clothing  -- 3  -RW (r) AK (t) RW (c)     Taking care of personal grooming (such as brushing teeth)  -- 4  -RW (r) AK (t) RW (c)     Eating meals  -- 4  -RW (r) AK (t) RW (c)     Score  -- 20  -RW (r) AK (t)        Functional Assessment    Outcome Measure Options AM-PAC 6 Clicks Basic Mobility (PT)  -CB AM-PAC 6 Clicks Daily Activity (OT)  -RW (r) AK (t) RW (c)       User Key  (r) = Recorded By, (t) = Taken By, (c) = Cosigned By    Initials Name Provider Type    CB Suzanne Morse, PT Physical Therapist    RW Prisca Calle, OTR/L Occupational Therapist    CA Tima Hanna PTA Physical Therapy Assistant    CONSTANTINE Bravo, BROWNING/L Occupational Therapy Assistant    AK Bessie Bentley, OT Student OT Student           Time Calculation:         PT Charges     Row Name 06/05/18 1438 06/05/18 1139          Time Calculation    Start Time 1255  -CA 0906  -CA     Stop Time 1328  -CA 1006  -CA     Time Calculation (min) 33 min  -CA 60 min  -CA     PT Received On 06/05/18  -CA 06/05/18  -CA        Time Calculation- PT    Total Timed Code Minutes- PT 33 minute(s)  -CA 60 minute(s)  -CA       User Key  (r) = Recorded By, (t) = Taken By, (c) = Cosigned By    Initials Name Provider Type    CA Tima Hanna PTA Physical Therapy Assistant          Therapy Charges for Today     Code Description Service Date Service Provider Modifiers Qty    45564364283 HC GAIT TRAINING EA 15 MIN 6/5/2018 Tima Hanna PTA GP 1    14136446920 HC PT THER PROC EA 15 MIN 6/5/2018 Tima Hanna PTA GP 1    49730838245 HC PT THERAPEUTIC ACT EA 15 MIN 6/5/2018 Tima Hanna PTA GP 2    53817060056 HC PT THERAPEUTIC ACT EA 15 MIN 6/5/2018 Tima Hanna PTA GP 1    29543810418 HC GAIT TRAINING EA 15 MIN 6/5/2018 Tima Hanna PTA GP 1          PT G-Codes  PT Professional Judgement Used?: Yes  Outcome Measure Options: AM-PAC 6 Clicks Daily Activity (OT)  Score: 18  Functional Limitation: Mobility: Walking and moving  around  Mobility: Walking and Moving Around Current Status (): At least 40 percent but less than 60 percent impaired, limited or restricted  Mobility: Walking and Moving Around Goal Status (): At least 20 percent but less than 40 percent impaired, limited or restricted    Tima Hanna, PTA  6/5/2018

## 2018-06-05 NOTE — PLAN OF CARE
Problem: Patient Care Overview  Goal: Plan of Care Review  Outcome: Ongoing (interventions implemented as appropriate)   06/05/18 1303   Coping/Psychosocial   Plan of Care Reviewed With patient   OTHER   Outcome Summary Pt tolerated tx well this date. Pt was SBA with toilet t/f. Pt completed an ADL. Pt was educated on home safety and LH AE. Continue OT POC.   Plan of Care Review   Progress improving

## 2018-06-05 NOTE — PROGRESS NOTES
"Anticoagulation by Pharmacy - Warfarin Day 2 of 42    Otilia Kenny is a 68 y.o.female  [Ht: 163.8 cm (64.5\"); Wt: 74.1 kg (163 lb 5.8 oz)] on Warfarin 2.5 mg PO  for indication of vte prophylaxis.    Goal INR: 1.7-2.5  Today's INR:   Lab Results   Component Value Date    INR 1.03 06/05/2018         Lab Results   Component Value Date    INR 1.03 06/05/2018    INR 2.40 06/25/2014    INR 2.50 06/18/2014    PROTIME 13.3 06/05/2018    PROTIME 16.0 (H) 05/23/2014    PROTIME 13.8 05/22/2014     Lab Results   Component Value Date    HGB 11.3 (L) 06/04/2018    HGB 13.4 05/30/2018    HGB 12.7 04/26/2018     Lab Results   Component Value Date    HCT 33.9 (L) 06/04/2018    HCT 39.4 05/30/2018    HCT 38.8 04/26/2018     Assessment/Plan:  INR 1.03, warfarin 2.5mg daily initiated yesterday. No interacting medications. H&H down from a few days ago. Pharmacy will monitor.    1. Warfarin 2.5mg QHS  2. PT/INR DARIEN Hallman [facility]  06/05/18 11:58 AM     "

## 2018-06-07 ENCOUNTER — ANTICOAGULATION VISIT (OUTPATIENT)
Dept: PHARMACY | Facility: HOSPITAL | Age: 68
End: 2018-06-07

## 2018-06-07 RX ORDER — CITALOPRAM 20 MG/1
20 TABLET ORAL DAILY
Qty: 90 TABLET | Refills: 3 | Status: SHIPPED | OUTPATIENT
Start: 2018-06-07 | End: 2019-05-17 | Stop reason: SDUPTHER

## 2018-06-07 NOTE — PROGRESS NOTES
Talked with Mr. Kenny.  Therapy is tomorrow, will go to [facility] tomorrow 6/8.  Instructed to take 2.5 mg tonight and will follow up tomorrow.  Findings otherwise negative.    Yordy Valentin, [facility]  06/07/18  4:49 PM

## 2018-06-08 ENCOUNTER — LAB (OUTPATIENT)
Dept: LAB | Facility: OTHER | Age: 68
End: 2018-06-08

## 2018-06-08 ENCOUNTER — ANTICOAGULATION VISIT (OUTPATIENT)
Dept: PHARMACY | Facility: HOSPITAL | Age: 68
End: 2018-06-08

## 2018-06-08 DIAGNOSIS — Z79.01 LONG-TERM (CURRENT) USE OF ANTICOAGULANTS: ICD-10-CM

## 2018-06-08 LAB
INR PPP: 1.2 (ref 0.8–1.2)
PROTHROMBIN TIME: 15 SECONDS (ref 11.1–15.3)

## 2018-06-08 PROCEDURE — 85610 PROTHROMBIN TIME: CPT | Performed by: ORTHOPAEDIC SURGERY

## 2018-06-08 PROCEDURE — 36415 COLL VENOUS BLD VENIPUNCTURE: CPT | Performed by: ORTHOPAEDIC SURGERY

## 2018-06-08 NOTE — PROGRESS NOTES
Spoke with Ms. Kenny. Reviewed current lab.  No s/s of bleeding. No new meds. No missed doses. Nauseated past few days.  No other findings.  INR 1.2, trending upwards appropriately.  Continue 2.5 mg nightly. Reviewed schedule for following week. Pt read back regimen and verbalized understanding. All questions answered. Next INR on 6/14 provided by [facility].    Yordy Valentin RPH  06/08/18  3:42 PM

## 2018-06-09 ENCOUNTER — TELEPHONE (OUTPATIENT)
Dept: ORTHOPEDIC SURGERY | Facility: CLINIC | Age: 68
End: 2018-06-09

## 2018-06-10 ENCOUNTER — TELEPHONE (OUTPATIENT)
Dept: ORTHOPEDIC SURGERY | Facility: CLINIC | Age: 68
End: 2018-06-10

## 2018-06-10 NOTE — DISCHARGE SUMMARY
Patient Name: Otilia Kenny  Patient YOB: 1950    Date of Admission:  6/4/2018  Date of Discharge:  6/5/2018  Discharge Diagnosis:   Primary osteoarthritis of left knee    Chronic pain of left knee    Essential hypertension    Chronic kidney disease, stage II (mild)    Major depression in complete remission    Generalized anxiety disorder    Type 2 diabetes mellitus without complication, without long-term current use of insulin    Impaired physical mobility    Impaired mobility and ADLs    Long-term (current) use of anticoagulants    Presenting Problem/History of Present Illness: Generalized anxiety disorder [F41.1]  Chronic kidney disease, stage II (mild) [N18.2]  Major depression in complete remission [F32.5]  Essential hypertension [I10]  Primary osteoarthritis of left knee [M17.12]  Chronic pain of left knee [M25.562, G89.29]  Type 2 diabetes mellitus without complication, without long-term current use of insulin [E11.9]  Primary osteoarthritis of left knee [M17.12]  Primary osteoarthritis of left knee [M17.12]  Primary osteoarthritis of left knee [M17.12]    Procedure:  Procedure(s) (LRB):  TOTAL KNEE ARTHROPLASTY ATTUNE with adductor canal block  (Left)      Admitting Physician:  Baron Reynolds MD    Consults:   Consults     No orders found from 5/6/2018 to 6/5/2018.          DETAILS OF HOSPITAL STAY:  Patient is a 68 y.o. female was admitted to the floor following the above procedure and underwent an uncomplicated hospital stay.  Patient did well with physical therapy and was ambulating well without problems.  On the day of discharge the wound was clean, dry and intact and calf was soft and non tender and Homans sign was negative.  Patient was tolerating diet without problems.  Patient was discharged home.    Condition on Discharge:  Stable    Vital Signs       LABS:      Admission on 06/04/2018, Discharged on 06/05/2018   Component Date Value Ref Range Status   • ABO Type 06/04/2018 O    Final   • RH type 06/04/2018 Positive   Final   • Antibody Screen 06/04/2018 Negative   Final   • T&S Expiration Date 06/04/2018 6/7/2018 11:59:59 PM   Final   • Previous History 06/04/2018 Previous Record on File   Final   • Glucose 06/04/2018 133* 70 - 130 mg/dL Final    RN NotifiedMeter: [id number]: [id number] GILSON JUNIOR   • Case Report 06/04/2018    Final                    Value:Surgical Pathology Report                         Case: [id number]                                  Authorizing Provider:  Baron Reynolds MD  Collected:           06/04/2018 10:59 AM          Ordering Location:     [facility]             Received:            06/04/2018 12:54 PM                                 [place]                                                              Pathologist:           Dennis Chairez MD                                                            Specimen:    Knee, Left, bone and soft tissue from left knee                                           • Final Diagnosis 06/04/2018    Final                    Value:This result contains rich text formatting which cannot be displayed here.   • Gross Description 06/04/2018    Final                    Value:This result contains rich text formatting which cannot be displayed here.   • Glucose 06/04/2018 128  70 - 130 mg/dL Final    RN NotifiedMeter: [id number]: [id number] DEBBI HYDE   • Hemoglobin 06/04/2018 11.3* 12.0 - 15.5 g/dL Final   • Hematocrit 06/04/2018 33.9* 35.0 - 45.0 % Final   • Glucose 06/04/2018 220* 70 - 130 mg/dL Final    RN NotifiedMeter: [id number]: [id number] BILL MALDONADO   • Protime 06/05/2018 13.3  11.1 - 15.3 Seconds Final   • INR 06/05/2018 1.03  0.80 - 1.20 Final   • Glucose 06/04/2018 155* 70 - 130 mg/dL Final    RN NotifiedMeter: [id number]: [id number] FELY ELLISON   • Extra Tube 06/05/2018 Hold for add-ons.   Final    Auto resulted.   • Glucose 06/05/2018 138* 70 - 130 mg/dL  Final    RN NotifiedMeter: [id number]: [id number] SAQIB ALTMAN   • Glucose 06/05/2018 166* 70 - 130 mg/dL Final    RN NotifiedMeter: [id number]: [id number] SAQIBJEFFREY ALTMAN       No results found.    Discharge Medications   Otilia Kenny   Home Medication Instructions HARRISON:[id number]    Printed on:06/10/18 9837   Medication Information                      amLODIPine (NORVASC) 5 MG tablet  Take 1 tablet by mouth Daily.             aspirin 81 MG EC tablet  Take 81 mg by mouth Daily.             Blood Glucose Monitoring Suppl (ONETOUCH VERIO IQ SYSTEM) W/DEVICE kit  daily.             CALCIUM CARB-CHOLECALCIFEROL PO  Take 1 tablet by mouth 2 (two) times a day.             Canagliflozin (INVOKANA) 300 MG tablet  Take 1/2 to 1 tab daily             Dulaglutide (TRULICITY) 0.75 MG/0.5ML solution pen-injector  Inject 0.5 mL under the skin 1 (One) Time Per Week.             fenofibrate (TRICOR) 145 MG tablet  Take 145 mg by mouth Daily. 1/2 to 1 tablet daily             glucose blood (ONETOUCH VERIO) test strip  Test daily as directed. DX: E11.9             Insulin Pen Needle 31G X 5 MM misc  Use daily with Victoza             IRON, FERROUS SULFATE, PO  Take 1 tablet by mouth Take As Directed. Patient takes on Mon, Wed, and Fri             lisinopril (PRINIVIL,ZESTRIL) 20 MG tablet  Take 1 tablet by mouth Daily.             Multiple Vitamins-Minerals (MULTIVITAMIN PO)  Take 1 tablet by mouth daily.             NON FORMULARY  Take 1 tablet by mouth Daily. Eye Health and Lutein             omeprazole (priLOSEC) 40 MG capsule  Take 1 capsule by mouth Daily.             [employer] DELICA LANCETS 33G misc  Test daily as directed DX: E11.9             oxyCODONE-acetaminophen (PERCOCET) 7.5-325 MG per tablet  Take 1 tablet by mouth Every 4 (Four) Hours As Needed for Moderate Pain .             rosuvastatin (CRESTOR) 20 MG tablet  Take 1 tablet by mouth Daily.             warfarin (COUMADIN) 2.5 MG tablet  Take  1 tablet by mouth Every Night for 41 days. Or as directed by [facility] Pharmacists             Warfarin Sodium (PHARMACY TO DOSE WARFARIN)  Continuous As Needed (6 weeks).                 Discharge Diet:   Diet Instructions     Diet: Regular       Discharge Diet:  Regular          Activity at Discharge:   Activity Instructions     Activity as Tolerated             Discharge Instructions: Patient is to continue with physical therapy exercises daily and continue working with the physical therapist as ordered. Patient may weight bear as tolerated. Continue FRACISCO hose daily and ice regularly. Patient instructed on frequent calf pumping exercises.  Patient also instructed on incentive spirometer during hospitalization and encouraged to continue to use at home regularly. Patient may shower on POD#2. The wound should be gently cleaned with antibacterial soap then allowed to dry.  If there is any drainage then it can be covered with a dry sterile dressing.  If there is drainage, redness or swelling, then the physician should be notified. Follow up appointment in 2 weeks - patient to call the office at [phone] to schedule.  Coumadin management per hospital pharmacy.  All other meds per the discharge med/rec    Discharge Diagnosis:    Patient Active Problem List   Diagnosis   • Vitamin B12 deficiency (dietary) anemia   • Type 2 diabetes mellitus without complication   • Spasm of back muscles   • Sleep disorder   • Osteoarthritis of knee   • Irritable bowel syndrome   • Iron deficiency anemia   • Hypertriglyceridemia   • Hyperlipidemia   • GERD (gastroesophageal reflux disease)   • Generalized anxiety disorder   • Gastritis   • Essential hypertension   • Dysfunction of eustachian tube   • Diverticular disease of colon   • Cervical disc disorder   • Diarrhea, unspecified   • Anemia   • Allergic rhinitis   • Abnormal liver enzymes   • Abdominal pain   • Major depression in complete remission   • Chronic kidney disease, stage II  "(mild)   • Chronic pain of both knees   • Internal derangement of left knee   • Primary osteoarthritis of left knee   • Chronic pain of left knee   • Type 2 diabetes mellitus without complication, without long-term current use of insulin       Follow-up Appointments  Future Appointments  Date Time Provider Department Center   6/12/2018 1:15 PM Alexis Zabala MD [facility] None   6/19/2018 11:20 AM MYRA Chiang [facility] None   11/8/2018 10:00 AM Alexis Zabala MD [facility] None     Additional Instructions for the Follow-ups that You Need to Schedule     Ambulatory Referral to Physical Therapy Evaluate and treat    As directed      nonavya's PT  TKA  Advance as tolerated  wbat  Ice and swelling control.    Order Comments:  noffsinger's PT TKA Advance as tolerated wbat Ice and swelling control.     Specialty needed:  Evaluate and treat         Call MD With Problems / Concerns    As directed      Instructions:   Call the office with questions, problems, or concerns.  Remember that pain medication can not be \"called in\" but a prescription must be written and picked up from the office.   So, be sure to notify the office with a little notice prior to running out.    Order Comments:  Instructions: Call the office with questions, problems, or concerns. Remember that pain medication can not be \"called in\" but a prescription must be written and picked up from the office. So, be sure to notify the office with a little notice prior to running out.          Discharge Follow-up with Specified Provider: CARLOS Sinclair R Peyton; 2 Weeks    As directed      To:  CARLOS Sinclair R Peyton    Follow Up:  2 Weeks         Protime-INR  (Twice a Week)   Jul 16, 2018               Baron Reynolds MD  06/10/18  5:47 PM  "

## 2018-06-10 NOTE — TELEPHONE ENCOUNTER
PHONE CALL:    Patient was contacted by phone to ensure that there were no issues.  All questions were answered.  Patient conveyed that they were doing well and that there were no current problems.  Patient will keep their current follow-up appointment.    06/10/18 at 5:54 PM by Baron Reynolds MD

## 2018-06-12 ENCOUNTER — OFFICE VISIT (OUTPATIENT)
Dept: FAMILY MEDICINE CLINIC | Facility: CLINIC | Age: 68
End: 2018-06-12

## 2018-06-12 ENCOUNTER — DOCUMENTATION (OUTPATIENT)
Dept: ORTHOPEDIC SURGERY | Facility: CLINIC | Age: 68
End: 2018-06-12

## 2018-06-12 VITALS
HEART RATE: 80 BPM | WEIGHT: 163 LBS | DIASTOLIC BLOOD PRESSURE: 60 MMHG | SYSTOLIC BLOOD PRESSURE: 110 MMHG | TEMPERATURE: 98.4 F | BODY MASS INDEX: 27.16 KG/M2 | HEIGHT: 65 IN

## 2018-06-12 DIAGNOSIS — M17.12 PRIMARY OSTEOARTHRITIS OF LEFT KNEE: Chronic | ICD-10-CM

## 2018-06-12 DIAGNOSIS — J30.1 CHRONIC SEASONAL ALLERGIC RHINITIS DUE TO POLLEN: Chronic | ICD-10-CM

## 2018-06-12 DIAGNOSIS — R42 VERTIGO: ICD-10-CM

## 2018-06-12 DIAGNOSIS — Z09 HOSPITAL DISCHARGE FOLLOW-UP: ICD-10-CM

## 2018-06-12 DIAGNOSIS — H81.03 MENIERE'S DISEASE OF BOTH EARS: Primary | Chronic | ICD-10-CM

## 2018-06-12 PROCEDURE — 99213 OFFICE O/P EST LOW 20 MIN: CPT | Performed by: INTERNAL MEDICINE

## 2018-06-12 RX ORDER — MECLIZINE HYDROCHLORIDE 25 MG/1
25 TABLET ORAL 3 TIMES DAILY PRN
Qty: 30 TABLET | Refills: 0 | Status: SHIPPED | OUTPATIENT
Start: 2018-06-12 | End: 2018-06-13 | Stop reason: SDUPTHER

## 2018-06-12 NOTE — PROGRESS NOTES
Subjective     History of Present Illness     Otilia Kenny is a 68 y.o. female with history of osteoarthritis among other issues who presents for hospital follow up.  Patient was admitted to [facility] on 06/04/18 where she had left total knee arthroplasty by Dr. Baron Reynolds.  She was discharged the following day and continues outpatient physical therapy.  She has a follow up appointment with Dr. Reynolds' office next week to have staples removed.  Surgical scar is healing well.      Blood pressure is 110/60 today. She reports recent orthostatic symptoms.  For the next couple of weeks while on her pain medication, I recommended she take 1/2 tablet of the lisinopril 20 mg each morning.  She can check blood pressure later in the day and take the remaining 1/2 tablet if blood pressure is above goal.        She reports a flare of Meniere's symptoms, as she has been experiencing vertigo with position changes.  She had good results with Meclizine in the past and is requesting a refill.  She continues on Flonase nasal spray.  I recommended she restart her Zyrtec for seasonal allergy symptoms.         Review of Systems   Constitutional: Negative for chills, fatigue and fever.   HENT: Negative for congestion, ear pain, postnasal drip, sinus pressure and sore throat.    Respiratory: Negative for cough, shortness of breath and wheezing.    Cardiovascular: Negative for chest pain, palpitations and leg swelling.   Gastrointestinal: Negative for abdominal pain, blood in stool, constipation, diarrhea, nausea and vomiting.   Endocrine: Negative for cold intolerance, heat intolerance, polydipsia and polyuria.   Genitourinary: Negative for dysuria, frequency, hematuria and urgency.   Skin: Negative for rash.   Neurological: Negative for syncope and weakness.        Objective     Vitals:    06/12/18 1319   BP: 110/60   Pulse: 80   Temp: 98.4 °F (36.9 °C)   TempSrc: Oral   Weight: 73.9 kg (163 lb)   Height: 163.8 cm  "(64.5\")     Physical Exam   Constitutional: She is oriented to person, place, and time. She appears well-developed and well-nourished. No distress.   HENT:   Head: Normocephalic and atraumatic.   Nose: Right sinus exhibits no maxillary sinus tenderness and no frontal sinus tenderness. Left sinus exhibits no maxillary sinus tenderness and no frontal sinus tenderness.   Mouth/Throat: Uvula is midline, oropharynx is clear and moist and mucous membranes are normal. No oral lesions. No tonsillar exudate.   Exam of the ears reveal small effusion on the left.  TM on the right is shiny.  Prominent, clear post-nasal drip.   Eyes: Conjunctivae and EOM are normal. Pupils are equal, round, and reactive to light.   Neck: Trachea normal. Neck supple. No JVD present. Carotid bruit is not present. No tracheal deviation present. No thyroid mass and no thyromegaly present.   Cardiovascular: Normal rate, regular rhythm, normal heart sounds and intact distal pulses.   No extrasystoles are present. PMI is not displaced.    No murmur heard.  Pulmonary/Chest: Effort normal and breath sounds normal. No accessory muscle usage. No respiratory distress. She has no decreased breath sounds. She has no wheezes. She has no rhonchi. She has no rales.   Abdominal: Soft. Bowel sounds are normal. She exhibits no distension. There is no hepatosplenomegaly. There is no tenderness.   Musculoskeletal:   Exam of the knee reveals well healing surgical scar from recent left total knee arthroplasty     Vascular Status -  Her right foot exhibits normal foot vasculature  and no edema. Her left foot exhibits normal foot vasculature  and no edema.  Lymphadenopathy:     She has no cervical adenopathy.   Neurological: She is alert and oriented to person, place, and time. No cranial nerve deficit. Coordination normal.   Skin: Skin is warm, dry and intact. No rash noted. No cyanosis. Nails show no clubbing.   Psychiatric: She has a normal mood and affect. Her speech " is normal and behavior is normal. Judgment and thought content normal.   Vitals reviewed.    Future Appointments  Date Time Provider Department Center   6/19/2018 11:20 AM MYRA Chiang [facility] None   11/8/2018 10:00 AM Alexis Zabala MD [facility] None         Assessment/Plan      Prescription is sent for Meclizine 25 mg to take one tablet t.i.d p.r.n. dizziness.     Continue follow up visits with Dr. Reynolds.       Monitor blood pressure.  For the next couple of weeks while on her pain medication, I recommended she take 1/2 tablet of the lisinopril 20 mg each morning.  She can check blood pressure later in the day and take the remaining 1/2 tablet if blood pressure is above goal.        Return in November for routine follow up with fasting labs one week prior.     Current outpatient and discharge medications have been reconciled for the patient.  Reviewed by: Alexis Zabala MD      Scribed for Dr. Zabala by Vandana Galeas [facility].     Diagnoses and all orders for this visit:    Meniere's disease of both ears    Vertigo    Chronic seasonal allergic rhinitis due to pollen    Primary osteoarthritis of left knee    Hospital discharge follow-up    Other orders  -     meclizine (ANTIVERT) 25 MG tablet; Take 1 tablet by mouth 3 (Three) Times a Day As Needed for dizziness.        Lab on 06/08/2018   Component Date Value Ref Range Status   • Protime 06/08/2018 15.0  11.1 - 15.3 Seconds Final   • INR 06/08/2018 1.20  0.80 - 1.20 Final   Admission on 06/04/2018, Discharged on 06/05/2018   Component Date Value Ref Range Status   • ABO Type 06/04/2018 O   Final   • RH type 06/04/2018 Positive   Final   • Antibody Screen 06/04/2018 Negative   Final   • T&S Expiration Date 06/04/2018 6/7/2018 11:59:59 PM   Final   • Previous History 06/04/2018 Previous Record on File   Final   • Glucose 06/04/2018 133* 70 - 130 mg/dL Final    RN NotifiedMeter: [id number]: [id number] RIGGINS SANDI   • Case Report 06/04/2018     Final                    Value:Surgical Pathology Report                         Case: [id number]                                  Authorizing Provider:  Baron Reynolds MD  Collected:           06/04/2018 10:59 AM          [facility]             Received:            06/04/2018 12:54 PM                                 [place]                                                              Pathologist:           Dennis Chairez MD                                                            Specimen:    Knee, Left, bone and soft tissue from left knee                                           • Final Diagnosis 06/04/2018    Final                    Value:This result contains rich text formatting which cannot be displayed here.   • Gross Description 06/04/2018    Final                    Value:This result contains rich text formatting which cannot be displayed here.   • Glucose 06/04/2018 128  70 - 130 mg/dL Final    RN NotifiedMeter: [id number]: [id number] DEBBI HYDE   • Hemoglobin 06/04/2018 11.3* 12.0 - 15.5 g/dL Final   • Hematocrit 06/04/2018 33.9* 35.0 - 45.0 % Final   • Glucose 06/04/2018 220* 70 - 130 mg/dL Final    RN NotifiedMeter: [id number]: [id number] BILL OKEEFE   • Protime 06/05/2018 13.3  11.1 - 15.3 Seconds Final   • INR 06/05/2018 1.03  0.80 - 1.20 Final   • Glucose 06/04/2018 155* 70 - 130 mg/dL Final    RN NotifiedMeter: [id number]: [id number] FELY ELLISON   • Extra Tube 06/05/2018 Hold for add-ons.   Final    Auto resulted.   • Glucose 06/05/2018 138* 70 - 130 mg/dL Final    RN NotifiedMeter: [id number]: [id number] SAQIB ALTMAN   • Glucose 06/05/2018 166* 70 - 130 mg/dL Final    RN NotifiedMeter: [id number]: [id number] SAQIB ALTMAN   Appointment on 05/30/2018   Component Date Value Ref Range Status   • Glucose 05/30/2018 85  60 - 100 mg/dL Final   • BUN 05/30/2018 21  7 - 21 mg/dL Final   • Creatinine  05/30/2018 1.02* 0.50 - 1.00 mg/dL Final   • Sodium 05/30/2018 139  137 - 145 mmol/L Final   • Potassium 05/30/2018 4.5  3.5 - 5.1 mmol/L Final   • Chloride 05/30/2018 101  95 - 110 mmol/L Final   • CO2 05/30/2018 26.0  22.0 - 31.0 mmol/L Final   • Calcium 05/30/2018 10.0  8.4 - 10.2 mg/dL Final   • eGFR Non African Amer 05/30/2018 54  39 - 90 mL/min/1.73 Final   • BUN/Creatinine Ratio 05/30/2018 20.6  7.0 - 25.0 Final   • Anion Gap 05/30/2018 12.0  5.0 - 15.0 mmol/L Final   • WBC 05/30/2018 5.56  3.20 - 9.80 10*3/mm3 Final   • RBC 05/30/2018 4.47  3.77 - 5.16 10*6/mm3 Final   • Hemoglobin 05/30/2018 13.4  12.0 - 15.5 g/dL Final   • Hematocrit 05/30/2018 39.4  35.0 - 45.0 % Final   • MCV 05/30/2018 88.1  80.0 - 98.0 fL Final   • MCH 05/30/2018 30.0  26.5 - 34.0 pg Final   • MCHC 05/30/2018 34.0  31.4 - 36.0 g/dL Final   • RDW 05/30/2018 12.1  11.5 - 14.5 % Final   • RDW-SD 05/30/2018 38.7  36.4 - 46.3 fl Final   • MPV 05/30/2018 10.5  8.0 - 12.0 fL Final   • Platelets 05/30/2018 351  150 - 450 10*3/mm3 Final   • ABO Type 05/30/2018 O   Final   • RH type 05/30/2018 Positive   Final   • Antibody Screen 05/30/2018 Negative   Final   • T&S Expiration Date 05/30/2018 6/2/2018 11:59:59 PM   Final   • MRSA, PCR 05/30/2018 Negative  Negative Final   • Previous History 05/30/2018 Previous Record on File   Final   ]

## 2018-06-13 RX ORDER — MECLIZINE HYDROCHLORIDE 25 MG/1
25 TABLET ORAL 3 TIMES DAILY PRN
Qty: 30 TABLET | Refills: 0 | Status: SHIPPED | OUTPATIENT
Start: 2018-06-13 | End: 2019-03-12 | Stop reason: SDUPTHER

## 2018-06-14 ENCOUNTER — ANTICOAGULATION VISIT (OUTPATIENT)
Dept: PHARMACY | Facility: HOSPITAL | Age: 68
End: 2018-06-14

## 2018-06-14 ENCOUNTER — LAB (OUTPATIENT)
Dept: LAB | Facility: OTHER | Age: 68
End: 2018-06-14

## 2018-06-14 DIAGNOSIS — Z79.01 LONG-TERM (CURRENT) USE OF ANTICOAGULANTS: ICD-10-CM

## 2018-06-14 LAB
INR PPP: 1.14 (ref 0.8–1.2)
PROTHROMBIN TIME: 14.3 SECONDS (ref 11.1–15.3)

## 2018-06-14 PROCEDURE — 36415 COLL VENOUS BLD VENIPUNCTURE: CPT | Performed by: ORTHOPAEDIC SURGERY

## 2018-06-14 PROCEDURE — 85610 PROTHROMBIN TIME: CPT | Performed by: ORTHOPAEDIC SURGERY

## 2018-06-14 NOTE — PROGRESS NOTES
Spoke with . Reviewed current lab.  No s/s of bleeding. Pt started taking meclizine for dizziness.  No missed doses. Reviewed schedule for following week. Pt read back regimen and verbalized understanding. All questions answered. Next INR on 06/20 provided by [facility].    Sara Dunn [facility]  06/14/18  1:48 PM

## 2018-06-18 DIAGNOSIS — G89.29 CHRONIC PAIN OF LEFT KNEE: Primary | ICD-10-CM

## 2018-06-18 DIAGNOSIS — M25.562 CHRONIC PAIN OF LEFT KNEE: Primary | ICD-10-CM

## 2018-06-19 ENCOUNTER — OFFICE VISIT (OUTPATIENT)
Dept: ORTHOPEDIC SURGERY | Facility: CLINIC | Age: 68
End: 2018-06-19

## 2018-06-19 VITALS — BODY MASS INDEX: 26.99 KG/M2 | HEIGHT: 65 IN | WEIGHT: 162 LBS

## 2018-06-19 DIAGNOSIS — M17.12 PRIMARY OSTEOARTHRITIS OF LEFT KNEE: Primary | ICD-10-CM

## 2018-06-19 DIAGNOSIS — M25.562 CHRONIC PAIN OF LEFT KNEE: ICD-10-CM

## 2018-06-19 DIAGNOSIS — M23.92 INTERNAL DERANGEMENT OF LEFT KNEE: ICD-10-CM

## 2018-06-19 DIAGNOSIS — G89.29 CHRONIC PAIN OF LEFT KNEE: ICD-10-CM

## 2018-06-19 PROCEDURE — 99024 POSTOP FOLLOW-UP VISIT: CPT | Performed by: NURSE PRACTITIONER

## 2018-06-19 RX ORDER — CETIRIZINE HYDROCHLORIDE 10 MG/1
10 TABLET ORAL DAILY
COMMUNITY

## 2018-06-19 NOTE — PROGRESS NOTES
Otilia Kenny is a 68 y.o. female is s/p       Chief Complaint   Patient presents with   • Left Knee - Post-op   • Suture / Staple Removal     6/4/18  Surgeon Role   Baron Reynolds MD Primary    Procedure Laterality Anesthesia   TOTAL KNEE ARTHROPLASTY ATTUNE with adductor canal block  Left Spinal          HISTORY OF PRESENT ILLNESS: Patient being seen for left knee post-op. X-rays done today. Patient using cane for assistance with ambulation.        Allergies   Allergen Reactions   • Allegra [Fexofenadine] Itching   • Amaryl [Glimepiride] Myalgia   • Dyazide [Triamterene-Hctz] Itching   • Hydrochlorothiazide W-Triamterene Itching   • Lipitor [Atorvastatin] Myalgia   • Metformin And Related Diarrhea   • Naproxen Itching   • Sulfamethoxazole-Trimethoprim Rash     dizziness         Current Outpatient Prescriptions:   •  amLODIPine (NORVASC) 5 MG tablet, Take 1 tablet by mouth Daily., Disp: 90 tablet, Rfl: 0  •  aspirin 81 MG EC tablet, Take 81 mg by mouth Daily., Disp: , Rfl:   •  Blood Glucose Monitoring Suppl (ONETOUCH VERIO IQ SYSTEM) W/DEVICE kit, daily., Disp: , Rfl:   •  CALCIUM CARB-CHOLECALCIFEROL PO, Take 1 tablet by mouth 2 (two) times a day., Disp: , Rfl:   •  Canagliflozin (INVOKANA) 300 MG tablet, Take 1/2 to 1 tab daily, Disp: 90 tablet, Rfl: 3  •  cetirizine (zyrTEC) 10 MG tablet, Take 10 mg by mouth Daily., Disp: , Rfl:   •  citalopram (CeleXA) 20 MG tablet, Take 1 tablet by mouth Daily., Disp: 90 tablet, Rfl: 3  •  Dulaglutide (TRULICITY) 0.75 MG/0.5ML solution pen-injector, Inject 0.5 mL under the skin 1 (One) Time Per Week., Disp: , Rfl:   •  fenofibrate (TRICOR) 145 MG tablet, Take 145 mg by mouth Daily. 1/2 to 1 tablet daily, Disp: , Rfl:   •  glucose blood (ONETOUCH VERIO) test strip, Test daily as directed. DX: E11.9, Disp: 100 each, Rfl: 3  •  Insulin Pen Needle 31G X 5 MM misc, Use daily with Victoza, Disp: 90 each, Rfl: 3  •  IRON, FERROUS SULFATE, PO, Take 1 tablet by mouth Take As  Directed. Patient takes on Mon, Wed, and Fri, Disp: , Rfl:   •  lisinopril (PRINIVIL,ZESTRIL) 20 MG tablet, Take 1 tablet by mouth Daily. (Patient taking differently: Take 10 mg by mouth Daily.), Disp: 90 tablet, Rfl: 0  •  meclizine (ANTIVERT) 25 MG tablet, Take 1 tablet by mouth 3 (Three) Times a Day As Needed for dizziness., Disp: 30 tablet, Rfl: 0  •  Multiple Vitamins-Minerals (MULTIVITAMIN PO), Take 1 tablet by mouth daily., Disp: , Rfl:   •  NON FORMULARY, Take 1 tablet by mouth Daily. Eye Health and Lutein, Disp: , Rfl:   •  omeprazole (priLOSEC) 40 MG capsule, Take 1 capsule by mouth Daily., Disp: 90 capsule, Rfl: 0  •  ONETOUCH DELICA LANCETS 33G misc, Test daily as directed DX: E11.9, Disp: 100 each, Rfl: 3  •  oxyCODONE-acetaminophen (PERCOCET) 7.5-325 MG per tablet, Take 1 tablet by mouth Every 4 (Four) Hours As Needed for Moderate Pain ., Disp: 40 tablet, Rfl: 0  •  rosuvastatin (CRESTOR) 20 MG tablet, Take 1 tablet by mouth Daily., Disp: 90 tablet, Rfl: 0  •  warfarin (COUMADIN) 2.5 MG tablet, Take 1 tablet by mouth Every Night for 41 days. Or as directed by [facility] Pharmacists, Disp: 30 tablet, Rfl: 1  •  Warfarin Sodium (PHARMACY TO DOSE WARFARIN), Continuous As Needed (6 weeks)., Disp: 1 each, Rfl: 1    No fevers or chills.  No nausea or vomiting.      PHYSICAL EXAMINATION:       Otilia Kenny is a 68 y.o. female    Patient is awake and alert, answers questions appropriately and is in no apparent distress.    GAIT:     []  Normal  []  Antalgic    Assistive device: []  None  []  Walker     []  Crutches  [x]  Cane     []  Wheelchair  []  Stretcher    Ortho Exam      Xr Knee 1 Or 2 View Left    Result Date: 6/19/2018  Narrative: Standing AP of both lateral views of the left knee only reveals a stable total knee arthroplasty of the left knee and a stable unicompartmental knee replacement of the right knee with out any evidence of hardware failure or other acute radiological abnormality noted.  The left  total knee arthroplasty remains in acceptable position and alignment when compared to previous postop films. 06/19/18 at 11:18 AM by MYRA Kearns     Xr Knee 1 Or 2 View Left    Result Date: 6/4/2018  Narrative: EXAM:  Radiograph(s), Osseous       REGION:   Knee  SIDE:     Left   VIEWS:   2         INDICATION:  Postop   Knee pain, no xray , M17.12 Unilateral primary osteoarthritis, left knee M25.562 Pain in left knee G89.29 Other chronic pain I10 Essential (primary) hypertension N18.2 Chronic kidney disease, stage 2 (mild) F32.5 Major depressive disorder, single episode, in full remission F41.1 Generalized anxiety disorder E11.9 Type 2 diabetes mellitus without complications: implant   COMPARISON:    Pre-Op 4/19/18          FINDINGS:       Status post total knee arthroplasty in standard position. .        Impression: CONCLUSION:       1. Stable postoperative examination.                   Electronically signed by:  SANGEETHA Malagon MD  6/4/2018 1:00 PM CDT Workstation: [id number]    Xr Knee Bilateral Ap Standing    Result Date: 6/19/2018  Narrative: Standing AP of both lateral views of the left knee only reveals a stable total knee arthroplasty of the left knee and a stable unicompartmental knee replacement of the right knee with out any evidence of hardware failure or other acute radiological abnormality noted.  The left total knee arthroplasty remains in acceptable position and alignment when compared to previous postop films. 06/19/18 at 11:18 AM by MYRA Kearns           ASSESSMENT:    Diagnoses and all orders for this visit:    Chronic pain of left knee    Primary osteoarthritis of left knee    Internal derangement of left knee    Other orders  -     cetirizine (zyrTEC) 10 MG tablet; Take 10 mg by mouth Daily.          PLAN    No Follow-up on file.    Sri Parikh MA

## 2018-06-20 ENCOUNTER — LAB (OUTPATIENT)
Dept: LAB | Facility: OTHER | Age: 68
End: 2018-06-20

## 2018-06-20 ENCOUNTER — ANTICOAGULATION VISIT (OUTPATIENT)
Dept: PHARMACY | Facility: HOSPITAL | Age: 68
End: 2018-06-20

## 2018-06-20 DIAGNOSIS — Z79.01 LONG-TERM (CURRENT) USE OF ANTICOAGULANTS: ICD-10-CM

## 2018-06-20 LAB
INR PPP: 1.74 (ref 0.8–1.2)
PROTHROMBIN TIME: 19.7 SECONDS (ref 11.1–15.3)

## 2018-06-20 PROCEDURE — 85610 PROTHROMBIN TIME: CPT | Performed by: ORTHOPAEDIC SURGERY

## 2018-06-20 PROCEDURE — 36415 COLL VENOUS BLD VENIPUNCTURE: CPT | Performed by: ORTHOPAEDIC SURGERY

## 2018-06-20 NOTE — PROGRESS NOTES
The patient is a 68 y.o. female who presents for followup.    Chief Complaint   Patient presents with   • Left Knee - Post-op   • Suture / Staple Removal       HPI:    68-year-old  female into the office today for follow-up.  She is status post 2 weeks from her total knee arthroplasty on the left side by Dr. Reynolds.  She reports continued progression range of motion and activity, uncontrolled pain with the Percocet and she's continue physical therapy as directed      Current Outpatient Prescriptions:   •  amLODIPine (NORVASC) 5 MG tablet, Take 1 tablet by mouth Daily., Disp: 90 tablet, Rfl: 0  •  aspirin 81 MG EC tablet, Take 81 mg by mouth Daily., Disp: , Rfl:   •  Blood Glucose Monitoring Suppl (ONETOUCH VERIO IQ SYSTEM) W/DEVICE kit, daily., Disp: , Rfl:   •  CALCIUM CARB-CHOLECALCIFEROL PO, Take 1 tablet by mouth 2 (two) times a day., Disp: , Rfl:   •  Canagliflozin (INVOKANA) 300 MG tablet, Take 1/2 to 1 tab daily, Disp: 90 tablet, Rfl: 3  •  cetirizine (zyrTEC) 10 MG tablet, Take 10 mg by mouth Daily., Disp: , Rfl:   •  citalopram (CeleXA) 20 MG tablet, Take 1 tablet by mouth Daily., Disp: 90 tablet, Rfl: 3  •  Dulaglutide (TRULICITY) 0.75 MG/0.5ML solution pen-injector, Inject 0.5 mL under the skin 1 (One) Time Per Week., Disp: , Rfl:   •  fenofibrate (TRICOR) 145 MG tablet, Take 145 mg by mouth Daily. 1/2 to 1 tablet daily, Disp: , Rfl:   •  glucose blood (ONETOUCH VERIO) test strip, Test daily as directed. DX: E11.9, Disp: 100 each, Rfl: 3  •  Insulin Pen Needle 31G X 5 MM misc, Use daily with Victoza, Disp: 90 each, Rfl: 3  •  IRON, FERROUS SULFATE, PO, Take 1 tablet by mouth Take As Directed. Patient takes on Mon, Wed, and Fri, Disp: , Rfl:   •  lisinopril (PRINIVIL,ZESTRIL) 20 MG tablet, Take 1 tablet by mouth Daily. (Patient taking differently: Take 10 mg by mouth Daily.), Disp: 90 tablet, Rfl: 0  •  meclizine (ANTIVERT) 25 MG tablet, Take 1 tablet by mouth 3 (Three) Times a Day As Needed for  "dizziness., Disp: 30 tablet, Rfl: 0  •  Multiple Vitamins-Minerals (MULTIVITAMIN PO), Take 1 tablet by mouth daily., Disp: , Rfl:   •  NON FORMULARY, Take 1 tablet by mouth Daily. Eye Health and Lutein, Disp: , Rfl:   •  omeprazole (priLOSEC) 40 MG capsule, Take 1 capsule by mouth Daily., Disp: 90 capsule, Rfl: 0  •  ONETOUCH DELICA LANCETS 33G misc, Test daily as directed DX: E11.9, Disp: 100 each, Rfl: 3  •  oxyCODONE-acetaminophen (PERCOCET) 7.5-325 MG per tablet, Take 1 tablet by mouth Every 4 (Four) Hours As Needed for Moderate Pain ., Disp: 40 tablet, Rfl: 0  •  rosuvastatin (CRESTOR) 20 MG tablet, Take 1 tablet by mouth Daily., Disp: 90 tablet, Rfl: 0  •  warfarin (COUMADIN) 2.5 MG tablet, Take 1 tablet by mouth Every Night for 41 days. Or as directed by [facility] Pharmacists, Disp: 30 tablet, Rfl: 1  •  Warfarin Sodium (PHARMACY TO DOSE WARFARIN), Continuous As Needed (6 weeks)., Disp: 1 each, Rfl: 1    Allergies   Allergen Reactions   • Allegra [Fexofenadine] Itching   • Amaryl [Glimepiride] Myalgia   • Dyazide [Triamterene-Hctz] Itching   • Hydrochlorothiazide W-Triamterene Itching   • Lipitor [Atorvastatin] Myalgia   • Metformin And Related Diarrhea   • Naproxen Itching   • Sulfamethoxazole-Trimethoprim Rash     dizziness       ROS:  No fevers or chills.  No nausea or vomiting    PHYSICAL EXAM:    Vitals:    06/19/18 1127   Weight: 73.5 kg (162 lb)   Height: 165.1 cm (65\")   PainSc:   7       GAIT:     []  Normal  [x]  Antalgic    Assistive device: []  None  []  Walker     []  Crutches  [x]  Cane     []  Wheelchair  []  Stretcher    Patient is awake and alert, answers questions appropriately, and is in no apparent distress.    Left Knee Exam     Range of Motion   Extension: Abnormal  Flexion:       Comments:  ROM is 3-110  Staples removed, edges of the incision are approximated without any evidence of infection.  No evidence of DVT    Right Knee Exam     Range of Motion   Normal right knee ROM    Muscle " Strength   Normal right knee strength            Xr Knee 1 Or 2 View Left    Result Date: 6/19/2018  Narrative: Standing AP of both lateral views of the left knee only reveals a stable total knee arthroplasty of the left knee and a stable unicompartmental knee replacement of the right knee with out any evidence of hardware failure or other acute radiological abnormality noted.  The left total knee arthroplasty remains in acceptable position and alignment when compared to previous postop films. 06/19/18 at 11:18 AM by MYRA Kearns     Xr Knee 1 Or 2 View Left    Result Date: 6/4/2018  Narrative: EXAM:  Radiograph(s), Osseous       REGION:   Knee  SIDE:     Left   VIEWS:   2         INDICATION:  Postop   Knee pain, no xray , M17.12 Unilateral primary osteoarthritis, left knee M25.562 Pain in left knee G89.29 Other chronic pain I10 Essential (primary) hypertension N18.2 Chronic kidney disease, stage 2 (mild) F32.5 Major depressive disorder, single episode, in full remission F41.1 Generalized anxiety disorder E11.9 Type 2 diabetes mellitus without complications: implant   COMPARISON:    Pre-Op 4/19/18          FINDINGS:       Status post total knee arthroplasty in standard position. .        Impression: CONCLUSION:       1. Stable postoperative examination.                   Electronically signed by:  SANGEETHA Malagon MD  6/4/2018 1:00 PM CDT Workstation: [id number]    Xr Knee Bilateral Ap Standing    Result Date: 6/19/2018  Narrative: Standing AP of both lateral views of the left knee only reveals a stable total knee arthroplasty of the left knee and a stable unicompartmental knee replacement of the right knee with out any evidence of hardware failure or other acute radiological abnormality noted.  The left total knee arthroplasty remains in acceptable position and alignment when compared to previous postop films. 06/19/18 at 11:18 AM by MYRA Kearns       ASSESSMENT:  Diagnoses and all orders for  this visit:    Primary osteoarthritis of left knee    Chronic pain of left knee    Internal derangement of left knee    Other orders  -     cetirizine (zyrTEC) 10 MG tablet; Take 10 mg by mouth Daily.        PLAN:  Continue to progress range of motion and activity as tolerated based on pain  Continue modified weightbearing with a cane and transition off once instructed to do physical therapy  Continue anticoagulation as directed  Follow-up 1 month for recheck    No Follow-up on file.    Aureliano Rapp, APRN

## 2018-06-20 NOTE — PROGRESS NOTES
Spoke with Ms. Kenny. Reviewed current lab.  No s/s of bleeding. No new meds. No missed doses. Reviewed schedule for following week. Pt read back regimen and verbalized understanding. All questions answered. Next INR on 6/27 provided by [facility].    Wil Leigh III, [facility]  06/20/18  12:34 PM

## 2018-06-27 ENCOUNTER — LAB (OUTPATIENT)
Dept: LAB | Facility: OTHER | Age: 68
End: 2018-06-27

## 2018-06-27 ENCOUNTER — ANTICOAGULATION VISIT (OUTPATIENT)
Dept: PHARMACY | Facility: HOSPITAL | Age: 68
End: 2018-06-27

## 2018-06-27 DIAGNOSIS — Z79.01 LONG-TERM (CURRENT) USE OF ANTICOAGULANTS: ICD-10-CM

## 2018-06-27 LAB
INR PPP: 2.47 (ref 0.8–1.2)
PROTHROMBIN TIME: 25.6 SECONDS (ref 11.1–15.3)

## 2018-06-27 PROCEDURE — 36415 COLL VENOUS BLD VENIPUNCTURE: CPT | Performed by: ORTHOPAEDIC SURGERY

## 2018-06-27 PROCEDURE — 85610 PROTHROMBIN TIME: CPT | Performed by: ORTHOPAEDIC SURGERY

## 2018-06-27 NOTE — PROGRESS NOTES
Spoke with Otilia. Reviewed current lab.  No s/s of bleeding. No new meds. No missed doses. Reviewed schedule for following week. Pt read back regimen and verbalized understanding. All questions answered. Next INR on 07/05 provided by [facility].    Sara Dunn [facility]  06/27/18  3:21 PM

## 2018-07-02 RX ORDER — OMEPRAZOLE 40 MG/1
CAPSULE, DELAYED RELEASE ORAL
Qty: 90 CAPSULE | Refills: 3 | Status: SHIPPED | OUTPATIENT
Start: 2018-07-02 | End: 2019-06-28 | Stop reason: SDUPTHER

## 2018-07-05 ENCOUNTER — ANTICOAGULATION VISIT (OUTPATIENT)
Dept: PHARMACY | Facility: HOSPITAL | Age: 68
End: 2018-07-05

## 2018-07-05 ENCOUNTER — LAB (OUTPATIENT)
Dept: LAB | Facility: OTHER | Age: 68
End: 2018-07-05

## 2018-07-05 DIAGNOSIS — Z79.01 LONG-TERM (CURRENT) USE OF ANTICOAGULANTS: ICD-10-CM

## 2018-07-05 LAB
INR PPP: 1.64 (ref 0.8–1.2)
PROTHROMBIN TIME: 18.8 SECONDS (ref 11.1–15.3)

## 2018-07-05 PROCEDURE — 85610 PROTHROMBIN TIME: CPT | Performed by: ORTHOPAEDIC SURGERY

## 2018-07-05 PROCEDURE — 36415 COLL VENOUS BLD VENIPUNCTURE: CPT | Performed by: ORTHOPAEDIC SURGERY

## 2018-07-11 ENCOUNTER — LAB (OUTPATIENT)
Dept: LAB | Facility: OTHER | Age: 68
End: 2018-07-11

## 2018-07-11 ENCOUNTER — ANTICOAGULATION VISIT (OUTPATIENT)
Dept: PHARMACY | Facility: HOSPITAL | Age: 68
End: 2018-07-11

## 2018-07-11 DIAGNOSIS — Z79.01 LONG-TERM (CURRENT) USE OF ANTICOAGULANTS: ICD-10-CM

## 2018-07-11 LAB
INR PPP: 1.98 (ref 0.8–1.2)
PROTHROMBIN TIME: 21.7 SECONDS (ref 11.1–15.3)

## 2018-07-11 PROCEDURE — 36415 COLL VENOUS BLD VENIPUNCTURE: CPT | Performed by: ORTHOPAEDIC SURGERY

## 2018-07-11 PROCEDURE — 85610 PROTHROMBIN TIME: CPT | Performed by: ORTHOPAEDIC SURGERY

## 2018-07-11 NOTE — PROGRESS NOTES
Spoke with Ms. Kenny. Reviewed current lab.  No s/s of bleeding. No new meds. No missed doses. Reviewed schedule for following week. Pt read back regimen and verbalized understanding. All questions answered.  Will call to stop warfarin on 7/16    Wil Leigh III, [facility]  07/11/18  2:12 PM

## 2018-07-16 RX ORDER — ROSUVASTATIN CALCIUM 20 MG/1
TABLET, COATED ORAL
Qty: 90 TABLET | Refills: 3 | Status: SHIPPED | OUTPATIENT
Start: 2018-07-16 | End: 2019-06-28 | Stop reason: SDUPTHER

## 2018-07-16 RX ORDER — AMLODIPINE BESYLATE 5 MG/1
TABLET ORAL
Qty: 90 TABLET | Refills: 3 | Status: SHIPPED | OUTPATIENT
Start: 2018-07-16 | End: 2019-06-28 | Stop reason: SDUPTHER

## 2018-07-17 ENCOUNTER — TELEPHONE (OUTPATIENT)
Dept: PHARMACY | Facility: HOSPITAL | Age: 68
End: 2018-07-17

## 2018-07-17 NOTE — TELEPHONE ENCOUNTER
Spoke with Ms. Kenny.  7/16 is the last day of warfarin therapy, instructed to take last dose of 2.5 mg 7/16.  Counseled on discarding warfarin.  Patient findings otherwise negative.  All questions answered.

## 2018-07-19 ENCOUNTER — OFFICE VISIT (OUTPATIENT)
Dept: ORTHOPEDIC SURGERY | Facility: CLINIC | Age: 68
End: 2018-07-19

## 2018-07-19 VITALS — WEIGHT: 165 LBS | BODY MASS INDEX: 27.49 KG/M2 | HEIGHT: 65 IN

## 2018-07-19 DIAGNOSIS — M25.562 CHRONIC PAIN OF LEFT KNEE: ICD-10-CM

## 2018-07-19 DIAGNOSIS — G89.29 CHRONIC PAIN OF LEFT KNEE: ICD-10-CM

## 2018-07-19 DIAGNOSIS — Z96.652 HISTORY OF LEFT KNEE REPLACEMENT: Primary | ICD-10-CM

## 2018-07-19 PROCEDURE — 99024 POSTOP FOLLOW-UP VISIT: CPT | Performed by: NURSE PRACTITIONER

## 2018-07-19 RX ORDER — CYCLOBENZAPRINE HCL 10 MG
10 TABLET ORAL 3 TIMES DAILY PRN
Qty: 45 TABLET | Refills: 0 | Status: SHIPPED | OUTPATIENT
Start: 2018-07-19 | End: 2018-11-07

## 2018-07-19 NOTE — PROGRESS NOTES
Otilia Kenny is a 68 y.o. female is s/p       Chief Complaint   Patient presents with   • Left Knee - Post-op     6/4/18  Surgeon Role   Baron Reynolds MD Primary    Procedure Laterality Anesthesia   TOTAL KNEE ARTHROPLASTY ATTUNE with adductor canal block  Left Spinal          HISTORY OF PRESENT ILLNESS: Patient being seen for left knee post-op. Patient doing physical therapy at [facility] 3 days/week.   [facility] faxed progress note.        Allergies   Allergen Reactions   • Allegra [Fexofenadine] Itching   • Amaryl [Glimepiride] Myalgia   • Dyazide [Triamterene-Hctz] Itching   • Hydrochlorothiazide W-Triamterene Itching   • Lipitor [Atorvastatin] Myalgia   • Metformin And Related Diarrhea   • Naproxen Itching   • Sulfamethoxazole-Trimethoprim Rash     dizziness         Current Outpatient Prescriptions:   •  amLODIPine (NORVASC) 5 MG tablet, TAKE 1 TABLET DAILY, Disp: 90 tablet, Rfl: 3  •  aspirin 81 MG EC tablet, Take 81 mg by mouth Daily., Disp: , Rfl:   •  Blood Glucose Monitoring Suppl (ONETOUCH VERIO IQ SYSTEM) W/DEVICE kit, daily., Disp: , Rfl:   •  CALCIUM CARB-CHOLECALCIFEROL PO, Take 1 tablet by mouth 2 (two) times a day., Disp: , Rfl:   •  Canagliflozin (INVOKANA) 300 MG tablet, Take 1/2 to 1 tab daily, Disp: 90 tablet, Rfl: 3  •  cetirizine (zyrTEC) 10 MG tablet, Take 10 mg by mouth Daily., Disp: , Rfl:   •  citalopram (CeleXA) 20 MG tablet, Take 1 tablet by mouth Daily., Disp: 90 tablet, Rfl: 3  •  fenofibrate (TRICOR) 145 MG tablet, Take 145 mg by mouth Daily. 1/2 to 1 tablet daily, Disp: , Rfl:   •  glucose blood (ONETOUCH VERIO) test strip, Test daily as directed. DX: E11.9, Disp: 100 each, Rfl: 3  •  Insulin Pen Needle 31G X 5 MM misc, Use daily with Victoza, Disp: 90 each, Rfl: 3  •  IRON, FERROUS SULFATE, PO, Take 1 tablet by mouth Take As Directed. Patient takes on Mon, Wed, and Fri, Disp: , Rfl:   •  Liraglutide (VICTOZA) 18 MG/3ML solution pen-injector injection, Inject  under the  skin Daily., Disp: , Rfl:   •  lisinopril (PRINIVIL,ZESTRIL) 20 MG tablet, Take 1 tablet by mouth Daily. (Patient taking differently: Take 10 mg by mouth Daily.), Disp: 90 tablet, Rfl: 0  •  meclizine (ANTIVERT) 25 MG tablet, Take 1 tablet by mouth 3 (Three) Times a Day As Needed for dizziness., Disp: 30 tablet, Rfl: 0  •  Multiple Vitamins-Minerals (MULTIVITAMIN PO), Take 1 tablet by mouth daily., Disp: , Rfl:   •  NON FORMULARY, Take 1 tablet by mouth Daily. Eye Health and Lutein, Disp: , Rfl:   •  omeprazole (priLOSEC) 40 MG capsule, TAKE 1 CAPSULE DAILY, Disp: 90 capsule, Rfl: 3  •  ONETOUCH DELICA LANCETS 33G misc, Test daily as directed DX: E11.9, Disp: 100 each, Rfl: 3  •  oxyCODONE-acetaminophen (PERCOCET) 7.5-325 MG per tablet, Take 1 tablet by mouth Every 4 (Four) Hours As Needed for Moderate Pain ., Disp: 40 tablet, Rfl: 0  •  rosuvastatin (CRESTOR) 20 MG tablet, TAKE 1 TABLET DAILY.       REPLACE SIMVASTATIN, Disp: 90 tablet, Rfl: 3  •  cyclobenzaprine (FLEXERIL) 10 MG tablet, Take 1 tablet by mouth 3 (Three) Times a Day As Needed for Muscle Spasms., Disp: 45 tablet, Rfl: 0    No fevers or chills.  No nausea or vomiting.      PHYSICAL EXAMINATION:       Otilia Kenny is a 68 y.o. female    Patient is awake and alert, answers questions appropriately and is in no apparent distress.    GAIT:     []  Normal  []  Antalgic    Assistive device: [x]  None  []  Walker     []  Crutches  []  Cane     []  Wheelchair  []  Stretcher    Left Knee Exam     Tenderness   The patient is experiencing no tenderness.         Range of Motion   Left knee extension: 2.   Left knee flexion: 120.     Other   Erythema: absent  Scars: present  Sensation: normal  Pulse: present  Swelling: mild  Effusion: no effusion present              No results found.        ASSESSMENT:    Diagnoses and all orders for this visit:    History of left knee replacement    Chronic pain of left knee    Other orders  -     Liraglutide (VICTOZA) 18  MG/3ML solution pen-injector injection; Inject  under the skin Daily.  -     cyclobenzaprine (FLEXERIL) 10 MG tablet; Take 1 tablet by mouth 3 (Three) Times a Day As Needed for Muscle Spasms.          PLAN  Continue to progress rom and activity as tolerated, continue HEP and follow up in one month for recheck and repeat xrays   No Follow-up on file.    Aureliano Rapp, APRN

## 2018-07-25 RX ORDER — LISINOPRIL 20 MG/1
TABLET ORAL
Qty: 90 TABLET | Refills: 3 | Status: SHIPPED | OUTPATIENT
Start: 2018-07-25 | End: 2019-02-08 | Stop reason: SDUPTHER

## 2018-08-17 DIAGNOSIS — M25.562 CHRONIC PAIN OF LEFT KNEE: Primary | ICD-10-CM

## 2018-08-17 DIAGNOSIS — G89.29 CHRONIC PAIN OF LEFT KNEE: Primary | ICD-10-CM

## 2018-08-20 ENCOUNTER — OFFICE VISIT (OUTPATIENT)
Dept: ORTHOPEDIC SURGERY | Facility: CLINIC | Age: 68
End: 2018-08-20

## 2018-08-20 VITALS — BODY MASS INDEX: 27.22 KG/M2 | WEIGHT: 163.4 LBS | HEIGHT: 65 IN

## 2018-08-20 DIAGNOSIS — M25.562 CHRONIC PAIN OF LEFT KNEE: ICD-10-CM

## 2018-08-20 DIAGNOSIS — G89.29 CHRONIC PAIN OF LEFT KNEE: ICD-10-CM

## 2018-08-20 DIAGNOSIS — Z96.652 HISTORY OF LEFT KNEE REPLACEMENT: Primary | ICD-10-CM

## 2018-08-20 PROCEDURE — 99024 POSTOP FOLLOW-UP VISIT: CPT | Performed by: NURSE PRACTITIONER

## 2018-08-20 NOTE — PROGRESS NOTES
Otilia Kenny is a 68 y.o. female is s/p       Chief Complaint   Patient presents with   • Left Knee - Post-op       HISTORY OF PRESENT ILLNESS: Patient being seen for left knee post-op. X-rays done today. Patient performing HEP, c/o of very little pain        Allergies   Allergen Reactions   • Allegra [Fexofenadine] Itching   • Amaryl [Glimepiride] Myalgia   • Dyazide [Triamterene-Hctz] Itching   • Hydrochlorothiazide W-Triamterene Itching   • Lipitor [Atorvastatin] Myalgia   • Metformin And Related Diarrhea   • Naproxen Itching   • Sulfamethoxazole-Trimethoprim Rash     dizziness         Current Outpatient Prescriptions:   •  amLODIPine (NORVASC) 5 MG tablet, TAKE 1 TABLET DAILY, Disp: 90 tablet, Rfl: 3  •  aspirin 81 MG EC tablet, Take 81 mg by mouth Daily., Disp: , Rfl:   •  Blood Glucose Monitoring Suppl (ONETOUCH VERIO IQ SYSTEM) W/DEVICE kit, daily., Disp: , Rfl:   •  CALCIUM CARB-CHOLECALCIFEROL PO, Take 1 tablet by mouth 2 (two) times a day., Disp: , Rfl:   •  Canagliflozin (INVOKANA) 300 MG tablet, Take 1/2 to 1 tab daily, Disp: 90 tablet, Rfl: 3  •  cetirizine (zyrTEC) 10 MG tablet, Take 10 mg by mouth Daily., Disp: , Rfl:   •  citalopram (CeleXA) 20 MG tablet, Take 1 tablet by mouth Daily., Disp: 90 tablet, Rfl: 3  •  cyclobenzaprine (FLEXERIL) 10 MG tablet, Take 1 tablet by mouth 3 (Three) Times a Day As Needed for Muscle Spasms., Disp: 45 tablet, Rfl: 0  •  fenofibrate (TRICOR) 145 MG tablet, Take 145 mg by mouth Daily. 1/2 to 1 tablet daily, Disp: , Rfl:   •  glucose blood (ONETOUCH VERIO) test strip, Test daily as directed. DX: E11.9, Disp: 100 each, Rfl: 3  •  Insulin Pen Needle 31G X 5 MM misc, Use daily with Victoza, Disp: 90 each, Rfl: 3  •  IRON, FERROUS SULFATE, PO, Take 1 tablet by mouth Take As Directed. Patient takes on Mon, Wed, and Fri, Disp: , Rfl:   •  Liraglutide (VICTOZA) 18 MG/3ML solution pen-injector injection, Inject  under the skin Daily., Disp: , Rfl:   •  lisinopril  (PRINIVIL,ZESTRIL) 20 MG tablet, TAKE 1 TABLET DAILY.       REPLACE LISINOPRIL/HCTZ, Disp: 90 tablet, Rfl: 3  •  meclizine (ANTIVERT) 25 MG tablet, Take 1 tablet by mouth 3 (Three) Times a Day As Needed for dizziness., Disp: 30 tablet, Rfl: 0  •  Multiple Vitamins-Minerals (MULTIVITAMIN PO), Take 1 tablet by mouth daily., Disp: , Rfl:   •  NON FORMULARY, Take 1 tablet by mouth Daily. Eye Health and Lutein, Disp: , Rfl:   •  omeprazole (priLOSEC) 40 MG capsule, TAKE 1 CAPSULE DAILY, Disp: 90 capsule, Rfl: 3  •  ONETOUCH DELICA LANCETS 33G misc, Test daily as directed DX: E11.9, Disp: 100 each, Rfl: 3  •  rosuvastatin (CRESTOR) 20 MG tablet, TAKE 1 TABLET DAILY.       REPLACE SIMVASTATIN, Disp: 90 tablet, Rfl: 3    No fevers or chills.  No nausea or vomiting.      PHYSICAL EXAMINATION:       Otilia Kenny is a 68 y.o. female    Patient is awake and alert, answers questions appropriately and is in no apparent distress.    GAIT:     []  Normal  []  Antalgic    Assistive device: [x]  None  []  Walker     []  Crutches  []  Cane     []  Wheelchair  []  Stretcher    Left Knee Exam     Tenderness   The patient is experiencing no tenderness.         Range of Motion   Left knee extension: -2.   Left knee flexion: 115.     Other   Erythema: absent  Scars: present  Sensation: normal  Pulse: present  Swelling: mild  Effusion: no effusion present              No results found.        ASSESSMENT:    Diagnoses and all orders for this visit:    History of left knee replacement    Chronic pain of left knee          PLAN  Continue to progress rom and activity as tolerated based on pain and follow up in three months for recheck unless pain worsens  No Follow-up on file.    Aureliano Rapp, APRN

## 2018-10-05 ENCOUNTER — CLINICAL SUPPORT (OUTPATIENT)
Dept: FAMILY MEDICINE CLINIC | Facility: CLINIC | Age: 68
End: 2018-10-05

## 2018-10-05 DIAGNOSIS — Z23 FLU VACCINE NEED: Primary | ICD-10-CM

## 2018-10-05 PROCEDURE — 90662 IIV NO PRSV INCREASED AG IM: CPT | Performed by: INTERNAL MEDICINE

## 2018-10-05 PROCEDURE — G0008 ADMIN INFLUENZA VIRUS VAC: HCPCS | Performed by: INTERNAL MEDICINE

## 2018-10-31 ENCOUNTER — LAB (OUTPATIENT)
Dept: LAB | Facility: OTHER | Age: 68
End: 2018-10-31

## 2018-10-31 DIAGNOSIS — E11.9 TYPE 2 DIABETES MELLITUS WITHOUT COMPLICATION, WITHOUT LONG-TERM CURRENT USE OF INSULIN (HCC): Chronic | ICD-10-CM

## 2018-10-31 DIAGNOSIS — E78.1 HYPERTRIGLYCERIDEMIA: Chronic | ICD-10-CM

## 2018-10-31 DIAGNOSIS — D51.8 VITAMIN B12 DEFICIENCY (DIETARY) ANEMIA: Chronic | ICD-10-CM

## 2018-10-31 DIAGNOSIS — E78.2 MIXED HYPERLIPIDEMIA: Chronic | ICD-10-CM

## 2018-10-31 DIAGNOSIS — D50.8 IRON DEFICIENCY ANEMIA SECONDARY TO INADEQUATE DIETARY IRON INTAKE: Chronic | ICD-10-CM

## 2018-10-31 DIAGNOSIS — E55.9 VITAMIN D DEFICIENCY: ICD-10-CM

## 2018-10-31 LAB
25(OH)D3 SERPL-MCNC: 34 NG/ML (ref 30–100)
ALBUMIN UR-MCNC: 0.7 MG/L
ARTICHOKE IGE QN: 129 MG/DL (ref 1–129)
BASOPHILS # BLD AUTO: 0.02 10*3/MM3 (ref 0–0.2)
BASOPHILS NFR BLD AUTO: 0.4 % (ref 0–2)
CREAT UR-MCNC: 107.2 MG/DL
DEPRECATED RDW RBC AUTO: 42 FL (ref 36.4–46.3)
EOSINOPHIL # BLD AUTO: 0.15 10*3/MM3 (ref 0–0.7)
EOSINOPHIL NFR BLD AUTO: 3 % (ref 0–7)
ERYTHROCYTE [DISTWIDTH] IN BLOOD BY AUTOMATED COUNT: 13.2 % (ref 11.5–14.5)
FERRITIN SERPL-MCNC: 25.7 NG/ML (ref 11.1–264)
HCT VFR BLD AUTO: 38.7 % (ref 35–45)
HGB BLD-MCNC: 12.6 G/DL (ref 12–15.5)
IRON 24H UR-MRATE: 81 MCG/DL (ref 37–170)
IRON SATN MFR SERPL: 17 % (ref 15–50)
LYMPHOCYTES # BLD AUTO: 1.47 10*3/MM3 (ref 0.6–4.2)
LYMPHOCYTES NFR BLD AUTO: 29.8 % (ref 10–50)
MCH RBC QN AUTO: 29 PG (ref 26.5–34)
MCHC RBC AUTO-ENTMCNC: 32.6 G/DL (ref 31.4–36)
MCV RBC AUTO: 89 FL (ref 80–98)
MICROALBUMIN/CREAT UR: 6.5 MG/G (ref 0–30)
MONOCYTES # BLD AUTO: 0.46 10*3/MM3 (ref 0–0.9)
MONOCYTES NFR BLD AUTO: 9.3 % (ref 0–12)
NEUTROPHILS # BLD AUTO: 2.84 10*3/MM3 (ref 2–8.6)
NEUTROPHILS NFR BLD AUTO: 57.5 % (ref 37–80)
PLATELET # BLD AUTO: 343 10*3/MM3 (ref 150–450)
PMV BLD AUTO: 10.1 FL (ref 8–12)
RBC # BLD AUTO: 4.35 10*6/MM3 (ref 3.77–5.16)
TIBC SERPL-MCNC: 481 MCG/DL (ref 265–497)
TRIGL SERPL-MCNC: 116 MG/DL
TSH SERPL DL<=0.05 MIU/L-ACNC: 1.46 MIU/ML (ref 0.46–4.68)
VIT B12 BLD-MCNC: 673 PG/ML (ref 239–931)
WBC NRBC COR # BLD: 4.94 10*3/MM3 (ref 3.2–9.8)

## 2018-10-31 PROCEDURE — 83550 IRON BINDING TEST: CPT | Performed by: INTERNAL MEDICINE

## 2018-10-31 PROCEDURE — 83721 ASSAY OF BLOOD LIPOPROTEIN: CPT | Performed by: INTERNAL MEDICINE

## 2018-10-31 PROCEDURE — 82728 ASSAY OF FERRITIN: CPT | Performed by: INTERNAL MEDICINE

## 2018-10-31 PROCEDURE — 84478 ASSAY OF TRIGLYCERIDES: CPT | Performed by: INTERNAL MEDICINE

## 2018-10-31 PROCEDURE — 84443 ASSAY THYROID STIM HORMONE: CPT | Performed by: INTERNAL MEDICINE

## 2018-10-31 PROCEDURE — 85025 COMPLETE CBC W/AUTO DIFF WBC: CPT | Performed by: INTERNAL MEDICINE

## 2018-10-31 PROCEDURE — 36415 COLL VENOUS BLD VENIPUNCTURE: CPT | Performed by: INTERNAL MEDICINE

## 2018-10-31 PROCEDURE — 82043 UR ALBUMIN QUANTITATIVE: CPT | Performed by: INTERNAL MEDICINE

## 2018-10-31 PROCEDURE — 82570 ASSAY OF URINE CREATININE: CPT | Performed by: INTERNAL MEDICINE

## 2018-10-31 PROCEDURE — 82607 VITAMIN B-12: CPT | Performed by: INTERNAL MEDICINE

## 2018-10-31 PROCEDURE — 83540 ASSAY OF IRON: CPT | Performed by: INTERNAL MEDICINE

## 2018-10-31 PROCEDURE — 82306 VITAMIN D 25 HYDROXY: CPT | Performed by: INTERNAL MEDICINE

## 2018-11-07 ENCOUNTER — LAB (OUTPATIENT)
Dept: LAB | Facility: OTHER | Age: 68
End: 2018-11-07

## 2018-11-07 ENCOUNTER — OFFICE VISIT (OUTPATIENT)
Dept: FAMILY MEDICINE CLINIC | Facility: CLINIC | Age: 68
End: 2018-11-07

## 2018-11-07 VITALS
WEIGHT: 163.2 LBS | HEART RATE: 72 BPM | BODY MASS INDEX: 27.19 KG/M2 | HEIGHT: 65 IN | SYSTOLIC BLOOD PRESSURE: 122 MMHG | DIASTOLIC BLOOD PRESSURE: 60 MMHG | TEMPERATURE: 99.1 F

## 2018-11-07 DIAGNOSIS — E11.9 TYPE 2 DIABETES MELLITUS WITHOUT COMPLICATION, WITHOUT LONG-TERM CURRENT USE OF INSULIN (HCC): ICD-10-CM

## 2018-11-07 DIAGNOSIS — D51.8 VITAMIN B12 DEFICIENCY (DIETARY) ANEMIA: Chronic | ICD-10-CM

## 2018-11-07 DIAGNOSIS — J30.1 SEASONAL ALLERGIC RHINITIS DUE TO POLLEN: Chronic | ICD-10-CM

## 2018-11-07 DIAGNOSIS — I10 ESSENTIAL HYPERTENSION: Chronic | ICD-10-CM

## 2018-11-07 DIAGNOSIS — E11.9 TYPE 2 DIABETES MELLITUS WITHOUT COMPLICATION, WITHOUT LONG-TERM CURRENT USE OF INSULIN (HCC): Primary | ICD-10-CM

## 2018-11-07 DIAGNOSIS — K21.9 GASTROESOPHAGEAL REFLUX DISEASE WITHOUT ESOPHAGITIS: Chronic | ICD-10-CM

## 2018-11-07 DIAGNOSIS — G47.9 SLEEP DISORDER: Chronic | ICD-10-CM

## 2018-11-07 DIAGNOSIS — F32.5 MAJOR DEPRESSION IN COMPLETE REMISSION (HCC): Chronic | ICD-10-CM

## 2018-11-07 DIAGNOSIS — E78.1 HYPERTRIGLYCERIDEMIA: Chronic | ICD-10-CM

## 2018-11-07 DIAGNOSIS — H81.03 MENIERE'S DISEASE OF BOTH EARS: Chronic | ICD-10-CM

## 2018-11-07 DIAGNOSIS — N18.2 CHRONIC KIDNEY DISEASE, STAGE II (MILD): Chronic | ICD-10-CM

## 2018-11-07 DIAGNOSIS — D50.8 IRON DEFICIENCY ANEMIA SECONDARY TO INADEQUATE DIETARY IRON INTAKE: Chronic | ICD-10-CM

## 2018-11-07 DIAGNOSIS — E78.2 MIXED HYPERLIPIDEMIA: Chronic | ICD-10-CM

## 2018-11-07 LAB
ALBUMIN SERPL-MCNC: 4.5 G/DL (ref 3.5–5)
ALBUMIN/GLOB SERPL: 1.4 G/DL (ref 1.1–1.8)
ALP SERPL-CCNC: 71 U/L (ref 38–126)
ALT SERPL W P-5'-P-CCNC: 26 U/L
ANION GAP SERPL CALCULATED.3IONS-SCNC: 9 MMOL/L (ref 5–15)
AST SERPL-CCNC: 29 U/L (ref 14–36)
BILIRUB SERPL-MCNC: 0.4 MG/DL (ref 0.2–1.3)
BUN BLD-MCNC: 24 MG/DL (ref 7–17)
BUN/CREAT SERPL: 22.6 (ref 7–25)
CALCIUM SPEC-SCNC: 9.8 MG/DL (ref 8.4–10.2)
CHLORIDE SERPL-SCNC: 106 MMOL/L (ref 98–107)
CO2 SERPL-SCNC: 27 MMOL/L (ref 22–30)
CREAT BLD-MCNC: 1.06 MG/DL (ref 0.52–1.04)
GFR SERPL CREATININE-BSD FRML MDRD: 51 ML/MIN/1.73 (ref 39–90)
GLOBULIN UR ELPH-MCNC: 3.3 GM/DL (ref 2.3–3.5)
GLUCOSE BLD-MCNC: 118 MG/DL (ref 74–99)
POTASSIUM BLD-SCNC: 4.2 MMOL/L (ref 3.4–5)
PROT SERPL-MCNC: 7.8 G/DL (ref 6.3–8.2)
SODIUM BLD-SCNC: 142 MMOL/L (ref 137–145)

## 2018-11-07 PROCEDURE — 36415 COLL VENOUS BLD VENIPUNCTURE: CPT | Performed by: INTERNAL MEDICINE

## 2018-11-07 PROCEDURE — 83036 HEMOGLOBIN GLYCOSYLATED A1C: CPT | Performed by: INTERNAL MEDICINE

## 2018-11-07 PROCEDURE — 99214 OFFICE O/P EST MOD 30 MIN: CPT | Performed by: INTERNAL MEDICINE

## 2018-11-07 PROCEDURE — 80053 COMPREHEN METABOLIC PANEL: CPT | Performed by: INTERNAL MEDICINE

## 2018-11-07 RX ORDER — TRIAMCINOLONE ACETONIDE 0.25 MG/ML
LOTION TOPICAL
Qty: 120 ML | Refills: 6 | Status: SHIPPED | OUTPATIENT
Start: 2018-11-07 | End: 2018-11-07 | Stop reason: SDUPTHER

## 2018-11-07 RX ORDER — TRIAMCINOLONE ACETONIDE 0.25 MG/ML
LOTION TOPICAL
Qty: 120 ML | Refills: 6 | Status: SHIPPED | OUTPATIENT
Start: 2018-11-07 | End: 2019-06-04

## 2018-11-07 NOTE — PROGRESS NOTES
Subjective        History of Present Illness      Otilia Kenny is a 68 y.o. female who presents for six-month follow-up of multiple medical issues including type 2 diabetes, high cholesterol, high triglycerides, hypertension, GERD, depression, and other issues.  She had total left knee arthroplasty by Dr. Reynolds in June of this year.  She continues to have some nighttime pain, but big improvement from prior to the surgery.  Anxiety is adequately controlled with Celexa.   Sleep is better on some nights more than others despite good hygiene.  GERD symptoms adequately controlled with Prilosec.     When she was here six months ago, she was requesting a cheaper GLP-1 to replace Trulicity, which is now tier 3 on her plan. I changed Trulicity to Victoza, gradually working up to 1.2 mg daily.  She continues on Invokana.  However, insurance is no longer going to cover Invokana after the first of the year. We will change her Invokana to Farxiga, which is covered under insurance formulary.  A1c and CMP were inadvertently omitted from current labs. She doesn't feel the Victoza is as effective in controlling glucose.  She monitors glucose at home and reports glucose has run between 135-170 averaging around 140-150.  We will plan on stopping Victoza and starting Ozempic after the first of the year when it is covered.  We will go ahead and chest CMP and A1c on her way out today. She had tolerability issues due to loose stool with moderate doses of metformin previously.         She reports chronic symptoms of sinus congestion, left greater than right not relieved with nightly Zyrtec and episodic use of Flonase.   Meclizine has not adequately controlling her Meniere's.  Exam reveals left frontal and maxillary sinus tenderness.  I recommended trying sinus irrigation with a Neti bottle.  A humidifier may be beneficial in the winter heating months.     She reports mild right sided abdominal tenderness not relieved with a bowel  "movement.  She will let us know if this does not resolve.       She is having a flare of intermittent flares of pruritic rash to the scalp, which burns when she scratches.  She has tried multiple shampoos.  Exam reveals scattered papules on a base of erythema and eczema on the right side of her scalp and in the hairline.  I am sending a prescription for Triamcinolone lotion to apply to the scalp.               She is up to date on immunizations and colonoscopy.  DEXA 05/2017 revealed normal bone density.     Weight is stable. Blood pressure at goal.      The patient's relevant past medical, surgical, and social history was reviewed in Epic.   Lab results are reviewed with the patient today.  CBC unremarkable.  Vitamin D and vitamin B-12 at goal.  .  Triglycerides 116.  Iron at goal with iron supplement three days weekly.      Review of Systems   Constitutional: Negative for chills, fatigue and fever.   HENT: Positive for sinus pain. Negative for congestion, ear pain, postnasal drip, sinus pressure and sore throat.    Respiratory: Negative for cough, shortness of breath and wheezing.    Cardiovascular: Negative for chest pain, palpitations and leg swelling.   Gastrointestinal: Negative for abdominal pain, blood in stool, constipation, diarrhea, nausea and vomiting.   Endocrine: Negative for cold intolerance, heat intolerance, polydipsia and polyuria.   Genitourinary: Negative for dysuria, frequency, hematuria and urgency.   Skin: Negative for rash.   Neurological: Negative for syncope and weakness.        Objective     Vitals:    11/07/18 1534   BP: 122/60   Pulse: 72   Temp: 99.1 °F (37.3 °C)   TempSrc: Oral   Weight: 74 kg (163 lb 3.2 oz)   Height: 165.1 cm (65\")     Physical Exam   Constitutional: She is oriented to person, place, and time. She appears well-developed and well-nourished. No distress.   HENT:   Head: Normocephalic and atraumatic.   Nose: Right sinus exhibits no maxillary sinus tenderness and " no frontal sinus tenderness. Left sinus exhibits maxillary sinus tenderness and frontal sinus tenderness.   Mouth/Throat: Uvula is midline, oropharynx is clear and moist and mucous membranes are normal. No oral lesions. No tonsillar exudate.   Left maxillary sinus tenderness and frontal sinus tenderness.     Eyes: Pupils are equal, round, and reactive to light. Conjunctivae and EOM are normal.   Neck: Trachea normal. Neck supple. No JVD present. Carotid bruit is not present. No tracheal deviation present. No thyroid mass and no thyromegaly present.   Cardiovascular: Normal rate, regular rhythm, normal heart sounds and intact distal pulses.   No extrasystoles are present. PMI is not displaced.    No murmur heard.  Pulmonary/Chest: Effort normal and breath sounds normal. No accessory muscle usage. No respiratory distress. She has no decreased breath sounds. She has no wheezes. She has no rhonchi. She has no rales.   Abdominal: Soft. Bowel sounds are normal. She exhibits no distension. There is no hepatosplenomegaly. There is no tenderness.   Mild right-sided abdominal tenderness.      Vascular Status -  Her right foot exhibits normal foot vasculature  and no edema. Her left foot exhibits normal foot vasculature  and no edema.  Lymphadenopathy:     She has no cervical adenopathy.   Neurological: She is alert and oriented to person, place, and time. No cranial nerve deficit. Coordination normal.   Skin: Skin is warm, dry and intact. No rash noted. No cyanosis. Nails show no clubbing.   Scattered papules on a base of erythema and eczema on the right side of her scalp and in the hairline   Psychiatric: She has a normal mood and affect. Her speech is normal and behavior is normal. Judgment and thought content normal.   Vitals reviewed.        Assessment/Plan      Continue the Invokana until current supply is depleted and then start Farxiga 10 mg daily .  Increase the Victoza gradually from 1.2 to 1.8 until the end of the  year(2018) In January, she will stop the Victoza and start Ozempic 0.5 mg  once weekly injections.   We will check a CMP and A1c today on her way out, as these were omitted from labs last week.   Continue to monitor glucose and notify me if consistently above goal.   Now that she has recovered from her left total knee replacement, I encouraged her to walk daily for exercise and increase other physical activity.    Continue to avoid frequent or chronic NSAIDs or other nephrotoxic drugs.  Maintain good hydration with water.    Continue Crestor and TriCor as well as dietary efforts.    Recommended use of sinus irrigation with a Neti bottle.  Resume the Flonase nasal spray one spray each nostril b.i.d.   Continue daily Zyrtecas needed.   She may find a humidifierin the bedroom  helps during winter heating months.     A prescription is sent for Triamcinolone 0.025% lotion to apply to rash on the scalp.     Continue the lisinopril and Norvasc.  Pursue sodium restriction and weight loss.      Continue the Celexa and nonpharmacological measures to reduce depression tendencies.     Continue other medications and vitamin and mineral supplements to treat additional medical problems which we addressed today.      Return in six months for follow up with fasting labs one week prior.       Scribed for Dr. Zabala by Vandana Galeas [facility].     Diagnoses and all orders for this visit:    Type 2 diabetes mellitus without complication, without long-term current use of insulin (CMS/[facility])  -     Comprehensive Metabolic Panel; Future  -     Hemoglobin A1c; Future  -     CBC Auto Differential; Future  -     Comprehensive Metabolic Panel; Future  -     Hemoglobin A1c; Future    Essential hypertension    Mixed hyperlipidemia  -     Lipid Panel; Future    Hypertriglyceridemia    Seasonal allergic rhinitis due to pollen    Gastroesophageal reflux disease without esophagitis    Meniere's disease of both ears    Chronic kidney disease, stage II  (mild)    Iron deficiency anemia secondary to inadequate dietary iron intake  -     Ferritin; Future    Vitamin B12 deficiency (dietary) anemia  -     Vitamin B12; Future    Major depression in complete remission (CMS/HCC)    Sleep disorder    Other orders  -     Liraglutide (VICTOZA) 18 MG/3ML solution pen-injector injection; Inject  under the skin into the appropriate area as directed Daily.  -     Discontinue: Triamcinolone Acetonide 0.025 % lotion; Apply to scalp rash BID prn  -     Triamcinolone Acetonide 0.025 % lotion; Apply to scalp rash BID prn        Lab on 10/31/2018   Component Date Value Ref Range Status   • Microalbumin/Creatinine Ratio 10/31/2018 6.5  0.0 - 30.0 mg/g Final   • Creatinine, Urine 10/31/2018 107.2  mg/dL Final   • Microalbumin, Urine 10/31/2018 0.7  mg/L Final   • WBC 10/31/2018 4.94  3.20 - 9.80 10*3/mm3 Final   • RBC 10/31/2018 4.35  3.77 - 5.16 10*6/mm3 Final   • Hemoglobin 10/31/2018 12.6  12.0 - 15.5 g/dL Final   • Hematocrit 10/31/2018 38.7  35.0 - 45.0 % Final   • MCV 10/31/2018 89.0  80.0 - 98.0 fL Final   • MCH 10/31/2018 29.0  26.5 - 34.0 pg Final   • MCHC 10/31/2018 32.6  31.4 - 36.0 g/dL Final   • RDW 10/31/2018 13.2  11.5 - 14.5 % Final   • RDW-SD 10/31/2018 42.0  36.4 - 46.3 fl Final   • MPV 10/31/2018 10.1  8.0 - 12.0 fL Final   • Platelets 10/31/2018 343  150 - 450 10*3/mm3 Final   • Neutrophil % 10/31/2018 57.5  37.0 - 80.0 % Final   • Lymphocyte % 10/31/2018 29.8  10.0 - 50.0 % Final   • Monocyte % 10/31/2018 9.3  0.0 - 12.0 % Final   • Eosinophil % 10/31/2018 3.0  0.0 - 7.0 % Final   • Basophil % 10/31/2018 0.4  0.0 - 2.0 % Final   • Neutrophils, Absolute 10/31/2018 2.84  2.00 - 8.60 10*3/mm3 Final   • Lymphocytes, Absolute 10/31/2018 1.47  0.60 - 4.20 10*3/mm3 Final   • Monocytes, Absolute 10/31/2018 0.46  0.00 - 0.90 10*3/mm3 Final   • Eosinophils, Absolute 10/31/2018 0.15  0.00 - 0.70 10*3/mm3 Final   • Basophils, Absolute 10/31/2018 0.02  0.00 - 0.20 10*3/mm3 Final    • Vitamin B-12 10/31/2018 673  239 - 931 pg/mL Final   • Ferritin 10/31/2018 25.70  11.10 - 264.00 ng/mL Final   • LDL Cholesterol  10/31/2018 129  1 - 129 mg/dL Final   • Triglycerides 10/31/2018 116  <=150 mg/dL Final   • Iron 10/31/2018 81  37 - 170 mcg/dL Final   • TIBC 10/31/2018 481  265 - 497 mcg/dL Final   • Iron Saturation 10/31/2018 17  15 - 50 % Final   • TSH 10/31/2018 1.460  0.460 - 4.680 mIU/mL Final   • 25 Hydroxy, Vitamin D 10/31/2018 34.0  30.0 - 100.0 ng/ml Final   ]

## 2018-11-08 LAB — HBA1C MFR BLD: 7.2 % (ref 4–5.6)

## 2018-11-20 ENCOUNTER — OFFICE VISIT (OUTPATIENT)
Dept: ORTHOPEDIC SURGERY | Facility: CLINIC | Age: 68
End: 2018-11-20

## 2018-11-20 VITALS — WEIGHT: 163 LBS | HEIGHT: 65 IN | BODY MASS INDEX: 27.16 KG/M2

## 2018-11-20 DIAGNOSIS — M23.92 INTERNAL DERANGEMENT OF LEFT KNEE: ICD-10-CM

## 2018-11-20 DIAGNOSIS — M25.562 CHRONIC PAIN OF LEFT KNEE: ICD-10-CM

## 2018-11-20 DIAGNOSIS — M17.12 PRIMARY OSTEOARTHRITIS OF LEFT KNEE: Primary | ICD-10-CM

## 2018-11-20 DIAGNOSIS — Z96.652 HISTORY OF LEFT KNEE REPLACEMENT: ICD-10-CM

## 2018-11-20 DIAGNOSIS — G89.29 CHRONIC PAIN OF LEFT KNEE: ICD-10-CM

## 2018-11-20 PROCEDURE — 99213 OFFICE O/P EST LOW 20 MIN: CPT | Performed by: ORTHOPAEDIC SURGERY

## 2018-11-20 NOTE — PROGRESS NOTES
"Otilia Kenny is a 68 y.o. female returns for     Chief Complaint   Patient presents with   • Left Knee - Follow-up       HISTORY OF PRESENT ILLNESS: Patient being seen for left knee follow up. Patient reports no complications. Left TKA performed 6/4/18.   No problems  No fevers or chills  Doing great  Glad that she had the surgery       CONCURRENT MEDICAL HISTORY:    The following portions of the patient's history were reviewed and updated as appropriate: allergies, current medications, past family history, past medical history, past social history, past surgical history and problem list.     ROS  No fevers or chills.  No chest pain or shortness of air.  No GI or  disturbances.    PHYSICAL EXAMINATION:       Ht 165.1 cm (65\")   Wt 73.9 kg (163 lb)   BMI 27.12 kg/m²     Physical Exam   Constitutional: She is oriented to person, place, and time. She appears well-developed and well-nourished.   Neurological: She is alert and oriented to person, place, and time.   Psychiatric: She has a normal mood and affect. Her behavior is normal. Judgment and thought content normal.       GAIT:     [x]  Normal  []  Antalgic    Assistive device: [x]  None  []  Walker     []  Crutches  []  Cane     []  Wheelchair  []  Stretcher    Left Knee Exam     Muscle Strength   The patient has normal left knee strength.    Tenderness   The patient is experiencing no tenderness.     Range of Motion   Extension: 0   Flexion: 120     Tests   Varus: negative Valgus: negative  Drawer:  Anterior - negative         Other   Erythema: absent  Scars: present  Sensation: normal  Pulse: present  Swelling: none              XR Knee bilateral ap standing  Standing AP of both knees with lateral views of the left knee only reveals a stable total knee arthroplasty of the left knee and a unicompartmental replacement of the right knee with no evidence of actual dislocation or hardware failure when compared to previous films obtained 1 month ago.  08/21/18 at " 9:53 AM by MYRA Kearns     XR knee 1 or 2 View left  Standing AP of both knees with lateral views of the left knee only reveals a stable total knee arthroplasty of the left knee and a unicompartmental replacement of the right knee with no evidence of actual dislocation or hardware failure when compared to previous films obtained 1 month ago.  08/21/18 at 9:53 AM by MYRA Kearns              ASSESSMENT:    Diagnoses and all orders for this visit:    Primary osteoarthritis of left knee    Chronic pain of left knee    History of left knee replacement    Internal derangement of left knee          PLAN    Activity as tolerated  No restrictions  Continue strength and conditioning.  Continue to progress..  Recheck in 1 year.    Patient's Body mass index is 27.12 kg/m². BMI is within normal parameters. No follow-up required.      Return in about 1 year (around 11/20/2019) for Recheck with repeat xrays.    Baron Reynolds MD

## 2019-01-04 DIAGNOSIS — R21 RASH: Primary | ICD-10-CM

## 2019-02-08 RX ORDER — LISINOPRIL 20 MG/1
20 TABLET ORAL DAILY
Qty: 90 TABLET | Refills: 3 | Status: SHIPPED | OUTPATIENT
Start: 2019-02-08 | End: 2020-05-20

## 2019-02-08 RX ORDER — FENOFIBRATE 145 MG/1
TABLET, COATED ORAL
Qty: 90 TABLET | Refills: 3 | Status: SHIPPED | OUTPATIENT
Start: 2019-02-08 | End: 2020-02-04

## 2019-03-12 ENCOUNTER — OFFICE VISIT (OUTPATIENT)
Dept: FAMILY MEDICINE CLINIC | Facility: CLINIC | Age: 69
End: 2019-03-12

## 2019-03-12 VITALS
WEIGHT: 167.8 LBS | BODY MASS INDEX: 27.96 KG/M2 | SYSTOLIC BLOOD PRESSURE: 130 MMHG | DIASTOLIC BLOOD PRESSURE: 70 MMHG | TEMPERATURE: 98.7 F | HEART RATE: 66 BPM | HEIGHT: 65 IN | OXYGEN SATURATION: 95 %

## 2019-03-12 DIAGNOSIS — H69.83 DYSFUNCTION OF BOTH EUSTACHIAN TUBES: ICD-10-CM

## 2019-03-12 DIAGNOSIS — H81.03 MENIERE'S DISEASE OF BOTH EARS: Chronic | ICD-10-CM

## 2019-03-12 DIAGNOSIS — J30.1 SEASONAL ALLERGIC RHINITIS DUE TO POLLEN: Chronic | ICD-10-CM

## 2019-03-12 DIAGNOSIS — R42 VERTIGO: Primary | ICD-10-CM

## 2019-03-12 PROCEDURE — 99213 OFFICE O/P EST LOW 20 MIN: CPT | Performed by: INTERNAL MEDICINE

## 2019-03-12 PROCEDURE — 96372 THER/PROPH/DIAG INJ SC/IM: CPT | Performed by: INTERNAL MEDICINE

## 2019-03-12 RX ORDER — CEFUROXIME AXETIL 500 MG/1
500 TABLET ORAL
Qty: 20 TABLET | Refills: 0 | Status: SHIPPED | OUTPATIENT
Start: 2019-03-12 | End: 2019-03-22

## 2019-03-12 RX ORDER — METHYLPREDNISOLONE ACETATE 80 MG/ML
80 INJECTION, SUSPENSION INTRA-ARTICULAR; INTRALESIONAL; INTRAMUSCULAR; SOFT TISSUE ONCE
Status: COMPLETED | OUTPATIENT
Start: 2019-03-12 | End: 2019-03-12

## 2019-03-12 RX ORDER — MECLIZINE HYDROCHLORIDE 25 MG/1
25 TABLET ORAL 3 TIMES DAILY PRN
Qty: 30 TABLET | Refills: 1 | Status: SHIPPED | OUTPATIENT
Start: 2019-03-12 | End: 2021-10-26 | Stop reason: SDUPTHER

## 2019-03-12 RX ORDER — TRIAMCINOLONE ACETONIDE 40 MG/ML
20 INJECTION, SUSPENSION INTRA-ARTICULAR; INTRAMUSCULAR ONCE
Status: COMPLETED | OUTPATIENT
Start: 2019-03-12 | End: 2019-03-12

## 2019-03-12 RX ADMIN — METHYLPREDNISOLONE ACETATE 80 MG: 80 INJECTION, SUSPENSION INTRA-ARTICULAR; INTRALESIONAL; INTRAMUSCULAR; SOFT TISSUE at 15:46

## 2019-03-12 RX ADMIN — TRIAMCINOLONE ACETONIDE 20 MG: 40 INJECTION, SUSPENSION INTRA-ARTICULAR; INTRAMUSCULAR at 15:46

## 2019-03-12 NOTE — PROGRESS NOTES
Subjective        History of Present Illness     Otilia Kenny is a 69 y.o. female with history of Meniere's disease who reports a flare of her Meniere's as well as chronic vertigo, worse for the past four days despite taking meclizine three times daily.  She reports an extreme flare of vertigo three days ago while bending over to dry her legs off after a bath when she had intense dizziness and nausea lasting approximately a minute. She reports daily milder vertigo symptoms even with changing positions in the bed, especially rolling over to the right side. [place] Hallpike maneuver deferred on exam today due to classic symptoms and history of similar episodes.  She continues to struggle with chronic postnasal drip despite taking Flonase and Zyrtec. She also reports a low-grade fever and chronic nasal discharge, colored in the morning turning lighter as the day progresses.      She is given a steroid injection today.  Glucose is normally averaging in a range 130-140, occasionally up to 160.  Her last A1c was 7.2 with labs 11/2018.  I planned to have her use glimepiride for any glucose elevations, although, she reports she was intolerant to glimepiride in the past due to myalgias.  I asked her to monitor glucose closely and decrease carbohydrates and sugar in the diet.      Review of Systems   Constitutional: Positive for fever. Negative for chills and fatigue.   HENT: Positive for postnasal drip. Negative for congestion, ear pain, sinus pressure and sore throat.    Respiratory: Negative for cough, shortness of breath and wheezing.    Cardiovascular: Negative for chest pain, palpitations and leg swelling.   Gastrointestinal: Negative for abdominal pain, blood in stool, constipation, diarrhea, nausea and vomiting.   Endocrine: Negative for cold intolerance, heat intolerance, polydipsia and polyuria.   Genitourinary: Negative for dysuria, frequency, hematuria and urgency.   Skin: Negative for rash.   Neurological: Positive  "for dizziness. Negative for syncope and weakness.          Objective     Vitals:    03/12/19 1503   BP: 130/70   Pulse: 66   Temp: 98.7 °F (37.1 °C)   TempSrc: Oral   SpO2: 95%   Weight: 76.1 kg (167 lb 12.8 oz)   Height: 165.1 cm (65\")     Physical Exam   Constitutional: She is oriented to person, place, and time. She appears well-developed and well-nourished. No distress.   Accompanied by her .    HENT:   Head: Normocephalic and atraumatic.   Nose: Nose normal.   Mouth/Throat: Oropharynx is clear and moist. No oropharyngeal exudate.   Moderate clear effusions bilateral TMs with no evidence of infection.  Bilateral maxillary sinusitis.         Eyes: EOM are normal. Pupils are equal, round, and reactive to light.   Neck: Neck supple. No JVD present. No thyromegaly present.   Cardiovascular: Normal rate, regular rhythm and normal heart sounds.   Pulmonary/Chest: Effort normal and breath sounds normal. No accessory muscle usage. No respiratory distress. She has no wheezes. She has no rales.   Abdominal: Soft. Bowel sounds are normal. She exhibits no distension. There is no tenderness.   Musculoskeletal: She exhibits no edema.   Lymphadenopathy:     She has no cervical adenopathy.   Neurological: She is alert and oriented to person, place, and time. No cranial nerve deficit.   [place] Hallpike maneuver deferred on exam today due to classic symptoms and history of similar episodes.   Psychiatric: She has a normal mood and affect. Her speech is normal and behavior is normal. Judgment and thought content normal.     Future Appointments   Date Time Provider Department Center   5/10/2019 10:30 AM Alexis Zabala MD [facility] None   6/4/2019 10:40 AM Baron Reynolds MD [facility] None       Assessment/Plan      Refill is sent for Meclizine to take 1 tablet by mouth 3 (Three) Times a Day As Needed for dizziness. For the vertigo, we will refer to [facility] for evaluation and treatment with " Epley maneuvers, which will be challenging due to her reduced neck range of motion status post cervical vertebrae fusion.       For the eustachian tube dysfunction and flare of Meniere's, she is given a steroid injection.  A refill is also sent for Flonase nasal spray to use one spray each nostril b.i.d.  Continue with Zyrtec.  After informed verbal consent, patient is given Kenalog 20 and Depo-Medrol 80 mg IM without difficulty or complications.  She tolerated well. Prescription is sent for Ceftin 500 mg to take one b.i.d.         Return in May for routine follow up with fasting labs one week prior.        Scribed for Dr. Zabala by Vandana Galeas [facility].     Diagnoses and all orders for this visit:    Vertigo  -     methylPREDNISolone acetate (DEPO-medrol) injection 80 mg; Inject 1 mL into the appropriate muscle as directed by prescriber 1 (One) Time.  -     triamcinolone acetonide (KENALOG-40) injection 20 mg; Inject 0.5 mL into the appropriate muscle as directed by prescriber 1 (One) Time.  -     Ambulatory Referral to Physical Therapy Evaluate and treat    Meniere's disease of both ears  -     methylPREDNISolone acetate (DEPO-medrol) injection 80 mg; Inject 1 mL into the appropriate muscle as directed by prescriber 1 (One) Time.  -     triamcinolone acetonide (KENALOG-40) injection 20 mg; Inject 0.5 mL into the appropriate muscle as directed by prescriber 1 (One) Time.  -     Ambulatory Referral to Physical Therapy Evaluate and treat    Seasonal allergic rhinitis due to pollen    Dysfunction of both eustachian tubes    Other orders  -     meclizine (ANTIVERT) 25 MG tablet; Take 1 tablet by mouth 3 (Three) Times a Day As Needed for dizziness.  -     cefuroxime (CEFTIN) 500 MG tablet; Take 1 tablet by mouth 2 (Two) Times a Day for 10 days.        No visits with results within 3 Week(s) from this visit.   Latest known visit with results is:   Lab on 11/07/2018   Component Date Value Ref Range Status   • Glucose  11/07/2018 118* 74 - 99 mg/dL Final   • BUN 11/07/2018 24* 7 - 17 mg/dL Final   • Creatinine 11/07/2018 1.06* 0.52 - 1.04 mg/dL Final   • Sodium 11/07/2018 142  137 - 145 mmol/L Final   • Potassium 11/07/2018 4.2  3.4 - 5.0 mmol/L Final   • Chloride 11/07/2018 106  98 - 107 mmol/L Final   • CO2 11/07/2018 27.0  22.0 - 30.0 mmol/L Final   • Calcium 11/07/2018 9.8  8.4 - 10.2 mg/dL Final   • Total Protein 11/07/2018 7.8  6.3 - 8.2 g/dL Final   • Albumin 11/07/2018 4.50  3.50 - 5.00 g/dL Final   • ALT (SGPT) 11/07/2018 26  <=35 U/L Final   • AST (SGOT) 11/07/2018 29  14 - 36 U/L Final   • Alkaline Phosphatase 11/07/2018 71  38 - 126 U/L Final   • Total Bilirubin 11/07/2018 0.4  0.2 - 1.3 mg/dL Final   • eGFR Non  Amer 11/07/2018 51  39 - 90 mL/min/1.73 Final   • Globulin 11/07/2018 3.3  2.3 - 3.5 gm/dL Final   • A/G Ratio 11/07/2018 1.4  1.1 - 1.8 g/dL Final   • BUN/Creatinine Ratio 11/07/2018 22.6  7.0 - 25.0 Final   • Anion Gap 11/07/2018 9.0  5.0 - 15.0 mmol/L Final   • Hemoglobin A1C 11/07/2018 7.2* 4 - 5.6 % Final   ]

## 2019-04-03 RX ORDER — LANCETS 33 GAUGE
EACH MISCELLANEOUS
Qty: 100 EACH | Refills: 3 | Status: SHIPPED | OUTPATIENT
Start: 2019-04-03 | End: 2020-02-04

## 2019-05-06 ENCOUNTER — LAB (OUTPATIENT)
Dept: LAB | Facility: OTHER | Age: 69
End: 2019-05-06

## 2019-05-06 DIAGNOSIS — E11.9 TYPE 2 DIABETES MELLITUS WITHOUT COMPLICATION, WITHOUT LONG-TERM CURRENT USE OF INSULIN (HCC): ICD-10-CM

## 2019-05-06 DIAGNOSIS — D51.8 VITAMIN B12 DEFICIENCY (DIETARY) ANEMIA: Chronic | ICD-10-CM

## 2019-05-06 DIAGNOSIS — D50.8 IRON DEFICIENCY ANEMIA SECONDARY TO INADEQUATE DIETARY IRON INTAKE: Chronic | ICD-10-CM

## 2019-05-06 DIAGNOSIS — E78.2 MIXED HYPERLIPIDEMIA: Chronic | ICD-10-CM

## 2019-05-06 LAB
ALBUMIN SERPL-MCNC: 4.3 G/DL (ref 3.5–5)
ALBUMIN/GLOB SERPL: 1.4 G/DL (ref 1.1–1.8)
ALP SERPL-CCNC: 68 U/L (ref 38–126)
ALT SERPL W P-5'-P-CCNC: 24 U/L
ANION GAP SERPL CALCULATED.3IONS-SCNC: 10 MMOL/L (ref 5–15)
AST SERPL-CCNC: 24 U/L (ref 14–36)
BASOPHILS # BLD AUTO: 0.02 10*3/MM3 (ref 0–0.2)
BASOPHILS NFR BLD AUTO: 0.4 % (ref 0–1.5)
BILIRUB SERPL-MCNC: 0.6 MG/DL (ref 0.2–1.3)
BUN BLD-MCNC: 27 MG/DL (ref 7–23)
BUN/CREAT SERPL: 26.7 (ref 7–25)
CALCIUM SPEC-SCNC: 9.7 MG/DL (ref 8.4–10.2)
CHLORIDE SERPL-SCNC: 97 MMOL/L (ref 101–112)
CHOLEST SERPL-MCNC: 196 MG/DL (ref 150–200)
CO2 SERPL-SCNC: 28 MMOL/L (ref 22–30)
CREAT BLD-MCNC: 1.01 MG/DL (ref 0.52–1.04)
DEPRECATED RDW RBC AUTO: 40.8 FL (ref 37–54)
EOSINOPHIL # BLD AUTO: 0.11 10*3/MM3 (ref 0–0.4)
EOSINOPHIL NFR BLD AUTO: 2.4 % (ref 0.3–6.2)
ERYTHROCYTE [DISTWIDTH] IN BLOOD BY AUTOMATED COUNT: 12.8 % (ref 12.3–15.4)
FERRITIN SERPL-MCNC: 49.8 NG/ML (ref 13–150)
GFR SERPL CREATININE-BSD FRML MDRD: 54 ML/MIN/1.73 (ref 39–90)
GLOBULIN UR ELPH-MCNC: 3.1 GM/DL (ref 2.3–3.5)
GLUCOSE BLD-MCNC: 146 MG/DL (ref 70–99)
HBA1C MFR BLD: 6.8 % (ref 4.8–5.6)
HCT VFR BLD AUTO: 39 % (ref 34–46.6)
HDLC SERPL-MCNC: 61 MG/DL (ref 40–59)
HGB BLD-MCNC: 13.3 G/DL (ref 12–15.9)
LDLC SERPL CALC-MCNC: 113 MG/DL
LDLC/HDLC SERPL: 1.85 {RATIO} (ref 0–3.22)
LYMPHOCYTES # BLD AUTO: 1.25 10*3/MM3 (ref 0.7–3.1)
LYMPHOCYTES NFR BLD AUTO: 27.2 % (ref 19.6–45.3)
MCH RBC QN AUTO: 30.4 PG (ref 26.6–33)
MCHC RBC AUTO-ENTMCNC: 34.1 G/DL (ref 31.5–35.7)
MCV RBC AUTO: 89 FL (ref 79–97)
MONOCYTES # BLD AUTO: 0.37 10*3/MM3 (ref 0.1–0.9)
MONOCYTES NFR BLD AUTO: 8 % (ref 5–12)
NEUTROPHILS # BLD AUTO: 2.85 10*3/MM3 (ref 1.7–7)
NEUTROPHILS NFR BLD AUTO: 62 % (ref 42.7–76)
PLATELET # BLD AUTO: 352 10*3/MM3 (ref 140–450)
PMV BLD AUTO: 9.5 FL (ref 6–12)
POTASSIUM BLD-SCNC: 4.3 MMOL/L (ref 3.4–5)
PROT SERPL-MCNC: 7.4 G/DL (ref 6.3–8.6)
RBC # BLD AUTO: 4.38 10*6/MM3 (ref 3.77–5.28)
SODIUM BLD-SCNC: 135 MMOL/L (ref 137–145)
TRIGL SERPL-MCNC: 111 MG/DL
VIT B12 BLD-MCNC: 728 PG/ML (ref 211–946)
VLDLC SERPL-MCNC: 22.2 MG/DL
WBC NRBC COR # BLD: 4.6 10*3/MM3 (ref 3.4–10.8)

## 2019-05-06 PROCEDURE — 80061 LIPID PANEL: CPT | Performed by: INTERNAL MEDICINE

## 2019-05-06 PROCEDURE — 83036 HEMOGLOBIN GLYCOSYLATED A1C: CPT | Performed by: INTERNAL MEDICINE

## 2019-05-06 PROCEDURE — 82728 ASSAY OF FERRITIN: CPT | Performed by: INTERNAL MEDICINE

## 2019-05-06 PROCEDURE — 36415 COLL VENOUS BLD VENIPUNCTURE: CPT | Performed by: INTERNAL MEDICINE

## 2019-05-06 PROCEDURE — 82607 VITAMIN B-12: CPT | Performed by: INTERNAL MEDICINE

## 2019-05-06 PROCEDURE — 80053 COMPREHEN METABOLIC PANEL: CPT | Performed by: INTERNAL MEDICINE

## 2019-05-06 PROCEDURE — 85025 COMPLETE CBC W/AUTO DIFF WBC: CPT | Performed by: INTERNAL MEDICINE

## 2019-05-14 ENCOUNTER — OFFICE VISIT (OUTPATIENT)
Dept: FAMILY MEDICINE CLINIC | Facility: CLINIC | Age: 69
End: 2019-05-14

## 2019-05-14 VITALS
DIASTOLIC BLOOD PRESSURE: 80 MMHG | SYSTOLIC BLOOD PRESSURE: 148 MMHG | HEIGHT: 65 IN | TEMPERATURE: 98.5 F | WEIGHT: 173 LBS | BODY MASS INDEX: 28.82 KG/M2 | HEART RATE: 72 BPM

## 2019-05-14 DIAGNOSIS — E11.9 TYPE 2 DIABETES MELLITUS WITHOUT COMPLICATION, WITHOUT LONG-TERM CURRENT USE OF INSULIN (HCC): Primary | ICD-10-CM

## 2019-05-14 DIAGNOSIS — N18.2 CHRONIC KIDNEY DISEASE, STAGE II (MILD): Chronic | ICD-10-CM

## 2019-05-14 DIAGNOSIS — D51.8 VITAMIN B12 DEFICIENCY (DIETARY) ANEMIA: Chronic | ICD-10-CM

## 2019-05-14 DIAGNOSIS — K21.9 GASTROESOPHAGEAL REFLUX DISEASE WITHOUT ESOPHAGITIS: Chronic | ICD-10-CM

## 2019-05-14 DIAGNOSIS — G47.9 SLEEP DISORDER: Chronic | ICD-10-CM

## 2019-05-14 DIAGNOSIS — F41.1 GENERALIZED ANXIETY DISORDER: Chronic | ICD-10-CM

## 2019-05-14 DIAGNOSIS — E78.1 HYPERTRIGLYCERIDEMIA: Chronic | ICD-10-CM

## 2019-05-14 DIAGNOSIS — E55.9 VITAMIN D DEFICIENCY: ICD-10-CM

## 2019-05-14 DIAGNOSIS — F32.5 MAJOR DEPRESSION IN COMPLETE REMISSION (HCC): Chronic | ICD-10-CM

## 2019-05-14 DIAGNOSIS — I10 ESSENTIAL HYPERTENSION: Chronic | ICD-10-CM

## 2019-05-14 DIAGNOSIS — D50.8 IRON DEFICIENCY ANEMIA SECONDARY TO INADEQUATE DIETARY IRON INTAKE: Chronic | ICD-10-CM

## 2019-05-14 DIAGNOSIS — J30.1 SEASONAL ALLERGIC RHINITIS DUE TO POLLEN: Chronic | ICD-10-CM

## 2019-05-14 DIAGNOSIS — E78.2 MIXED HYPERLIPIDEMIA: Chronic | ICD-10-CM

## 2019-05-14 DIAGNOSIS — R42 VERTIGO: ICD-10-CM

## 2019-05-14 PROCEDURE — 99214 OFFICE O/P EST MOD 30 MIN: CPT | Performed by: INTERNAL MEDICINE

## 2019-05-14 RX ORDER — DOXYCYCLINE HYCLATE 100 MG/1
100 CAPSULE ORAL DAILY
Refills: 1 | COMMUNITY
Start: 2019-04-29 | End: 2020-05-20 | Stop reason: ALTCHOICE

## 2019-05-14 NOTE — PROGRESS NOTES
Subjective          History of Present Illness     Otilia Kenny is a 69 y.o. female who presents for six-month follow-up of multiple medical issues including type 2 diabetes, high cholesterol, high triglycerides, hypertension, GERD, depression, and other issues.  Anxiety and depression adequately managed with Celexa.  She wakes frequently at night, but normally reads and is able to go back to sleep in a while.  She used Trazodone in the past with good results.  We may consider this if sleep becomes more of a problem.      In March, we referred her to [facility] for evaluation and treatment of vertigo with Epley maneuvers.  By the time her appointment was scheduled, her vertigo symptoms had resolved and she did not follow through with the therapy.      She had a laminectomy in the past and is asking about a pain reliever due to her chronic lumbar and thoracic back pain.  She has been avoiding Tylenol products to fatty liver due to history of fatty liver.  Since liver enzymes are at goal, I assured her I felt like she could try the Tylenol Arthritis for pain relief.     We changed her Victoza to weekly Ozempic 0.5 mg at the beginning of 2019 after it was covered on her formulary.  Invokana was changed to Farxiga per insurance formulary as well.  Weight is up 10 pounds in the past six months.  She is riding 30 minutes on her exercise bicycle almost daily, but reports she knows she has been eating more due to feeling hungry.      DEXA 05/2017 revealed normal bone density.      The patient's relevant past medical, surgical, and social history was reviewed in Epic.   Lab results are reviewed with the patient today.  CBC unremarkable.  Fasting glucose 146.  A1c is 6.8.  Normal renal and liver function.  Total cholesterol 196. HDL 61.  .  Triglycerides 111.  LDL/HDL ratio 1.85.  Iron has increased by near 50% with oral iron three times weekly.  B-12 at goal without a supplement.       Review  "of Systems   Constitutional: Negative for chills, fatigue and fever.   HENT: Negative for congestion, ear pain, postnasal drip, sinus pressure and sore throat.    Respiratory: Negative for cough, shortness of breath and wheezing.    Cardiovascular: Negative for chest pain, palpitations and leg swelling.   Gastrointestinal: Negative for abdominal pain, blood in stool, constipation, diarrhea, nausea and vomiting.   Endocrine: Negative for cold intolerance, heat intolerance, polydipsia and polyuria.   Genitourinary: Negative for dysuria, frequency, hematuria and urgency.   Skin: Negative for rash.   Neurological: Negative for syncope and weakness.        Objective     Vitals:    05/14/19 1302   BP: 148/80   Pulse: 72   Temp: 98.5 °F (36.9 °C)   TempSrc: Oral   Weight: 78.5 kg (173 lb)   Height: 165.1 cm (65\")     Physical Exam   Constitutional: She is oriented to person, place, and time. She appears well-developed and well-nourished. No distress.   HENT:   Head: Normocephalic and atraumatic.   Nose: Right sinus exhibits no maxillary sinus tenderness and no frontal sinus tenderness. Left sinus exhibits no maxillary sinus tenderness and no frontal sinus tenderness.   Mouth/Throat: Uvula is midline, oropharynx is clear and moist and mucous membranes are normal. No oral lesions. No tonsillar exudate.   Eyes: Conjunctivae and EOM are normal. Pupils are equal, round, and reactive to light.   Neck: Trachea normal. Neck supple. No JVD present. Carotid bruit is not present. No tracheal deviation present. No thyroid mass and no thyromegaly present.   Cardiovascular: Normal rate, regular rhythm, normal heart sounds and intact distal pulses.  No extrasystoles are present. PMI is not displaced.   No murmur heard.  Pulmonary/Chest: Effort normal and breath sounds normal. No accessory muscle usage. No respiratory distress. She has no decreased breath sounds. She has no wheezes. She has no rhonchi. She has no rales.   Abdominal: Soft. " Bowel sounds are normal. She exhibits no distension. There is no hepatosplenomegaly. There is no tenderness.   Musculoskeletal:   Increased  paraspinal tension in lumbar region.  No vertebral tenderness.  Laminectomy scar in lumbar region.  Increased muscle spasm in cervical spine     Vascular Status -  Her right foot exhibits normal foot vasculature  and no edema. Her left foot exhibits normal foot vasculature  and no edema.  Lymphadenopathy:     She has no cervical adenopathy.   Neurological: She is alert and oriented to person, place, and time. No cranial nerve deficit. Coordination normal.   Skin: Skin is warm, dry and intact. No rash noted. No cyanosis. Nails show no clubbing.   Psychiatric: She has a normal mood and affect. Her speech is normal and behavior is normal. Judgment and thought content normal.   Vitals reviewed.      Assessment/Plan      To help suppress her appetite and help with diabetes management, she will gradually increase her dose of Ozempic to 0.5 mg to 1 mg gradually over 3-4 weeks.  Continue with the Farxiga.      For the chronic back pain, I recommended she take two Tylenol Arthritis q.a.m. repeating two tablets 8h. later if needed. Continue to avoid frequent or chronic NSAIDs or other nephrotoxic drugs.      Decrease oral iron to twice weekly.    Continue the lisinopril and Norvasc.  Pursue sodium restriction and weight loss.       Continue Crestor and TriCor as well as dietary efforts.    Continue the Celexa and nonpharmacological measures to reduce depression tendencies.  We can add Trazodone if sleep becomes more of an issue.      Continue other medications and vitamin and mineral supplements to treat additional medical problems which we addressed today.   Return in six months for routine follow up with fasting labs one week prior.      Scribed for Dr. Zabala by Vandana Galeas [facility].     Diagnoses and all orders for this visit:    Type 2 diabetes mellitus without complication,  without long-term current use of insulin (CMS/[facility])  -     CBC Auto Differential; Future  -     Comprehensive Metabolic Panel; Future  -     Hemoglobin A1c; Future  -     LDL Cholesterol, Direct; Future  -     TSH; Future  -     Microalbumin / Creatinine Urine Ratio - Urine, Clean Catch; Future    Essential hypertension  -     Comprehensive Metabolic Panel; Future    Mixed hyperlipidemia  -     LDL Cholesterol, Direct; Future    Hypertriglyceridemia    Seasonal allergic rhinitis due to pollen    Gastroesophageal reflux disease without esophagitis    Chronic kidney disease, stage II (mild)  -     Comprehensive Metabolic Panel; Future    Iron deficiency anemia secondary to inadequate dietary iron intake  -     Ferritin; Future    Vitamin B12 deficiency (dietary) anemia  -     Vitamin B12; Future    Major depression in complete remission (CMS/HCC)    Generalized anxiety disorder    Sleep disorder    Vertigo    Vitamin D deficiency  -     Vitamin D 25 Hydroxy; Future    Other orders  -     doxycycline (VIBRAMYCIN) 100 MG capsule; Take 100 mg by mouth Daily.  -     Semaglutide (OZEMPIC) 1 MG/DOSE solution pen-injector; Inject 1 mg under the skin into the appropriate area as directed 1 (One) Time Per Week.        Lab on 05/06/2019   Component Date Value Ref Range Status   • WBC 05/06/2019 4.60  3.40 - 10.80 10*3/mm3 Final   • RBC 05/06/2019 4.38  3.77 - 5.28 10*6/mm3 Final   • Hemoglobin 05/06/2019 13.3  12.0 - 15.9 g/dL Final   • Hematocrit 05/06/2019 39.0  34.0 - 46.6 % Final   • MCV 05/06/2019 89.0  79.0 - 97.0 fL Final   • MCH 05/06/2019 30.4  26.6 - 33.0 pg Final   • MCHC 05/06/2019 34.1  31.5 - 35.7 g/dL Final   • RDW 05/06/2019 12.8  12.3 - 15.4 % Final   • RDW-SD 05/06/2019 40.8  37.0 - 54.0 fl Final   • MPV 05/06/2019 9.5  6.0 - 12.0 fL Final   • Platelets 05/06/2019 352  140 - 450 10*3/mm3 Final   • Neutrophil % 05/06/2019 62.0  42.7 - 76.0 % Final   • Lymphocyte % 05/06/2019 27.2  19.6 - 45.3 % Final   •  Monocyte % 05/06/2019 8.0  5.0 - 12.0 % Final   • Eosinophil % 05/06/2019 2.4  0.3 - 6.2 % Final   • Basophil % 05/06/2019 0.4  0.0 - 1.5 % Final   • Neutrophils, Absolute 05/06/2019 2.85  1.70 - 7.00 10*3/mm3 Final   • Lymphocytes, Absolute 05/06/2019 1.25  0.70 - 3.10 10*3/mm3 Final   • Monocytes, Absolute 05/06/2019 0.37  0.10 - 0.90 10*3/mm3 Final   • Eosinophils, Absolute 05/06/2019 0.11  0.00 - 0.40 10*3/mm3 Final   • Basophils, Absolute 05/06/2019 0.02  0.00 - 0.20 10*3/mm3 Final   • Glucose 05/06/2019 146* 70 - 99 mg/dL Final   • BUN 05/06/2019 27* 7 - 23 mg/dL Final   • Creatinine 05/06/2019 1.01  0.52 - 1.04 mg/dL Final   • Sodium 05/06/2019 135* 137 - 145 mmol/L Final   • Potassium 05/06/2019 4.3  3.4 - 5.0 mmol/L Final   • Chloride 05/06/2019 97* 101 - 112 mmol/L Final   • CO2 05/06/2019 28.0  22.0 - 30.0 mmol/L Final   • Calcium 05/06/2019 9.7  8.4 - 10.2 mg/dL Final   • Total Protein 05/06/2019 7.4  6.3 - 8.6 g/dL Final   • Albumin 05/06/2019 4.30  3.50 - 5.00 g/dL Final   • ALT (SGPT) 05/06/2019 24  <=35 U/L Final   • AST (SGOT) 05/06/2019 24  14 - 36 U/L Final   • Alkaline Phosphatase 05/06/2019 68  38 - 126 U/L Final   • Total Bilirubin 05/06/2019 0.6  0.2 - 1.3 mg/dL Final   • eGFR Non African Amer 05/06/2019 54  39 - 90 mL/min/1.73 Final   • Globulin 05/06/2019 3.1  2.3 - 3.5 gm/dL Final   • A/G Ratio 05/06/2019 1.4  1.1 - 1.8 g/dL Final   • BUN/Creatinine Ratio 05/06/2019 26.7* 7.0 - 25.0 Final   • Anion Gap 05/06/2019 10.0  5.0 - 15.0 mmol/L Final   • Hemoglobin A1C 05/06/2019 6.80* 4.80 - 5.60 % Final   • Ferritin 05/06/2019 49.80  13.00 - 150.00 ng/mL Final   • Total Cholesterol 05/06/2019 196  150 - 200 mg/dL Final   • Triglycerides 05/06/2019 111  <=150 mg/dL Final   • HDL Cholesterol 05/06/2019 61* 40 - 59 mg/dL Final   • LDL Cholesterol  05/06/2019 113* <=100 mg/dL Final   • VLDL Cholesterol 05/06/2019 22.2  mg/dL Final   • LDL/HDL Ratio 05/06/2019 1.85  0.00 - 3.22 Final   • Vitamin B-12  05/06/2019 728  211 - 946 pg/mL Final   ]

## 2019-05-14 NOTE — PATIENT INSTRUCTIONS
Calorie Counting for Weight Loss  Calories are units of energy. Your body needs a certain amount of calories from food to keep you going throughout the day. When you eat more calories than your body needs, your body stores the extra calories as fat. When you eat fewer calories than your body needs, your body burns fat to get the energy it needs.  Calorie counting means keeping track of how many calories you eat and drink each day. Calorie counting can be helpful if you need to lose weight. If you make sure to eat fewer calories than your body needs, you should lose weight. Ask your health care provider what a healthy weight is for you.  For calorie counting to work, you will need to eat the right number of calories in a day in order to lose a healthy amount of weight per week. A dietitian can help you determine how many calories you need in a day and will give you suggestions on how to reach your calorie goal.  · A healthy amount of weight to lose per week is usually 1-2 lb (0.5-0.9 kg). This usually means that your daily calorie intake should be reduced by 500-750 calories.  · Eating 1,200 - 1,500 calories per day can help most women lose weight.  · Eating 1,500 - 1,800 calories per day can help most men lose weight.    What is my plan?  My goal is to have __________ calories per day.  If I have this many calories per day, I should lose around __________ pounds per week.  What do I need to know about calorie counting?  In order to meet your daily calorie goal, you will need to:  · Find out how many calories are in each food you would like to eat. Try to do this before you eat.  · Decide how much of the food you plan to eat.  · Write down what you ate and how many calories it had. Doing this is called keeping a food log.    To successfully lose weight, it is important to balance calorie counting with a healthy lifestyle that includes regular activity. Aim for 150 minutes of moderate exercise (such as walking) or 75  minutes of vigorous exercise (such as running) each week.  Where do I find calorie information?    The number of calories in a food can be found on a Nutrition Facts label. If a food does not have a Nutrition Facts label, try to look up the calories online or ask your dietitian for help.  Remember that calories are listed per serving. If you choose to have more than one serving of a food, you will have to multiply the calories per serving by the amount of servings you plan to eat. For example, the label on a package of bread might say that a serving size is 1 slice and that there are 90 calories in a serving. If you eat 1 slice, you will have eaten 90 calories. If you eat 2 slices, you will have eaten 180 calories.  How do I keep a food log?  Immediately after each meal, record the following information in your food log:  · What you ate. Don't forget to include toppings, sauces, and other extras on the food.  · How much you ate. This can be measured in cups, ounces, or number of items.  · How many calories each food and drink had.  · The total number of calories in the meal.    Keep your food log near you, such as in a small notebook in your pocket, or use a mobile katerin or website. Some programs will calculate calories for you and show you how many calories you have left for the day to meet your goal.  What are some calorie counting tips?  · Use your calories on foods and drinks that will fill you up and not leave you hungry:  ? Some examples of foods that fill you up are nuts and nut butters, vegetables, lean proteins, and high-fiber foods like whole grains. High-fiber foods are foods with more than 5 g fiber per serving.  ? Drinks such as sodas, specialty coffee drinks, alcohol, and juices have a lot of calories, yet do not fill you up.  · Eat nutritious foods and avoid empty calories. Empty calories are calories you get from foods or beverages that do not have many vitamins or protein, such as candy, sweets, and  "soda. It is better to have a nutritious high-calorie food (such as an avocado) than a food with few nutrients (such as a bag of chips).  · Know how many calories are in the foods you eat most often. This will help you calculate calorie counts faster.  · Pay attention to calories in drinks. Low-calorie drinks include water and unsweetened drinks.  · Pay attention to nutrition labels for \"low fat\" or \"fat free\" foods. These foods sometimes have the same amount of calories or more calories than the full fat versions. They also often have added sugar, starch, or salt, to make up for flavor that was removed with the fat.  · Find a way of tracking calories that works for you. Get creative. Try different apps or programs if writing down calories does not work for you.  What are some portion control tips?  · Know how many calories are in a serving. This will help you know how many servings of a certain food you can have.  · Use a measuring cup to measure serving sizes. You could also try weighing out portions on a kitchen scale. With time, you will be able to estimate serving sizes for some foods.  · Take some time to put servings of different foods on your favorite plates, bowls, and cups so you know what a serving looks like.  · Try not to eat straight from a bag or box. Doing this can lead to overeating. Put the amount you would like to eat in a cup or on a plate to make sure you are eating the right portion.  · Use smaller plates, glasses, and bowls to prevent overeating.  · Try not to multitask (for example, watch TV or use your computer) while eating. If it is time to eat, sit down at a table and enjoy your food. This will help you to know when you are full. It will also help you to be aware of what you are eating and how much you are eating.  What are tips for following this plan?  Reading food labels  · Check the calorie count compared to the serving size. The serving size may be smaller than what you are used to " eating.  · Check the source of the calories. Make sure the food you are eating is high in vitamins and protein and low in saturated and trans fats.  Shopping  · Read nutrition labels while you shop. This will help you make healthy decisions before you decide to purchase your food.  · Make a grocery list and stick to it.  Cooking  · Try to cook your favorite foods in a healthier way. For example, try baking instead of frying.  · Use low-fat dairy products.  Meal planning  · Use more fruits and vegetables. Half of your plate should be fruits and vegetables.  · Include lean proteins like poultry and fish.  How do I count calories when eating out?  · Ask for smaller portion sizes.  · Consider sharing an entree and sides instead of getting your own entree.  · If you get your own entree, eat only half. Ask for a box at the beginning of your meal and put the rest of your entree in it so you are not tempted to eat it.  · If calories are listed on the menu, choose the lower calorie options.  · Choose dishes that include vegetables, fruits, whole grains, low-fat dairy products, and lean protein.  · Choose items that are boiled, broiled, grilled, or steamed. Stay away from items that are buttered, battered, fried, or served with cream sauce. Items labeled “crispy” are usually fried, unless stated otherwise.  · Choose water, low-fat milk, unsweetened iced tea, or other drinks without added sugar. If you want an alcoholic beverage, choose a lower calorie option such as a glass of wine or light beer.  · Ask for dressings, sauces, and syrups on the side. These are usually high in calories, so you should limit the amount you eat.  · If you want a salad, choose a garden salad and ask for grilled meats. Avoid extra toppings like yu, cheese, or fried items. Ask for the dressing on the side, or ask for olive oil and vinegar or lemon to use as dressing.  · Estimate how many servings of a food you are given. For example, a serving of  cooked rice is ½ cup or about the size of half a baseball. Knowing serving sizes will help you be aware of how much food you are eating at restaurants. The list below tells you how big or small some common portion sizes are based on everyday objects:  ? 1 oz--4 stacked dice.  ? 3 oz--1 deck of cards.  ? 1 tsp--1 die.  ? 1 Tbsp--½ a ping-pong ball.  ? 2 Tbsp--1 ping-pong ball.  ? ½ cup--½ baseball.  ? 1 cup--1 baseball.  Summary  · Calorie counting means keeping track of how many calories you eat and drink each day. If you eat fewer calories than your body needs, you should lose weight.  · A healthy amount of weight to lose per week is usually 1-2 lb (0.5-0.9 kg). This usually means reducing your daily calorie intake by 500-750 calories.  · The number of calories in a food can be found on a Nutrition Facts label. If a food does not have a Nutrition Facts label, try to look up the calories online or ask your dietitian for help.  · Use your calories on foods and drinks that will fill you up, and not on foods and drinks that will leave you hungry.  · Use smaller plates, glasses, and bowls to prevent overeating.  This information is not intended to replace advice given to you by your health care provider. Make sure you discuss any questions you have with your health care provider.  Document Released: 12/18/2006 Document Revised: 11/17/2017 Document Reviewed: 11/17/2017  [employer] Patient Education © 2019 [employer]      Exercising to Lose Weight  Exercising can help you to lose weight. In order to lose weight through exercise, you need to do vigorous-intensity exercise. You can tell that you are exercising with vigorous intensity if you are breathing very hard and fast and cannot hold a conversation while exercising.  Moderate-intensity exercise helps to maintain your current weight. You can tell that you are exercising at a moderate level if you have a higher heart rate and faster breathing, but you are  still able to hold a conversation.  How often should I exercise?  Choose an activity that you enjoy and set realistic goals. Your health care provider can help you to make an activity plan that works for you. Exercise regularly as directed by your health care provider. This may include:  · Doing resistance training twice each week, such as:  ? Push-ups.  ? Sit-ups.  ? Lifting weights.  ? Using resistance bands.  · Doing a given intensity of exercise for a given amount of time. Choose from these options:  ? 150 minutes of moderate-intensity exercise every week.  ? 75 minutes of vigorous-intensity exercise every week.  ? A mix of moderate-intensity and vigorous-intensity exercise every week.    Children, pregnant women, people who are out of shape, people who are overweight, and older adults may need to consult a health care provider for individual recommendations. If you have any sort of medical condition, be sure to consult your health care provider before starting a new exercise program.  What are some activities that can help me to lose weight?  · Walking at a rate of at least 4.5 miles an hour.  · Jogging or running at a rate of 5 miles per hour.  · Biking at a rate of at least 10 miles per hour.  · Lap swimming.  · Roller-skating or in-line skating.  · Cross-country skiing.  · Vigorous competitive sports, such as football, basketball, and soccer.  · Jumping rope.  · Aerobic dancing.  How can I be more active in my day-to-day activities?  · Use the stairs instead of the elevator.  · Take a walk during your lunch break.  · If you drive, park your car farther away from work or school.  · If you take public transportation, get off one stop early and walk the rest of the way.  · Make all of your phone calls while standing up and walking around.  · Get up, stretch, and walk around every 30 minutes throughout the day.  What guidelines should I follow while exercising?  · Do not exercise so much that you hurt yourself,  feel dizzy, or get very short of breath.  · Consult your health care provider prior to starting a new exercise program.  · Wear comfortable clothes and shoes with good support.  · Drink plenty of water while you exercise to prevent dehydration or heat stroke. Body water is lost during exercise and must be replaced.  · Work out until you breathe faster and your heart beats faster.  This information is not intended to replace advice given to you by your health care provider. Make sure you discuss any questions you have with your health care provider.  Document Released: 01/20/2012 Document Revised: 05/25/2017 Document Reviewed: 05/21/2015  [employer] Patient Education © 2019 [employer]

## 2019-05-17 RX ORDER — CITALOPRAM 20 MG/1
TABLET ORAL
Qty: 90 TABLET | Refills: 3 | Status: SHIPPED | OUTPATIENT
Start: 2019-05-17 | End: 2019-06-24

## 2019-06-03 DIAGNOSIS — Z96.652 HISTORY OF LEFT KNEE REPLACEMENT: Primary | ICD-10-CM

## 2019-06-04 ENCOUNTER — OFFICE VISIT (OUTPATIENT)
Dept: ORTHOPEDIC SURGERY | Facility: CLINIC | Age: 69
End: 2019-06-04

## 2019-06-04 VITALS — WEIGHT: 171 LBS | HEIGHT: 65 IN | BODY MASS INDEX: 28.49 KG/M2

## 2019-06-04 DIAGNOSIS — Z96.652 HISTORY OF LEFT KNEE REPLACEMENT: Primary | ICD-10-CM

## 2019-06-04 DIAGNOSIS — N18.2 CHRONIC KIDNEY DISEASE, STAGE II (MILD): ICD-10-CM

## 2019-06-04 DIAGNOSIS — E11.9 TYPE 2 DIABETES MELLITUS WITHOUT COMPLICATION, WITHOUT LONG-TERM CURRENT USE OF INSULIN (HCC): ICD-10-CM

## 2019-06-04 DIAGNOSIS — I10 ESSENTIAL HYPERTENSION: ICD-10-CM

## 2019-06-04 PROCEDURE — 99213 OFFICE O/P EST LOW 20 MIN: CPT | Performed by: ORTHOPAEDIC SURGERY

## 2019-06-04 NOTE — PROGRESS NOTES
"Otilia Kenny is a 69 y.o. female returns for     Chief Complaint   Patient presents with   • Left Knee - Follow-up     Xray today       HISTORY OF PRESENT ILLNESS:  Follow up left knee replacement.  DOS 6/4/18.  Xray today.   Patient reports doing well.   Occasional pain in right knee but doing well.  No problems in left knee.  No fevers or chills  Very pleased with results     CONCURRENT MEDICAL HISTORY:    The following portions of the patient's history were reviewed and updated as appropriate: allergies, current medications, past family history, past medical history, past social history, past surgical history and problem list.     ROS  No fevers or chills.  No chest pain or shortness of air.  No GI or  disturbances.    PHYSICAL EXAMINATION:       Ht 165.1 cm (65\")   Wt 77.6 kg (171 lb)   BMI 28.46 kg/m²     Physical Exam   Constitutional: She is oriented to person, place, and time. She appears well-developed and well-nourished.   Neurological: She is alert and oriented to person, place, and time.   Psychiatric: She has a normal mood and affect. Her behavior is normal. Judgment and thought content normal.       GAIT:     [x]  Normal  []  Antalgic    Assistive device: [x]  None  []  Walker     []  Crutches  []  Cane     []  Wheelchair  []  Stretcher    Left Knee Exam     Muscle Strength   The patient has normal left knee strength.    Tenderness   The patient is experiencing no tenderness.     Range of Motion   Extension: 0   Flexion: 130     Tests   Varus: negative Valgus: negative  Pivot shift: negative    Other   Erythema: absent  Sensation: normal  Pulse: present  Swelling: none              Xr Knee 1 Or 2 View Left    Result Date: 6/4/2019  Narrative: Ordering Provider:  Baron Reynolds MD Ordering Diagnosis/Indication:  History of left knee replacement Procedure:  XR KNEE 1 OR 2 VW LEFT Exam Date:  6/4/19 COMPARISON:  Todays X-rays were compared to previous images dated August 20, 2018. "     Impression:  AP bilateral standing of the knees with lateral of the left knee shows acceptable position and alignment of a left total knee arthroplasty with no sign of implant loosening or failure.  Appropriate sizing is noted on both views.  No interval change from prior x-ray.  Right knee shows acceptable position and alignment of a unicompartmental arthroplasty on the medial side.  No sign of implant loosening or failure is noted.  She does have mild to moderate arthritic changes noted in the lateral compartment with marginal osteophytes present.  Also no significant interval change from prior x-ray. Baron Reynolds MD 6/4/19     Xr Knee Bilateral Ap Standing    Result Date: 6/4/2019  Narrative: Ordering Provider:  Baron Reynolds MD Ordering Diagnosis/Indication:  History of left knee replacement Procedure:  XR KNEE BILATERAL AP STANDING Exam Date:  6/4/19 COMPARISON:  Todays X-rays were compared to previous images dated August 20, 2018.     Impression:  AP bilateral standing of the knees with lateral of the left knee shows acceptable position and alignment of a left total knee arthroplasty with no sign of implant loosening or failure.  Appropriate sizing is noted on both views.  No interval change from prior x-ray.  Right knee shows acceptable position and alignment of a unicompartmental arthroplasty on the medial side.  No sign of implant loosening or failure is noted.  She does have mild to moderate arthritic changes noted in the lateral compartment with marginal osteophytes present.  Also no significant interval change from prior x-ray. Baron Reynolds MD 6/4/19             ASSESSMENT:    Diagnoses and all orders for this visit:    History of left knee replacement    Essential hypertension    Type 2 diabetes mellitus without complication, without long-term current use of insulin (CMS/[facility])    Chronic kidney disease, stage II (mild)          PLAN    Continue to work with strengthening  and conditioning exercises.  Slowly progress activity as strengthening and conditioning allows.  Slowly increase activity.  No restrictions.  Follow-up as needed.    Patient's Body mass index is 28.46 kg/m². BMI is above normal parameters. Recommendations include: exercise counseling and nutrition counseling.      Return if symptoms worsen or fail to improve, for recheck.    Baron Reynolds MD

## 2019-06-24 ENCOUNTER — OFFICE VISIT (OUTPATIENT)
Dept: FAMILY MEDICINE CLINIC | Facility: CLINIC | Age: 69
End: 2019-06-24

## 2019-06-24 VITALS
TEMPERATURE: 98.8 F | HEART RATE: 72 BPM | HEIGHT: 65 IN | SYSTOLIC BLOOD PRESSURE: 130 MMHG | DIASTOLIC BLOOD PRESSURE: 78 MMHG | BODY MASS INDEX: 28.52 KG/M2 | WEIGHT: 171.2 LBS

## 2019-06-24 DIAGNOSIS — M62.830 MUSCLE SPASM OF BACK: ICD-10-CM

## 2019-06-24 DIAGNOSIS — M62.838 MUSCLE SPASMS OF NECK: ICD-10-CM

## 2019-06-24 DIAGNOSIS — M54.6 CHRONIC MIDLINE THORACIC BACK PAIN: ICD-10-CM

## 2019-06-24 DIAGNOSIS — N18.2 CHRONIC KIDNEY DISEASE, STAGE II (MILD): Chronic | ICD-10-CM

## 2019-06-24 DIAGNOSIS — Z00.00 MEDICARE ANNUAL WELLNESS VISIT, SUBSEQUENT: Primary | ICD-10-CM

## 2019-06-24 DIAGNOSIS — M50.90 CERVICAL DISC DISORDER: Chronic | ICD-10-CM

## 2019-06-24 DIAGNOSIS — G89.29 CHRONIC MIDLINE THORACIC BACK PAIN: ICD-10-CM

## 2019-06-24 DIAGNOSIS — M47.812 SPONDYLOSIS OF CERVICAL REGION WITHOUT MYELOPATHY OR RADICULOPATHY: ICD-10-CM

## 2019-06-24 DIAGNOSIS — F32.5 MAJOR DEPRESSION IN COMPLETE REMISSION (HCC): Chronic | ICD-10-CM

## 2019-06-24 PROCEDURE — 99214 OFFICE O/P EST MOD 30 MIN: CPT | Performed by: INTERNAL MEDICINE

## 2019-06-24 PROCEDURE — G0439 PPPS, SUBSEQ VISIT: HCPCS | Performed by: INTERNAL MEDICINE

## 2019-06-24 RX ORDER — TIZANIDINE HYDROCHLORIDE 4 MG/1
4 CAPSULE, GELATIN COATED ORAL 2 TIMES DAILY PRN
Qty: 30 CAPSULE | Refills: 1 | Status: SHIPPED | OUTPATIENT
Start: 2019-06-24 | End: 2019-06-25 | Stop reason: SDUPTHER

## 2019-06-24 RX ORDER — METAXALONE 800 MG/1
800 TABLET ORAL 3 TIMES DAILY PRN
Qty: 30 TABLET | Refills: 1 | Status: SHIPPED | OUTPATIENT
Start: 2019-06-24 | End: 2019-06-24 | Stop reason: CLARIF

## 2019-06-24 RX ORDER — DULOXETIN HYDROCHLORIDE 60 MG/1
60 CAPSULE, DELAYED RELEASE ORAL DAILY
Qty: 90 CAPSULE | Refills: 3 | Status: SHIPPED | OUTPATIENT
Start: 2019-06-24 | End: 2019-07-01 | Stop reason: SDUPTHER

## 2019-06-24 RX ORDER — METAXALONE 800 MG/1
800 TABLET ORAL 3 TIMES DAILY PRN
Qty: 270 TABLET | Refills: 1 | Status: SHIPPED | OUTPATIENT
Start: 2019-06-24 | End: 2019-06-24 | Stop reason: SDUPTHER

## 2019-06-24 NOTE — PROGRESS NOTES
QUICK REFERENCE INFORMATION:  The ABCs of the Annual Wellness Visit    Subsequent Medicare Wellness Visit     HEALTH RISK ASSESSMENT    : 1950    Recent Hospitalizations:  No hospitalization(s) within the last year..  ccc      Current Medical Providers:  Patient Care Team:  Alexis Zabala MD as PCP - Baron Albert MD as Surgeon (Orthopedic Surgery)        Smoking Status:  Social History     Tobacco Use   Smoking Status Former Smoker   • Types: Cigarettes   • Last attempt to quit: 1985   • Years since quittin.0   Smokeless Tobacco Never Used       Alcohol Consumption:  Social History     Substance and Sexual Activity   Alcohol Use No       Depression Screen:   PHQ-2/PHQ-9 Depression Screening 2019   Little interest or pleasure in doing things 0   Feeling down, depressed, or hopeless 0   Trouble falling or staying asleep, or sleeping too much 0   Feeling tired or having little energy 0   Poor appetite or overeating 0   Feeling bad about yourself - or that you are a failure or have let yourself or your family down 0   Trouble concentrating on things, such as reading the newspaper or watching television 0   Moving or speaking so slowly that other people could have noticed. Or the opposite - being so fidgety or restless that you have been moving around a lot more than usual 0   Thoughts that you would be better off dead, or of hurting yourself in some way 0   Total Score 0   If you checked off any problems, how difficult have these problems made it for you to do your work, take care of things at home, or get along with other people? -       Health Habits and Functional and Cognitive Screening:  Functional & Cognitive Status 2019   Do you have difficulty preparing food and eating? No   Do you have difficulty bathing yourself, getting dressed or grooming yourself? No   Do you have difficulty using the toilet? No   Do you have difficulty moving around from place to place? No    Do you have trouble with steps or getting out of a bed or a chair? Yes   In the past year have you fallen or experienced a near fall? No   Current Diet Well Balanced Diet   Dental Exam Up to date   Eye Exam Up to date   Exercise (times per week) 7 times per week   Current Exercise Activities Include Stationary Bicycling/Spin Class   Do you need help using the phone?  No   Are you deaf or do you have serious difficulty hearing?  No   Do you need help with transportation? No   Do you need help shopping? No   Do you need help preparing meals?  No   Do you need help with housework?  No   Do you need help with laundry? No   Do you need help taking your medications? No   Do you need help managing money? No   Do you ever drive or ride in a car without wearing a seat belt? No   Have you felt unusual stress, anger or loneliness in the last month? No   Who do you live with? Spouse   If you need help, do you have trouble finding someone available to you? No   Have you been bothered in the last four weeks by sexual problems? No   Do you have difficulty concentrating, remembering or making decisions? No           Does the patient have evidence of cognitive impairment? No    Asiprin use counseling: Taking ASA appropriately as indicated      Recent Lab Results:       Lab Results   Component Value Date    HGBA1C 6.80 (H) 05/06/2019     Lab Results   Component Value Date    CHOL 196 05/06/2019    TRIG 111 05/06/2019    HDL 61 (H) 05/06/2019    VLDL 22.2 05/06/2019    LDLHDL 1.85 05/06/2019           Age-appropriate Screening Schedule:  Refer to the list below for future screening recommendations based on patient's age, sex and/or medical conditions. Orders for these recommended tests are listed in the plan section. The patient has been provided with a written plan.    Health Maintenance   Topic Date Due   • TDAP/TD VACCINES (1 - Tdap) 01/10/1967   • ZOSTER VACCINE (1 of 2) 01/10/1998   • DIABETIC FOOT EXAM  08/02/2016   •  MAMMOGRAM  05/16/2019   • INFLUENZA VACCINE  08/01/2019   • URINE MICROALBUMIN  10/31/2019   • HEMOGLOBIN A1C  11/06/2019   • DIABETIC EYE EXAM  03/27/2020   • LIPID PANEL  05/06/2020   • COLONOSCOPY  01/05/2026   • PNEUMOCOCCAL VACCINES (65+ LOW/MEDIUM RISK)  Completed        Subjective   History of Present Illness    Otilia Kenny is a 69 y.o. female who presents for an Annual Wellness Visit.    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  She does not have any pertinent problems on file.  She  has a past surgical history that includes Back surgery; Cervical disc surgery; endoscopy and colonoscopy; Colonoscopy; Esophagogastroduodenoscopy; Knee arthroscopy (Bilateral); Knee surgery; Hysterectomy; Cholecystectomy; Carpal tunnel release (Bilateral); Excision heel spur excision; Bunionectomy; Finger/Thumb Lesion/Cyst Excision; and Total knee arthroplasty (Left, 6/4/2018).  Her family history includes Cancer in her other; Diabetes in her other; Hypertension in her other; Stroke in her other.  She  reports that she quit smoking about 34 years ago. Her smoking use included cigarettes. She has never used smokeless tobacco. She reports that she does not drink alcohol or use drugs.  Current Outpatient Medications   Medication Sig Dispense Refill   • amLODIPine  (NORVASC) 5 MG tablet TAKE 1 TABLET DAILY 90 tablet 3   • aspirin 81 MG EC tablet Take 81 mg by mouth Daily.     • Blood Glucose Monitoring Suppl (ONETOUCH VERIO IQ SYSTEM) W/DEVICE kit daily.     • CALCIUM CARB-CHOLECALCIFEROL PO Take 1 tablet by mouth 2 (two) times a day.     • cetirizine (zyrTEC) 10 MG tablet Take 10 mg by mouth Daily.     • Dapagliflozin Propanediol (FARXIGA) 10 MG tablet TAKE 1 TABLET DAILY        (REPLACES INVOKANA) 90 tablet 1   • doxycycline (VIBRAMYCIN) 100 MG capsule Take 100 mg by mouth Daily.  1   • fenofibrate (TRICOR) 145 MG tablet 1/2 to 1 tablet daily 90 tablet 3   • glucose blood (ONETOUCH VERIO) test strip Test daily as directed. DX: E11.9 100 each 3   • Insulin Pen Needle 31G X 5 MM misc Use daily with Victoza 90 each 3   • IRON, FERROUS SULFATE, PO Take 1 tablet by mouth Take As Directed. Patient takes on Mon, Wed, and Fri     • lisinopril (PRINIVIL,ZESTRIL) 20 MG tablet Take 1 tablet by mouth Daily. 90 tablet 3   • meclizine (ANTIVERT) 25 MG tablet Take 1 tablet by mouth 3 (Three) Times a Day As Needed for dizziness. 30 tablet 1   • Multiple Vitamins-Minerals (MULTIVITAMIN PO) Take 1 tablet by mouth daily.     • NON FORMULARY Take 1 tablet by mouth Daily. Eye Health and Lutein     • omeprazole (priLOSEC) 40 MG capsule TAKE 1 CAPSULE DAILY 90 capsule 3   • ONETOUCH DELICA LANCETS 33G misc Test one time daily as directed DX: E11.9 100 each 3   • rosuvastatin (CRESTOR) 20 MG tablet TAKE 1 TABLET DAILY.       REPLACE SIMVASTATIN 90 tablet 3   • Semaglutide (OZEMPIC) 1 MG/DOSE solution pen-injector Inject 1 mg under the skin into the appropriate area as directed 1 (One) Time Per Week. 6 pen 3   • DULoxetine (CYMBALTA) 60 MG capsule Take 1 capsule by mouth Daily. 90 capsule 3   • metaxalone (SKELAXIN) 800 MG tablet Take 1 tablet by mouth 3 (Three) Times a Day As Needed for Muscle Spasms. 30 tablet 1     No current facility-administered medications for this visit.      Current  Outpatient Medications on File Prior to Visit   Medication Sig   • amLODIPine (NORVASC) 5 MG tablet TAKE 1 TABLET DAILY   • aspirin 81 MG EC tablet Take 81 mg by mouth Daily.   • Blood Glucose Monitoring Suppl (ONETOUCH VERIO IQ SYSTEM) W/DEVICE kit daily.   • CALCIUM CARB-CHOLECALCIFEROL PO Take 1 tablet by mouth 2 (two) times a day.   • cetirizine (zyrTEC) 10 MG tablet Take 10 mg by mouth Daily.   • Dapagliflozin Propanediol (FARXIGA) 10 MG tablet TAKE 1 TABLET DAILY        (REPLACES INVOKANA)   • doxycycline (VIBRAMYCIN) 100 MG capsule Take 100 mg by mouth Daily.   • fenofibrate (TRICOR) 145 MG tablet 1/2 to 1 tablet daily   • glucose blood (ONETOUCH VERIO) test strip Test daily as directed. DX: E11.9   • Insulin Pen Needle 31G X 5 MM misc Use daily with Victoza   • IRON, FERROUS SULFATE, PO Take 1 tablet by mouth Take As Directed. Patient takes on Mon, Wed, and Fri   • lisinopril (PRINIVIL,ZESTRIL) 20 MG tablet Take 1 tablet by mouth Daily.   • meclizine (ANTIVERT) 25 MG tablet Take 1 tablet by mouth 3 (Three) Times a Day As Needed for dizziness.   • Multiple Vitamins-Minerals (MULTIVITAMIN PO) Take 1 tablet by mouth daily.   • NON FORMULARY Take 1 tablet by mouth Daily. Eye Health and Lutein   • omeprazole (priLOSEC) 40 MG capsule TAKE 1 CAPSULE DAILY   • ONETOUCH DELICA LANCETS 33G misc Test one time daily as directed DX: E11.9   • rosuvastatin (CRESTOR) 20 MG tablet TAKE 1 TABLET DAILY.       REPLACE SIMVASTATIN   • Semaglutide (OZEMPIC) 1 MG/DOSE solution pen-injector Inject 1 mg under the skin into the appropriate area as directed 1 (One) Time Per Week.   • [DISCONTINUED] citalopram (CeleXA) 20 MG tablet TAKE 1 TABLET DAILY     No current facility-administered medications on file prior to visit.      She is allergic to allegra [fexofenadine]; amaryl [glimepiride]; dyazide [triamterene-hctz]; hydrochlorothiazide w-triamterene; lipitor [atorvastatin]; metformin and related; naproxen; and  sulfamethoxazole-trimethoprim..    Outpatient Medications Prior to Visit   Medication Sig Dispense Refill   • amLODIPine (NORVASC) 5 MG tablet TAKE 1 TABLET DAILY 90 tablet 3   • aspirin 81 MG EC tablet Take 81 mg by mouth Daily.     • Blood Glucose Monitoring Suppl (ONETOUCH VERIO IQ SYSTEM) W/DEVICE kit daily.     • CALCIUM CARB-CHOLECALCIFEROL PO Take 1 tablet by mouth 2 (two) times a day.     • cetirizine (zyrTEC) 10 MG tablet Take 10 mg by mouth Daily.     • Dapagliflozin Propanediol (FARXIGA) 10 MG tablet TAKE 1 TABLET DAILY        (REPLACES INVOKANA) 90 tablet 1   • doxycycline (VIBRAMYCIN) 100 MG capsule Take 100 mg by mouth Daily.  1   • fenofibrate (TRICOR) 145 MG tablet 1/2 to 1 tablet daily 90 tablet 3   • glucose blood (ONETOUCH VERIO) test strip Test daily as directed. DX: E11.9 100 each 3   • Insulin Pen Needle 31G X 5 MM misc Use daily with Victoza 90 each 3   • IRON, FERROUS SULFATE, PO Take 1 tablet by mouth Take As Directed. Patient takes on Mon, Wed, and Fri     • lisinopril (PRINIVIL,ZESTRIL) 20 MG tablet Take 1 tablet by mouth Daily. 90 tablet 3   • meclizine (ANTIVERT) 25 MG tablet Take 1 tablet by mouth 3 (Three) Times a Day As Needed for dizziness. 30 tablet 1   • Multiple Vitamins-Minerals (MULTIVITAMIN PO) Take 1 tablet by mouth daily.     • NON FORMULARY Take 1 tablet by mouth Daily. Eye Health and Lutein     • omeprazole (priLOSEC) 40 MG capsule TAKE 1 CAPSULE DAILY 90 capsule 3   • ONETOUCH DELICA LANCETS 33G misc Test one time daily as directed DX: E11.9 100 each 3   • rosuvastatin (CRESTOR) 20 MG tablet TAKE 1 TABLET DAILY.       REPLACE SIMVASTATIN 90 tablet 3   • Semaglutide (OZEMPIC) 1 MG/DOSE solution pen-injector Inject 1 mg under the skin into the appropriate area as directed 1 (One) Time Per Week. 6 pen 3   • citalopram (CeleXA) 20 MG tablet TAKE 1 TABLET DAILY 90 tablet 3     No facility-administered medications prior to visit.        Patient Active Problem List   Diagnosis  "  • Vitamin B12 deficiency (dietary) anemia   • Spasm of back muscles   • Sleep disorder   • Osteoarthritis of knee   • Irritable bowel syndrome   • Iron deficiency anemia   • Hypertriglyceridemia   • Hyperlipidemia   • GERD (gastroesophageal reflux disease)   • Generalized anxiety disorder   • Gastritis   • Essential hypertension   • Dysfunction of eustachian tube   • Diverticular disease of colon   • Cervical disc disorder   • Diarrhea, unspecified   • Anemia   • Allergic rhinitis   • Abnormal liver enzymes   • Abdominal pain   • Major depression in complete remission (CMS/HCC)   • Chronic kidney disease, stage II (mild)   • Chronic pain of both knees   • Internal derangement of left knee   • Primary osteoarthritis of left knee   • Chronic pain of left knee   • Type 2 diabetes mellitus without complication, without long-term current use of insulin (CMS/HCC)   • Meniere's disease of both ears   • Vertigo       Advance Care Planning:  Patient has an advance directive - a copy has been provided and is visible in patient header    Identification of Risk Factors:  Risk factors include: weight  and chronic pain.    Review of Systems    Compared to one year ago, the patient feels her physical health is better.  Compared to one year ago, the patient feels her mental health is better.    Objective     Physical Exam    Vitals:    06/24/19 1307   BP: 130/78   Pulse: 72   Temp: 98.8 °F (37.1 °C)   TempSrc: Oral   Weight: 77.7 kg (171 lb 3.2 oz)   Height: 165.1 cm (65\")   PainSc:   3   PainLoc: Back       Patient's Body mass index is 28.49 kg/m². BMI is above normal parameters. Recommendations include: exercise counseling and nutrition counseling.      Assessment/Plan   Patient Self-Management and Personalized Health Advice  The patient has been provided with information about: diet and exercise and preventive services including:   · Exercise counseling provided, Fall Risk assessment done, Nutrition counseling provided, " Pneumococcal vaccine .    Visit Diagnoses:    ICD-10-CM ICD-9-CM   1. Medicare annual wellness visit, subsequent Z00.00 V70.0   2. Spondylosis of cervical region without myelopathy or radiculopathy M47.812 721.0   3. Muscle spasms of neck M62.838 728.85   4. Muscle spasm of back M62.830 724.8   5. Cervical disc disorder M50.90 722.91   6. Major depression in complete remission (CMS/[facility]) F32.5 296.26   7. Chronic midline thoracic back pain M54.6 724.1    G89.29 338.29   8. Chronic kidney disease, stage II (mild) N18.2 585.2       Orders Placed This Encounter   Procedures   • XR Spine Thoracic 2 View     Order Specific Question:   Reason for Exam:     Answer:   worsening neck and upper back pain   • Ambulatory Referral to Physical Therapy Evaluate and treat     Referral Priority:   Routine     Referral Type:   Therapy     Referral Reason:   Specialty Services Required     Requested Specialty:   Physical Therapy     Number of Visits Requested:   1       Outpatient Encounter Medications as of 6/24/2019   Medication Sig Dispense Refill   • amLODIPine (NORVASC) 5 MG tablet TAKE 1 TABLET DAILY 90 tablet 3   • aspirin 81 MG EC tablet Take 81 mg by mouth Daily.     • Blood Glucose Monitoring Suppl (ONETOUCH VERIO IQ SYSTEM) W/DEVICE kit daily.     • CALCIUM CARB-CHOLECALCIFEROL PO Take 1 tablet by mouth 2 (two) times a day.     • cetirizine (zyrTEC) 10 MG tablet Take 10 mg by mouth Daily.     • Dapagliflozin Propanediol (FARXIGA) 10 MG tablet TAKE 1 TABLET DAILY        (REPLACES INVOKANA) 90 tablet 1   • doxycycline (VIBRAMYCIN) 100 MG capsule Take 100 mg by mouth Daily.  1   • fenofibrate (TRICOR) 145 MG tablet 1/2 to 1 tablet daily 90 tablet 3   • glucose blood (ONETOUCH VERIO) test strip Test daily as directed. DX: E11.9 100 each 3   • Insulin Pen Needle 31G X 5 MM misc Use daily with Victoza 90 each 3   • IRON, FERROUS SULFATE, PO Take 1 tablet by mouth Take As Directed. Patient takes on Mon, Wed, and Fri     •  lisinopril (PRINIVIL,ZESTRIL) 20 MG tablet Take 1 tablet by mouth Daily. 90 tablet 3   • meclizine (ANTIVERT) 25 MG tablet Take 1 tablet by mouth 3 (Three) Times a Day As Needed for dizziness. 30 tablet 1   • Multiple Vitamins-Minerals (MULTIVITAMIN PO) Take 1 tablet by mouth daily.     • NON FORMULARY Take 1 tablet by mouth Daily. Eye Health and Lutein     • omeprazole (priLOSEC) 40 MG capsule TAKE 1 CAPSULE DAILY 90 capsule 3   • ONETOUCH DELICA LANCETS 33G misc Test one time daily as directed DX: E11.9 100 each 3   • rosuvastatin (CRESTOR) 20 MG tablet TAKE 1 TABLET DAILY.       REPLACE SIMVASTATIN 90 tablet 3   • Semaglutide (OZEMPIC) 1 MG/DOSE solution pen-injector Inject 1 mg under the skin into the appropriate area as directed 1 (One) Time Per Week. 6 pen 3   • [DISCONTINUED] citalopram (CeleXA) 20 MG tablet TAKE 1 TABLET DAILY 90 tablet 3   • DULoxetine (CYMBALTA) 60 MG capsule Take 1 capsule by mouth Daily. 90 capsule 3   • metaxalone (SKELAXIN) 800 MG tablet Take 1 tablet by mouth 3 (Three) Times a Day As Needed for Muscle Spasms. 30 tablet 1   • [DISCONTINUED] metaxalone (SKELAXIN) 800 MG tablet Take 1 tablet by mouth 3 (Three) Times a Day As Needed for Muscle Spasms. 270 tablet 1     No facility-administered encounter medications on file as of 6/24/2019.        Reviewed use of high risk medication in the elderly: not applicable  Reviewed for potential of harmful drug interactions in the elderly: not applicable    Follow Up:  Return if symptoms worsen or fail to improve, for Next scheduled follow up.     An After Visit Summary and PPPS with all of these plans were given to the patient.

## 2019-06-24 NOTE — PROGRESS NOTES
Subjective          History of Present Illness     Otilia Kenny is a 69 y.o. female with history of laminectomy and cervical fusion 02/2013 who comes in reporting worsening posterior neck and upper thoracic back pain.  Exam  reveals spasm of bilateral trapezius and paraspinal muscles of cervical and thoracic spine awith worsening kyphotic curvature of thoracic spine as well as some right biceps muscle tenderness.  She had been avoiding Tylenol products due to history of fatty liver, although, with her visit last month, liver enzymes were at goal, for which I recommended she try Tylenol Arthritis for pain relief.  She has not had significant relief with taking two Tylenol Arthritis twice daily.  The pain worsens as the day progresses.  Pain is relieved with lying down allowing her to be able to sleep at night. She rates her pain a 7-8 of 10.  We have been avoiding chronic NSAIDs due to her mild CKD, although, creatinine was improved with labs last month with creatinine 1.01.  She has been intolerant to several anti-inflammatories including Celebrex, naproxen, and Mobic.  She continues on Celexa to help with depressive symptoms.  I discussed trying Cymbalta instead of Celexa, which may help block pain.  She is in agreement. I also offered a referral to physical therapy and patient would like to try this.         Review of Systems   Constitutional: Negative for chills, fatigue and fever.   HENT: Negative for congestion, ear pain, postnasal drip, sinus pressure and sore throat.    Respiratory: Negative for cough, shortness of breath and wheezing.    Cardiovascular: Negative for chest pain, palpitations and leg swelling.   Gastrointestinal: Negative for abdominal pain, blood in stool, constipation, diarrhea, nausea and vomiting.   Endocrine: Negative for cold intolerance, heat intolerance, polydipsia and polyuria.   Genitourinary: Negative for dysuria, frequency, hematuria and urgency.   Musculoskeletal: Positive for  "back pain and neck pain.   Skin: Negative for rash.   Neurological: Negative for syncope and weakness.        Objective     Vitals:    06/24/19 1307   BP: 130/78   Pulse: 72   Temp: 98.8 °F (37.1 °C)   TempSrc: Oral   Weight: 77.7 kg (171 lb 3.2 oz)   Height: 165.1 cm (65\")     Physical Exam   Constitutional: She is oriented to person, place, and time. She appears well-developed and well-nourished. No distress.   Accompanied by her .    HENT:   Head: Normocephalic and atraumatic.   Nose: Nose normal.   Mouth/Throat: Oropharynx is clear and moist. No oropharyngeal exudate.   Eyes: EOM are normal. Pupils are equal, round, and reactive to light.   Neck: Neck supple. No JVD present. No thyromegaly present.   Cardiovascular: Normal rate, regular rhythm and normal heart sounds.   Pulmonary/Chest: Effort normal and breath sounds normal. No accessory muscle usage. No respiratory distress. She has no wheezes. She has no rales.   Abdominal: Soft. Bowel sounds are normal. She exhibits no distension. There is no tenderness.   Musculoskeletal: She exhibits no edema.   Exam reveals spasm of bilateral trapezius and paraspinal muscles of thoracic and lumbar spine awith worsening kyphotic curvature of thoracic spine. Increased muscle spasm in cervical spine    Lymphadenopathy:     She has no cervical adenopathy.   Neurological: She is alert and oriented to person, place, and time. No cranial nerve deficit.   Psychiatric: She has a normal mood and affect. Her speech is normal and behavior is normal. Judgment and thought content normal.     Future Appointments   Date Time Provider Department Center   11/18/2019  1:00 PM Alexis Zabala MD [facility] None       Assessment/Plan      She will stop by for x-rays of the cervical spine.  Stop the Celexa and start Cymbalta.  Prescription is sent for Cybalta 60 mg to take one q.d. and Skelaxin 800 mg to take one t.i.d. for muscle spasm.   A referral is sent to [facility] " therapy.  She may find chiropractic manipulation and/or massage therapy beneficial in pain relief.    We will avoid NSAIDs due to her history of CKD and her history of poor intolerance to multiple NSAIDs in the past.    Return in November for follow up with fasting labs one week prior.     Scribed for Dr. Zabala by Vandana Galeas [facility].     Diagnoses and all orders for this visit:    Medicare annual wellness visit, subsequent    Spondylosis of cervical region without myelopathy or radiculopathy  -     XR Spine Thoracic 2 View    Muscle spasms of neck  -     XR Spine Thoracic 2 View    Muscle spasm of back  -     XR Spine Thoracic 2 View    Cervical disc disorder    Major depression in complete remission (CMS/HCC)    Chronic midline thoracic back pain  -     Ambulatory Referral to Physical Therapy Evaluate and treat    Chronic kidney disease, stage II (mild)    Other orders  -     DULoxetine (CYMBALTA) 60 MG capsule; Take 1 capsule by mouth Daily.  -     Discontinue: metaxalone (SKELAXIN) 800 MG tablet; Take 1 tablet by mouth 3 (Three) Times a Day As Needed for Muscle Spasms.  -     metaxalone (SKELAXIN) 800 MG tablet; Take 1 tablet by mouth 3 (Three) Times a Day As Needed for Muscle Spasms.        No visits with results within 3 Week(s) from this visit.   Latest known visit with results is:   Lab on 05/06/2019   Component Date Value Ref Range Status   • WBC 05/06/2019 4.60  3.40 - 10.80 10*3/mm3 Final   • RBC 05/06/2019 4.38  3.77 - 5.28 10*6/mm3 Final   • Hemoglobin 05/06/2019 13.3  12.0 - 15.9 g/dL Final   • Hematocrit 05/06/2019 39.0  34.0 - 46.6 % Final   • MCV 05/06/2019 89.0  79.0 - 97.0 fL Final   • MCH 05/06/2019 30.4  26.6 - 33.0 pg Final   • MCHC 05/06/2019 34.1  31.5 - 35.7 g/dL Final   • RDW 05/06/2019 12.8  12.3 - 15.4 % Final   • RDW-SD 05/06/2019 40.8  37.0 - 54.0 fl Final   • MPV 05/06/2019 9.5  6.0 - 12.0 fL Final   • Platelets 05/06/2019 352  140 - 450 10*3/mm3 Final   • Neutrophil % 05/06/2019  62.0  42.7 - 76.0 % Final   • Lymphocyte % 05/06/2019 27.2  19.6 - 45.3 % Final   • Monocyte % 05/06/2019 8.0  5.0 - 12.0 % Final   • Eosinophil % 05/06/2019 2.4  0.3 - 6.2 % Final   • Basophil % 05/06/2019 0.4  0.0 - 1.5 % Final   • Neutrophils, Absolute 05/06/2019 2.85  1.70 - 7.00 10*3/mm3 Final   • Lymphocytes, Absolute 05/06/2019 1.25  0.70 - 3.10 10*3/mm3 Final   • Monocytes, Absolute 05/06/2019 0.37  0.10 - 0.90 10*3/mm3 Final   • Eosinophils, Absolute 05/06/2019 0.11  0.00 - 0.40 10*3/mm3 Final   • Basophils, Absolute 05/06/2019 0.02  0.00 - 0.20 10*3/mm3 Final   • Glucose 05/06/2019 146* 70 - 99 mg/dL Final   • BUN 05/06/2019 27* 7 - 23 mg/dL Final   • Creatinine 05/06/2019 1.01  0.52 - 1.04 mg/dL Final   • Sodium 05/06/2019 135* 137 - 145 mmol/L Final   • Potassium 05/06/2019 4.3  3.4 - 5.0 mmol/L Final   • Chloride 05/06/2019 97* 101 - 112 mmol/L Final   • CO2 05/06/2019 28.0  22.0 - 30.0 mmol/L Final   • Calcium 05/06/2019 9.7  8.4 - 10.2 mg/dL Final   • Total Protein 05/06/2019 7.4  6.3 - 8.6 g/dL Final   • Albumin 05/06/2019 4.30  3.50 - 5.00 g/dL Final   • ALT (SGPT) 05/06/2019 24  <=35 U/L Final   • AST (SGOT) 05/06/2019 24  14 - 36 U/L Final   • Alkaline Phosphatase 05/06/2019 68  38 - 126 U/L Final   • Total Bilirubin 05/06/2019 0.6  0.2 - 1.3 mg/dL Final   • eGFR Non African Amer 05/06/2019 54  39 - 90 mL/min/1.73 Final   • Globulin 05/06/2019 3.1  2.3 - 3.5 gm/dL Final   • A/G Ratio 05/06/2019 1.4  1.1 - 1.8 g/dL Final   • BUN/Creatinine Ratio 05/06/2019 26.7* 7.0 - 25.0 Final   • Anion Gap 05/06/2019 10.0  5.0 - 15.0 mmol/L Final   • Hemoglobin A1C 05/06/2019 6.80* 4.80 - 5.60 % Final   • Ferritin 05/06/2019 49.80  13.00 - 150.00 ng/mL Final   • Total Cholesterol 05/06/2019 196  150 - 200 mg/dL Final   • Triglycerides 05/06/2019 111  <=150 mg/dL Final   • HDL Cholesterol 05/06/2019 61* 40 - 59 mg/dL Final   • LDL Cholesterol  05/06/2019 113* <=100 mg/dL Final   • VLDL Cholesterol 05/06/2019 22.2   mg/dL Final   • LDL/HDL Ratio 05/06/2019 1.85  0.00 - 3.22 Final   • Vitamin B-12 05/06/2019 728  211 - 946 pg/mL Final   ]

## 2019-06-24 NOTE — PATIENT INSTRUCTIONS
Medicare Wellness  Personal Prevention Plan of Service     Date of Office Visit:  2019  Encounter Provider:  Alexis Zabala MD  Place of Service:  [facility]  Patient Name: Otilia Kenny  :  1950    As part of the Medicare Wellness portion of your visit today, we are providing you with this personalized preventive plan of services (PPPS). This plan is based upon recommendations of the [place] Preventive Services Task Force (USPSTF) and the Advisory Committee on Immunization Practices (ACIP).    This lists the preventive care services that should be considered, and provides dates of when you are due. Items listed as completed are up-to-date and do not require any further intervention.    Health Maintenance   Topic Date Due   • TDAP/TD VACCINES (1 - Tdap) 01/10/1967   • ZOSTER VACCINE (1 of 2) 01/10/1998   • HEPATITIS C SCREENING  2016   • DIABETIC FOOT EXAM  2016   • MEDICARE ANNUAL WELLNESS  2018   • MAMMOGRAM  2019   • INFLUENZA VACCINE  2019   • URINE MICROALBUMIN  10/31/2019   • HEMOGLOBIN A1C  2019   • DIABETIC EYE EXAM  2020   • LIPID PANEL  2020   • COLONOSCOPY  2026   • PNEUMOCOCCAL VACCINES (65+ LOW/MEDIUM RISK)  Completed       No orders of the defined types were placed in this encounter.      Return if symptoms worsen or fail to improve, for Next scheduled follow up.          Calorie Counting for Weight Loss  Calories are units of energy. Your body needs a certain amount of calories from food to keep you going throughout the day. When you eat more calories than your body needs, your body stores the extra calories as fat. When you eat fewer calories than your body needs, your body burns fat to get the energy it needs.  Calorie counting means keeping track of how many calories you eat and drink each day. Calorie counting can be helpful if you need to lose weight. If you make sure to eat fewer  calories than your body needs, you should lose weight. Ask your health care provider what a healthy weight is for you.  For calorie counting to work, you will need to eat the right number of calories in a day in order to lose a healthy amount of weight per week. A dietitian can help you determine how many calories you need in a day and will give you suggestions on how to reach your calorie goal.  · A healthy amount of weight to lose per week is usually 1-2 lb (0.5-0.9 kg). This usually means that your daily calorie intake should be reduced by 500-750 calories.  · Eating 1,200 - 1,500 calories per day can help most women lose weight.  · Eating 1,500 - 1,800 calories per day can help most men lose weight.    What is my plan?  My goal is to have __________ calories per day.  If I have this many calories per day, I should lose around __________ pounds per week.  What do I need to know about calorie counting?  In order to meet your daily calorie goal, you will need to:  · Find out how many calories are in each food you would like to eat. Try to do this before you eat.  · Decide how much of the food you plan to eat.  · Write down what you ate and how many calories it had. Doing this is called keeping a food log.    To successfully lose weight, it is important to balance calorie counting with a healthy lifestyle that includes regular activity. Aim for 150 minutes of moderate exercise (such as walking) or 75 minutes of vigorous exercise (such as running) each week.  Where do I find calorie information?    The number of calories in a food can be found on a Nutrition Facts label. If a food does not have a Nutrition Facts label, try to look up the calories online or ask your dietitian for help.  Remember that calories are listed per serving. If you choose to have more than one serving of a food, you will have to multiply the calories per serving by the amount of servings you plan to eat. For example, the label on a package of  "bread might say that a serving size is 1 slice and that there are 90 calories in a serving. If you eat 1 slice, you will have eaten 90 calories. If you eat 2 slices, you will have eaten 180 calories.  How do I keep a food log?  Immediately after each meal, record the following information in your food log:  · What you ate. Don't forget to include toppings, sauces, and other extras on the food.  · How much you ate. This can be measured in cups, ounces, or number of items.  · How many calories each food and drink had.  · The total number of calories in the meal.    Keep your food log near you, such as in a small notebook in your pocket, or use a mobile katerin or website. Some programs will calculate calories for you and show you how many calories you have left for the day to meet your goal.  What are some calorie counting tips?  · Use your calories on foods and drinks that will fill you up and not leave you hungry:  ? Some examples of foods that fill you up are nuts and nut butters, vegetables, lean proteins, and high-fiber foods like whole grains. High-fiber foods are foods with more than 5 g fiber per serving.  ? Drinks such as sodas, specialty coffee drinks, alcohol, and juices have a lot of calories, yet do not fill you up.  · Eat nutritious foods and avoid empty calories. Empty calories are calories you get from foods or beverages that do not have many vitamins or protein, such as candy, sweets, and soda. It is better to have a nutritious high-calorie food (such as an avocado) than a food with few nutrients (such as a bag of chips).  · Know how many calories are in the foods you eat most often. This will help you calculate calorie counts faster.  · Pay attention to calories in drinks. Low-calorie drinks include water and unsweetened drinks.  · Pay attention to nutrition labels for \"low fat\" or \"fat free\" foods. These foods sometimes have the same amount of calories or more calories than the full fat versions. They " also often have added sugar, starch, or salt, to make up for flavor that was removed with the fat.  · Find a way of tracking calories that works for you. Get creative. Try different apps or programs if writing down calories does not work for you.  What are some portion control tips?  · Know how many calories are in a serving. This will help you know how many servings of a certain food you can have.  · Use a measuring cup to measure serving sizes. You could also try weighing out portions on a kitchen scale. With time, you will be able to estimate serving sizes for some foods.  · Take some time to put servings of different foods on your favorite plates, bowls, and cups so you know what a serving looks like.  · Try not to eat straight from a bag or box. Doing this can lead to overeating. Put the amount you would like to eat in a cup or on a plate to make sure you are eating the right portion.  · Use smaller plates, glasses, and bowls to prevent overeating.  · Try not to multitask (for example, watch TV or use your computer) while eating. If it is time to eat, sit down at a table and enjoy your food. This will help you to know when you are full. It will also help you to be aware of what you are eating and how much you are eating.  What are tips for following this plan?  Reading food labels  · Check the calorie count compared to the serving size. The serving size may be smaller than what you are used to eating.  · Check the source of the calories. Make sure the food you are eating is high in vitamins and protein and low in saturated and trans fats.  Shopping  · Read nutrition labels while you shop. This will help you make healthy decisions before you decide to purchase your food.  · Make a grocery list and stick to it.  Cooking  · Try to cook your favorite foods in a healthier way. For example, try baking instead of frying.  · Use low-fat dairy products.  Meal planning  · Use more fruits and vegetables. Half of your  plate should be fruits and vegetables.  · Include lean proteins like poultry and fish.  How do I count calories when eating out?  · Ask for smaller portion sizes.  · Consider sharing an entree and sides instead of getting your own entree.  · If you get your own entree, eat only half. Ask for a box at the beginning of your meal and put the rest of your entree in it so you are not tempted to eat it.  · If calories are listed on the menu, choose the lower calorie options.  · Choose dishes that include vegetables, fruits, whole grains, low-fat dairy products, and lean protein.  · Choose items that are boiled, broiled, grilled, or steamed. Stay away from items that are buttered, battered, fried, or served with cream sauce. Items labeled “crispy” are usually fried, unless stated otherwise.  · Choose water, low-fat milk, unsweetened iced tea, or other drinks without added sugar. If you want an alcoholic beverage, choose a lower calorie option such as a glass of wine or light beer.  · Ask for dressings, sauces, and syrups on the side. These are usually high in calories, so you should limit the amount you eat.  · If you want a salad, choose a garden salad and ask for grilled meats. Avoid extra toppings like yu, cheese, or fried items. Ask for the dressing on the side, or ask for olive oil and vinegar or lemon to use as dressing.  · Estimate how many servings of a food you are given. For example, a serving of cooked rice is ½ cup or about the size of half a baseball. Knowing serving sizes will help you be aware of how much food you are eating at restaurants. The list below tells you how big or small some common portion sizes are based on everyday objects:  ? 1 oz--4 stacked dice.  ? 3 oz--1 deck of cards.  ? 1 tsp--1 die.  ? 1 Tbsp--½ a ping-pong ball.  ? 2 Tbsp--1 ping-pong ball.  ? ½ cup--½ baseball.  ? 1 cup--1 baseball.  Summary  · Calorie counting means keeping track of how many calories you eat and drink each day. If  you eat fewer calories than your body needs, you should lose weight.  · A healthy amount of weight to lose per week is usually 1-2 lb (0.5-0.9 kg). This usually means reducing your daily calorie intake by 500-750 calories.  · The number of calories in a food can be found on a Nutrition Facts label. If a food does not have a Nutrition Facts label, try to look up the calories online or ask your dietitian for help.  · Use your calories on foods and drinks that will fill you up, and not on foods and drinks that will leave you hungry.  · Use smaller plates, glasses, and bowls to prevent overeating.  This information is not intended to replace advice given to you by your health care provider. Make sure you discuss any questions you have with your health care provider.  Document Released: 12/18/2006 Document Revised: 11/17/2017 Document Reviewed: 11/17/2017  [employer] Interactive Patient Education © 2019 [employer]      Exercising to Lose Weight  Exercising can help you to lose weight. In order to lose weight through exercise, you need to do vigorous-intensity exercise. You can tell that you are exercising with vigorous intensity if you are breathing very hard and fast and cannot hold a conversation while exercising.  Moderate-intensity exercise helps to maintain your current weight. You can tell that you are exercising at a moderate level if you have a higher heart rate and faster breathing, but you are still able to hold a conversation.  How often should I exercise?  Choose an activity that you enjoy and set realistic goals. Your health care provider can help you to make an activity plan that works for you. Exercise regularly as directed by your health care provider. This may include:  · Doing resistance training twice each week, such as:  ? Push-ups.  ? Sit-ups.  ? Lifting weights.  ? Using resistance bands.  · Doing a given intensity of exercise for a given amount of time. Choose from these options:  ? 150 minutes of  moderate-intensity exercise every week.  ? 75 minutes of vigorous-intensity exercise every week.  ? A mix of moderate-intensity and vigorous-intensity exercise every week.    Children, pregnant women, people who are out of shape, people who are overweight, and older adults may need to consult a health care provider for individual recommendations. If you have any sort of medical condition, be sure to consult your health care provider before starting a new exercise program.  What are some activities that can help me to lose weight?  · Walking at a rate of at least 4.5 miles an hour.  · Jogging or running at a rate of 5 miles per hour.  · Biking at a rate of at least 10 miles per hour.  · Lap swimming.  · Roller-skating or in-line skating.  · Cross-country skiing.  · Vigorous competitive sports, such as football, basketball, and soccer.  · Jumping rope.  · Aerobic dancing.  How can I be more active in my day-to-day activities?  · Use the stairs instead of the elevator.  · Take a walk during your lunch break.  · If you drive, park your car farther away from work or school.  · If you take public transportation, get off one stop early and walk the rest of the way.  · Make all of your phone calls while standing up and walking around.  · Get up, stretch, and walk around every 30 minutes throughout the day.  What guidelines should I follow while exercising?  · Do not exercise so much that you hurt yourself, feel dizzy, or get very short of breath.  · Consult your health care provider prior to starting a new exercise program.  · Wear comfortable clothes and shoes with good support.  · Drink plenty of water while you exercise to prevent dehydration or heat stroke. Body water is lost during exercise and must be replaced.  · Work out until you breathe faster and your heart beats faster.  This information is not intended to replace advice given to you by your health care provider. Make sure you discuss any questions you have with  your health care provider.  Document Released: 01/20/2012 Document Revised: 05/25/2017 Document Reviewed: 05/21/2015  [employer] Interactive Patient Education © 2019 [employer]

## 2019-06-25 RX ORDER — TIZANIDINE HYDROCHLORIDE 4 MG/1
4 CAPSULE, GELATIN COATED ORAL 2 TIMES DAILY PRN
Qty: 180 CAPSULE | Refills: 3 | Status: SHIPPED | OUTPATIENT
Start: 2019-06-25 | End: 2020-06-19

## 2019-06-28 RX ORDER — AMLODIPINE BESYLATE 5 MG/1
TABLET ORAL
Qty: 90 TABLET | Refills: 3 | Status: SHIPPED | OUTPATIENT
Start: 2019-06-28 | End: 2020-09-11

## 2019-06-28 RX ORDER — OMEPRAZOLE 40 MG/1
CAPSULE, DELAYED RELEASE ORAL
Qty: 90 CAPSULE | Refills: 3 | Status: SHIPPED | OUTPATIENT
Start: 2019-06-28 | End: 2020-06-19

## 2019-06-28 RX ORDER — ROSUVASTATIN CALCIUM 20 MG/1
TABLET, COATED ORAL
Qty: 90 TABLET | Refills: 3 | Status: SHIPPED | OUTPATIENT
Start: 2019-06-28 | End: 2020-05-20

## 2019-07-01 RX ORDER — DULOXETIN HYDROCHLORIDE 60 MG/1
60 CAPSULE, DELAYED RELEASE ORAL DAILY
Qty: 90 CAPSULE | Refills: 3 | Status: SHIPPED | OUTPATIENT
Start: 2019-07-01 | End: 2019-11-18 | Stop reason: ALTCHOICE

## 2019-10-10 ENCOUNTER — CLINICAL SUPPORT (OUTPATIENT)
Dept: FAMILY MEDICINE CLINIC | Facility: CLINIC | Age: 69
End: 2019-10-10

## 2019-10-10 PROCEDURE — G0008 ADMIN INFLUENZA VIRUS VAC: HCPCS | Performed by: INTERNAL MEDICINE

## 2019-10-10 PROCEDURE — 90653 IIV ADJUVANT VACCINE IM: CPT | Performed by: INTERNAL MEDICINE

## 2019-11-13 ENCOUNTER — LAB (OUTPATIENT)
Dept: LAB | Facility: OTHER | Age: 69
End: 2019-11-13

## 2019-11-13 DIAGNOSIS — E78.2 MIXED HYPERLIPIDEMIA: Chronic | ICD-10-CM

## 2019-11-13 DIAGNOSIS — E55.9 VITAMIN D DEFICIENCY: ICD-10-CM

## 2019-11-13 DIAGNOSIS — I10 ESSENTIAL HYPERTENSION: Chronic | ICD-10-CM

## 2019-11-13 DIAGNOSIS — N18.2 CHRONIC KIDNEY DISEASE, STAGE II (MILD): Chronic | ICD-10-CM

## 2019-11-13 DIAGNOSIS — D51.8 VITAMIN B12 DEFICIENCY (DIETARY) ANEMIA: Chronic | ICD-10-CM

## 2019-11-13 DIAGNOSIS — E11.9 TYPE 2 DIABETES MELLITUS WITHOUT COMPLICATION, WITHOUT LONG-TERM CURRENT USE OF INSULIN (HCC): ICD-10-CM

## 2019-11-13 DIAGNOSIS — D50.8 IRON DEFICIENCY ANEMIA SECONDARY TO INADEQUATE DIETARY IRON INTAKE: Chronic | ICD-10-CM

## 2019-11-13 LAB
25(OH)D3 SERPL-MCNC: 30.6 NG/ML (ref 30–100)
ALBUMIN SERPL-MCNC: 4.8 G/DL (ref 3.5–5)
ALBUMIN UR-MCNC: 29.6 MG/DL
ALBUMIN/GLOB SERPL: 1.5 G/DL (ref 1.1–1.8)
ALP SERPL-CCNC: 75 U/L (ref 38–126)
ALT SERPL W P-5'-P-CCNC: 22 U/L
ANION GAP SERPL CALCULATED.3IONS-SCNC: 10 MMOL/L (ref 5–15)
ARTICHOKE IGE QN: 152 MG/DL (ref 0–100)
AST SERPL-CCNC: 27 U/L (ref 14–36)
BASOPHILS # BLD AUTO: 0.03 10*3/MM3 (ref 0–0.2)
BASOPHILS NFR BLD AUTO: 0.6 % (ref 0–1.5)
BILIRUB SERPL-MCNC: 0.5 MG/DL (ref 0.2–1.3)
BUN BLD-MCNC: 24 MG/DL (ref 7–23)
BUN/CREAT SERPL: 20.3 (ref 7–25)
CALCIUM SPEC-SCNC: 10.2 MG/DL (ref 8.4–10.2)
CHLORIDE SERPL-SCNC: 104 MMOL/L (ref 101–112)
CO2 SERPL-SCNC: 28 MMOL/L (ref 22–30)
CREAT BLD-MCNC: 1.18 MG/DL (ref 0.52–1.04)
CREAT UR-MCNC: 256 MG/DL
DEPRECATED RDW RBC AUTO: 40.6 FL (ref 37–54)
EOSINOPHIL # BLD AUTO: 0.1 10*3/MM3 (ref 0–0.4)
EOSINOPHIL NFR BLD AUTO: 1.9 % (ref 0.3–6.2)
ERYTHROCYTE [DISTWIDTH] IN BLOOD BY AUTOMATED COUNT: 12.4 % (ref 12.3–15.4)
FERRITIN SERPL-MCNC: 62.8 NG/ML (ref 13–150)
GFR SERPL CREATININE-BSD FRML MDRD: 45 ML/MIN/1.73 (ref 39–90)
GLOBULIN UR ELPH-MCNC: 3.3 GM/DL (ref 2.3–3.5)
GLUCOSE BLD-MCNC: 184 MG/DL (ref 70–99)
HBA1C MFR BLD: 7.3 % (ref 4.8–5.6)
HCT VFR BLD AUTO: 41.2 % (ref 34–46.6)
HGB BLD-MCNC: 13.6 G/DL (ref 12–15.9)
LYMPHOCYTES # BLD AUTO: 1.69 10*3/MM3 (ref 0.7–3.1)
LYMPHOCYTES NFR BLD AUTO: 32.9 % (ref 19.6–45.3)
MCH RBC QN AUTO: 29.8 PG (ref 26.6–33)
MCHC RBC AUTO-ENTMCNC: 33 G/DL (ref 31.5–35.7)
MCV RBC AUTO: 90.2 FL (ref 79–97)
MICROALBUMIN/CREAT UR: 115.6 MG/G
MONOCYTES # BLD AUTO: 0.36 10*3/MM3 (ref 0.1–0.9)
MONOCYTES NFR BLD AUTO: 7 % (ref 5–12)
NEUTROPHILS # BLD AUTO: 2.96 10*3/MM3 (ref 1.7–7)
NEUTROPHILS NFR BLD AUTO: 57.6 % (ref 42.7–76)
PLATELET # BLD AUTO: 358 10*3/MM3 (ref 140–450)
PMV BLD AUTO: 10 FL (ref 6–12)
POTASSIUM BLD-SCNC: 4.2 MMOL/L (ref 3.4–5)
PROT SERPL-MCNC: 8.1 G/DL (ref 6.3–8.6)
RBC # BLD AUTO: 4.57 10*6/MM3 (ref 3.77–5.28)
SODIUM BLD-SCNC: 142 MMOL/L (ref 137–145)
TSH SERPL DL<=0.05 MIU/L-ACNC: 2.28 UIU/ML (ref 0.27–4.2)
VIT B12 BLD-MCNC: 651 PG/ML (ref 211–946)
WBC NRBC COR # BLD: 5.14 10*3/MM3 (ref 3.4–10.8)

## 2019-11-13 PROCEDURE — 80053 COMPREHEN METABOLIC PANEL: CPT | Performed by: INTERNAL MEDICINE

## 2019-11-13 PROCEDURE — 82607 VITAMIN B-12: CPT | Performed by: INTERNAL MEDICINE

## 2019-11-13 PROCEDURE — 83721 ASSAY OF BLOOD LIPOPROTEIN: CPT | Performed by: INTERNAL MEDICINE

## 2019-11-13 PROCEDURE — 83036 HEMOGLOBIN GLYCOSYLATED A1C: CPT | Performed by: INTERNAL MEDICINE

## 2019-11-13 PROCEDURE — 82043 UR ALBUMIN QUANTITATIVE: CPT | Performed by: INTERNAL MEDICINE

## 2019-11-13 PROCEDURE — 36415 COLL VENOUS BLD VENIPUNCTURE: CPT | Performed by: INTERNAL MEDICINE

## 2019-11-13 PROCEDURE — 82570 ASSAY OF URINE CREATININE: CPT | Performed by: INTERNAL MEDICINE

## 2019-11-13 PROCEDURE — 85025 COMPLETE CBC W/AUTO DIFF WBC: CPT | Performed by: INTERNAL MEDICINE

## 2019-11-13 PROCEDURE — 82728 ASSAY OF FERRITIN: CPT | Performed by: INTERNAL MEDICINE

## 2019-11-13 PROCEDURE — 82306 VITAMIN D 25 HYDROXY: CPT | Performed by: INTERNAL MEDICINE

## 2019-11-13 PROCEDURE — 84443 ASSAY THYROID STIM HORMONE: CPT | Performed by: INTERNAL MEDICINE

## 2019-11-18 ENCOUNTER — OFFICE VISIT (OUTPATIENT)
Dept: FAMILY MEDICINE CLINIC | Facility: CLINIC | Age: 69
End: 2019-11-18

## 2019-11-18 VITALS
HEIGHT: 65 IN | DIASTOLIC BLOOD PRESSURE: 80 MMHG | BODY MASS INDEX: 27.32 KG/M2 | TEMPERATURE: 98.9 F | WEIGHT: 164 LBS | HEART RATE: 84 BPM | SYSTOLIC BLOOD PRESSURE: 148 MMHG

## 2019-11-18 DIAGNOSIS — N18.2 CHRONIC KIDNEY DISEASE, STAGE II (MILD): Chronic | ICD-10-CM

## 2019-11-18 DIAGNOSIS — E11.9 TYPE 2 DIABETES MELLITUS WITHOUT COMPLICATION, WITHOUT LONG-TERM CURRENT USE OF INSULIN (HCC): Primary | ICD-10-CM

## 2019-11-18 DIAGNOSIS — E55.9 VITAMIN D DEFICIENCY: Chronic | ICD-10-CM

## 2019-11-18 DIAGNOSIS — F32.5 MAJOR DEPRESSION IN COMPLETE REMISSION (HCC): Chronic | ICD-10-CM

## 2019-11-18 DIAGNOSIS — Z12.31 BREAST CANCER SCREENING BY MAMMOGRAM: ICD-10-CM

## 2019-11-18 DIAGNOSIS — E78.1 HYPERTRIGLYCERIDEMIA: Chronic | ICD-10-CM

## 2019-11-18 DIAGNOSIS — E78.2 MIXED HYPERLIPIDEMIA: Chronic | ICD-10-CM

## 2019-11-18 DIAGNOSIS — M50.90 CERVICAL DISC DISORDER: Chronic | ICD-10-CM

## 2019-11-18 DIAGNOSIS — H81.03 MENIERE'S DISEASE OF BOTH EARS: Chronic | ICD-10-CM

## 2019-11-18 DIAGNOSIS — D50.8 IRON DEFICIENCY ANEMIA SECONDARY TO INADEQUATE DIETARY IRON INTAKE: Chronic | ICD-10-CM

## 2019-11-18 DIAGNOSIS — D51.8 VITAMIN B12 DEFICIENCY (DIETARY) ANEMIA: Chronic | ICD-10-CM

## 2019-11-18 DIAGNOSIS — I10 ESSENTIAL HYPERTENSION: Chronic | ICD-10-CM

## 2019-11-18 PROCEDURE — 99214 OFFICE O/P EST MOD 30 MIN: CPT | Performed by: INTERNAL MEDICINE

## 2019-11-18 RX ORDER — CITALOPRAM 20 MG/1
20 TABLET ORAL DAILY
Qty: 90 TABLET | Refills: 3 | Status: SHIPPED | OUTPATIENT
Start: 2019-11-18 | End: 2020-11-04 | Stop reason: SDUPTHER

## 2019-11-18 RX ORDER — BLOOD-GLUCOSE METER
1 EACH MISCELLANEOUS DAILY
Qty: 1 KIT | Refills: 0 | Status: SHIPPED | OUTPATIENT
Start: 2019-11-18

## 2019-11-18 NOTE — PATIENT INSTRUCTIONS
Exercising to Lose Weight  Exercise is structured, repetitive physical activity to improve fitness and health. Getting regular exercise is important for everyone. It is especially important if you are overweight. Being overweight increases your risk of heart disease, stroke, diabetes, high blood pressure, and several types of cancer. Reducing your calorie intake and exercising can help you lose weight.  Exercise is usually categorized as moderate or vigorous intensity. To lose weight, most people need to do a certain amount of moderate-intensity or vigorous-intensity exercise each week.  Moderate-intensity exercise    Moderate-intensity exercise is any activity that gets you moving enough to burn at least three times more energy (calories) than if you were sitting.  Examples of moderate exercise include:  · Walking a mile in 15 minutes.  · Doing light yard work.  · Biking at an easy pace.  Most people should get at least 150 minutes (2 hours and 30 minutes) a week of moderate-intensity exercise to maintain their body weight.  Vigorous-intensity exercise  Vigorous-intensity exercise is any activity that gets you moving enough to burn at least six times more calories than if you were sitting. When you exercise at this intensity, you should be working hard enough that you are not able to carry on a conversation.  Examples of vigorous exercise include:  · Running.  · Playing a team sport, such as football, basketball, and soccer.  · Jumping rope.  Most people should get at least 75 minutes (1 hour and 15 minutes) a week of vigorous-intensity exercise to maintain their body weight.  How can exercise affect me?  When you exercise enough to burn more calories than you eat, you lose weight. Exercise also reduces body fat and builds muscle. The more muscle you have, the more calories you burn. Exercise also:  · Improves mood.  · Reduces stress and tension.  · Improves your overall fitness, flexibility, and  endurance.  · Increases bone strength.  The amount of exercise you need to lose weight depends on:  · Your age.  · The type of exercise.  · Any health conditions you have.  · Your overall physical ability.  Talk to your health care provider about how much exercise you need and what types of activities are safe for you.  What actions can I take to lose weight?  Nutrition    · Make changes to your diet as told by your health care provider or diet and nutrition specialist (dietitian). This may include:  ? Eating fewer calories.  ? Eating more protein.  ? Eating less unhealthy fats.  ? Eating a diet that includes fresh fruits and vegetables, whole grains, low-fat dairy products, and lean protein.  ? Avoiding foods with added fat, salt, and sugar.  · Drink plenty of water while you exercise to prevent dehydration or heat stroke.  Activity  · Choose an activity that you enjoy and set realistic goals. Your health care provider can help you make an exercise plan that works for you.  · Exercise at a moderate or vigorous intensity most days of the week.  ? The intensity of exercise may vary from person to person. You can tell how intense a workout is for you by paying attention to your breathing and heartbeat. Most people will notice their breathing and heartbeat get faster with more intense exercise.  · Do resistance training twice each week, such as:  ? Push-ups.  ? Sit-ups.  ? Lifting weights.  ? Using resistance bands.  · Getting short amounts of exercise can be just as helpful as long structured periods of exercise. If you have trouble finding time to exercise, try to include exercise in your daily routine.  ? Get up, stretch, and walk around every 30 minutes throughout the day.  ? Go for a walk during your lunch break.  ? Park your car farther away from your destination.  ? If you take public transportation, get off one stop early and walk the rest of the way.  ? Make phone calls while standing up and walking  around.  ? Take the stairs instead of elevators or escalators.  · Wear comfortable clothes and shoes with good support.  · Do not exercise so much that you hurt yourself, feel dizzy, or get very short of breath.  Where to find more information  · [place] Department of Health and Human Services: www.hhs.gov  · Centers for Disease Control and Prevention (CDC): www.cdc.gov  Contact a health care provider:  · Before starting a new exercise program.  · If you have questions or concerns about your weight.  · If you have a medical problem that keeps you from exercising.  Get help right away if you have any of the following while exercising:  · Injury.  · Dizziness.  · Difficulty breathing or shortness of breath that does not go away when you stop exercising.  · Chest pain.  · Rapid heartbeat.  Summary  · Being overweight increases your risk of heart disease, stroke, diabetes, high blood pressure, and several types of cancer.  · Losing weight happens when you burn more calories than you eat.  · Reducing the amount of calories you eat in addition to getting regular moderate or vigorous exercise each week helps you lose weight.  This information is not intended to replace advice given to you by your health care provider. Make sure you discuss any questions you have with your health care provider.  Document Released: 01/20/2012 Document Revised: 12/31/2018 Document Reviewed: 12/31/2018  [employer] Interactive Patient Education © 2019 [employer]      Calorie Counting for Weight Loss  Calories are units of energy. Your body needs a certain amount of calories from food to keep you going throughout the day. When you eat more calories than your body needs, your body stores the extra calories as fat. When you eat fewer calories than your body needs, your body burns fat to get the energy it needs.  Calorie counting means keeping track of how many calories you eat and drink each day. Calorie counting can be helpful if you need to lose  weight. If you make sure to eat fewer calories than your body needs, you should lose weight. Ask your health care provider what a healthy weight is for you.  For calorie counting to work, you will need to eat the right number of calories in a day in order to lose a healthy amount of weight per week. A dietitian can help you determine how many calories you need in a day and will give you suggestions on how to reach your calorie goal.  · A healthy amount of weight to lose per week is usually 1-2 lb (0.5-0.9 kg). This usually means that your daily calorie intake should be reduced by 500-750 calories.  · Eating 1,200 - 1,500 calories per day can help most women lose weight.  · Eating 1,500 - 1,800 calories per day can help most men lose weight.  What is my plan?  My goal is to have __________ calories per day.  If I have this many calories per day, I should lose around __________ pounds per week.  What do I need to know about calorie counting?  In order to meet your daily calorie goal, you will need to:  · Find out how many calories are in each food you would like to eat. Try to do this before you eat.  · Decide how much of the food you plan to eat.  · Write down what you ate and how many calories it had. Doing this is called keeping a food log.  To successfully lose weight, it is important to balance calorie counting with a healthy lifestyle that includes regular activity. Aim for 150 minutes of moderate exercise (such as walking) or 75 minutes of vigorous exercise (such as running) each week.  Where do I find calorie information?    The number of calories in a food can be found on a Nutrition Facts label. If a food does not have a Nutrition Facts label, try to look up the calories online or ask your dietitian for help.  Remember that calories are listed per serving. If you choose to have more than one serving of a food, you will have to multiply the calories per serving by the amount of servings you plan to eat. For  "example, the label on a package of bread might say that a serving size is 1 slice and that there are 90 calories in a serving. If you eat 1 slice, you will have eaten 90 calories. If you eat 2 slices, you will have eaten 180 calories.  How do I keep a food log?  Immediately after each meal, record the following information in your food log:  · What you ate. Don't forget to include toppings, sauces, and other extras on the food.  · How much you ate. This can be measured in cups, ounces, or number of items.  · How many calories each food and drink had.  · The total number of calories in the meal.  Keep your food log near you, such as in a small notebook in your pocket, or use a mobile katerin or website. Some programs will calculate calories for you and show you how many calories you have left for the day to meet your goal.  What are some calorie counting tips?    · Use your calories on foods and drinks that will fill you up and not leave you hungry:  ? Some examples of foods that fill you up are nuts and nut butters, vegetables, lean proteins, and high-fiber foods like whole grains. High-fiber foods are foods with more than 5 g fiber per serving.  ? Drinks such as sodas, specialty coffee drinks, alcohol, and juices have a lot of calories, yet do not fill you up.  · Eat nutritious foods and avoid empty calories. Empty calories are calories you get from foods or beverages that do not have many vitamins or protein, such as candy, sweets, and soda. It is better to have a nutritious high-calorie food (such as an avocado) than a food with few nutrients (such as a bag of chips).  · Know how many calories are in the foods you eat most often. This will help you calculate calorie counts faster.  · Pay attention to calories in drinks. Low-calorie drinks include water and unsweetened drinks.  · Pay attention to nutrition labels for \"low fat\" or \"fat free\" foods. These foods sometimes have the same amount of calories or more calories " than the full fat versions. They also often have added sugar, starch, or salt, to make up for flavor that was removed with the fat.  · Find a way of tracking calories that works for you. Get creative. Try different apps or programs if writing down calories does not work for you.  What are some portion control tips?  · Know how many calories are in a serving. This will help you know how many servings of a certain food you can have.  · Use a measuring cup to measure serving sizes. You could also try weighing out portions on a kitchen scale. With time, you will be able to estimate serving sizes for some foods.  · Take some time to put servings of different foods on your favorite plates, bowls, and cups so you know what a serving looks like.  · Try not to eat straight from a bag or box. Doing this can lead to overeating. Put the amount you would like to eat in a cup or on a plate to make sure you are eating the right portion.  · Use smaller plates, glasses, and bowls to prevent overeating.  · Try not to multitask (for example, watch TV or use your computer) while eating. If it is time to eat, sit down at a table and enjoy your food. This will help you to know when you are full. It will also help you to be aware of what you are eating and how much you are eating.  What are tips for following this plan?  Reading food labels  · Check the calorie count compared to the serving size. The serving size may be smaller than what you are used to eating.  · Check the source of the calories. Make sure the food you are eating is high in vitamins and protein and low in saturated and trans fats.  Shopping  · Read nutrition labels while you shop. This will help you make healthy decisions before you decide to purchase your food.  · Make a grocery list and stick to it.  Cooking  · Try to cook your favorite foods in a healthier way. For example, try baking instead of frying.  · Use low-fat dairy products.  Meal planning  · Use more fruits  "and vegetables. Half of your plate should be fruits and vegetables.  · Include lean proteins like poultry and fish.  How do I count calories when eating out?  · Ask for smaller portion sizes.  · Consider sharing an entree and sides instead of getting your own entree.  · If you get your own entree, eat only half. Ask for a box at the beginning of your meal and put the rest of your entree in it so you are not tempted to eat it.  · If calories are listed on the menu, choose the lower calorie options.  · Choose dishes that include vegetables, fruits, whole grains, low-fat dairy products, and lean protein.  · Choose items that are boiled, broiled, grilled, or steamed. Stay away from items that are buttered, battered, fried, or served with cream sauce. Items labeled \"crispy\" are usually fried, unless stated otherwise.  · Choose water, low-fat milk, unsweetened iced tea, or other drinks without added sugar. If you want an alcoholic beverage, choose a lower calorie option such as a glass of wine or light beer.  · Ask for dressings, sauces, and syrups on the side. These are usually high in calories, so you should limit the amount you eat.  · If you want a salad, choose a garden salad and ask for grilled meats. Avoid extra toppings like yu, cheese, or fried items. Ask for the dressing on the side, or ask for olive oil and vinegar or lemon to use as dressing.  · Estimate how many servings of a food you are given. For example, a serving of cooked rice is ½ cup or about the size of half a baseball. Knowing serving sizes will help you be aware of how much food you are eating at restaurants. The list below tells you how big or small some common portion sizes are based on everyday objects:  ? 1 oz--4 stacked dice.  ? 3 oz--1 deck of cards.  ? 1 tsp--1 die.  ? 1 Tbsp--½ a ping-pong ball.  ? 2 Tbsp--1 ping-pong ball.  ? ½ cup--½ baseball.  ? 1 cup--1 baseball.  Summary  · Calorie counting means keeping track of how many calories " you eat and drink each day. If you eat fewer calories than your body needs, you should lose weight.  · A healthy amount of weight to lose per week is usually 1-2 lb (0.5-0.9 kg). This usually means reducing your daily calorie intake by 500-750 calories.  · The number of calories in a food can be found on a Nutrition Facts label. If a food does not have a Nutrition Facts label, try to look up the calories online or ask your dietitian for help.  · Use your calories on foods and drinks that will fill you up, and not on foods and drinks that will leave you hungry.  · Use smaller plates, glasses, and bowls to prevent overeating.  This information is not intended to replace advice given to you by your health care provider. Make sure you discuss any questions you have with your health care provider.  Document Released: 12/18/2006 Document Revised: 09/06/2019 Document Reviewed: 11/17/2017  [employer] Interactive Patient Education © 2019 [employer]

## 2019-11-19 PROBLEM — E55.9 VITAMIN D DEFICIENCY: Chronic | Status: ACTIVE | Noted: 2019-11-19

## 2019-11-19 NOTE — PROGRESS NOTES
Subjective        Nausea   Associated symptoms include nausea. Pertinent negatives include no abdominal pain, chest pain, chills, congestion, coughing, fatigue, fever, rash, sore throat, vomiting or weakness.   Alopecia   Associated symptoms include nausea. Pertinent negatives include no abdominal pain, chest pain, chills, congestion, coughing, fatigue, fever, rash, sore throat, vomiting or weakness.   Hypertension   Pertinent negatives include no chest pain, palpitations or shortness of breath.        Otilia Kenny is a 69 y.o. female who presents for six-month follow-up of multiple medical issues including type 2 diabetes, high cholesterol, high triglycerides, hypertension, GERD, depression, and other issues.  She struggles with dizziness associated with Meniere's disease.  She has a history of laminectomy and cervical fusion 02/2013 who we saw in June for worsening posterior neck and upper thoracic back pain.  After being intolerant to several anti-inflammatories including Celebrex, naproxen, and Mobic, I recommended trying Cymbalta instead of Celexa, which to help block pain.  She doesn't feel the Cymbalta works as well as the Celexa and would like to resume the Celexa.       Diabetes is not at goal despite weight loss. A1c is 7.3 increased from 6.8.  Six months ago, I had her gradually increase her dose of Ozempic to 1 mg to help suppress her appetite and help with diabetes management.  She continues on Farxiga.   She was intolerant to metformin in the past due to GI side effects and Amaryl due to myalgias.  Weight is down 7 pounds in the past six months, although, she has noticed nausea and inadequate control of GERD with the 1 mg dose.  She seemed to tolerate the 0.5 mg dose.  She is not sure if her One Touch glucometer is working adequately.  She will more than likely require insulin in the future.      She had a sister diagnosed with breast cancer this year.  Patient's last mammogram was 05/2017.  She is  in agreement to repeat mammogram.  I recommended she consider going back to annual mammograms with this new family member's recent diagnosis of breast cancer.       She reports episodic pain over the bladder.  Denies blood in the urine.  She will notify us if the pain persists.       She is reporting hair loss, which she feels is worse since starting Farxiga.  She has an appointment with dermatology on Thursday to address the hair loss.  Anxiety could be playing a role as she reported Cymbalta was not managing her symptoms as well as Celexa.  We discussed trying an OTC vitamin to strengthen hair and nails.  We are switching her back to Celexa, which may manage stress/anxiety better.          She is up to date on pneumonia vaccines.  She had influenza vaccine 10/2019.      DEXA 05/2017 revealed normal bone density.      Blood pressure is slightly above goal today at 148/80.  She reports blood pressure is normally at goal at home.  I recommended she bring in a blood pressure cuff for calibration to make sure she is getting an adequate reading at home.     The patient's relevant past medical, surgical, and social history was reviewed in Epic.   Lab results are reviewed with the patient today.  CBC unremarkable. Fasting glucose 184.  A1c is 7.3. Normal liver function.   increased from 113 six months ago with a combination with Crestor and fenofibrate.  Iron levels improved.      Review of Systems   Constitutional: Negative for chills, fatigue and fever.   HENT: Negative for congestion, ear pain, postnasal drip, sinus pressure and sore throat.    Respiratory: Negative for cough, shortness of breath and wheezing.    Cardiovascular: Negative for chest pain, palpitations and leg swelling.   Gastrointestinal: Positive for nausea. Negative for abdominal pain, blood in stool, constipation, diarrhea and vomiting.   Endocrine: Negative for cold intolerance, heat intolerance, polydipsia and polyuria.   Genitourinary:  "Negative for dysuria, frequency, hematuria and urgency.   Skin: Negative for rash.   Neurological: Negative for syncope and weakness.          Objective     Visit Vitals  /80   Pulse 84   Temp 98.9 °F (37.2 °C) (Oral)   Ht 165.1 cm (65\")   Wt 74.4 kg (164 lb)   Breastfeeding? No   BMI 27.29 kg/m²     Physical Exam   Constitutional: She is oriented to person, place, and time. She appears well-developed and well-nourished. No distress.   Accompanied by her .    HENT:   Head: Normocephalic and atraumatic.   Nose: Right sinus exhibits no maxillary sinus tenderness and no frontal sinus tenderness. Left sinus exhibits no maxillary sinus tenderness and no frontal sinus tenderness.   Mouth/Throat: Uvula is midline, oropharynx is clear and moist and mucous membranes are normal. No oral lesions. No tonsillar exudate.   Eyes: Conjunctivae and EOM are normal. Pupils are equal, round, and reactive to light.   Neck: Trachea normal. Neck supple. No JVD present. Carotid bruit is not present. No tracheal deviation present. No thyroid mass and no thyromegaly present.   Cardiovascular: Normal rate, regular rhythm, normal heart sounds and intact distal pulses.  No extrasystoles are present. PMI is not displaced.   No murmur heard.  Pulmonary/Chest: Effort normal and breath sounds normal. No accessory muscle usage. No respiratory distress. She has no decreased breath sounds. She has no wheezes. She has no rhonchi. She has no rales.   Abdominal: Soft. Bowel sounds are normal. She exhibits no distension. There is no hepatosplenomegaly. There is tenderness.   Mild epigastric tenderness.  Mild tenderness over the bladder.       Vascular Status -  Her right foot exhibits normal foot vasculature  and no edema. Her left foot exhibits normal foot vasculature  and no edema.  Lymphadenopathy:     She has no cervical adenopathy.   Neurological: She is alert and oriented to person, place, and time. No cranial nerve deficit. " Coordination normal.   Skin: Skin is warm, dry and intact. No rash noted. No cyanosis. Nails show no clubbing.   Thinning of hair crown of the scalp.    Psychiatric: She has a normal mood and affect. Her speech is normal and behavior is normal. Judgment and thought content normal.   Vitals reviewed.        Assessment/Plan      I recommended decreasing her dose of Ozempic slightly due to nausea and GI symptoms with the higher dose of Ozempic.  I recommended using about 75% of her current dose, between 0.5 and 1 mg dose of Ozempic to see if this lessens the GI symptoms.  Continue the Farxiga.  A prescription is sent for a new One Touch glucometer for patient to monitor glucose.  She feels her current glucometer is not working accurately.  She has plenty of test strips on hand.  Intensify diet, exercise and weight loss efforts.  Notify me if glucose is not consistently staying less than 150.  She will more than likely require insulin in the near future.     An order is placed for patient to schedule repeat mammogram.  Her sister has been recently diagnosed with breast cancer.      Recommended an OTC vitamin high in Biotin to help strengthen hair and nails.  Keep the appointment with dermatology on Thursday of this week.          Stop the Cymbalta and restart Celexa 20 mg q.d.  She did not feel Cymbalta worked as well as the Celexa.     Continue the lisinopril and Norvasc.  Pursue sodium restriction and weight loss.       Continue Crestor and TriCor as well as dietary efforts.    Continue other medications and vitamin and mineral supplements to treat additional medical problems which we addressed today.   Return in six months for routine follow up with fasting labs one week prior.        Scribed for Dr. Zabala by Vandana Galeas [facility].     Diagnoses and all orders for this visit:    Type 2 diabetes mellitus without complication, without long-term current use of insulin (CMS/[facility])  -     CBC Auto Differential; Future  -      Comprehensive Metabolic Panel; Future  -     Hemoglobin A1c; Future    Essential hypertension    Mixed hyperlipidemia  -     Lipid Panel; Future    Hypertriglyceridemia    Meniere's disease of both ears    Cervical disc disorder    Chronic kidney disease, stage II (mild)    Iron deficiency anemia secondary to inadequate dietary iron intake  -     Ferritin; Future    Vitamin B12 deficiency (dietary) anemia  -     Vitamin B12; Future    Major depression in complete remission (CMS/HCC)    Breast cancer screening by mammogram  -     Mammo Screening Digital Tomosynthesis Bilateral With CAD; Future    Vitamin D deficiency  -     Vitamin D 25 Hydroxy; Future    Other orders  -     citalopram (CELEXA) 20 MG tablet; Take 1 tablet by mouth Daily.  -     Blood Glucose Monitoring Suppl (ONETOUCH VERIO IQ SYSTEM) w/Device kit; 1 each Daily.        Lab on 11/13/2019   Component Date Value Ref Range Status   • Microalbumin/Creatinine Ratio 11/13/2019 115.6  mg/g Final   • Creatinine, Urine 11/13/2019 256.0  mg/dL Final   • Microalbumin, Urine 11/13/2019 29.6  mg/dL Final   • WBC 11/13/2019 5.14  3.40 - 10.80 10*3/mm3 Final   • RBC 11/13/2019 4.57  3.77 - 5.28 10*6/mm3 Final   • Hemoglobin 11/13/2019 13.6  12.0 - 15.9 g/dL Final   • Hematocrit 11/13/2019 41.2  34.0 - 46.6 % Final   • MCV 11/13/2019 90.2  79.0 - 97.0 fL Final   • MCH 11/13/2019 29.8  26.6 - 33.0 pg Final   • MCHC 11/13/2019 33.0  31.5 - 35.7 g/dL Final   • RDW 11/13/2019 12.4  12.3 - 15.4 % Final   • RDW-SD 11/13/2019 40.6  37.0 - 54.0 fl Final   • MPV 11/13/2019 10.0  6.0 - 12.0 fL Final   • Platelets 11/13/2019 358  140 - 450 10*3/mm3 Final   • Neutrophil % 11/13/2019 57.6  42.7 - 76.0 % Final   • Lymphocyte % 11/13/2019 32.9  19.6 - 45.3 % Final   • Monocyte % 11/13/2019 7.0  5.0 - 12.0 % Final   • Eosinophil % 11/13/2019 1.9  0.3 - 6.2 % Final   • Basophil % 11/13/2019 0.6  0.0 - 1.5 % Final   • Neutrophils, Absolute 11/13/2019 2.96  1.70 - 7.00 10*3/mm3  Final   • Lymphocytes, Absolute 11/13/2019 1.69  0.70 - 3.10 10*3/mm3 Final   • Monocytes, Absolute 11/13/2019 0.36  0.10 - 0.90 10*3/mm3 Final   • Eosinophils, Absolute 11/13/2019 0.10  0.00 - 0.40 10*3/mm3 Final   • Basophils, Absolute 11/13/2019 0.03  0.00 - 0.20 10*3/mm3 Final   • Glucose 11/13/2019 184* 70 - 99 mg/dL Final   • BUN 11/13/2019 24* 7 - 23 mg/dL Final   • Creatinine 11/13/2019 1.18* 0.52 - 1.04 mg/dL Final   • Sodium 11/13/2019 142  137 - 145 mmol/L Final   • Potassium 11/13/2019 4.2  3.4 - 5.0 mmol/L Final   • Chloride 11/13/2019 104  101 - 112 mmol/L Final   • CO2 11/13/2019 28.0  22.0 - 30.0 mmol/L Final   • Calcium 11/13/2019 10.2  8.4 - 10.2 mg/dL Final   • Total Protein 11/13/2019 8.1  6.3 - 8.6 g/dL Final   • Albumin 11/13/2019 4.80  3.50 - 5.00 g/dL Final   • ALT (SGPT) 11/13/2019 22  <=35 U/L Final   • AST (SGOT) 11/13/2019 27  14 - 36 U/L Final   • Alkaline Phosphatase 11/13/2019 75  38 - 126 U/L Final   • Total Bilirubin 11/13/2019 0.5  0.2 - 1.3 mg/dL Final   • eGFR Non African Amer 11/13/2019 45  39 - 90 mL/min/1.73 Final   • Globulin 11/13/2019 3.3  2.3 - 3.5 gm/dL Final   • A/G Ratio 11/13/2019 1.5  1.1 - 1.8 g/dL Final   • BUN/Creatinine Ratio 11/13/2019 20.3  7.0 - 25.0 Final   • Anion Gap 11/13/2019 10.0  5.0 - 15.0 mmol/L Final   • Hemoglobin A1C 11/13/2019 7.30* 4.80 - 5.60 % Final   • LDL Cholesterol  11/13/2019 152* 0 - 100 mg/dL Final   • Ferritin 11/13/2019 62.80  13.00 - 150.00 ng/mL Final   • Vitamin B-12 11/13/2019 651  211 - 946 pg/mL Final   • TSH 11/13/2019 2.280  0.270 - 4.200 uIU/mL Final   • 25 Hydroxy, Vitamin D 11/13/2019 30.6  30.0 - 100.0 ng/ml Final   ]

## 2019-11-21 ENCOUNTER — TRANSCRIBE ORDERS (OUTPATIENT)
Dept: GENERAL RADIOLOGY | Facility: CLINIC | Age: 69
End: 2019-11-21

## 2019-11-21 ENCOUNTER — LAB (OUTPATIENT)
Dept: LAB | Facility: OTHER | Age: 69
End: 2019-11-21

## 2019-11-21 DIAGNOSIS — E03.9 HYPOTHYROIDISM, UNSPECIFIED TYPE: ICD-10-CM

## 2019-11-21 DIAGNOSIS — Z79.899 ENCOUNTER FOR LONG-TERM (CURRENT) USE OF OTHER MEDICATIONS: Primary | ICD-10-CM

## 2019-11-21 DIAGNOSIS — R53.83 TIREDNESS: ICD-10-CM

## 2019-11-21 DIAGNOSIS — Z79.899 ENCOUNTER FOR LONG-TERM (CURRENT) USE OF OTHER MEDICATIONS: ICD-10-CM

## 2019-11-21 LAB
ALBUMIN SERPL-MCNC: 4.6 G/DL (ref 3.5–5)
ALBUMIN/GLOB SERPL: 1.5 G/DL (ref 1.1–1.8)
ALP SERPL-CCNC: 72 U/L (ref 38–126)
ALT SERPL W P-5'-P-CCNC: 22 U/L
ANION GAP SERPL CALCULATED.3IONS-SCNC: 9 MMOL/L (ref 5–15)
AST SERPL-CCNC: 29 U/L (ref 14–36)
BILIRUB SERPL-MCNC: 0.5 MG/DL (ref 0.2–1.3)
BUN BLD-MCNC: 24 MG/DL (ref 7–23)
BUN/CREAT SERPL: 23.1 (ref 7–25)
CALCIUM SPEC-SCNC: 9.8 MG/DL (ref 8.4–10.2)
CHLORIDE SERPL-SCNC: 104 MMOL/L (ref 101–112)
CO2 SERPL-SCNC: 26 MMOL/L (ref 22–30)
CREAT BLD-MCNC: 1.04 MG/DL (ref 0.52–1.04)
GFR SERPL CREATININE-BSD FRML MDRD: 52 ML/MIN/1.73 (ref 39–90)
GLOBULIN UR ELPH-MCNC: 3 GM/DL (ref 2.3–3.5)
GLUCOSE BLD-MCNC: 203 MG/DL (ref 70–99)
POTASSIUM BLD-SCNC: 4.2 MMOL/L (ref 3.4–5)
PROT SERPL-MCNC: 7.6 G/DL (ref 6.3–8.6)
SODIUM BLD-SCNC: 139 MMOL/L (ref 137–145)

## 2019-11-21 PROCEDURE — 36415 COLL VENOUS BLD VENIPUNCTURE: CPT | Performed by: INTERNAL MEDICINE

## 2019-11-21 PROCEDURE — 84439 ASSAY OF FREE THYROXINE: CPT | Performed by: NURSE PRACTITIONER

## 2019-11-21 PROCEDURE — 80053 COMPREHEN METABOLIC PANEL: CPT | Performed by: INTERNAL MEDICINE

## 2019-11-21 PROCEDURE — 86038 ANTINUCLEAR ANTIBODIES: CPT | Performed by: NURSE PRACTITIONER

## 2019-11-22 LAB — T4 FREE SERPL-MCNC: 1.31 NG/DL (ref 0.93–1.7)

## 2019-11-25 LAB — ANA SER QL: NEGATIVE

## 2020-02-04 RX ORDER — LANCETS 33 GAUGE
EACH MISCELLANEOUS
Qty: 100 EACH | Refills: 3 | Status: SHIPPED | OUTPATIENT
Start: 2020-02-04 | End: 2020-11-04

## 2020-02-04 RX ORDER — FENOFIBRATE 145 MG/1
TABLET, COATED ORAL
Qty: 90 TABLET | Refills: 3 | Status: SHIPPED | OUTPATIENT
Start: 2020-02-04 | End: 2020-11-04 | Stop reason: SDUPTHER

## 2020-05-12 ENCOUNTER — LAB (OUTPATIENT)
Dept: LAB | Facility: OTHER | Age: 70
End: 2020-05-12

## 2020-05-12 DIAGNOSIS — E78.2 MIXED HYPERLIPIDEMIA: Chronic | ICD-10-CM

## 2020-05-12 DIAGNOSIS — D50.8 IRON DEFICIENCY ANEMIA SECONDARY TO INADEQUATE DIETARY IRON INTAKE: Chronic | ICD-10-CM

## 2020-05-12 DIAGNOSIS — E11.9 TYPE 2 DIABETES MELLITUS WITHOUT COMPLICATION, WITHOUT LONG-TERM CURRENT USE OF INSULIN (HCC): ICD-10-CM

## 2020-05-12 DIAGNOSIS — D51.8 VITAMIN B12 DEFICIENCY (DIETARY) ANEMIA: Chronic | ICD-10-CM

## 2020-05-12 DIAGNOSIS — E55.9 VITAMIN D DEFICIENCY: Chronic | ICD-10-CM

## 2020-05-12 LAB
25(OH)D3 SERPL-MCNC: 28.3 NG/ML (ref 30–100)
ALBUMIN SERPL-MCNC: 4.3 G/DL (ref 3.5–5)
ALBUMIN/GLOB SERPL: 1.2 G/DL (ref 1.1–1.8)
ALP SERPL-CCNC: 69 U/L (ref 38–126)
ALT SERPL W P-5'-P-CCNC: 28 U/L
ANION GAP SERPL CALCULATED.3IONS-SCNC: 8 MMOL/L (ref 5–15)
AST SERPL-CCNC: 32 U/L (ref 14–36)
BASOPHILS # BLD AUTO: 0.05 10*3/MM3 (ref 0–0.2)
BASOPHILS NFR BLD AUTO: 0.7 % (ref 0–1.5)
BILIRUB SERPL-MCNC: 0.4 MG/DL (ref 0.2–1.3)
BUN BLD-MCNC: 31 MG/DL (ref 7–23)
BUN/CREAT SERPL: 30.1 (ref 7–25)
CALCIUM SPEC-SCNC: 9.8 MG/DL (ref 8.4–10.2)
CHLORIDE SERPL-SCNC: 103 MMOL/L (ref 101–112)
CHOLEST SERPL-MCNC: 208 MG/DL (ref 150–200)
CO2 SERPL-SCNC: 28 MMOL/L (ref 22–30)
CREAT BLD-MCNC: 1.03 MG/DL (ref 0.52–1.04)
DEPRECATED RDW RBC AUTO: 40.7 FL (ref 37–54)
EOSINOPHIL # BLD AUTO: 0.19 10*3/MM3 (ref 0–0.4)
EOSINOPHIL NFR BLD AUTO: 2.7 % (ref 0.3–6.2)
ERYTHROCYTE [DISTWIDTH] IN BLOOD BY AUTOMATED COUNT: 12.5 % (ref 12.3–15.4)
FERRITIN SERPL-MCNC: 69.4 NG/ML (ref 13–150)
GFR SERPL CREATININE-BSD FRML MDRD: 53 ML/MIN/1.73 (ref 39–90)
GLOBULIN UR ELPH-MCNC: 3.7 GM/DL (ref 2.3–3.5)
GLUCOSE BLD-MCNC: 175 MG/DL (ref 70–99)
HBA1C MFR BLD: 8.35 % (ref 4.8–5.6)
HCT VFR BLD AUTO: 39.9 % (ref 34–46.6)
HDLC SERPL-MCNC: 48 MG/DL (ref 40–59)
HGB BLD-MCNC: 12.9 G/DL (ref 12–15.9)
LDLC SERPL CALC-MCNC: 126 MG/DL
LDLC/HDLC SERPL: 2.63 {RATIO} (ref 0–3.22)
LYMPHOCYTES # BLD AUTO: 2 10*3/MM3 (ref 0.7–3.1)
LYMPHOCYTES NFR BLD AUTO: 28.9 % (ref 19.6–45.3)
MCH RBC QN AUTO: 29.5 PG (ref 26.6–33)
MCHC RBC AUTO-ENTMCNC: 32.3 G/DL (ref 31.5–35.7)
MCV RBC AUTO: 91.1 FL (ref 79–97)
MONOCYTES # BLD AUTO: 0.51 10*3/MM3 (ref 0.1–0.9)
MONOCYTES NFR BLD AUTO: 7.4 % (ref 5–12)
NEUTROPHILS # BLD AUTO: 4.17 10*3/MM3 (ref 1.7–7)
NEUTROPHILS NFR BLD AUTO: 60.3 % (ref 42.7–76)
PLATELET # BLD AUTO: 372 10*3/MM3 (ref 140–450)
PMV BLD AUTO: 9.6 FL (ref 6–12)
POTASSIUM BLD-SCNC: 4.9 MMOL/L (ref 3.4–5)
PROT SERPL-MCNC: 8 G/DL (ref 6.3–8.6)
RBC # BLD AUTO: 4.38 10*6/MM3 (ref 3.77–5.28)
SODIUM BLD-SCNC: 139 MMOL/L (ref 137–145)
TRIGL SERPL-MCNC: 170 MG/DL
VIT B12 BLD-MCNC: 689 PG/ML (ref 211–946)
VLDLC SERPL-MCNC: 34 MG/DL
WBC NRBC COR # BLD: 6.92 10*3/MM3 (ref 3.4–10.8)

## 2020-05-12 PROCEDURE — 36415 COLL VENOUS BLD VENIPUNCTURE: CPT | Performed by: INTERNAL MEDICINE

## 2020-05-12 PROCEDURE — 82728 ASSAY OF FERRITIN: CPT | Performed by: INTERNAL MEDICINE

## 2020-05-12 PROCEDURE — 80053 COMPREHEN METABOLIC PANEL: CPT | Performed by: INTERNAL MEDICINE

## 2020-05-12 PROCEDURE — 82607 VITAMIN B-12: CPT | Performed by: INTERNAL MEDICINE

## 2020-05-12 PROCEDURE — 85025 COMPLETE CBC W/AUTO DIFF WBC: CPT | Performed by: INTERNAL MEDICINE

## 2020-05-12 PROCEDURE — 83036 HEMOGLOBIN GLYCOSYLATED A1C: CPT | Performed by: INTERNAL MEDICINE

## 2020-05-12 PROCEDURE — 80061 LIPID PANEL: CPT | Performed by: INTERNAL MEDICINE

## 2020-05-12 PROCEDURE — 82306 VITAMIN D 25 HYDROXY: CPT | Performed by: INTERNAL MEDICINE

## 2020-05-20 ENCOUNTER — TELEMEDICINE (OUTPATIENT)
Dept: FAMILY MEDICINE CLINIC | Facility: CLINIC | Age: 70
End: 2020-05-20

## 2020-05-20 VITALS
TEMPERATURE: 98 F | WEIGHT: 169 LBS | HEIGHT: 65 IN | HEART RATE: 75 BPM | BODY MASS INDEX: 28.16 KG/M2 | DIASTOLIC BLOOD PRESSURE: 54 MMHG | SYSTOLIC BLOOD PRESSURE: 106 MMHG

## 2020-05-20 DIAGNOSIS — R19.7 DIARRHEA, UNSPECIFIED TYPE: ICD-10-CM

## 2020-05-20 DIAGNOSIS — N18.2 CHRONIC KIDNEY DISEASE, STAGE II (MILD): Chronic | ICD-10-CM

## 2020-05-20 DIAGNOSIS — E78.2 MIXED HYPERLIPIDEMIA: Chronic | ICD-10-CM

## 2020-05-20 DIAGNOSIS — D51.8 VITAMIN B12 DEFICIENCY (DIETARY) ANEMIA: Chronic | ICD-10-CM

## 2020-05-20 DIAGNOSIS — I10 ESSENTIAL HYPERTENSION: Chronic | ICD-10-CM

## 2020-05-20 DIAGNOSIS — M77.8 TENDONITIS OF WRIST, RIGHT: ICD-10-CM

## 2020-05-20 DIAGNOSIS — E55.9 VITAMIN D DEFICIENCY: ICD-10-CM

## 2020-05-20 DIAGNOSIS — M19.031 PRIMARY OSTEOARTHRITIS OF RIGHT WRIST: Chronic | ICD-10-CM

## 2020-05-20 DIAGNOSIS — F32.5 MAJOR DEPRESSION IN COMPLETE REMISSION (HCC): Chronic | ICD-10-CM

## 2020-05-20 DIAGNOSIS — E78.1 HYPERTRIGLYCERIDEMIA: Chronic | ICD-10-CM

## 2020-05-20 DIAGNOSIS — D50.8 IRON DEFICIENCY ANEMIA SECONDARY TO INADEQUATE DIETARY IRON INTAKE: Chronic | ICD-10-CM

## 2020-05-20 DIAGNOSIS — E11.9 TYPE 2 DIABETES MELLITUS WITHOUT COMPLICATION, WITHOUT LONG-TERM CURRENT USE OF INSULIN (HCC): Primary | ICD-10-CM

## 2020-05-20 DIAGNOSIS — K21.9 GASTROESOPHAGEAL REFLUX DISEASE WITHOUT ESOPHAGITIS: Chronic | ICD-10-CM

## 2020-05-20 DIAGNOSIS — M77.8 THUMB TENDONITIS: ICD-10-CM

## 2020-05-20 PROCEDURE — 99214 OFFICE O/P EST MOD 30 MIN: CPT | Performed by: INTERNAL MEDICINE

## 2020-05-20 RX ORDER — SPIRONOLACTONE 50 MG/1
1 TABLET, FILM COATED ORAL DAILY
COMMUNITY
Start: 2020-03-19 | End: 2021-06-04

## 2020-05-20 RX ORDER — LISINOPRIL 20 MG/1
TABLET ORAL
Qty: 90 TABLET | Refills: 3 | Status: SHIPPED | OUTPATIENT
Start: 2020-05-20 | End: 2020-09-11

## 2020-05-20 RX ORDER — ROSUVASTATIN CALCIUM 20 MG/1
TABLET, COATED ORAL
Qty: 90 TABLET | Refills: 3 | Status: SHIPPED | OUTPATIENT
Start: 2020-05-20 | End: 2020-11-25 | Stop reason: SDUPTHER

## 2020-05-20 RX ORDER — MINOCYCLINE HYDROCHLORIDE 100 MG/1
CAPSULE ORAL
COMMUNITY
Start: 2020-03-19 | End: 2021-06-04 | Stop reason: SDUPTHER

## 2020-05-20 RX ORDER — METFORMIN HYDROCHLORIDE 750 MG/1
750 TABLET, EXTENDED RELEASE ORAL
Qty: 90 TABLET | Refills: 3 | Status: SHIPPED | OUTPATIENT
Start: 2020-05-20 | End: 2020-07-06 | Stop reason: ALTCHOICE

## 2020-05-20 NOTE — PROGRESS NOTES
"Subjective   You have chosen to receive care through a telehealth visit.  Do you consent to use a video/audio connection for your medical care today? Yes     History of Present Illness     Otilia Kenny is a 70 y.o. female who is seen today via video visit for six-month follow-up of multiple medical issues including type 2 diabetes, high cholesterol, high triglycerides, hypertension, GERD, depression, and other issues.   She has a history of laminectomy and cervical fusion 02/2013.    Diabetes is not at goal with A1c 8.3 progressing from 7.3 six months ago with her current diabetic medications including Farxiga and weekly Ozempic.  She reported history of intolerance to metformin due to diarrhea, but would like to try it again.  She has been monitoring glucose at home and reports a range from 150-220, although, she has not been monitoring for the past few days due to \"being busy.\"  She states she has been making efforts to follow diabetic diet and has been physically active recently with packing to get ready to a move, although, no regular physical exercise. She reports weight is up 5 pounds.        She reports a new issue of right wrist pain, which has been gradually worsens over the past few months.  She notices pain with trying to lift a milk jug or when she signs her name.  She was tried wearing a wrist brace, which made the pain worse.  She has been taking two Tylenol with temporary pain relief. She has seen Dr. Reynolds in the past.       DEXA 05/2017 revealed normal bone density.      She decreased her lisinopril to 1/2 tablet due to hypotensive episodes. She continues on Norvasc 5 mg daily. We discussed a plan for her to resume the full dose of lisinopril, splitting the dose, and discontinue the Norvasc.        The patient's relevant past medical, surgical, and social history was reviewed in Epic.   Lab results are reviewed with the patient today.  Fasting glucose 175.  A1c 8.3. Normal renal and liver " "function. Iron level acceptable level.   with Crestor.      Review of Systems   Constitutional: Negative for chills, fatigue and fever.   HENT: Negative for congestion, ear pain, postnasal drip, sinus pressure and sore throat.    Respiratory: Negative for cough, shortness of breath and wheezing.    Cardiovascular: Negative for chest pain, palpitations and leg swelling.   Gastrointestinal: Negative for abdominal pain, blood in stool, constipation, diarrhea, nausea and vomiting.   Endocrine: Negative for cold intolerance, heat intolerance, polydipsia and polyuria.   Genitourinary: Negative for dysuria, frequency, hematuria and urgency.   Musculoskeletal: Positive for arthralgias.   Skin: Negative for rash.   Neurological: Negative for syncope and weakness.   All other systems reviewed and are negative.       Objective     Visit Vitals  /54   Pulse 75   Temp 98 °F (36.7 °C) (Oral)   Ht 165.1 cm (65\")   Wt 76.7 kg (169 lb)   BMI 28.12 kg/m²     Physical Exam   Constitutional: She is oriented to person, place, and time. She appears well-developed and well-nourished. No distress.   HENT:   Head: Normocephalic and atraumatic.   Right Ear: External ear normal.   Left Ear: External ear normal.   Nose: Nose normal.   Eyes: Pupils are equal, round, and reactive to light. EOM are normal. Right eye exhibits no discharge. Left eye exhibits no discharge. No scleral icterus.   Neck: Normal range of motion. Neck supple. No thyromegaly present.   Pulmonary/Chest: Effort normal. No accessory muscle usage. No respiratory distress.   Musculoskeletal: Normal range of motion. She exhibits no edema.   Some arthritic changes at the base of the right thumb.   Neurological: She is alert and oriented to person, place, and time. No cranial nerve deficit.   Skin: Skin is warm and dry. No rash noted.   Psychiatric: She has a normal mood and affect. Her speech is normal and behavior is normal. Judgment and thought content normal. "       Assessment/Plan      A prescription is sent for metformin  q.d. Recommended OTC Imodium per label directions to help her tolerate the metformin.  Continue weekly Ozempic and daily Farxiga.  Monitor glucose twice daily prior to breakfast and again in the evening for a couple of weeks keeping a diary for us to review.  Intensify diabetic diet, exercise and weight loss efforts.  Notify us if she cannot tolerate the metformin when she calls to report her diabetic and BP diary numbers.     Refer to Dr. Reynolds for right wrist and thumb pain.  Recommended Tylenol Arthritis to take two tablet in the morning repeating two hours later.  Use Biofreeze topically.  Avoid chronic NSAIDs, but she could probably tolerate a short course.  Naprosyn is listed as an allergy due to her report that it produced itching    Recommended she resume the full dose of lisinopril 20 mg, take 1/2 tablet b.i.d.  Stop the Norvasc.  Monitor BP 2-3 times weekly and keep a BP diary to report back.  Pursue sodium restriction and weight loss.  Walk for exercise 1-2 times daily.    Continue the Crestor and intensify efforts at dietary improvement and regular exercise.    Continue the Celexa for depression, which seems to be well controlled.    Continue the current iron and oral B12 supplements.    Continue current interventions for GERD, which is stable.  Avoid frequent or chronic NSAIDs.    Return in six months for routine follow up with fasting labs      Scribed for Dr. Zabala by Vandana Galeas [facility].     Diagnoses and all orders for this visit:    Type 2 diabetes mellitus without complication, without long-term current use of insulin (CMS/[facility])  -     CBC Auto Differential; Future  -     Comprehensive Metabolic Panel; Future  -     Hemoglobin A1c; Future  -     Microalbumin / Creatinine Urine Ratio - Urine, Clean Catch; Future  -     TSH; Future    Essential hypertension  -     Comprehensive Metabolic Panel; Future    Mixed  hyperlipidemia  -     LDL Cholesterol, Direct; Future    Hypertriglyceridemia  -     LDL Cholesterol, Direct; Future    Diarrhea, unspecified type    Primary osteoarthritis of right wrist  -     Ambulatory Referral to Orthopedic Surgery    Thumb tendonitis  -     Ambulatory Referral to Orthopedic Surgery    Tendonitis of wrist, right  -     Ambulatory Referral to Orthopedic Surgery    Major depression in complete remission (CMS/HCC)    Gastroesophageal reflux disease without esophagitis    Chronic kidney disease, stage II (mild)    Iron deficiency anemia secondary to inadequate dietary iron intake  -     Ferritin; Future  -     Iron Profile; Future    Vitamin B12 deficiency (dietary) anemia  -     Vitamin B12; Future    Vitamin D deficiency  -     Vitamin D 25 Hydroxy; Future    Other orders  -     minocycline (MINOCIN,DYNACIN) 100 MG capsule  -     spironolactone (ALDACTONE) 50 MG tablet; Take 1 tablet by mouth Daily.  -     metFORMIN ER (GLUCOPHAGE-XR) 750 MG 24 hr tablet; Take 1 tablet by mouth Daily With Breakfast.        Lab on 05/12/2020   Component Date Value Ref Range Status   • WBC 05/12/2020 6.92  3.40 - 10.80 10*3/mm3 Final   • RBC 05/12/2020 4.38  3.77 - 5.28 10*6/mm3 Final   • Hemoglobin 05/12/2020 12.9  12.0 - 15.9 g/dL Final   • Hematocrit 05/12/2020 39.9  34.0 - 46.6 % Final   • MCV 05/12/2020 91.1  79.0 - 97.0 fL Final   • MCH 05/12/2020 29.5  26.6 - 33.0 pg Final   • MCHC 05/12/2020 32.3  31.5 - 35.7 g/dL Final   • RDW 05/12/2020 12.5  12.3 - 15.4 % Final   • RDW-SD 05/12/2020 40.7  37.0 - 54.0 fl Final   • MPV 05/12/2020 9.6  6.0 - 12.0 fL Final   • Platelets 05/12/2020 372  140 - 450 10*3/mm3 Final   • Neutrophil % 05/12/2020 60.3  42.7 - 76.0 % Final   • Lymphocyte % 05/12/2020 28.9  19.6 - 45.3 % Final   • Monocyte % 05/12/2020 7.4  5.0 - 12.0 % Final   • Eosinophil % 05/12/2020 2.7  0.3 - 6.2 % Final   • Basophil % 05/12/2020 0.7  0.0 - 1.5 % Final   • Neutrophils, Absolute 05/12/2020 4.17   1.70 - 7.00 10*3/mm3 Final   • Lymphocytes, Absolute 05/12/2020 2.00  0.70 - 3.10 10*3/mm3 Final   • Monocytes, Absolute 05/12/2020 0.51  0.10 - 0.90 10*3/mm3 Final   • Eosinophils, Absolute 05/12/2020 0.19  0.00 - 0.40 10*3/mm3 Final   • Basophils, Absolute 05/12/2020 0.05  0.00 - 0.20 10*3/mm3 Final   • Glucose 05/12/2020 175* 70 - 99 mg/dL Final   • BUN 05/12/2020 31* 7 - 23 mg/dL Final   • Creatinine 05/12/2020 1.03  0.52 - 1.04 mg/dL Final   • Sodium 05/12/2020 139  137 - 145 mmol/L Final   • Potassium 05/12/2020 4.9  3.4 - 5.0 mmol/L Final   • Chloride 05/12/2020 103  101 - 112 mmol/L Final   • CO2 05/12/2020 28.0  22.0 - 30.0 mmol/L Final   • Calcium 05/12/2020 9.8  8.4 - 10.2 mg/dL Final   • Total Protein 05/12/2020 8.0  6.3 - 8.6 g/dL Final   • Albumin 05/12/2020 4.30  3.50 - 5.00 g/dL Final   • ALT (SGPT) 05/12/2020 28  <=35 U/L Final   • AST (SGOT) 05/12/2020 32  14 - 36 U/L Final   • Alkaline Phosphatase 05/12/2020 69  38 - 126 U/L Final   • Total Bilirubin 05/12/2020 0.4  0.2 - 1.3 mg/dL Final   • eGFR Non African Amer 05/12/2020 53  39 - 90 mL/min/1.73 Final   • Globulin 05/12/2020 3.7* 2.3 - 3.5 gm/dL Final   • A/G Ratio 05/12/2020 1.2  1.1 - 1.8 g/dL Final   • BUN/Creatinine Ratio 05/12/2020 30.1* 7.0 - 25.0 Final   • Anion Gap 05/12/2020 8.0  5.0 - 15.0 mmol/L Final   • Hemoglobin A1C 05/12/2020 8.35* 4.80 - 5.60 % Final   • Total Cholesterol 05/12/2020 208* 150 - 200 mg/dL Final   • Triglycerides 05/12/2020 170* <=150 mg/dL Final   • HDL Cholesterol 05/12/2020 48  40 - 59 mg/dL Final   • LDL Cholesterol  05/12/2020 126* <=100 mg/dL Final   • VLDL Cholesterol 05/12/2020 34  mg/dL Final   • LDL/HDL Ratio 05/12/2020 2.63  0.00 - 3.22 Final   • Ferritin 05/12/2020 69.40  13.00 - 150.00 ng/mL Final   • Vitamin B-12 05/12/2020 689  211 - 946 pg/mL Final   • 25 Hydroxy, Vitamin D 05/12/2020 28.3* 30.0 - 100.0 ng/ml Final   ]

## 2020-06-08 DIAGNOSIS — R52 PAIN: Primary | ICD-10-CM

## 2020-06-10 ENCOUNTER — OFFICE VISIT (OUTPATIENT)
Dept: ORTHOPEDIC SURGERY | Facility: CLINIC | Age: 70
End: 2020-06-10

## 2020-06-10 VITALS — BODY MASS INDEX: 28.32 KG/M2 | HEIGHT: 65 IN | WEIGHT: 170 LBS

## 2020-06-10 DIAGNOSIS — I10 ESSENTIAL HYPERTENSION: ICD-10-CM

## 2020-06-10 DIAGNOSIS — M25.531 RIGHT WRIST PAIN: ICD-10-CM

## 2020-06-10 DIAGNOSIS — M65.331 TRIGGER FINGER, RIGHT MIDDLE FINGER: ICD-10-CM

## 2020-06-10 DIAGNOSIS — N18.2 CHRONIC KIDNEY DISEASE, STAGE II (MILD): ICD-10-CM

## 2020-06-10 DIAGNOSIS — M65.4 RADIAL STYLOID TENOSYNOVITIS OF RIGHT HAND: Primary | ICD-10-CM

## 2020-06-10 PROCEDURE — 99213 OFFICE O/P EST LOW 20 MIN: CPT | Performed by: ORTHOPAEDIC SURGERY

## 2020-06-10 NOTE — PROGRESS NOTES
Otilia Kenny is a 70 y.o. female   Primary provider:  Alexis Zabala MD       Chief Complaint   Patient presents with   • Right Wrist - Wrist Pain       HISTORY OF PRESENT ILLNESS: right wrist pain started few months ago is gradually getting worse. No known injury. xrays done today.   2 issues.  First involves catching and locking of the 3rd finger of right hand.  No injury.  Worse with gripping.  Severe pain at times. No  Numbness or tingling    Second involves side of wrist.  Pain at the base of thumb.  Difficulty grasping and using right hand.  Writing is very painful.  Some activities of daily living are very difficulty.  No specific injury    Wrist Pain    The pain is present in the right wrist. This is a chronic problem. There has been no history of extremity trauma. The problem occurs constantly. The problem has been unchanged. The quality of the pain is described as burning (stabbing, ). The pain is at a severity of 5/10. The pain is moderate. Associated symptoms comments: Clicking, popping. . Exacerbated by: driving.  Treatments tried: wrist splint. The treatment provided no relief.        CONCURRENT MEDICAL HISTORY:    Past Medical History:   Diagnosis Date   • Abdominal pain     Most likely related to functional colonic spasm     • Abnormal liver enzymes     Improved, 3/2015      • Allergic rhinitis     seasonal   • Allergic rhinitis    • Anemia    • Anesthesia complication     states sometime B/P drops   • Arthritis    • Cervical disc disorder     S/P cervical surgeries x2-3. Extensive fusion C 3-7. [place] [facility]      • Depression    • Diarrhea, unspecified     Resolved with discontinuation of metformin.    • Diverticular disease of colon    • Dysfunction of eustachian tube    • Essential hypertension     Increase lisinopril HCT, 10/12.5.      • Gastritis    • Generalized anxiety disorder    • GERD (gastroesophageal reflux disease)    • Hyperlipidemia     Intolerant of Lipitor. The patient  "stopped Zocor due to \"liver pain\", summer 2015. patient instructed to reattempt Zocor every other day, 10/2015    • Hypertriglyceridemia     Holding simvastatin 2/2015 due to slightly elevated LFTs.      • Iron deficiency anemia    • Irritable bowel syndrome     Dr. 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 Reynolds    • Sleep disorder    • Spasm of back muscles     Dr. Castro changed Soma to Zanaflex.     • Vitamin B12 deficiency (dietary) anemia     Currently on multivitamin daily         Allergies   Allergen Reactions   • Allegra [Fexofenadine] Itching   • Amaryl [Glimepiride] Myalgia   • Dyazide [Triamterene-Hctz] Itching   • Hydrochlorothiazide W-Triamterene Itching   • Lipitor [Atorvastatin] Myalgia   • Metformin And Related Diarrhea   • Naproxen Itching   • Sulfamethoxazole-Trimethoprim Rash     dizziness         Current Outpatient Medications:   •  aspirin 81 MG EC tablet, Take 81 mg by mouth Daily., Disp: , Rfl:   •  Blood Glucose Monitoring Suppl (ONETOUCH VERIO IQ SYSTEM) w/Device kit, 1 each Daily., Disp: 1 kit, Rfl: 0  •  CALCIUM CARB-CHOLECALCIFEROL PO, Take 1 tablet by mouth 2 (two) times a day., Disp: , Rfl:   •  cetirizine (zyrTEC) 10 MG tablet, Take 10 mg by mouth Daily., Disp: , Rfl:   •  Dapagliflozin Propanediol (Farxiga) 10 MG tablet, TAKE 1 TABLET DAILY        (REPLACES INVOKANA), Disp: 90 tablet, Rfl: 1  •  fenofibrate (TRICOR) 145 MG tablet, TAKE 1/2 TO 1 TABLET DAILY, Disp: 90 tablet, Rfl: 3  •  glucose blood (ONETOUCH VERIO) test strip, TEST DAILY AS DIRECTED., Disp: 100 each, Rfl: 3  •  Insulin Pen Needle 31G X 5 MM misc, Use daily with Victoza, Disp: 90 each, Rfl: 3  •  IRON, FERROUS SULFATE, PO, Take 1 tablet by mouth 2 (Two) Times a Week., Disp: , Rfl:   •  Lancets (ONETOUCH DELICA PLUS [id number]) misc, TEST ONCE DAILY AS DIRECTED, Disp: 100 each, Rfl: 3  •  lisinopril (PRINIVIL,ZESTRIL) 20 MG tablet, TAKE 1 TABLET DAILY, Disp: 90 tablet, Rfl: " 3  •  meclizine (ANTIVERT) 25 MG tablet, Take 1 tablet by mouth 3 (Three) Times a Day As Needed for dizziness., Disp: 30 tablet, Rfl: 1  •  metFORMIN ER (GLUCOPHAGE-XR) 750 MG 24 hr tablet, Take 1 tablet by mouth Daily With Breakfast., Disp: 90 tablet, Rfl: 3  •  minocycline (MINOCIN,DYNACIN) 100 MG capsule, , Disp: , Rfl:   •  Multiple Vitamins-Minerals (MULTIVITAMIN PO), Take 1 tablet by mouth daily., Disp: , Rfl:   •  NON FORMULARY, Take 1 tablet by mouth Daily. Eye Health and Lutein, Disp: , Rfl:   •  omeprazole (priLOSEC) 40 MG capsule, TAKE 1 CAPSULE DAILY, Disp: 90 capsule, Rfl: 3  •  rosuvastatin (CRESTOR) 20 MG tablet, TAKE 1 TABLET DAILY.       REPLACE SIMVASTATIN, Disp: 90 tablet, Rfl: 3  •  Semaglutide (OZEMPIC) 1 MG/DOSE solution pen-injector, Inject 1 mg under the skin into the appropriate area as directed 1 (One) Time Per Week., Disp: 6 pen, Rfl: 3  •  spironolactone (ALDACTONE) 50 MG tablet, Take 1 tablet by mouth Daily., Disp: , Rfl:   •  TiZANidine (ZANAFLEX) 4 MG capsule, Take 1 capsule by mouth 2 (Two) Times a Day As Needed for Muscle Spasms., Disp: 180 capsule, Rfl: 3  •  amLODIPine (NORVASC) 5 MG tablet, TAKE 1 TABLET DAILY, Disp: 90 tablet, Rfl: 3  •  citalopram (CELEXA) 20 MG tablet, Take 1 tablet by mouth Daily., Disp: 90 tablet, Rfl: 3    Past Surgical History:   Procedure Laterality Date   • BACK SURGERY      Laminectomy   • BUNIONECTOMY     • CARPAL TUNNEL RELEASE Bilateral    • CERVICAL DISC SURGERY      Laminectomy 2011. Fusion 2013.   • CHOLECYSTECTOMY     • COLONOSCOPY      Diverticulosis found in the sigmoid colon.A single diminutive polyp found in the colon.Mul.biopsies taken.Hemorrhoids found in the anus.   • ENDOSCOPY      Normal hypopharynx and esophagus.Marked irregularity of the Z-line,compatable with chronic reflux.Mul.biopsies.A hiatus hernia found in GE junction.Mild nonerosive gastritis.Mul.biopsies.Polyps in fundus.Mul.biopsies.Normal pylorus.Normal duodenum.   • ENDOSCOPY  "AND COLONOSCOPY     • FINGER/THUMB LESION/CYST EXCISION     • HEEL SPUR EXCISION     • HYSTERECTOMY     • KNEE ARTHROSCOPY Bilateral     Arthroscopy of the left knee with debridement of medial meniscus.   • KNEE SURGERY      Right unicompartmental arthroplasty.   • TOTAL KNEE ARTHROPLASTY Left 2018    Procedure: TOTAL KNEE ARTHROPLASTY ATTUNE with adductor canal block ;  Surgeon: Baron Reynolds MD;  Location: [facility];  Service: Orthopedics       Family History   Problem Relation Age of Onset   • Cancer Other         lung   • Diabetes Other    • Hypertension Other    • Stroke Other    • Breast cancer Sister         Social History     Socioeconomic History   • Marital status:      Spouse name: Not on file   • Number of children: Not on file   • Years of education: Not on file   • Highest education level: Not on file   Tobacco Use   • Smoking status: Former Smoker     Packs/day: 1.00     Years: 10.00     Pack years: 10.00     Types: Cigarettes     Last attempt to quit: 1985     Years since quittin.0   • Smokeless tobacco: Never Used   Substance and Sexual Activity   • Alcohol use: No   • Drug use: No   • Sexual activity: Defer        Review of Systems   Endocrine:        Hair loss   Musculoskeletal:        Right wrist pain.   All other systems reviewed and are negative.      PHYSICAL EXAMINATION:       Ht 165.1 cm (65\")   Wt 77.1 kg (170 lb)   BMI 28.29 kg/m²     Physical Exam   Constitutional: She is oriented to person, place, and time. She appears well-developed and well-nourished.   Neurological: She is alert and oriented to person, place, and time.   Psychiatric: She has a normal mood and affect. Her behavior is normal. Judgment and thought content normal.       GAIT:     [x]  Normal  []  Antalgic    Assistive device: [x]  None  []  Walker     []  Crutches  []  Cane     []  Wheelchair  []  Stretcher    Right Hand Exam     Tenderness   Right hand tenderness location: tender over volar " aspect of MCP joint, 3rd finger.  Also very tender over radial styloid.    Muscle Strength   : 4/5     Tests   Phalen’s Sign: negative  Finkelstein's test: positive    Other   Erythema: absent  Sensation: normal  Pulse: present    Comments:  Tender wrist motion, especially with ulnar deviation                Xr Wrist 3+ Vw Right    Result Date: 6/10/2020  Narrative: Ordering Provider:  Baron Reynolds MD Ordering Diagnosis/Indication:  Pain Procedure:  XR WRIST 3+ VW RIGHT Exam Date:  6/10/20 COMPARISON:  Not applicable, no relevant images available.     Impression:  3 views of the right wrist show acceptable position and alignment with no evidence of acute bony abnormality.  Diffuse arthritic change noted throughout the wrist with significant first CMC joint arthritic change.  No acute findings. Baron Reynolds MD 6/10/20           ASSESSMENT:    Diagnoses and all orders for this visit:    Radial styloid tenosynovitis of right hand    Right wrist pain  -     Injection Tendon or Ligament  -     Ambulatory Referral to Physical Therapy Evaluate and treat    Trigger finger, right middle finger  -     Injection Tendon or Ligament    Essential hypertension    Chronic kidney disease, stage II (mild)          PLAN    Noivettinger PT:   dequervain syndrome right.   Advance as tolerated   Bracing as needed   Modalities as needed.   Teach HEP    Will inject right middle finger tendon sheath today    Slowly increase activity as tolerated.    Discussed possible trigger release in office if not improving.    Will see how tendonitis in wrist responds to PT.    Cannot take NSAIDS due to hiatal hernia.    Injection Tendon or Ligament  Date/Time: 6/10/2020 9:06 AM  Performed by: Baron Reynolds MD  Authorized by: Baron Reynolds MD   Preparation: Patient was prepped and draped in the usual sterile fashion.  Local anesthesia used: no    Anesthesia:  Local anesthesia used: no    Sedation:  Patient  sedated: no    Patient tolerance: Patient tolerated the procedure well with no immediate complications  Comments: .3cc 1% Lidocaine and .3cc kenalog injected into right middle finger.   Lidocaine [id number] lot [id number]  Kenalog [id number]          Patient's Body mass index is 28.29 kg/m². BMI is above normal parameters. Recommendations include: exercise counseling and nutrition counseling.      Return in about 6 weeks (around 7/22/2020) for recheck.    Baron Reynolds MD

## 2020-06-18 ENCOUNTER — LAB (OUTPATIENT)
Dept: LAB | Facility: OTHER | Age: 70
End: 2020-06-18

## 2020-06-18 ENCOUNTER — TRANSCRIBE ORDERS (OUTPATIENT)
Dept: LAB | Facility: OTHER | Age: 70
End: 2020-06-18

## 2020-06-18 DIAGNOSIS — Z79.899 ENCOUNTER FOR LONG-TERM (CURRENT) USE OF HIGH-RISK MEDICATION: Primary | ICD-10-CM

## 2020-06-18 DIAGNOSIS — Z79.899 ENCOUNTER FOR LONG-TERM (CURRENT) USE OF HIGH-RISK MEDICATION: ICD-10-CM

## 2020-06-18 LAB — POTASSIUM BLD-SCNC: 4.5 MMOL/L (ref 3.4–5)

## 2020-06-18 PROCEDURE — 84132 ASSAY OF SERUM POTASSIUM: CPT | Performed by: INTERNAL MEDICINE

## 2020-06-18 PROCEDURE — 36415 COLL VENOUS BLD VENIPUNCTURE: CPT | Performed by: INTERNAL MEDICINE

## 2020-06-19 RX ORDER — TIZANIDINE HYDROCHLORIDE 4 MG/1
CAPSULE, GELATIN COATED ORAL
Qty: 180 CAPSULE | Refills: 3 | Status: SHIPPED | OUTPATIENT
Start: 2020-06-19 | End: 2021-08-11

## 2020-06-19 RX ORDER — OMEPRAZOLE 40 MG/1
CAPSULE, DELAYED RELEASE ORAL
Qty: 90 CAPSULE | Refills: 3 | Status: SHIPPED | OUTPATIENT
Start: 2020-06-19 | End: 2020-11-25 | Stop reason: SDUPTHER

## 2020-07-06 ENCOUNTER — TELEPHONE (OUTPATIENT)
Dept: FAMILY MEDICINE CLINIC | Facility: CLINIC | Age: 70
End: 2020-07-06

## 2020-07-06 RX ORDER — PIOGLITAZONEHYDROCHLORIDE 15 MG/1
15 TABLET ORAL DAILY
Qty: 90 TABLET | Refills: 1 | Status: SHIPPED | OUTPATIENT
Start: 2020-07-06 | End: 2020-07-06 | Stop reason: SDUPTHER

## 2020-07-06 RX ORDER — PIOGLITAZONEHYDROCHLORIDE 15 MG/1
15 TABLET ORAL DAILY
Qty: 90 TABLET | Refills: 1 | Status: SHIPPED | OUTPATIENT
Start: 2020-07-06 | End: 2020-07-07 | Stop reason: SDUPTHER

## 2020-07-06 NOTE — TELEPHONE ENCOUNTER
Relayed information to patient. TP      ----- Message from Alexis Zabala MD sent at 7/6/2020 12:00 PM CDT -----  Regarding: RE: MEDICATION  Stop metformin XR.  Continue her other diabetic medications.  Start pioglitazone 15 mg daily.  Watch for increased edema.  Monitor glucose closely and notify us if not able to keep it at goal.  EB  ----- Message -----  From: Emily Lou RN  Sent: 7/6/2020  11:34 AM CDT  To: Alexis Zabala MD  Subject: FW: MEDICATION                                     ----- Message -----  From: Melonie Veronica RegSched Rep  Sent: 7/6/2020  11:30 AM CDT  To: Emily Lou RN  Subject: MEDICATION                                       metFORMIN ER (GLUCOPHAGE-XR) 750 MG 24 hr tablet    GOT A RECALL LETTER

## 2020-07-07 RX ORDER — PIOGLITAZONEHYDROCHLORIDE 15 MG/1
15 TABLET ORAL DAILY
Qty: 90 TABLET | Refills: 1 | Status: SHIPPED | OUTPATIENT
Start: 2020-07-07 | End: 2020-09-11 | Stop reason: SINTOL

## 2020-07-23 ENCOUNTER — OFFICE VISIT (OUTPATIENT)
Dept: ORTHOPEDIC SURGERY | Facility: CLINIC | Age: 70
End: 2020-07-23

## 2020-07-23 VITALS — HEIGHT: 65 IN | BODY MASS INDEX: 28.32 KG/M2 | WEIGHT: 170 LBS

## 2020-07-23 DIAGNOSIS — M65.4 RADIAL STYLOID TENOSYNOVITIS OF RIGHT HAND: Primary | ICD-10-CM

## 2020-07-23 DIAGNOSIS — M65.331 TRIGGER FINGER, RIGHT MIDDLE FINGER: ICD-10-CM

## 2020-07-23 DIAGNOSIS — M25.531 RIGHT WRIST PAIN: ICD-10-CM

## 2020-07-23 DIAGNOSIS — I10 ESSENTIAL HYPERTENSION: ICD-10-CM

## 2020-07-23 DIAGNOSIS — N18.2 CHRONIC KIDNEY DISEASE, STAGE II (MILD): ICD-10-CM

## 2020-07-23 PROCEDURE — 20600 DRAIN/INJ JOINT/BURSA W/O US: CPT | Performed by: ORTHOPAEDIC SURGERY

## 2020-07-23 PROCEDURE — 99213 OFFICE O/P EST LOW 20 MIN: CPT | Performed by: ORTHOPAEDIC SURGERY

## 2020-07-23 RX ADMIN — TRIAMCINOLONE ACETONIDE 20 MG: 40 INJECTION, SUSPENSION INTRA-ARTICULAR; INTRAMUSCULAR at 12:01

## 2020-07-23 RX ADMIN — LIDOCAINE HYDROCHLORIDE 0.5 ML: 10 INJECTION, SOLUTION EPIDURAL; INFILTRATION; INTRACAUDAL; PERINEURAL at 12:01

## 2020-07-23 NOTE — PROGRESS NOTES
"Otilia Kenny is a 70 y.o. female returns for     Chief Complaint   Patient presents with   • Right Wrist - Follow-up, Pain       HISTORY OF PRESENT ILLNESS:4 week follow up right wrist.  Pain scale today 5/10  The catching and locking is much improved.  She continues to have pain along the radial side of her wrist.  She has tried bracing without significant success.  She has pain with activity.       CONCURRENT MEDICAL HISTORY:    The following portions of the patient's history were reviewed and updated as appropriate: allergies, current medications, past family history, past medical history, past social history, past surgical history and problem list.     ROS  No fevers or chills.  No chest pain or shortness of air.  No GI or  disturbances.    PHYSICAL EXAMINATION:       Ht 165.1 cm (65\")   Wt 77.1 kg (170 lb)   BMI 28.29 kg/m²     Physical Exam   Constitutional: She is oriented to person, place, and time. She appears well-developed and well-nourished.   Neurological: She is alert and oriented to person, place, and time.   Psychiatric: She has a normal mood and affect. Her behavior is normal. Judgment and thought content normal.       GAIT:     [x]  Normal  []  Antalgic    Assistive device: []  None  []  Walker     []  Crutches  []  Cane     []  Wheelchair  []  Stretcher    Right Hand Exam     Tenderness   Right hand tenderness location: Radial styloid and first dorsal compartment tenderness.    Range of Motion   Wrist   Extension: normal   Flexion: normal     Muscle Strength   : 4/5     Tests   Finkelstein's test: positive    Other   Erythema: absent  Sensation: normal  Pulse: present    Comments:  Significant pain with ulnar deviation.            Small Joint Arthrocentesis: R thumb CMC  Consent given by: patient  Site marked: site marked  Timeout: Immediately prior to procedure a time out was called to verify the correct patient, procedure, equipment, support staff and site/side marked as required "   Supporting Documentation  Indications: pain   Procedure Details  Location: thumb - R thumb CMC  Preparation: Patient was prepped and draped in the usual sterile fashion  Needle size: 22 G  Approach: radial  Medications administered: 0.5 mL lidocaine PF 1% 1 %; 20 mg triamcinolone acetonide 40 MG/ML                    ASSESSMENT:    Diagnoses and all orders for this visit:    Radial styloid tenosynovitis of right hand  -     Small Joint Arthrocentesis: R thumb CMC    Right wrist pain  -     Small Joint Arthrocentesis: R thumb CMC    Trigger finger, right middle finger  -     Small Joint Arthrocentesis: R thumb CMC    Essential hypertension  -     Small Joint Arthrocentesis: R thumb CMC    Chronic kidney disease, stage II (mild)  -     Small Joint Arthrocentesis: R thumb CMC          PLAN    We will continue using the thumb spica splint for support.  We also discussed and proceeded with an injection into the first dorsal compartment of the right wrist.  She will slowly continue stretching and strengthening exercises.  She does also have significant osteoarthritis at the base of her thumb as well as in the base of her wrist.  Her symptoms seem to be mostly related to first dorsal compartment tenosynovitis.    Patient's Body mass index is 28.29 kg/m². BMI is above normal parameters. Recommendations include: exercise counseling and nutrition counseling.  Return in about 4 weeks (around 8/20/2020) for recheck.    Baron Reynolds MD

## 2020-07-26 RX ORDER — LIDOCAINE HYDROCHLORIDE 10 MG/ML
0.5 INJECTION, SOLUTION EPIDURAL; INFILTRATION; INTRACAUDAL; PERINEURAL
Status: COMPLETED | OUTPATIENT
Start: 2020-07-23 | End: 2020-07-23

## 2020-07-26 RX ORDER — TRIAMCINOLONE ACETONIDE 40 MG/ML
20 INJECTION, SUSPENSION INTRA-ARTICULAR; INTRAMUSCULAR
Status: COMPLETED | OUTPATIENT
Start: 2020-07-23 | End: 2020-07-23

## 2020-09-08 ENCOUNTER — TELEPHONE (OUTPATIENT)
Dept: FAMILY MEDICINE CLINIC | Facility: CLINIC | Age: 70
End: 2020-09-08

## 2020-09-08 NOTE — TELEPHONE ENCOUNTER
----- Message from Alexis Zabala MD sent at 9/8/2020  2:41 PM CDT -----  Stop the Actos.  Schedule an appointment later this week or early next week to make sure her Actos was the cause of the rash and to discuss alternatives, which will be a complicated decision.  Monitor glucose twice daily and keep a list of the results to review with me at the time of the appointment.  EB  ----- Message -----  From: Emily Lou RN  Sent: 9/8/2020   2:34 PM CDT  To: Alexis Zabala MD    SHE HAS AN ALLERGIC RASH TO ACTOS THAT WAS REPLACING METFORMIN. PLEASE ADVISE A REPLACEMENT. TP

## 2020-09-11 ENCOUNTER — OFFICE VISIT (OUTPATIENT)
Dept: FAMILY MEDICINE CLINIC | Facility: CLINIC | Age: 70
End: 2020-09-11

## 2020-09-11 VITALS
HEART RATE: 71 BPM | HEIGHT: 65 IN | WEIGHT: 165 LBS | SYSTOLIC BLOOD PRESSURE: 113 MMHG | DIASTOLIC BLOOD PRESSURE: 54 MMHG | BODY MASS INDEX: 27.49 KG/M2

## 2020-09-11 DIAGNOSIS — I10 ESSENTIAL HYPERTENSION: Chronic | ICD-10-CM

## 2020-09-11 DIAGNOSIS — I95.1 ORTHOSTATIC HYPOTENSION: ICD-10-CM

## 2020-09-11 DIAGNOSIS — E11.65 TYPE 2 DIABETES MELLITUS WITH HYPERGLYCEMIA, WITHOUT LONG-TERM CURRENT USE OF INSULIN (HCC): Primary | ICD-10-CM

## 2020-09-11 PROCEDURE — 99442 PR PHYS/QHP TELEPHONE EVALUATION 11-20 MIN: CPT | Performed by: INTERNAL MEDICINE

## 2020-09-11 RX ORDER — NATEGLINIDE 120 MG/1
120 TABLET ORAL
Qty: 90 TABLET | Refills: 3 | Status: SHIPPED | OUTPATIENT
Start: 2020-09-11 | End: 2021-06-04 | Stop reason: SDUPTHER

## 2020-09-11 RX ORDER — LISINOPRIL 2.5 MG/1
2.5 TABLET ORAL 2 TIMES DAILY
Qty: 180 TABLET | Refills: 3 | Status: SHIPPED | OUTPATIENT
Start: 2020-09-11 | End: 2020-11-25 | Stop reason: SDUPTHER

## 2020-09-11 NOTE — PROGRESS NOTES
Subjective     You have chosen to receive care through a telephone visit. Do you consent to use a telephone visit for your medical care today? Yes  Total visit time: 13 minutes     History of Present Illness     Otilia Kenny is a 70 y.o. female.withfor multiple medical issues including type 2 diabetes, high cholesterol, high triglycerides, hypertension, GERD, depression, and other issues who receives care today via telephone visit to follow up on diabetes management.  When she was here in May, diabetes was not at goal with A1c 8.3 progressing from 7.3 six months prior on Farxiga and weekly Ozempic.  We started metformin  mg daily with that visit, although, she stopped the metformin in May due to medication recall and started Actos 15 mg daily.  She was intolerant to plain metformin in the past due to GI side effects.  She was intolerant to Amaryl due to myalgias.   After starting the metformin, she developed a skin rash, which resolved when she stopped the Actos.  She has been checking glucose four times daily and keeping a diabetic daily with a range from 105 to 195 with 7-day average of 142.  Her evening numbers are typically running higher than morning.  I recommended adding Starlix to help bring down the evening glucose elevations.       She has experiencing 5-pound gradual weight loss over the past four months and reports persistent hypotensive episodes even though she stopped her Norvasc and has decreased her lisinopril from 20 mg to 5 mg daily.  Blood pressure is 113/54 today.  With one episode last week, she experienced presyncope and nausea when working outdoors.  She checked BP, which was 87/47.   We will have her split her dose of lisinopril to take 2.5 mg b.i.d., holding the dose for days she has increased physical activity planned.         .      Review of Systems   Constitutional: Negative for chills, fatigue and fever.   HENT: Negative for congestion, ear pain, postnasal drip, sinus pressure  "and sore throat.    Respiratory: Negative for cough, shortness of breath and wheezing.    Cardiovascular: Negative for chest pain, palpitations and leg swelling.   Gastrointestinal: Negative for abdominal pain, blood in stool, constipation, diarrhea, nausea and vomiting.   Endocrine: Negative for cold intolerance, heat intolerance, polydipsia and polyuria.   Genitourinary: Negative for dysuria, frequency, hematuria and urgency.   Skin: Negative for rash.   Neurological: Negative for syncope and weakness.        Objective     Visit Vitals  /54   Pulse 71   Ht 165.1 cm (65\")   Wt 74.8 kg (165 lb)   BMI 27.46 kg/m²     Physical Exam    Future Appointments   Date Time Provider Department Center   11/25/2020 10:30 AM Alexis Zabala MD [facility] None       Assessment/Plan      Continue the weekly Ozempic and daily Farxiga 10 mg.  We will send a prescription for Starlix 120 mg to take each evening with her supper meal, although, she may need to take the Starlix with a different meal if she plans to eat a higher carbohydrate meal such as dining out with friends for lunch.  In other words, take it with the largest meal of the day.  Continue to monitor glucose and notify me if 7-day average is not consistently less than 150     I sent a prescription for lisinopril 2.5 mg b.i.d.  Monitor BP.  She may need to hold the morning dose if she is planning a day with increased physical activity.  Continue monitoring blood pressure.  Continue to pursue sodium restriction and additional weight loss.    Return in November for routine follow up with fasting labs one week prior or sooner if BP or diabetes is not consistently at goal.      Scribed for Dr. Zabala by Vandana Galeas [facility].     Diagnoses and all orders for this visit:    Type 2 diabetes mellitus with hyperglycemia, without long-term current use of insulin (CMS/[facility])    Essential hypertension    Orthostatic hypotension    Other orders  -     lisinopril " (PRINIVIL,ZESTRIL) 2.5 MG tablet; Take 1 tablet by mouth 2 (Two) Times a Day.  -     nateglinide (Starlix) 120 MG tablet; Take 1 tablet by mouth Daily Before Supper.        No visits with results within 3 Week(s) from this visit.   Latest known visit with results is:   Lab on 06/18/2020   Component Date Value Ref Range Status   • Potassium 06/18/2020 4.5  3.4 - 5.0 mmol/L Final   ]

## 2020-09-21 ENCOUNTER — TRANSCRIBE ORDERS (OUTPATIENT)
Dept: GENERAL RADIOLOGY | Facility: CLINIC | Age: 70
End: 2020-09-21

## 2020-09-21 ENCOUNTER — LAB (OUTPATIENT)
Dept: LAB | Facility: OTHER | Age: 70
End: 2020-09-21

## 2020-09-21 DIAGNOSIS — Z79.899 ENCOUNTER FOR LONG-TERM (CURRENT) USE OF HIGH-RISK MEDICATION: Primary | ICD-10-CM

## 2020-09-21 DIAGNOSIS — Z79.899 ENCOUNTER FOR LONG-TERM (CURRENT) USE OF HIGH-RISK MEDICATION: ICD-10-CM

## 2020-09-21 LAB
ALBUMIN SERPL-MCNC: 4.4 G/DL (ref 3.5–5)
ALBUMIN/GLOB SERPL: 1.2 G/DL (ref 1.1–1.8)
ALP SERPL-CCNC: 71 U/L (ref 38–126)
ALT SERPL W P-5'-P-CCNC: 21 U/L
ANION GAP SERPL CALCULATED.3IONS-SCNC: 8 MMOL/L (ref 5–15)
AST SERPL-CCNC: 27 U/L (ref 14–36)
BASOPHILS # BLD AUTO: 0.03 10*3/MM3 (ref 0–0.2)
BASOPHILS NFR BLD AUTO: 0.5 % (ref 0–1.5)
BILIRUB SERPL-MCNC: 0.4 MG/DL (ref 0.2–1.3)
BUN SERPL-MCNC: 27 MG/DL (ref 7–23)
BUN/CREAT SERPL: 24.8 (ref 7–25)
CALCIUM SPEC-SCNC: 9.7 MG/DL (ref 8.4–10.2)
CHLORIDE SERPL-SCNC: 101 MMOL/L (ref 101–112)
CO2 SERPL-SCNC: 26 MMOL/L (ref 22–30)
CREAT SERPL-MCNC: 1.09 MG/DL (ref 0.52–1.04)
DEPRECATED RDW RBC AUTO: 42.9 FL (ref 37–54)
EOSINOPHIL # BLD AUTO: 0.11 10*3/MM3 (ref 0–0.4)
EOSINOPHIL NFR BLD AUTO: 2 % (ref 0.3–6.2)
ERYTHROCYTE [DISTWIDTH] IN BLOOD BY AUTOMATED COUNT: 13 % (ref 12.3–15.4)
GFR SERPL CREATININE-BSD FRML MDRD: 49 ML/MIN/1.73 (ref 39–90)
GLOBULIN UR ELPH-MCNC: 3.6 GM/DL (ref 2.3–3.5)
GLUCOSE SERPL-MCNC: 214 MG/DL (ref 70–99)
HCT VFR BLD AUTO: 39.4 % (ref 34–46.6)
HGB BLD-MCNC: 12.7 G/DL (ref 12–15.9)
LYMPHOCYTES # BLD AUTO: 1.34 10*3/MM3 (ref 0.7–3.1)
LYMPHOCYTES NFR BLD AUTO: 24.4 % (ref 19.6–45.3)
MCH RBC QN AUTO: 30 PG (ref 26.6–33)
MCHC RBC AUTO-ENTMCNC: 32.2 G/DL (ref 31.5–35.7)
MCV RBC AUTO: 93.1 FL (ref 79–97)
MONOCYTES # BLD AUTO: 0.44 10*3/MM3 (ref 0.1–0.9)
MONOCYTES NFR BLD AUTO: 8 % (ref 5–12)
NEUTROPHILS NFR BLD AUTO: 3.58 10*3/MM3 (ref 1.7–7)
NEUTROPHILS NFR BLD AUTO: 65.1 % (ref 42.7–76)
PLATELET # BLD AUTO: 361 10*3/MM3 (ref 140–450)
PMV BLD AUTO: 10 FL (ref 6–12)
POTASSIUM SERPL-SCNC: 4.7 MMOL/L (ref 3.4–5)
PROT SERPL-MCNC: 8 G/DL (ref 6.3–8.6)
RBC # BLD AUTO: 4.23 10*6/MM3 (ref 3.77–5.28)
SODIUM SERPL-SCNC: 135 MMOL/L (ref 137–145)
WBC # BLD AUTO: 5.5 10*3/MM3 (ref 3.4–10.8)

## 2020-09-21 PROCEDURE — 36415 COLL VENOUS BLD VENIPUNCTURE: CPT | Performed by: INTERNAL MEDICINE

## 2020-09-21 PROCEDURE — 85025 COMPLETE CBC W/AUTO DIFF WBC: CPT | Performed by: INTERNAL MEDICINE

## 2020-09-21 PROCEDURE — 80053 COMPREHEN METABOLIC PANEL: CPT | Performed by: INTERNAL MEDICINE

## 2020-09-22 ENCOUNTER — OFFICE VISIT (OUTPATIENT)
Dept: FAMILY MEDICINE CLINIC | Facility: CLINIC | Age: 70
End: 2020-09-22

## 2020-09-22 VITALS — BODY MASS INDEX: 27.49 KG/M2 | WEIGHT: 165 LBS | HEIGHT: 65 IN

## 2020-09-22 DIAGNOSIS — Z23 NEED FOR IMMUNIZATION AGAINST INFLUENZA: ICD-10-CM

## 2020-09-22 DIAGNOSIS — Z23 NEED FOR SHINGLES VACCINE: ICD-10-CM

## 2020-09-22 DIAGNOSIS — Z23 NEED FOR DIPHTHERIA-TETANUS-PERTUSSIS (TDAP) VACCINE: ICD-10-CM

## 2020-09-22 DIAGNOSIS — Z00.00 MEDICARE ANNUAL WELLNESS VISIT, SUBSEQUENT: Primary | ICD-10-CM

## 2020-09-22 PROCEDURE — G0439 PPPS, SUBSEQ VISIT: HCPCS | Performed by: INTERNAL MEDICINE

## 2020-09-22 NOTE — PROGRESS NOTES
The ABCs of the Annual Wellness Visit  Subsequent Medicare Wellness Visit    Chief Complaint   Patient presents with   • Medicare Wellness-subsequent       Subjective   History of Present Illness:  Otilia Kenny is a 70 y.o. female who presents for a Subsequent Medicare Wellness Visit.  You have chosen to receive care through a telephone visit. Do you consent to use a telephone visit for your medical care today? Yes    HEALTH RISK ASSESSMENT    Recent Hospitalizations:  No hospitalization(s) within the last year.    Current Medical Providers:  Patient Care Team:  Alexis Zabala MD as PCP - Baron Albert MD as Surgeon (Orthopedic Surgery)    Smoking Status:  Social History     Tobacco Use   Smoking Status Former Smoker   • Packs/day: 1.00   • Years: 10.00   • Pack years: 10.00   • Types: Cigarettes   • Quit date: 1985   • Years since quittin.3   Smokeless Tobacco Never Used       Alcohol Consumption:  Social History     Substance and Sexual Activity   Alcohol Use No       Depression Screen:   PHQ-2/PHQ-9 Depression Screening 2020   Little interest or pleasure in doing things 0   Feeling down, depressed, or hopeless 0   Trouble falling or staying asleep, or sleeping too much -   Feeling tired or having little energy -   Poor appetite or overeating -   Feeling bad about yourself - or that you are a failure or have let yourself or your family down -   Trouble concentrating on things, such as reading the newspaper or watching television -   Moving or speaking so slowly that other people could have noticed. Or the opposite - being so fidgety or restless that you have been moving around a lot more than usual -   Thoughts that you would be better off dead, or of hurting yourself in some way -   Total Score 0   If you checked off any problems, how difficult have these problems made it for you to do your work, take care of things at home, or get along with other people? -       Fall Risk  Screen:  LEONARDO Fall Risk Assessment was completed, and patient is at LOW risk for falls.Assessment completed on:9/22/2020    Health Habits and Functional and Cognitive Screening:  Functional & Cognitive Status 9/22/2020   Do you have difficulty preparing food and eating? No   Do you have difficulty bathing yourself, getting dressed or grooming yourself? No   Do you have difficulty using the toilet? No   Do you have difficulty moving around from place to place? No   Do you have trouble with steps or getting out of a bed or a chair? No   Current Diet Limited Junk Food   Dental Exam Up to date   Eye Exam Up to date   Exercise (times per week) 5 times per week   Current Exercise Activities Include Housecleaning   Do you need help using the phone?  No   Are you deaf or do you have serious difficulty hearing?  No   Do you need help with transportation? No   Do you need help shopping? No   Do you need help preparing meals?  No   Do you need help with housework?  No   Do you need help with laundry? No   Do you need help taking your medications? No   Do you need help managing money? No   Do you ever drive or ride in a car without wearing a seat belt? No   Have you felt unusual stress, anger or loneliness in the last month? Yes   Who do you live with? Spouse   If you need help, do you have trouble finding someone available to you? No   Have you been bothered in the last four weeks by sexual problems? No   Do you have difficulty concentrating, remembering or making decisions? No         Does the patient have evidence of cognitive impairment? No    Asprin use counseling:Taking ASA appropriately as indicated    Age-appropriate Screening Schedule:  Refer to the list below for future screening recommendations based on patient's age, sex and/or medical conditions. Orders for these recommended tests are listed in the plan section. The patient has been provided with a written plan.    Health Maintenance   Topic Date Due   • TDAP/TD  VACCINES (1 - Tdap) 01/10/1967   • ZOSTER VACCINE (1 of 2) 01/10/1998   • DIABETIC FOOT EXAM  08/02/2016   • DIABETIC EYE EXAM  03/27/2020   • INFLUENZA VACCINE  08/01/2020   • HEMOGLOBIN A1C  11/12/2020   • URINE MICROALBUMIN  11/13/2020   • MAMMOGRAM  12/18/2020   • LIPID PANEL  05/12/2021   • COLONOSCOPY  01/05/2026          The following portions of the patient's history were reviewed and updated as appropriate:   She  has a past medical history of Abdominal pain, Abnormal liver enzymes, Allergic rhinitis, Allergic rhinitis, Anemia, Anesthesia complication, Arthritis, Cervical disc disorder, Depression, Diarrhea, unspecified, Diverticular disease of colon, Dysfunction of eustachian tube, Essential hypertension, Gastritis, Generalized anxiety disorder, GERD (gastroesophageal reflux disease), Hyperlipidemia, Hypertriglyceridemia, Iron deficiency anemia, Irritable bowel syndrome, 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  She does not have any pertinent problems on file.  She  has a past surgical history that includes Back surgery; Cervical disc surgery; endoscopy and colonoscopy; Colonoscopy; Esophagogastroduodenoscopy; Knee arthroscopy (Bilateral); Knee surgery; Hysterectomy; Cholecystectomy; Carpal tunnel release (Bilateral); Excision heel spur excision; Bunionectomy; Finger/Thumb Lesion/Cyst Excision; and Total knee arthroplasty (Left, 6/4/2018).  Her family history includes Breast cancer in her sister; Cancer in an other family member; Diabetes in an other family member; Hypertension in an other family member; Stroke in an other family member.  She  reports that she quit smoking about 35 years ago. Her smoking use included cigarettes. She has a 10.00 pack-year smoking history. She has never used smokeless tobacco. She reports that she does not drink alcohol or use drugs.  Current Outpatient Medications    Medication Sig Dispense Refill   • aspirin 81 MG EC tablet Take 81 mg by mouth Daily.     • Blood Glucose Monitoring Suppl (ONETOUCH VERIO IQ SYSTEM) w/Device kit 1 each Daily. 1 kit 0   • CALCIUM CARB-CHOLECALCIFEROL PO Take 1 tablet by mouth 2 (two) times a day.     • cetirizine (zyrTEC) 10 MG tablet Take 10 mg by mouth Daily.     • citalopram (CELEXA) 20 MG tablet Take 1 tablet by mouth Daily. 90 tablet 3   • Dapagliflozin Propanediol (Farxiga) 10 MG tablet TAKE 1 TABLET DAILY        (REPLACES INVOKANA) 90 tablet 1   • fenofibrate (TRICOR) 145 MG tablet TAKE 1/2 TO 1 TABLET DAILY 90 tablet 3   • glucose blood (OneTouch Verio) test strip USE TO TEST DAILY AS       DIRECTED 100 each 3   • Insulin Pen Needle 31G X 5 MM misc Use daily with Victoza 90 each 3   • IRON, FERROUS SULFATE, PO Take 1 tablet by mouth 2 (Two) Times a Week.     • Lancets (ONETOUCH DELICA PLUS [id number]) misc TEST ONCE DAILY AS DIRECTED 100 each 3   • lisinopril (PRINIVIL,ZESTRIL) 2.5 MG tablet Take 1 tablet by mouth 2 (Two) Times a Day. 180 tablet 3   • meclizine (ANTIVERT) 25 MG tablet Take 1 tablet by mouth 3 (Three) Times a Day As Needed for dizziness. 30 tablet 1   • minocycline (MINOCIN,DYNACIN) 100 MG capsule      • Multiple Vitamins-Minerals (MULTIVITAMIN PO) Take 1 tablet by mouth daily.     • nateglinide (Starlix) 120 MG tablet Take 1 tablet by mouth Daily Before Supper. 90 tablet 3   • NON FORMULARY Take 1 tablet by mouth Daily. Eye Health and Lutein     • omeprazole (priLOSEC) 40 MG capsule TAKE 1 CAPSULE DAILY 90 capsule 3   • rosuvastatin (CRESTOR) 20 MG tablet TAKE 1 TABLET DAILY.       REPLACE SIMVASTATIN 90 tablet 3   • Semaglutide (OZEMPIC) 1 MG/DOSE solution pen-injector Inject 1 mg under the skin into the appropriate area as directed 1 (One) Time Per Week. 6 pen 3   • spironolactone (ALDACTONE) 50 MG tablet Take 1 tablet by mouth Daily.     • TiZANidine (ZANAFLEX) 4 MG capsule TAKE 1 CAPSULE 2 TIMES     DAILY AS NEEDED FOR  MUSCLE SPASM (REPLACES SKELAXIN) 180 capsule 3   • Zoster Vac Recomb Adjuvanted 50 MCG/0.5ML reconstituted suspension Inject 0.5 mL into the appropriate muscle as directed by prescriber Every 2 (Two) Months. Second dose to be given 2 to 6 months after first 1 each 1     No current facility-administered medications for this visit.      Current Outpatient Medications on File Prior to Visit   Medication Sig   • aspirin 81 MG EC tablet Take 81 mg by mouth Daily.   • Blood Glucose Monitoring Suppl (ONETOUCH VERIO IQ SYSTEM) w/Device kit 1 each Daily.   • CALCIUM CARB-CHOLECALCIFEROL PO Take 1 tablet by mouth 2 (two) times a day.   • cetirizine (zyrTEC) 10 MG tablet Take 10 mg by mouth Daily.   • citalopram (CELEXA) 20 MG tablet Take 1 tablet by mouth Daily.   • Dapagliflozin Propanediol (Farxiga) 10 MG tablet TAKE 1 TABLET DAILY        (REPLACES INVOKANA)   • fenofibrate (TRICOR) 145 MG tablet TAKE 1/2 TO 1 TABLET DAILY   • glucose blood (OneTouch Verio) test strip USE TO TEST DAILY AS       DIRECTED   • Insulin Pen Needle 31G X 5 MM misc Use daily with Victoza   • IRON, FERROUS SULFATE, PO Take 1 tablet by mouth 2 (Two) Times a Week.   • Lancets (ONETOUCH DELICA PLUS [id number]) misc TEST ONCE DAILY AS DIRECTED   • lisinopril (PRINIVIL,ZESTRIL) 2.5 MG tablet Take 1 tablet by mouth 2 (Two) Times a Day.   • meclizine (ANTIVERT) 25 MG tablet Take 1 tablet by mouth 3 (Three) Times a Day As Needed for dizziness.   • minocycline (MINOCIN,DYNACIN) 100 MG capsule    • Multiple Vitamins-Minerals (MULTIVITAMIN PO) Take 1 tablet by mouth daily.   • nateglinide (Starlix) 120 MG tablet Take 1 tablet by mouth Daily Before Supper.   • NON FORMULARY Take 1 tablet by mouth Daily. Eye Health and Lutein   • omeprazole (priLOSEC) 40 MG capsule TAKE 1 CAPSULE DAILY   • rosuvastatin (CRESTOR) 20 MG tablet TAKE 1 TABLET DAILY.       REPLACE SIMVASTATIN   • Semaglutide (OZEMPIC) 1 MG/DOSE solution pen-injector Inject 1 mg under the skin into  the appropriate area as directed 1 (One) Time Per Week.   • spironolactone (ALDACTONE) 50 MG tablet Take 1 tablet by mouth Daily.   • TiZANidine (ZANAFLEX) 4 MG capsule TAKE 1 CAPSULE 2 TIMES     DAILY AS NEEDED FOR MUSCLE SPASM (REPLACES SKELAXIN)     No current facility-administered medications on file prior to visit.      She is allergic to allegra [fexofenadine]; amaryl [glimepiride]; dyazide [triamterene-hctz]; hydrochlorothiazide w-triamterene; lipitor [atorvastatin]; metformin and related; naproxen; actos [pioglitazone]; and sulfamethoxazole-trimethoprim..    Outpatient Medications Prior to Visit   Medication Sig Dispense Refill   • aspirin 81 MG EC tablet Take 81 mg by mouth Daily.     • Blood Glucose Monitoring Suppl (ONETOUCH VERIO IQ SYSTEM) w/Device kit 1 each Daily. 1 kit 0   • CALCIUM CARB-CHOLECALCIFEROL PO Take 1 tablet by mouth 2 (two) times a day.     • cetirizine (zyrTEC) 10 MG tablet Take 10 mg by mouth Daily.     • citalopram (CELEXA) 20 MG tablet Take 1 tablet by mouth Daily. 90 tablet 3   • Dapagliflozin Propanediol (Farxiga) 10 MG tablet TAKE 1 TABLET DAILY        (REPLACES INVOKANA) 90 tablet 1   • fenofibrate (TRICOR) 145 MG tablet TAKE 1/2 TO 1 TABLET DAILY 90 tablet 3   • glucose blood (OneTouch Verio) test strip USE TO TEST DAILY AS       DIRECTED 100 each 3   • Insulin Pen Needle 31G X 5 MM misc Use daily with Victoza 90 each 3   • IRON, FERROUS SULFATE, PO Take 1 tablet by mouth 2 (Two) Times a Week.     • Lancets (ONETOUCH DELICA PLUS [id number]) misc TEST ONCE DAILY AS DIRECTED 100 each 3   • lisinopril (PRINIVIL,ZESTRIL) 2.5 MG tablet Take 1 tablet by mouth 2 (Two) Times a Day. 180 tablet 3   • meclizine (ANTIVERT) 25 MG tablet Take 1 tablet by mouth 3 (Three) Times a Day As Needed for dizziness. 30 tablet 1   • minocycline (MINOCIN,DYNACIN) 100 MG capsule      • Multiple Vitamins-Minerals (MULTIVITAMIN PO) Take 1 tablet by mouth daily.     • nateglinide (Starlix) 120 MG tablet Take  1 tablet by mouth Daily Before Supper. 90 tablet 3   • NON FORMULARY Take 1 tablet by mouth Daily. Eye Health and Lutein     • omeprazole (priLOSEC) 40 MG capsule TAKE 1 CAPSULE DAILY 90 capsule 3   • rosuvastatin (CRESTOR) 20 MG tablet TAKE 1 TABLET DAILY.       REPLACE SIMVASTATIN 90 tablet 3   • Semaglutide (OZEMPIC) 1 MG/DOSE solution pen-injector Inject 1 mg under the skin into the appropriate area as directed 1 (One) Time Per Week. 6 pen 3   • spironolactone (ALDACTONE) 50 MG tablet Take 1 tablet by mouth Daily.     • TiZANidine (ZANAFLEX) 4 MG capsule TAKE 1 CAPSULE 2 TIMES     DAILY AS NEEDED FOR MUSCLE SPASM (REPLACES SKELAXIN) 180 capsule 3     No facility-administered medications prior to visit.        Patient Active Problem List   Diagnosis   • Vitamin B12 deficiency (dietary) anemia   • Spasm of back muscles   • Sleep disorder   • Osteoarthritis of knee   • Irritable bowel syndrome   • Iron deficiency anemia   • Hypertriglyceridemia   • Hyperlipidemia   • GERD (gastroesophageal reflux disease)   • Generalized anxiety disorder   • Gastritis   • Essential hypertension   • Dysfunction of eustachian tube   • Diverticular disease of colon   • Cervical disc disorder   • Diarrhea, unspecified   • Anemia   • Allergic rhinitis   • Abnormal liver enzymes   • Abdominal pain   • Major depression in complete remission (CMS/HCC)   • Chronic kidney disease, stage II (mild)   • Chronic pain of both knees   • Internal derangement of left knee   • Primary osteoarthritis of left knee   • Chronic pain of left knee   • Type 2 diabetes mellitus without complication, without long-term current use of insulin (CMS/HCC)   • Meniere's disease of both ears   • Vertigo   • Vitamin D deficiency   • Primary osteoarthritis of right wrist   • Right wrist pain   • Radial styloid tenosynovitis of right hand   • Trigger finger, right middle finger       Advanced Care Planning:  ACP discussion was held with the patient during this visit.  "Patient has an advance directive in EMR which is still valid.     Review of Systems    Compared to one year ago, the patient feels her physical health is the same.  Compared to one year ago, the patient feels her mental health is the same.    Reviewed chart for potential of high risk medication in the elderly: yes  Reviewed chart for potential of harmful drug interactions in the elderly:yes    Objective         Vitals:    09/22/20 1509   Weight: 74.8 kg (165 lb)   Height: 165.1 cm (65\")   PainSc: 0-No pain     No blood pressure obtained today with this telephone visit due to coronavirus pandemic.    Body mass index is 27.46 kg/m².  Discussed the patient's BMI with her. The BMI is above average; BMI management plan is completed.    Physical Exam          Assessment/Plan   Medicare Risks and Personalized Health Plan  CMS Preventative Services Quick Reference  Advance Directive Discussion  Immunizations Discussed/Encouraged (specific immunizations; adacel Tdap, Influenza and Shingrix )    The above risks/problems have been discussed with the patient.  The patient will obtain her influenza vaccine in October.  The patient is interested in Shingrix vaccine.  I have sent a prescription to her pharmacy.  The patient will come in for Tdap vaccination soon.  Pertinent information has been shared with the patient in the After Visit Summary.  Follow up plans and orders are seen below in the Assessment/Plan Section.    Diagnoses and all orders for this visit:    1. Medicare annual wellness visit, subsequent (Primary)    2. Need for immunization against influenza    3. Need for diphtheria-tetanus-pertussis (Tdap) vaccine    4. Need for shingles vaccine    Other orders  -     Zoster Vac Recomb Adjuvanted 50 MCG/0.5ML reconstituted suspension; Inject 0.5 mL into the appropriate muscle as directed by prescriber Every 2 (Two) Months. Second dose to be given 2 to 6 months after first  Dispense: 1 each; Refill: 1      Follow " Up:  Return in about 1 year (around 9/22/2021) for Medicare Wellness.     An After Visit Summary and PPPS were given to the patient.

## 2020-09-22 NOTE — PATIENT INSTRUCTIONS
Medicare Wellness  Personal Prevention Plan of Service     Date of Office Visit:  2020  Encounter Provider:  Alexis Zabala MD  Place of Service:  [facility]  Patient Name: Otilia Kenny  :  1950    As part of the Medicare Wellness portion of your visit today, we are providing you with this personalized preventive plan of services (PPPS). This plan is based upon recommendations of the [place] Preventive Services Task Force (USPSTF) and the Advisory Committee on Immunization Practices (ACIP).    This lists the preventive care services that should be considered, and provides dates of when you are due. Items listed as completed are up-to-date and do not require any further intervention.    Health Maintenance   Topic Date Due   • TDAP/TD VACCINES (1 - Tdap) 01/10/1967   • ZOSTER VACCINE (1 of 2) 01/10/1998   • HEPATITIS C SCREENING  2016   • DIABETIC FOOT EXAM  2016   • DIABETIC EYE EXAM  2020   • MEDICARE ANNUAL WELLNESS  2020   • INFLUENZA VACCINE  2020   • HEMOGLOBIN A1C  2020   • URINE MICROALBUMIN  2020   • MAMMOGRAM  2020   • LIPID PANEL  2021   • COLONOSCOPY  2026   • Pneumococcal Vaccine Once at 65 Years Old  Completed       No orders of the defined types were placed in this encounter.      Return in about 1 year (around 2021) for Medicare Wellness.          Exercising to Lose Weight  Exercise is structured, repetitive physical activity to improve fitness and health. Getting regular exercise is important for everyone. It is especially important if you are overweight. Being overweight increases your risk of heart disease, stroke, diabetes, high blood pressure, and several types of cancer. Reducing your calorie intake and exercising can help you lose weight.  Exercise is usually categorized as moderate or vigorous intensity. To lose weight, most people need to do a certain amount of  moderate-intensity or vigorous-intensity exercise each week.  Moderate-intensity exercise    Moderate-intensity exercise is any activity that gets you moving enough to burn at least three times more energy (calories) than if you were sitting.  Examples of moderate exercise include:  · Walking a mile in 15 minutes.  · Doing light yard work.  · Biking at an easy pace.  Most people should get at least 150 minutes (2 hours and 30 minutes) a week of moderate-intensity exercise to maintain their body weight.  Vigorous-intensity exercise  Vigorous-intensity exercise is any activity that gets you moving enough to burn at least six times more calories than if you were sitting. When you exercise at this intensity, you should be working hard enough that you are not able to carry on a conversation.  Examples of vigorous exercise include:  · Running.  · Playing a team sport, such as football, basketball, and soccer.  · Jumping rope.  Most people should get at least 75 minutes (1 hour and 15 minutes) a week of vigorous-intensity exercise to maintain their body weight.  How can exercise affect me?  When you exercise enough to burn more calories than you eat, you lose weight. Exercise also reduces body fat and builds muscle. The more muscle you have, the more calories you burn. Exercise also:  · Improves mood.  · Reduces stress and tension.  · Improves your overall fitness, flexibility, and endurance.  · Increases bone strength.  The amount of exercise you need to lose weight depends on:  · Your age.  · The type of exercise.  · Any health conditions you have.  · Your overall physical ability.  Talk to your health care provider about how much exercise you need and what types of activities are safe for you.  What actions can I take to lose weight?  Nutrition    · Make changes to your diet as told by your health care provider or diet and nutrition specialist (dietitian). This may include:  ? Eating fewer calories.  ? Eating more  protein.  ? Eating less unhealthy fats.  ? Eating a diet that includes fresh fruits and vegetables, whole grains, low-fat dairy products, and lean protein.  ? Avoiding foods with added fat, salt, and sugar.  · Drink plenty of water while you exercise to prevent dehydration or heat stroke.  Activity  · Choose an activity that you enjoy and set realistic goals. Your health care provider can help you make an exercise plan that works for you.  · Exercise at a moderate or vigorous intensity most days of the week.  ? The intensity of exercise may vary from person to person. You can tell how intense a workout is for you by paying attention to your breathing and heartbeat. Most people will notice their breathing and heartbeat get faster with more intense exercise.  · Do resistance training twice each week, such as:  ? Push-ups.  ? Sit-ups.  ? Lifting weights.  ? Using resistance bands.  · Getting short amounts of exercise can be just as helpful as long structured periods of exercise. If you have trouble finding time to exercise, try to include exercise in your daily routine.  ? Get up, stretch, and walk around every 30 minutes throughout the day.  ? Go for a walk during your lunch break.  ? Park your car farther away from your destination.  ? If you take public transportation, get off one stop early and walk the rest of the way.  ? Make phone calls while standing up and walking around.  ? Take the stairs instead of elevators or escalators.  · Wear comfortable clothes and shoes with good support.  · Do not exercise so much that you hurt yourself, feel dizzy, or get very short of breath.  Where to find more information  · [place] Department of Health and Human Services: www.hhs.gov  · Centers for Disease Control and Prevention (CDC): www.cdc.gov  Contact a health care provider:  · Before starting a new exercise program.  · If you have questions or concerns about your weight.  · If you have a medical problem that keeps you from  exercising.  Get help right away if you have any of the following while exercising:  · Injury.  · Dizziness.  · Difficulty breathing or shortness of breath that does not go away when you stop exercising.  · Chest pain.  · Rapid heartbeat.  Summary  · Being overweight increases your risk of heart disease, stroke, diabetes, high blood pressure, and several types of cancer.  · Losing weight happens when you burn more calories than you eat.  · Reducing the amount of calories you eat in addition to getting regular moderate or vigorous exercise each week helps you lose weight.  This information is not intended to replace advice given to you by your health care provider. Make sure you discuss any questions you have with your health care provider.  Document Released: 01/20/2012 Document Revised: 12/31/2018 Document Reviewed: 12/31/2018  [employer]vier Patient Education © 2020 [employer]      Calorie Counting for Weight Loss  Calories are units of energy. Your body needs a certain amount of calories from food to keep you going throughout the day. When you eat more calories than your body needs, your body stores the extra calories as fat. When you eat fewer calories than your body needs, your body burns fat to get the energy it needs.  Calorie counting means keeping track of how many calories you eat and drink each day. Calorie counting can be helpful if you need to lose weight. If you make sure to eat fewer calories than your body needs, you should lose weight. Ask your health care provider what a healthy weight is for you.  For calorie counting to work, you will need to eat the right number of calories in a day in order to lose a healthy amount of weight per week. A dietitian can help you determine how many calories you need in a day and will give you suggestions on how to reach your calorie goal.  · A healthy amount of weight to lose per week is usually 1-2 lb (0.5-0.9 kg). This usually means that your daily calorie intake  should be reduced by 500-750 calories.  · Eating 1,200 - 1,500 calories per day can help most women lose weight.  · Eating 1,500 - 1,800 calories per day can help most men lose weight.  What is my plan?  My goal is to have __________ calories per day.  If I have this many calories per day, I should lose around __________ pounds per week.  What do I need to know about calorie counting?  In order to meet your daily calorie goal, you will need to:  · Find out how many calories are in each food you would like to eat. Try to do this before you eat.  · Decide how much of the food you plan to eat.  · Write down what you ate and how many calories it had. Doing this is called keeping a food log.  To successfully lose weight, it is important to balance calorie counting with a healthy lifestyle that includes regular activity. Aim for 150 minutes of moderate exercise (such as walking) or 75 minutes of vigorous exercise (such as running) each week.  Where do I find calorie information?    The number of calories in a food can be found on a Nutrition Facts label. If a food does not have a Nutrition Facts label, try to look up the calories online or ask your dietitian for help.  Remember that calories are listed per serving. If you choose to have more than one serving of a food, you will have to multiply the calories per serving by the amount of servings you plan to eat. For example, the label on a package of bread might say that a serving size is 1 slice and that there are 90 calories in a serving. If you eat 1 slice, you will have eaten 90 calories. If you eat 2 slices, you will have eaten 180 calories.  How do I keep a food log?  Immediately after each meal, record the following information in your food log:  · What you ate. Don't forget to include toppings, sauces, and other extras on the food.  · How much you ate. This can be measured in cups, ounces, or number of items.  · How many calories each food and drink had.  · The  "total number of calories in the meal.  Keep your food log near you, such as in a small notebook in your pocket, or use a mobile katerin or website. Some programs will calculate calories for you and show you how many calories you have left for the day to meet your goal.  What are some calorie counting tips?    · Use your calories on foods and drinks that will fill you up and not leave you hungry:  ? Some examples of foods that fill you up are nuts and nut butters, vegetables, lean proteins, and high-fiber foods like whole grains. High-fiber foods are foods with more than 5 g fiber per serving.  ? Drinks such as sodas, specialty coffee drinks, alcohol, and juices have a lot of calories, yet do not fill you up.  · Eat nutritious foods and avoid empty calories. Empty calories are calories you get from foods or beverages that do not have many vitamins or protein, such as candy, sweets, and soda. It is better to have a nutritious high-calorie food (such as an avocado) than a food with few nutrients (such as a bag of chips).  · Know how many calories are in the foods you eat most often. This will help you calculate calorie counts faster.  · Pay attention to calories in drinks. Low-calorie drinks include water and unsweetened drinks.  · Pay attention to nutrition labels for \"low fat\" or \"fat free\" foods. These foods sometimes have the same amount of calories or more calories than the full fat versions. They also often have added sugar, starch, or salt, to make up for flavor that was removed with the fat.  · Find a way of tracking calories that works for you. Get creative. Try different apps or programs if writing down calories does not work for you.  What are some portion control tips?  · Know how many calories are in a serving. This will help you know how many servings of a certain food you can have.  · Use a measuring cup to measure serving sizes. You could also try weighing out portions on a kitchen scale. With time, you " will be able to estimate serving sizes for some foods.  · Take some time to put servings of different foods on your favorite plates, bowls, and cups so you know what a serving looks like.  · Try not to eat straight from a bag or box. Doing this can lead to overeating. Put the amount you would like to eat in a cup or on a plate to make sure you are eating the right portion.  · Use smaller plates, glasses, and bowls to prevent overeating.  · Try not to multitask (for example, watch TV or use your computer) while eating. If it is time to eat, sit down at a table and enjoy your food. This will help you to know when you are full. It will also help you to be aware of what you are eating and how much you are eating.  What are tips for following this plan?  Reading food labels  · Check the calorie count compared to the serving size. The serving size may be smaller than what you are used to eating.  · Check the source of the calories. Make sure the food you are eating is high in vitamins and protein and low in saturated and trans fats.  Shopping  · Read nutrition labels while you shop. This will help you make healthy decisions before you decide to purchase your food.  · Make a grocery list and stick to it.  Cooking  · Try to cook your favorite foods in a healthier way. For example, try baking instead of frying.  · Use low-fat dairy products.  Meal planning  · Use more fruits and vegetables. Half of your plate should be fruits and vegetables.  · Include lean proteins like poultry and fish.  How do I count calories when eating out?  · Ask for smaller portion sizes.  · Consider sharing an entree and sides instead of getting your own entree.  · If you get your own entree, eat only half. Ask for a box at the beginning of your meal and put the rest of your entree in it so you are not tempted to eat it.  · If calories are listed on the menu, choose the lower calorie options.  · Choose dishes that include vegetables, fruits, whole  "grains, low-fat dairy products, and lean protein.  · Choose items that are boiled, broiled, grilled, or steamed. Stay away from items that are buttered, battered, fried, or served with cream sauce. Items labeled \"crispy\" are usually fried, unless stated otherwise.  · Choose water, low-fat milk, unsweetened iced tea, or other drinks without added sugar. If you want an alcoholic beverage, choose a lower calorie option such as a glass of wine or light beer.  · Ask for dressings, sauces, and syrups on the side. These are usually high in calories, so you should limit the amount you eat.  · If you want a salad, choose a garden salad and ask for grilled meats. Avoid extra toppings like yu, cheese, or fried items. Ask for the dressing on the side, or ask for olive oil and vinegar or lemon to use as dressing.  · Estimate how many servings of a food you are given. For example, a serving of cooked rice is ½ cup or about the size of half a baseball. Knowing serving sizes will help you be aware of how much food you are eating at restaurants. The list below tells you how big or small some common portion sizes are based on everyday objects:  ? 1 oz--4 stacked dice.  ? 3 oz--1 deck of cards.  ? 1 tsp--1 die.  ? 1 Tbsp--½ a ping-pong ball.  ? 2 Tbsp--1 ping-pong ball.  ? ½ cup--½ baseball.  ? 1 cup--1 baseball.  Summary  · Calorie counting means keeping track of how many calories you eat and drink each day. If you eat fewer calories than your body needs, you should lose weight.  · A healthy amount of weight to lose per week is usually 1-2 lb (0.5-0.9 kg). This usually means reducing your daily calorie intake by 500-750 calories.  · The number of calories in a food can be found on a Nutrition Facts label. If a food does not have a Nutrition Facts label, try to look up the calories online or ask your dietitian for help.  · Use your calories on foods and drinks that will fill you up, and not on foods and drinks that will leave you " hungry.  · Use smaller plates, glasses, and bowls to prevent overeating.  This information is not intended to replace advice given to you by your health care provider. Make sure you discuss any questions you have with your health care provider.  Document Released: 12/18/2006 Document Revised: 09/06/2019 Document Reviewed: 11/17/2017  [employer]vier Patient Education © 2020 [employer]

## 2020-09-24 ENCOUNTER — CLINICAL SUPPORT (OUTPATIENT)
Dept: FAMILY MEDICINE CLINIC | Facility: CLINIC | Age: 70
End: 2020-09-24

## 2020-09-24 DIAGNOSIS — Z23 NEED FOR DIPHTHERIA-TETANUS-PERTUSSIS (TDAP) VACCINE: Primary | ICD-10-CM

## 2020-09-24 PROCEDURE — 90715 TDAP VACCINE 7 YRS/> IM: CPT | Performed by: INTERNAL MEDICINE

## 2020-09-24 PROCEDURE — 90471 IMMUNIZATION ADMIN: CPT | Performed by: INTERNAL MEDICINE

## 2020-09-24 RX ORDER — SEMAGLUTIDE 1.34 MG/ML
1 INJECTION, SOLUTION SUBCUTANEOUS WEEKLY
Qty: 6 PEN | Refills: 3 | Status: SHIPPED | OUTPATIENT
Start: 2020-09-24 | End: 2022-02-15 | Stop reason: SDUPTHER

## 2020-09-26 ENCOUNTER — FLU SHOT (OUTPATIENT)
Dept: FAMILY MEDICINE CLINIC | Facility: CLINIC | Age: 70
End: 2020-09-26

## 2020-09-26 DIAGNOSIS — Z23 NEED FOR IMMUNIZATION AGAINST INFLUENZA: Primary | ICD-10-CM

## 2020-09-26 PROCEDURE — G0008 ADMIN INFLUENZA VIRUS VAC: HCPCS | Performed by: INTERNAL MEDICINE

## 2020-09-26 PROCEDURE — 90694 VACC AIIV4 NO PRSRV 0.5ML IM: CPT | Performed by: INTERNAL MEDICINE

## 2020-11-04 RX ORDER — LANCETS 33 GAUGE
EACH MISCELLANEOUS
Qty: 100 EACH | Refills: 3 | Status: SHIPPED | OUTPATIENT
Start: 2020-11-04 | End: 2021-10-06

## 2020-11-04 RX ORDER — DAPAGLIFLOZIN 10 MG/1
TABLET, FILM COATED ORAL
Qty: 90 TABLET | Refills: 1 | Status: SHIPPED | OUTPATIENT
Start: 2020-11-04 | End: 2021-05-05

## 2020-11-06 RX ORDER — FENOFIBRATE 145 MG/1
TABLET, COATED ORAL
Qty: 90 TABLET | Refills: 3 | Status: SHIPPED | OUTPATIENT
Start: 2020-11-06 | End: 2021-02-10

## 2020-11-06 RX ORDER — CITALOPRAM 20 MG/1
20 TABLET ORAL DAILY
Qty: 90 TABLET | Refills: 3 | Status: SHIPPED | OUTPATIENT
Start: 2020-11-06 | End: 2021-05-11 | Stop reason: SDUPTHER

## 2020-11-19 ENCOUNTER — LAB (OUTPATIENT)
Dept: LAB | Facility: OTHER | Age: 70
End: 2020-11-19

## 2020-11-19 DIAGNOSIS — E78.1 HYPERTRIGLYCERIDEMIA: Chronic | ICD-10-CM

## 2020-11-19 DIAGNOSIS — E78.2 MIXED HYPERLIPIDEMIA: Chronic | ICD-10-CM

## 2020-11-19 DIAGNOSIS — I10 ESSENTIAL HYPERTENSION: Chronic | ICD-10-CM

## 2020-11-19 DIAGNOSIS — E11.9 TYPE 2 DIABETES MELLITUS WITHOUT COMPLICATION, WITHOUT LONG-TERM CURRENT USE OF INSULIN (HCC): ICD-10-CM

## 2020-11-19 DIAGNOSIS — D50.8 IRON DEFICIENCY ANEMIA SECONDARY TO INADEQUATE DIETARY IRON INTAKE: Chronic | ICD-10-CM

## 2020-11-19 DIAGNOSIS — Z79.899 ENCOUNTER FOR LONG-TERM (CURRENT) USE OF HIGH-RISK MEDICATION: ICD-10-CM

## 2020-11-19 DIAGNOSIS — D51.8 VITAMIN B12 DEFICIENCY (DIETARY) ANEMIA: Chronic | ICD-10-CM

## 2020-11-19 DIAGNOSIS — E55.9 VITAMIN D DEFICIENCY: ICD-10-CM

## 2020-11-19 LAB
25(OH)D3 SERPL-MCNC: 28.4 NG/ML (ref 30–100)
ALBUMIN SERPL-MCNC: 4.3 G/DL (ref 3.5–5)
ALBUMIN UR-MCNC: 4.3 MG/DL
ALBUMIN/GLOB SERPL: 1.3 G/DL (ref 1.1–1.8)
ALP SERPL-CCNC: 52 U/L (ref 38–126)
ALT SERPL W P-5'-P-CCNC: 21 U/L
ANION GAP SERPL CALCULATED.3IONS-SCNC: 8 MMOL/L (ref 5–15)
ARTICHOKE IGE QN: 122 MG/DL (ref 0–100)
AST SERPL-CCNC: 27 U/L (ref 14–36)
BASOPHILS # BLD AUTO: 0.05 10*3/MM3 (ref 0–0.2)
BASOPHILS NFR BLD AUTO: 1.1 % (ref 0–1.5)
BILIRUB SERPL-MCNC: 0.5 MG/DL (ref 0.2–1.3)
BUN SERPL-MCNC: 27 MG/DL (ref 7–23)
BUN/CREAT SERPL: 28.1 (ref 7–25)
CALCIUM SPEC-SCNC: 9.5 MG/DL (ref 8.4–10.2)
CHLORIDE SERPL-SCNC: 98 MMOL/L (ref 101–112)
CO2 SERPL-SCNC: 27 MMOL/L (ref 22–30)
CREAT SERPL-MCNC: 0.96 MG/DL (ref 0.52–1.04)
CREAT UR-MCNC: 107.8 MG/DL
DEPRECATED RDW RBC AUTO: 39.9 FL (ref 37–54)
EOSINOPHIL # BLD AUTO: 0.13 10*3/MM3 (ref 0–0.4)
EOSINOPHIL NFR BLD AUTO: 2.7 % (ref 0.3–6.2)
ERYTHROCYTE [DISTWIDTH] IN BLOOD BY AUTOMATED COUNT: 12.2 % (ref 12.3–15.4)
FERRITIN SERPL-MCNC: 46.5 NG/ML (ref 13–150)
GFR SERPL CREATININE-BSD FRML MDRD: 57 ML/MIN/1.73 (ref 39–90)
GLOBULIN UR ELPH-MCNC: 3.3 GM/DL (ref 2.3–3.5)
GLUCOSE SERPL-MCNC: 141 MG/DL (ref 70–99)
HBA1C MFR BLD: 7.16 % (ref 4.8–5.6)
HCT VFR BLD AUTO: 37.3 % (ref 34–46.6)
HGB BLD-MCNC: 11.9 G/DL (ref 12–15.9)
IRON 24H UR-MRATE: 96 MCG/DL (ref 37–145)
IRON SATN MFR SERPL: 19 % (ref 20–50)
LYMPHOCYTES # BLD AUTO: 1.42 10*3/MM3 (ref 0.7–3.1)
LYMPHOCYTES NFR BLD AUTO: 30 % (ref 19.6–45.3)
MCH RBC QN AUTO: 29.5 PG (ref 26.6–33)
MCHC RBC AUTO-ENTMCNC: 31.9 G/DL (ref 31.5–35.7)
MCV RBC AUTO: 92.3 FL (ref 79–97)
MICROALBUMIN/CREAT UR: 39.9 MG/G
MONOCYTES # BLD AUTO: 0.3 10*3/MM3 (ref 0.1–0.9)
MONOCYTES NFR BLD AUTO: 6.3 % (ref 5–12)
NEUTROPHILS NFR BLD AUTO: 2.84 10*3/MM3 (ref 1.7–7)
NEUTROPHILS NFR BLD AUTO: 59.9 % (ref 42.7–76)
PLATELET # BLD AUTO: 324 10*3/MM3 (ref 140–450)
PMV BLD AUTO: 9.6 FL (ref 6–12)
POTASSIUM SERPL-SCNC: 4.3 MMOL/L (ref 3.4–5)
PROT SERPL-MCNC: 7.6 G/DL (ref 6.3–8.6)
RBC # BLD AUTO: 4.04 10*6/MM3 (ref 3.77–5.28)
SODIUM SERPL-SCNC: 133 MMOL/L (ref 137–145)
TIBC SERPL-MCNC: 508 MCG/DL (ref 298–536)
TRANSFERRIN SERPL-MCNC: 341 MG/DL (ref 200–360)
TSH SERPL DL<=0.05 MIU/L-ACNC: 2.76 UIU/ML (ref 0.27–4.2)
VIT B12 BLD-MCNC: 596 PG/ML (ref 211–946)
WBC # BLD AUTO: 4.74 10*3/MM3 (ref 3.4–10.8)

## 2020-11-19 PROCEDURE — 36415 COLL VENOUS BLD VENIPUNCTURE: CPT | Performed by: INTERNAL MEDICINE

## 2020-11-19 PROCEDURE — 84443 ASSAY THYROID STIM HORMONE: CPT | Performed by: INTERNAL MEDICINE

## 2020-11-19 PROCEDURE — 82570 ASSAY OF URINE CREATININE: CPT | Performed by: INTERNAL MEDICINE

## 2020-11-19 PROCEDURE — 82043 UR ALBUMIN QUANTITATIVE: CPT | Performed by: INTERNAL MEDICINE

## 2020-11-19 PROCEDURE — 84466 ASSAY OF TRANSFERRIN: CPT | Performed by: INTERNAL MEDICINE

## 2020-11-19 PROCEDURE — 83721 ASSAY OF BLOOD LIPOPROTEIN: CPT | Performed by: INTERNAL MEDICINE

## 2020-11-19 PROCEDURE — 82306 VITAMIN D 25 HYDROXY: CPT | Performed by: INTERNAL MEDICINE

## 2020-11-19 PROCEDURE — 82728 ASSAY OF FERRITIN: CPT | Performed by: INTERNAL MEDICINE

## 2020-11-19 PROCEDURE — 85025 COMPLETE CBC W/AUTO DIFF WBC: CPT | Performed by: INTERNAL MEDICINE

## 2020-11-19 PROCEDURE — 80053 COMPREHEN METABOLIC PANEL: CPT | Performed by: INTERNAL MEDICINE

## 2020-11-19 PROCEDURE — 83036 HEMOGLOBIN GLYCOSYLATED A1C: CPT | Performed by: INTERNAL MEDICINE

## 2020-11-19 PROCEDURE — 82607 VITAMIN B-12: CPT | Performed by: INTERNAL MEDICINE

## 2020-11-19 PROCEDURE — 83540 ASSAY OF IRON: CPT | Performed by: INTERNAL MEDICINE

## 2020-11-25 ENCOUNTER — OFFICE VISIT (OUTPATIENT)
Dept: FAMILY MEDICINE CLINIC | Facility: CLINIC | Age: 70
End: 2020-11-25

## 2020-11-25 VITALS
DIASTOLIC BLOOD PRESSURE: 66 MMHG | SYSTOLIC BLOOD PRESSURE: 139 MMHG | HEIGHT: 64 IN | BODY MASS INDEX: 27.9 KG/M2 | HEART RATE: 87 BPM | WEIGHT: 163.4 LBS

## 2020-11-25 DIAGNOSIS — D51.8 VITAMIN B12 DEFICIENCY (DIETARY) ANEMIA: ICD-10-CM

## 2020-11-25 DIAGNOSIS — E11.29 TYPE 2 DIABETES MELLITUS WITH MICROALBUMINURIA, WITHOUT LONG-TERM CURRENT USE OF INSULIN (HCC): Primary | Chronic | ICD-10-CM

## 2020-11-25 DIAGNOSIS — Z11.59 ENCOUNTER FOR HEPATITIS C SCREENING TEST FOR LOW RISK PATIENT: ICD-10-CM

## 2020-11-25 DIAGNOSIS — D50.8 IRON DEFICIENCY ANEMIA SECONDARY TO INADEQUATE DIETARY IRON INTAKE: ICD-10-CM

## 2020-11-25 DIAGNOSIS — R80.9 TYPE 2 DIABETES MELLITUS WITH MICROALBUMINURIA, WITHOUT LONG-TERM CURRENT USE OF INSULIN (HCC): Primary | Chronic | ICD-10-CM

## 2020-11-25 DIAGNOSIS — E78.5 HYPERLIPIDEMIA ASSOCIATED WITH TYPE 2 DIABETES MELLITUS (HCC): Chronic | ICD-10-CM

## 2020-11-25 DIAGNOSIS — I10 ESSENTIAL HYPERTENSION: Chronic | ICD-10-CM

## 2020-11-25 DIAGNOSIS — E55.9 VITAMIN D DEFICIENCY: Chronic | ICD-10-CM

## 2020-11-25 DIAGNOSIS — E78.1 HYPERTRIGLYCERIDEMIA: Chronic | ICD-10-CM

## 2020-11-25 DIAGNOSIS — E11.69 HYPERLIPIDEMIA ASSOCIATED WITH TYPE 2 DIABETES MELLITUS (HCC): Chronic | ICD-10-CM

## 2020-11-25 PROCEDURE — 99443 PR PHYS/QHP TELEPHONE EVALUATION 21-30 MIN: CPT | Performed by: INTERNAL MEDICINE

## 2020-11-25 RX ORDER — OMEPRAZOLE 40 MG/1
40 CAPSULE, DELAYED RELEASE ORAL DAILY
Qty: 90 CAPSULE | Refills: 3 | Status: SHIPPED | OUTPATIENT
Start: 2020-11-25 | End: 2021-06-30

## 2020-11-25 RX ORDER — LISINOPRIL 2.5 MG/1
2.5 TABLET ORAL 2 TIMES DAILY
Qty: 180 TABLET | Refills: 3 | Status: SHIPPED | OUTPATIENT
Start: 2020-11-25 | End: 2021-10-20

## 2020-11-25 RX ORDER — ROSUVASTATIN CALCIUM 20 MG/1
TABLET, COATED ORAL
Qty: 90 TABLET | Refills: 3 | Status: SHIPPED | OUTPATIENT
Start: 2020-11-25 | End: 2021-07-09

## 2020-11-25 NOTE — PROGRESS NOTES
Subjective     Otilia Kenny is a 70 y.o. female.     History of Present Illness     You have chosen to receive care through a telephone visit. Do you consent to use a telephone visit for your medical care today? Yes  Total visit time: 23 minutes      Otilia Kenny is a 70 y.o. female who is seen today via video visit for six-month follow-up of multiple medical issues including type 2 diabetes, high cholesterol, high triglycerides, hypertension, GERD, depression, iron deficiency anemia and other issues.   She has a history of laminectomy and cervical fusion 02/2013.  She and her  sold her home in [place] and moved into [place] this year, as they were downsizing.  They have been doing numerous projects in their new home.  The increased physical activity has resulted in weight loss and seems to have helped her mood and affect.  She continues on Celexa with good results.    With her visit 6 months ago, we increased lisinopril to 2.5 mg twice daily and discontinued Norvasc to address orthostatic symptoms.  Her blood pressure control has been good and the orthostatic symptoms resolved.  Her microalbuminuria has improved with the increased lisinopril.  She reports systolic blood pressure is usually in the 120s to low 130s.    Blood pressure and heart rate are at goal today.  She reports pulses usually in the 70s.  Weight is down 7 pounds since springtime.    Her A1c has improved from 8.3-7.1.    Her LDL is still not quite at goal despite Crestor 20 mg daily. She continues on fenofibrate for elevated triglycerides. Vascepa is too expensive on her formulary.    All of her other labs are reviewed with the patient and listed below.  Microalbumin has improved.  Vitamin D level is nearly at goal with her current calcium/vitamin D supplement and the vitamin D that is in her daily multivitamin.  Her ferritin is drifting downward.  She reports that she has decreased her iron supplement to 1/2 tablet twice weekly.  She  "agrees to go back to the prior dose of 1 tablet twice weekly.    She reports a new problem of nasal congestion and sinus pressure.  She was asking for an antibiotic, but does not have any symptoms of bacterial sinusitis.  She is using Flonase nasal spray and Zyrtec daily.  She has been sanding kitchen cabinets, so a logan environment, but that task is completed.  She is currently painting her cabinets.  We discussed measures that should be helpful.    Review of Systems   Constitutional: Negative for chills, fatigue and fever.   HENT: Positive for congestion, postnasal drip and sinus pressure. Negative for ear pain and sore throat.    Respiratory: Negative for cough, shortness of breath and wheezing.    Cardiovascular: Negative for chest pain, palpitations and leg swelling.   Gastrointestinal: Negative for abdominal pain, blood in stool, constipation, diarrhea, nausea and vomiting.   Endocrine: Negative for cold intolerance, heat intolerance, polydipsia and polyuria.   Genitourinary: Negative for dysuria, frequency, hematuria and urgency.   Musculoskeletal: Positive for arthralgias and neck stiffness.   Skin: Negative for rash.   Neurological: Negative for syncope and weakness.       Objective     /66   Pulse 87   Ht 162.6 cm (64\") Comment: per patient  Wt 74.1 kg (163 lb 6.4 oz) Comment: per patient  BMI 28.05 kg/m²     Physical Exam    PHQ-2/PHQ-9 Depression Screening 9/22/2020   Little interest or pleasure in doing things 0   Feeling down, depressed, or hopeless 0   Trouble falling or staying asleep, or sleeping too much -   Feeling tired or having little energy -   Poor appetite or overeating -   Feeling bad about yourself - or that you are a failure or have let yourself or your family down -   Trouble concentrating on things, such as reading the newspaper or watching television -   Moving or speaking so slowly that other people could have noticed. Or the opposite - being so fidgety or restless that you " have been moving around a lot more than usual -   Thoughts that you would be better off dead, or of hurting yourself in some way -   Total Score 0   If you checked off any problems, how difficult have these problems made it for you to do your work, take care of things at home, or get along with other people? -       Assessment/Plan     Continue the current medical management and monitoring of diabetes.  The recent addition of Starlix proved to be helpful.  We praised her for the increased activity and weight loss.  Continue that.  A1c is vastly improved.    Continue the current doses of Crestor and fenofibrate.  I would prefer her to be on the combination of Crestor and Vascepa, but Vascepa is not covered on her formulary and it is very expensive.  Try to intensify dietary efforts.  We might add Zetia in the future.    Continue lisinopril 2.5 mg twice daily and her spironolactone.  Edema and low potassium tendencies are well controlled.  Her previous orthostatic symptoms have resolved.    Continue the current calcium with vitamin D and a multivitamin.  We will continue to follow her vitamin D level and may need her to take a little supplemental OTC vitamin D as well in the future.    Include a screen for hepatitis C with next set of labs.    Resume the prior dose of ferrous sulfate 325 mg twice weekly.  We will recheck iron studies with next set of labs.  She denies any evidence of GI or  blood loss and she is up-to-date on colon cancer screening.    She has received her first dose of Shingrix vaccine.  She will receive the booster in a couple months.    Continue omeprazole for GERD symptoms, which are well controlled.    Continue Celexa for depression and anxiety symptoms which are well controlled.    For the new problem of nasal congestion and sinus pressure.  I suggested decreasing the Zyrtec to 1/2 tablet every other day and continuing the Flonase nasal spray twice daily.  Use the Sosa bottle daily for her  current nasal congestion and sinus pressure.  Also use saline nasal spray frequently as needed.  Notify me if this approach is not successful.  If she starts experiencing symptoms of bacterial sinus infection, we would add an antibiotic, but I declined to prescribe antibiotic today.    Return to clinic in 6 months with fasting labs prior.    Diagnoses and all orders for this visit:    Type 2 diabetes mellitus with microalbuminuria, without long-term current use of insulin (CMS/[facility])  -     CBC Auto Differential; Future  -     Comprehensive Metabolic Panel; Future  -     Hemoglobin A1c; Future    Hyperlipidemia associated with type 2 diabetes mellitus (CMS/[facility])  -     Lipid Panel; Future    Essential hypertension  -     Comprehensive Metabolic Panel; Future    Hypertriglyceridemia  -     Lipid Panel; Future    Vitamin D deficiency  -     Vitamin D 25 Hydroxy; Future    Encounter for hepatitis C screening test for low risk patient  -     Hepatitis C Antibody; Future    Iron deficiency anemia secondary to inadequate dietary iron intake  -     Ferritin; Future  -     Iron Profile; Future    Vitamin B12 deficiency (dietary) anemia  -     Vitamin B12; Future    Other orders  -     lisinopril (PRINIVIL,ZESTRIL) 2.5 MG tablet; Take 1 tablet by mouth 2 (Two) Times a Day.  -     omeprazole (priLOSEC) 40 MG capsule; Take 1 capsule by mouth Daily.  -     rosuvastatin (CRESTOR) 20 MG tablet; TAKE 1 TABLET BY MOUTH DAILY        REPLACE SIMVASTATIN        Lab on 11/19/2020   Component Date Value Ref Range Status   • WBC 11/19/2020 4.74  3.40 - 10.80 10*3/mm3 Final   • RBC 11/19/2020 4.04  3.77 - 5.28 10*6/mm3 Final   • Hemoglobin 11/19/2020 11.9* 12.0 - 15.9 g/dL Final   • Hematocrit 11/19/2020 37.3  34.0 - 46.6 % Final   • MCV 11/19/2020 92.3  79.0 - 97.0 fL Final   • MCH 11/19/2020 29.5  26.6 - 33.0 pg Final   • MCHC 11/19/2020 31.9  31.5 - 35.7 g/dL Final   • RDW 11/19/2020 12.2* 12.3 - 15.4 % Final   • RDW-SD 11/19/2020 39.9   37.0 - 54.0 fl Final   • MPV 11/19/2020 9.6  6.0 - 12.0 fL Final   • Platelets 11/19/2020 324  140 - 450 10*3/mm3 Final   • Neutrophil % 11/19/2020 59.9  42.7 - 76.0 % Final   • Lymphocyte % 11/19/2020 30.0  19.6 - 45.3 % Final   • Monocyte % 11/19/2020 6.3  5.0 - 12.0 % Final   • Eosinophil % 11/19/2020 2.7  0.3 - 6.2 % Final   • Basophil % 11/19/2020 1.1  0.0 - 1.5 % Final   • Neutrophils, Absolute 11/19/2020 2.84  1.70 - 7.00 10*3/mm3 Final   • Lymphocytes, Absolute 11/19/2020 1.42  0.70 - 3.10 10*3/mm3 Final   • Monocytes, Absolute 11/19/2020 0.30  0.10 - 0.90 10*3/mm3 Final   • Eosinophils, Absolute 11/19/2020 0.13  0.00 - 0.40 10*3/mm3 Final   • Basophils, Absolute 11/19/2020 0.05  0.00 - 0.20 10*3/mm3 Final   • Glucose 11/19/2020 141* 70 - 99 mg/dL Final   • BUN 11/19/2020 27* 7 - 23 mg/dL Final   • Creatinine 11/19/2020 0.96  0.52 - 1.04 mg/dL Final   • Sodium 11/19/2020 133* 137 - 145 mmol/L Final   • Potassium 11/19/2020 4.3  3.4 - 5.0 mmol/L Final   • Chloride 11/19/2020 98* 101 - 112 mmol/L Final   • CO2 11/19/2020 27.0  22.0 - 30.0 mmol/L Final   • Calcium 11/19/2020 9.5  8.4 - 10.2 mg/dL Final   • Total Protein 11/19/2020 7.6  6.3 - 8.6 g/dL Final   • Albumin 11/19/2020 4.30  3.50 - 5.00 g/dL Final   • ALT (SGPT) 11/19/2020 21  <=35 U/L Final   • AST (SGOT) 11/19/2020 27  14 - 36 U/L Final   • Alkaline Phosphatase 11/19/2020 52  38 - 126 U/L Final   • Total Bilirubin 11/19/2020 0.5  0.2 - 1.3 mg/dL Final   • eGFR Non African Amer 11/19/2020 57  39 - 90 mL/min/1.73 Final   • Globulin 11/19/2020 3.3  2.3 - 3.5 gm/dL Final   • A/G Ratio 11/19/2020 1.3  1.1 - 1.8 g/dL Final   • BUN/Creatinine Ratio 11/19/2020 28.1* 7.0 - 25.0 Final   • Anion Gap 11/19/2020 8.0  5.0 - 15.0 mmol/L Final   • Hemoglobin A1C 11/19/2020 7.16* 4.80 - 5.60 % Final   • LDL Cholesterol  11/19/2020 122* 0 - 100 mg/dL Final   • Microalbumin/Creatinine Ratio 11/19/2020 39.9  mg/g Final   • Creatinine, Urine 11/19/2020 107.8  mg/dL  Final   • Microalbumin, Urine 11/19/2020 4.3  mg/dL Final   • TSH 11/19/2020 2.760  0.270 - 4.200 uIU/mL Final   • Vitamin B-12 11/19/2020 596  211 - 946 pg/mL Final   • 25 Hydroxy, Vitamin D 11/19/2020 28.4* 30.0 - 100.0 ng/ml Final   • Ferritin 11/19/2020 46.50  13.00 - 150.00 ng/mL Final   • Iron 11/19/2020 96  37 - 145 mcg/dL Final   • Iron Saturation 11/19/2020 19* 20 - 50 % Final   • Transferrin 11/19/2020 341  200 - 360 mg/dL Final   • TIBC 11/19/2020 508  298 - 536 mcg/dL Final   ]

## 2021-02-10 RX ORDER — FENOFIBRATE 145 MG/1
TABLET, COATED ORAL
Qty: 90 TABLET | Refills: 3 | Status: SHIPPED | OUTPATIENT
Start: 2021-02-10 | End: 2022-02-11 | Stop reason: SDUPTHER

## 2021-02-23 DIAGNOSIS — M79.642 BILATERAL HAND PAIN: Primary | ICD-10-CM

## 2021-02-23 DIAGNOSIS — M79.641 BILATERAL HAND PAIN: Primary | ICD-10-CM

## 2021-02-24 ENCOUNTER — LAB (OUTPATIENT)
Dept: LAB | Facility: OTHER | Age: 71
End: 2021-02-24

## 2021-02-24 ENCOUNTER — OFFICE VISIT (OUTPATIENT)
Dept: ORTHOPEDIC SURGERY | Facility: CLINIC | Age: 71
End: 2021-02-24

## 2021-02-24 ENCOUNTER — TRANSCRIBE ORDERS (OUTPATIENT)
Dept: LAB | Facility: OTHER | Age: 71
End: 2021-02-24

## 2021-02-24 VITALS — HEIGHT: 64 IN | BODY MASS INDEX: 28.68 KG/M2 | WEIGHT: 168 LBS

## 2021-02-24 DIAGNOSIS — M65.341 TRIGGER RING FINGER OF RIGHT HAND: ICD-10-CM

## 2021-02-24 DIAGNOSIS — M79.641 BILATERAL HAND PAIN: ICD-10-CM

## 2021-02-24 DIAGNOSIS — I10 ESSENTIAL HYPERTENSION: ICD-10-CM

## 2021-02-24 DIAGNOSIS — M65.342 TRIGGER FINGER, LEFT RING FINGER: ICD-10-CM

## 2021-02-24 DIAGNOSIS — Z79.899 ENCOUNTER FOR LONG-TERM (CURRENT) USE OF OTHER MEDICATIONS: ICD-10-CM

## 2021-02-24 DIAGNOSIS — Z79.899 ENCOUNTER FOR LONG-TERM (CURRENT) USE OF OTHER MEDICATIONS: Primary | ICD-10-CM

## 2021-02-24 DIAGNOSIS — N18.2 CHRONIC KIDNEY DISEASE, STAGE II (MILD): ICD-10-CM

## 2021-02-24 DIAGNOSIS — M79.642 BILATERAL HAND PAIN: ICD-10-CM

## 2021-02-24 DIAGNOSIS — M65.332 TRIGGER FINGER, LEFT MIDDLE FINGER: Primary | ICD-10-CM

## 2021-02-24 DIAGNOSIS — E11.9 TYPE 2 DIABETES MELLITUS WITHOUT COMPLICATION, WITHOUT LONG-TERM CURRENT USE OF INSULIN (HCC): ICD-10-CM

## 2021-02-24 LAB
ALBUMIN SERPL-MCNC: 4.6 G/DL (ref 3.5–5)
ALBUMIN/GLOB SERPL: 1.5 G/DL (ref 1.1–1.8)
ALP SERPL-CCNC: 57 U/L (ref 38–126)
ALT SERPL W P-5'-P-CCNC: 23 U/L
ANION GAP SERPL CALCULATED.3IONS-SCNC: 9 MMOL/L (ref 5–15)
AST SERPL-CCNC: 30 U/L (ref 14–36)
BASOPHILS # BLD AUTO: 0.06 10*3/MM3 (ref 0–0.2)
BASOPHILS NFR BLD AUTO: 0.9 % (ref 0–1.5)
BILIRUB SERPL-MCNC: 0.6 MG/DL (ref 0.2–1.3)
BUN SERPL-MCNC: 26 MG/DL (ref 7–23)
BUN/CREAT SERPL: 23.4 (ref 7–25)
CALCIUM SPEC-SCNC: 9.7 MG/DL (ref 8.4–10.2)
CHLORIDE SERPL-SCNC: 102 MMOL/L (ref 101–112)
CO2 SERPL-SCNC: 24 MMOL/L (ref 22–30)
CREAT SERPL-MCNC: 1.11 MG/DL (ref 0.52–1.04)
DEPRECATED RDW RBC AUTO: 39.3 FL (ref 37–54)
EOSINOPHIL # BLD AUTO: 0.12 10*3/MM3 (ref 0–0.4)
EOSINOPHIL NFR BLD AUTO: 1.9 % (ref 0.3–6.2)
ERYTHROCYTE [DISTWIDTH] IN BLOOD BY AUTOMATED COUNT: 12.1 % (ref 12.3–15.4)
GFR SERPL CREATININE-BSD FRML MDRD: 48 ML/MIN/1.73 (ref 39–90)
GLOBULIN UR ELPH-MCNC: 3 GM/DL (ref 2.3–3.5)
GLUCOSE SERPL-MCNC: 205 MG/DL (ref 70–99)
HCT VFR BLD AUTO: 38.9 % (ref 34–46.6)
HGB BLD-MCNC: 12.9 G/DL (ref 12–15.9)
LYMPHOCYTES # BLD AUTO: 1.8 10*3/MM3 (ref 0.7–3.1)
LYMPHOCYTES NFR BLD AUTO: 27.9 % (ref 19.6–45.3)
MCH RBC QN AUTO: 30 PG (ref 26.6–33)
MCHC RBC AUTO-ENTMCNC: 33.2 G/DL (ref 31.5–35.7)
MCV RBC AUTO: 90.5 FL (ref 79–97)
MONOCYTES # BLD AUTO: 0.47 10*3/MM3 (ref 0.1–0.9)
MONOCYTES NFR BLD AUTO: 7.3 % (ref 5–12)
NEUTROPHILS NFR BLD AUTO: 4 10*3/MM3 (ref 1.7–7)
NEUTROPHILS NFR BLD AUTO: 62 % (ref 42.7–76)
PLATELET # BLD AUTO: 354 10*3/MM3 (ref 140–450)
PMV BLD AUTO: 9.8 FL (ref 6–12)
POTASSIUM SERPL-SCNC: 4.5 MMOL/L (ref 3.4–5)
PROT SERPL-MCNC: 7.6 G/DL (ref 6.3–8.6)
RBC # BLD AUTO: 4.3 10*6/MM3 (ref 3.77–5.28)
SODIUM SERPL-SCNC: 135 MMOL/L (ref 137–145)
WBC # BLD AUTO: 6.45 10*3/MM3 (ref 3.4–10.8)

## 2021-02-24 PROCEDURE — 85025 COMPLETE CBC W/AUTO DIFF WBC: CPT | Performed by: INTERNAL MEDICINE

## 2021-02-24 PROCEDURE — 99213 OFFICE O/P EST LOW 20 MIN: CPT | Performed by: ORTHOPAEDIC SURGERY

## 2021-02-24 PROCEDURE — 80053 COMPREHEN METABOLIC PANEL: CPT | Performed by: INTERNAL MEDICINE

## 2021-02-24 PROCEDURE — 36415 COLL VENOUS BLD VENIPUNCTURE: CPT | Performed by: INTERNAL MEDICINE

## 2021-02-24 NOTE — PROGRESS NOTES
"Otilia Kenny is a 71 y.o. female   Primary provider:  Alexis Zabala MD       Chief Complaint   Patient presents with   • Left Hand - locking     Middle and ring fingers.    • Right Hand - locking     Ring finger.        HISTORY OF PRESENT ILLNESS:  Patient states fingers started locking about 2 months ago, xrays done today.   2 month history of triggering in left middle/left ring fingers and 1 month history of trigger in right ring finger.  No injury.  Pain has gotten worse in left hand and now she can't bend them enough to make them trigger.    Hand Pain   The incident occurred more than 1 week ago. There was no injury mechanism. The pain is present in the left hand, left fingers, right hand and right fingers. The quality of the pain is described as aching. The pain is moderate. The pain has been intermittent since the incident. Associated symptoms comments: Clicking, locking, swelling. . Exacerbated by: driving.  She has tried NSAIDs for the symptoms.        CONCURRENT MEDICAL HISTORY:    Past Medical History:   Diagnosis Date   • Abdominal pain     Most likely related to functional colonic spasm     • Abnormal liver enzymes     Improved, 3/2015      • Allergic rhinitis     seasonal   • Allergic rhinitis    • Anemia    • Anesthesia complication     states sometime B/P drops   • Arthritis    • Cervical disc disorder     S/P cervical surgeries x2-3. Extensive fusion C 3-7. [place] [facility]      • Depression    • Diarrhea, unspecified     Resolved with discontinuation of metformin.    • Diverticular disease of colon    • Dysfunction of eustachian tube    • Essential hypertension     Increase lisinopril HCT, 10/12.5.      • Gastritis    • Generalized anxiety disorder    • GERD (gastroesophageal reflux disease)    • Hyperlipidemia     Intolerant of Lipitor. The patient stopped Zocor due to \"liver pain\", summer 2015. patient instructed to reattempt Zocor every other day, 10/2015    • Hyperlipidemia associated " with type 2 diabetes mellitus (CMS/[facility]) 11/25/2020   • Hypertriglyceridemia     Holding simvastatin 2/2015 due to slightly elevated LFTs.      • Iron deficiency anemia    • Irritable bowel syndrome     Dr. 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 Reynolds    • Sleep disorder    • Spasm of back muscles     Dr. Castro changed Soma to Zanaflex.     • Type 2 diabetes mellitus with microalbuminuria, without long-term current use of insulin (CMS/[facility]) 5/7/2018    Added automatically from request for surgery [id number]   • Vitamin B12 deficiency (dietary) anemia     Currently on multivitamin daily         Allergies   Allergen Reactions   • Allegra [Fexofenadine] Itching   • Amaryl [Glimepiride] Myalgia   • Dyazide [Triamterene-Hctz] Itching   • Hydrochlorothiazide W-Triamterene Itching   • Lipitor [Atorvastatin] Myalgia   • Metformin And Related Diarrhea   • Naproxen Itching   • Actos [Pioglitazone] Rash   • Sulfamethoxazole-Trimethoprim Rash     dizziness         Current Outpatient Medications:   •  aspirin 81 MG EC tablet, Take 81 mg by mouth Daily., Disp: , Rfl:   •  Blood Glucose Monitoring Suppl (ONETOUCH VERIO IQ SYSTEM) w/Device kit, 1 each Daily., Disp: 1 kit, Rfl: 0  •  CALCIUM CARB-CHOLECALCIFEROL PO, Take 1 tablet by mouth 2 (two) times a day., Disp: , Rfl:   •  cetirizine (zyrTEC) 10 MG tablet, Take 10 mg by mouth Daily., Disp: , Rfl:   •  citalopram (CeleXA) 20 MG tablet, Take 1 tablet by mouth Daily., Disp: 90 tablet, Rfl: 3  •  Dapagliflozin Propanediol (Farxiga) 10 MG tablet, TAKE 1 TABLET DAILY        (REPLACES INVOKANA), Disp: 90 tablet, Rfl: 1  •  fenofibrate (TRICOR) 145 MG tablet, TAKE 1/2 TO 1 TABLET DAILY, Disp: 90 tablet, Rfl: 3  •  glucose blood (OneTouch Verio) test strip, USE TO TEST DAILY AS       DIRECTED, Disp: 100 each, Rfl: 3  •  Insulin Pen Needle 31G X 5 MM misc, Use daily with Victoza, Disp: 90 each, Rfl: 3  •  IRON, FERROUS SULFATE, PO, Take 1  tablet by mouth 2 (Two) Times a Week., Disp: , Rfl:   •  Lancets (OneTouch Delica Plus [id number]) misc, TEST ONCE DAILY AS DIRECTED, Disp: 100 each, Rfl: 3  •  lisinopril (PRINIVIL,ZESTRIL) 2.5 MG tablet, Take 1 tablet by mouth 2 (Two) Times a Day., Disp: 180 tablet, Rfl: 3  •  meclizine (ANTIVERT) 25 MG tablet, Take 1 tablet by mouth 3 (Three) Times a Day As Needed for dizziness., Disp: 30 tablet, Rfl: 1  •  minocycline (MINOCIN,DYNACIN) 100 MG capsule, , Disp: , Rfl:   •  Multiple Vitamins-Minerals (MULTIVITAMIN PO), Take 1 tablet by mouth daily., Disp: , Rfl:   •  nateglinide (Starlix) 120 MG tablet, Take 1 tablet by mouth Daily Before Supper., Disp: 90 tablet, Rfl: 3  •  NON FORMULARY, Take 1 tablet by mouth Daily. Eye Health and Lutein, Disp: , Rfl:   •  omeprazole (priLOSEC) 40 MG capsule, Take 1 capsule by mouth Daily., Disp: 90 capsule, Rfl: 3  •  rosuvastatin (CRESTOR) 20 MG tablet, TAKE 1 TABLET BY MOUTH DAILY        REPLACE SIMVASTATIN, Disp: 90 tablet, Rfl: 3  •  Semaglutide, 1 MG/DOSE, (Ozempic, 1 MG/DOSE,) 2 MG/1.5ML solution pen-injector, Inject 1 mg under the skin into the appropriate area as directed 1 (One) Time Per Week., Disp: 6 pen, Rfl: 3  •  spironolactone (ALDACTONE) 50 MG tablet, Take 1 tablet by mouth Daily., Disp: , Rfl:   •  TiZANidine (ZANAFLEX) 4 MG capsule, TAKE 1 CAPSULE 2 TIMES     DAILY AS NEEDED FOR MUSCLE SPASM (REPLACES SKELAXIN), Disp: 180 capsule, Rfl: 3  •  Zoster Vac Recomb Adjuvanted 50 MCG/0.5ML reconstituted suspension, Inject 0.5 mL into the appropriate muscle as directed by prescriber Every 2 (Two) Months. Second dose to be given 2 to 6 months after first, Disp: 1 each, Rfl: 1    Past Surgical History:   Procedure Laterality Date   • BACK SURGERY      Laminectomy   • BUNIONECTOMY     • CARPAL TUNNEL RELEASE Bilateral    • CERVICAL DISC SURGERY      Laminectomy 2011. Fusion 2013.   • CHOLECYSTECTOMY     • COLONOSCOPY      Diverticulosis found in the sigmoid colon.A single  diminutive polyp found in the colon.Mul.biopsies taken.Hemorrhoids found in the anus.   • ENDOSCOPY      Normal hypopharynx and esophagus.Marked irregularity of the Z-line,compatable with chronic reflux.Mul.biopsies.A hiatus hernia found in GE junction.Mild nonerosive gastritis.Mul.biopsies.Polyps in fundus.Mul.biopsies.Normal pylorus.Normal duodenum.   • ENDOSCOPY AND COLONOSCOPY     • FINGER/THUMB LESION/CYST EXCISION     • HEEL SPUR EXCISION     • HYSTERECTOMY     • KNEE ARTHROSCOPY Bilateral     Arthroscopy of the left knee with debridement of medial meniscus.   • KNEE SURGERY      Right unicompartmental arthroplasty.   • TOTAL KNEE ARTHROPLASTY Left 2018    Procedure: TOTAL KNEE ARTHROPLASTY ATTUNE with adductor canal block ;  Surgeon: Baron Reynolds MD;  Location: [facility];  Service: Orthopedics       Family History   Problem Relation Age of Onset   • Cancer Other         lung   • Diabetes Other    • Hypertension Other    • Stroke Other    • Breast cancer Sister         Social History     Socioeconomic History   • Marital status:      Spouse name: Not on file   • Number of children: Not on file   • Years of education: Not on file   • Highest education level: Not on file   Tobacco Use   • Smoking status: Former Smoker     Packs/day: 1.00     Years: 10.00     Pack years: 10.00     Types: Cigarettes     Quit date: 1985     Years since quittin.7   • Smokeless tobacco: Never Used   Substance and Sexual Activity   • Alcohol use: No   • Drug use: No   • Sexual activity: Defer        Review of Systems   Constitutional: Negative.    HENT: Negative.    Eyes: Negative.    Respiratory: Negative.    Cardiovascular: Negative.    Gastrointestinal: Negative.    Endocrine: Negative.    Genitourinary: Negative.    Musculoskeletal:        Triggering of both hands.   Skin: Negative.    Allergic/Immunologic: Negative.    Neurological: Negative.    Hematological: Negative.    Psychiatric/Behavioral:  "Negative.        PHYSICAL EXAMINATION:       Ht 162.6 cm (64\")   Wt 76.2 kg (168 lb)   BMI 28.84 kg/m²     Physical Exam  Constitutional:       General: She is not in acute distress.     Appearance: Normal appearance. She is well-developed. She is not ill-appearing.   Pulmonary:      Effort: Pulmonary effort is normal. No respiratory distress.   Neurological:      Mental Status: She is alert and oriented to person, place, and time.   Psychiatric:         Mood and Affect: Mood normal.         Behavior: Behavior normal.         Thought Content: Thought content normal.         Judgment: Judgment normal.         GAIT:     []  Normal  []  Antalgic    Assistive device: [x]  None  []  Walker     []  Crutches  []  Cane     []  Wheelchair  []  Stretcher    Right Hand Exam     Comments:  Tender nodule the volar aspect of the fourth digit overlying the MCP joint.  Active triggering with flexion.  Good capillary refill.  Good distal pulses and sensation.  Weakened  strength due to the pain with triggering during forced flexion.      Left Hand Exam     Comments:  Tender nodules overlying the third and fourth digits at the MCP joints on the volar aspect.  Weakened  strength due to the pain with active triggering during forced flexion.  Good capillary refill.  Good distal pulses and sensation grossly.              Xr Hand 3+ View Bilateral    Result Date: 2/24/2021  Narrative: Ordering Provider:  Baron Reynolds MD Ordering Diagnosis/Indication:  Bilateral hand pain Procedure:  XR HAND 3+ VW BILATERAL Exam Date:  2/24/21 COMPARISON:  Not applicable, no relevant images available.     Impression:  3 views of both hands show no acute bony abnormality.  No fracture or dislocation is noted.  There is acceptable position alignment of both hands.  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 Baron Klein " MD [place] 2/24/21           ASSESSMENT:    Diagnoses and all orders for this visit:    Trigger finger, left middle finger    Bilateral hand pain    Trigger ring finger of right hand  -     Injection Tendon or Ligament    Trigger finger, left ring finger    Essential hypertension    Chronic kidney disease, stage II (mild)    Type 2 diabetes mellitus without complication, without long-term current use of insulin (CMS/[facility])          PLAN    Proceed with trigger release left ring and left middle fingers in office in [place].  The patient voiced understanding of the risks, benefits, and alternative forms of treatment that were discussed and the patient consents to proceed with surgery.  All risks, benefits and alternatives were discussed.  Risks including but not exclusive to anesthetic complications, including death, MI, CVA, infection, bleeding DVT, fracture, residual pain and need for future surgery.    This discussion was held with the patient by Baron Reynolds MD and all questions were answered.    We also discussed proceeding with trigger injection into right Ring finger today.    Slowly increase activity as tolerated.    Follow-up for in office procedure.    Injection Tendon or Ligament    Date/Time: 2/24/2021 9:34 AM  Performed by: Baron Reynolds MD  Authorized by: Baron Reynolds MD   Preparation: Patient was prepped and draped in the usual sterile fashion.  Local anesthesia used: no    Anesthesia:  Local anesthesia used: no    Sedation:  Patient sedated: no    Patient tolerance: Patient tolerated the procedure well with no immediate complications  Comments: .3cc 1% Lidocaine and .3cc kenalog injected into right 4th finger.  Xylocaine ndc  44767-214-51  Lot  [id number],  Kenalog ndc [address]-0  Lot [id number]        Patient's Body mass index is 28.84 kg/m². BMI is above normal parameters. Recommendations include: exercise counseling and nutrition counseling.    Return for In office surgical  procedure.    Baron Reynolds MD  Answers for HPI/ROS submitted by the patient on 2/22/2021   What is the primary reason for your visit?: Other  Please describe your symptoms.: My fingers are very painful and lock up on me.  Have you had these symptoms before?: No  How long have you been having these symptoms?: Greater than 2 weeks  Please list any medications you are currently taking for this condition.: Tylenol

## 2021-03-11 ENCOUNTER — LAB (OUTPATIENT)
Dept: LAB | Facility: OTHER | Age: 71
End: 2021-03-11

## 2021-03-11 DIAGNOSIS — Z79.899 ENCOUNTER FOR LONG-TERM (CURRENT) USE OF HIGH-RISK MEDICATION: ICD-10-CM

## 2021-03-11 LAB — POTASSIUM SERPL-SCNC: 4.2 MMOL/L (ref 3.4–5)

## 2021-03-11 PROCEDURE — 84132 ASSAY OF SERUM POTASSIUM: CPT | Performed by: INTERNAL MEDICINE

## 2021-03-11 PROCEDURE — 36415 COLL VENOUS BLD VENIPUNCTURE: CPT | Performed by: INTERNAL MEDICINE

## 2021-03-16 ENCOUNTER — PROCEDURE VISIT (OUTPATIENT)
Dept: ORTHOPEDIC SURGERY | Facility: CLINIC | Age: 71
End: 2021-03-16

## 2021-03-16 DIAGNOSIS — M65.342 TRIGGER FINGER, LEFT RING FINGER: ICD-10-CM

## 2021-03-16 DIAGNOSIS — M65.332 TRIGGER FINGER, LEFT MIDDLE FINGER: Primary | ICD-10-CM

## 2021-03-16 PROCEDURE — 26055 INCISE FINGER TENDON SHEATH: CPT | Performed by: ORTHOPAEDIC SURGERY

## 2021-03-16 NOTE — PROGRESS NOTES
Otilia Kenny is a 71 y.o. female returns for     Chief Complaint   Patient presents with   • Left Hand - Procedure     Left ring and middle fingers.        HISTORY OF PRESENT ILLNESS: patient in office today for trigger finger release left ring and middle fingers.            ROS  No fevers or chills.  No chest pain or shortness of air.  No GI or  disturbances.    PHYSICAL EXAMINATION:       There were no vitals taken for this visit.                  ASSESSMENT:    Diagnoses and all orders for this visit:    Trigger finger, left middle finger    Trigger finger, left ring finger       After Appropriate risk and benefit discussion with the patient, the ring finger was marked hand was prepped and draped. The tourniquet was applied to the upper arm.Local anesthetic 1% lidocaine was injected in the area of the a-1 pulley.  After compression of the hand and elevation,  the tourniquet was inflated to 250 mmHg, The incision was made in the flexor skin crease at the ring finger. The  A1 pulley was identified and released.  The patient had active flexion and extension without triggering.  The same process then occurred for the middle finger in the same fashion The wounds were then closed with interrupted 4-0 nylon suture.  Sterile dressing was applied. Tourniquet was released and circulation returned immediately to the hand. The patient was discharged to follow-up in 10 days for suture removal.    PLAN    Wound care  Slowly progress  F/u 10 days    Return in about 10 days (around 3/26/2021).    Barno Reynolds MD

## 2021-03-23 ENCOUNTER — BULK ORDERING (OUTPATIENT)
Dept: CASE MANAGEMENT | Facility: OTHER | Age: 71
End: 2021-03-23

## 2021-03-23 DIAGNOSIS — Z23 IMMUNIZATION DUE: ICD-10-CM

## 2021-03-26 ENCOUNTER — OFFICE VISIT (OUTPATIENT)
Dept: ORTHOPEDIC SURGERY | Facility: CLINIC | Age: 71
End: 2021-03-26

## 2021-03-26 VITALS — WEIGHT: 168 LBS | HEIGHT: 64 IN | BODY MASS INDEX: 28.68 KG/M2

## 2021-03-26 DIAGNOSIS — I10 ESSENTIAL HYPERTENSION: ICD-10-CM

## 2021-03-26 DIAGNOSIS — M65.332 TRIGGER FINGER, LEFT MIDDLE FINGER: Primary | ICD-10-CM

## 2021-03-26 DIAGNOSIS — M65.342 TRIGGER FINGER, LEFT RING FINGER: ICD-10-CM

## 2021-03-26 PROCEDURE — 99024 POSTOP FOLLOW-UP VISIT: CPT | Performed by: ORTHOPAEDIC SURGERY

## 2021-03-26 NOTE — PROGRESS NOTES
Otilia Kenny is a 71 y.o. female is s/p       Chief Complaint   Patient presents with   • Left Hand - Post-op   • Suture / Staple Removal       HISTORY OF PRESENT ILLNESS: Patient being seen for left ring and middle trigger finger release post-op.   No problems.  Doing well.      No fevers or chills.  No nausea or vomiting.      PHYSICAL EXAMINATION:       Otilia Kenny is a 71 y.o. female    Patient is awake and alert, answers questions appropriately and is in no apparent distress.    GAIT:     [x]  Normal  []  Antalgic    Assistive device: [x]  None  []  Walker     []  Crutches  []  Cane     []  Wheelchair  []  Stretcher    Ortho Exam  Wound is well-healed.  She has full flexion of her fingers.  No erythema.      Procedure:  XR HAND 3+ VW BILATERAL  Exam Date:  2/24/21     COMPARISON:  Not applicable, no relevant images available.     IMPRESSION: 3 views of both hands show no acute bony abnormality.  No fracture or dislocation is noted.  There is acceptable position alignment of both hands.  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        Baron Reynolds MD  2/24/21      ASSESSMENT:    Diagnoses and all orders for this visit:    Trigger finger, left middle finger    Trigger finger, left ring finger    Essential hypertension          PLAN    Slowly progress motion and activity as tolerated.  Activity as tolerated.  No true restrictions.  Follow-up as needed.    Patient's Body mass index is 28.84 kg/m². BMI is above normal parameters. Recommendations include: exercise counseling and nutrition counseling.      Return if symptoms worsen or fail to improve, for recheck.    Baron Reynolds MD

## 2021-05-03 ENCOUNTER — TRANSCRIBE ORDERS (OUTPATIENT)
Dept: LAB | Facility: OTHER | Age: 71
End: 2021-05-03

## 2021-05-03 ENCOUNTER — LAB (OUTPATIENT)
Dept: LAB | Facility: OTHER | Age: 71
End: 2021-05-03

## 2021-05-03 DIAGNOSIS — Z79.899 ENCOUNTER FOR LONG-TERM (CURRENT) USE OF OTHER MEDICATIONS: Primary | ICD-10-CM

## 2021-05-03 DIAGNOSIS — Z79.899 ENCOUNTER FOR LONG-TERM (CURRENT) USE OF OTHER MEDICATIONS: ICD-10-CM

## 2021-05-03 LAB
ALBUMIN SERPL-MCNC: 4.4 G/DL (ref 3.5–5)
ALBUMIN/GLOB SERPL: 1.5 G/DL (ref 1.1–1.8)
ALP SERPL-CCNC: 74 U/L (ref 38–126)
ALT SERPL W P-5'-P-CCNC: 27 U/L
ANION GAP SERPL CALCULATED.3IONS-SCNC: 13 MMOL/L (ref 5–15)
AST SERPL-CCNC: 30 U/L (ref 14–36)
BASOPHILS # BLD AUTO: 0.07 10*3/MM3 (ref 0–0.2)
BASOPHILS NFR BLD AUTO: 1.3 % (ref 0–1.5)
BILIRUB SERPL-MCNC: 0.3 MG/DL (ref 0.2–1.3)
BUN SERPL-MCNC: 31 MG/DL (ref 7–23)
BUN/CREAT SERPL: 31.3 (ref 7–25)
CALCIUM SPEC-SCNC: 9.1 MG/DL (ref 8.4–10.2)
CHLORIDE SERPL-SCNC: 98 MMOL/L (ref 101–112)
CO2 SERPL-SCNC: 23 MMOL/L (ref 22–30)
CREAT SERPL-MCNC: 0.99 MG/DL (ref 0.52–1.04)
DEPRECATED RDW RBC AUTO: 39.8 FL (ref 37–54)
EOSINOPHIL # BLD AUTO: 0.16 10*3/MM3 (ref 0–0.4)
EOSINOPHIL NFR BLD AUTO: 2.9 % (ref 0.3–6.2)
ERYTHROCYTE [DISTWIDTH] IN BLOOD BY AUTOMATED COUNT: 12.2 % (ref 12.3–15.4)
ERYTHROCYTE [SEDIMENTATION RATE] IN BLOOD: 3 MM/HR (ref 0–20)
GFR SERPL CREATININE-BSD FRML MDRD: 55 ML/MIN/1.73 (ref 39–90)
GLOBULIN UR ELPH-MCNC: 3 GM/DL (ref 2.3–3.5)
GLUCOSE SERPL-MCNC: 281 MG/DL (ref 70–99)
HCT VFR BLD AUTO: 39.8 % (ref 34–46.6)
HGB BLD-MCNC: 13.4 G/DL (ref 12–15.9)
LYMPHOCYTES # BLD AUTO: 1.71 10*3/MM3 (ref 0.7–3.1)
LYMPHOCYTES NFR BLD AUTO: 31.1 % (ref 19.6–45.3)
MCH RBC QN AUTO: 30.6 PG (ref 26.6–33)
MCHC RBC AUTO-ENTMCNC: 33.7 G/DL (ref 31.5–35.7)
MCV RBC AUTO: 90.9 FL (ref 79–97)
MONOCYTES # BLD AUTO: 0.34 10*3/MM3 (ref 0.1–0.9)
MONOCYTES NFR BLD AUTO: 6.2 % (ref 5–12)
NEUTROPHILS NFR BLD AUTO: 3.22 10*3/MM3 (ref 1.7–7)
NEUTROPHILS NFR BLD AUTO: 58.5 % (ref 42.7–76)
PLATELET # BLD AUTO: 348 10*3/MM3 (ref 140–450)
PMV BLD AUTO: 10 FL (ref 6–12)
POTASSIUM SERPL-SCNC: 4.4 MMOL/L (ref 3.4–5)
PROT SERPL-MCNC: 7.4 G/DL (ref 6.3–8.6)
RBC # BLD AUTO: 4.38 10*6/MM3 (ref 3.77–5.28)
SODIUM SERPL-SCNC: 134 MMOL/L (ref 137–145)
WBC # BLD AUTO: 5.5 10*3/MM3 (ref 3.4–10.8)

## 2021-05-03 PROCEDURE — 84443 ASSAY THYROID STIM HORMONE: CPT | Performed by: NURSE PRACTITIONER

## 2021-05-03 PROCEDURE — 86235 NUCLEAR ANTIGEN ANTIBODY: CPT | Performed by: NURSE PRACTITIONER

## 2021-05-03 PROCEDURE — 85651 RBC SED RATE NONAUTOMATED: CPT | Performed by: INTERNAL MEDICINE

## 2021-05-03 PROCEDURE — 86225 DNA ANTIBODY NATIVE: CPT | Performed by: NURSE PRACTITIONER

## 2021-05-03 PROCEDURE — 36415 COLL VENOUS BLD VENIPUNCTURE: CPT | Performed by: INTERNAL MEDICINE

## 2021-05-03 PROCEDURE — 82728 ASSAY OF FERRITIN: CPT | Performed by: NURSE PRACTITIONER

## 2021-05-03 PROCEDURE — 84439 ASSAY OF FREE THYROXINE: CPT | Performed by: NURSE PRACTITIONER

## 2021-05-03 PROCEDURE — 82306 VITAMIN D 25 HYDROXY: CPT | Performed by: NURSE PRACTITIONER

## 2021-05-03 PROCEDURE — 85025 COMPLETE CBC W/AUTO DIFF WBC: CPT | Performed by: INTERNAL MEDICINE

## 2021-05-03 PROCEDURE — 82627 DEHYDROEPIANDROSTERONE: CPT | Performed by: NURSE PRACTITIONER

## 2021-05-03 PROCEDURE — 80053 COMPREHEN METABOLIC PANEL: CPT | Performed by: INTERNAL MEDICINE

## 2021-05-03 PROCEDURE — 84403 ASSAY OF TOTAL TESTOSTERONE: CPT | Performed by: NURSE PRACTITIONER

## 2021-05-04 LAB
25(OH)D3 SERPL-MCNC: 30.7 NG/ML (ref 30–100)
FERRITIN SERPL-MCNC: 64.8 NG/ML (ref 13–150)
T4 FREE SERPL-MCNC: 1.14 NG/DL (ref 0.93–1.7)
TESTOST SERPL-MCNC: <2.5 NG/DL (ref 2.9–40.8)
TSH SERPL DL<=0.05 MIU/L-ACNC: 3.16 UIU/ML (ref 0.27–4.2)

## 2021-05-05 LAB
CENTROMERE B AB SER-ACNC: <0.2 AI (ref 0–0.9)
CHROMATIN AB SERPL-ACNC: <0.2 AI (ref 0–0.9)
DHEA-S SERPL-MCNC: 52.6 UG/DL (ref 20.4–186.6)
DSDNA AB SER-ACNC: 1 IU/ML (ref 0–9)
ENA JO1 AB SER-ACNC: <0.2 AI (ref 0–0.9)
ENA RNP AB SER-ACNC: <0.2 AI (ref 0–0.9)
ENA SCL70 AB SER-ACNC: <0.2 AI (ref 0–0.9)
ENA SM AB SER-ACNC: 0.3 AI (ref 0–0.9)
ENA SS-A AB SER-ACNC: <0.2 AI (ref 0–0.9)
ENA SS-B AB SER-ACNC: <0.2 AI (ref 0–0.9)
Lab: NORMAL

## 2021-05-05 RX ORDER — DAPAGLIFLOZIN 10 MG/1
TABLET, FILM COATED ORAL
Qty: 90 TABLET | Refills: 1 | Status: SHIPPED | OUTPATIENT
Start: 2021-05-05 | End: 2021-11-03

## 2021-05-11 RX ORDER — CITALOPRAM 20 MG/1
20 TABLET ORAL DAILY
Qty: 90 TABLET | Refills: 3 | Status: SHIPPED | OUTPATIENT
Start: 2021-05-11 | End: 2022-03-01

## 2021-05-24 ENCOUNTER — LAB (OUTPATIENT)
Dept: LAB | Facility: OTHER | Age: 71
End: 2021-05-24

## 2021-05-24 DIAGNOSIS — E55.9 VITAMIN D DEFICIENCY: Chronic | ICD-10-CM

## 2021-05-24 DIAGNOSIS — R80.9 TYPE 2 DIABETES MELLITUS WITH MICROALBUMINURIA, WITHOUT LONG-TERM CURRENT USE OF INSULIN (HCC): Chronic | ICD-10-CM

## 2021-05-24 DIAGNOSIS — E11.69 HYPERLIPIDEMIA ASSOCIATED WITH TYPE 2 DIABETES MELLITUS (HCC): Chronic | ICD-10-CM

## 2021-05-24 DIAGNOSIS — E11.29 TYPE 2 DIABETES MELLITUS WITH MICROALBUMINURIA, WITHOUT LONG-TERM CURRENT USE OF INSULIN (HCC): Chronic | ICD-10-CM

## 2021-05-24 DIAGNOSIS — E78.5 HYPERLIPIDEMIA ASSOCIATED WITH TYPE 2 DIABETES MELLITUS (HCC): Chronic | ICD-10-CM

## 2021-05-24 DIAGNOSIS — D50.8 IRON DEFICIENCY ANEMIA SECONDARY TO INADEQUATE DIETARY IRON INTAKE: ICD-10-CM

## 2021-05-24 DIAGNOSIS — E78.1 HYPERTRIGLYCERIDEMIA: Chronic | ICD-10-CM

## 2021-05-24 DIAGNOSIS — D51.8 VITAMIN B12 DEFICIENCY (DIETARY) ANEMIA: ICD-10-CM

## 2021-05-24 DIAGNOSIS — I10 ESSENTIAL HYPERTENSION: Chronic | ICD-10-CM

## 2021-05-24 DIAGNOSIS — Z11.59 ENCOUNTER FOR HEPATITIS C SCREENING TEST FOR LOW RISK PATIENT: ICD-10-CM

## 2021-05-24 LAB
25(OH)D3 SERPL-MCNC: 44.4 NG/ML (ref 30–100)
ALBUMIN SERPL-MCNC: 4.2 G/DL (ref 3.5–5)
ALBUMIN/GLOB SERPL: 1.4 G/DL (ref 1.1–1.8)
ALP SERPL-CCNC: 72 U/L (ref 38–126)
ALT SERPL W P-5'-P-CCNC: 25 U/L
ANION GAP SERPL CALCULATED.3IONS-SCNC: 9 MMOL/L (ref 5–15)
AST SERPL-CCNC: 27 U/L (ref 14–36)
BASOPHILS # BLD AUTO: 0.06 10*3/MM3 (ref 0–0.2)
BASOPHILS NFR BLD AUTO: 0.9 % (ref 0–1.5)
BILIRUB SERPL-MCNC: 0.3 MG/DL (ref 0.2–1.3)
BUN SERPL-MCNC: 28 MG/DL (ref 7–23)
BUN/CREAT SERPL: 28.3 (ref 7–25)
CALCIUM SPEC-SCNC: 10 MG/DL (ref 8.4–10.2)
CHLORIDE SERPL-SCNC: 99 MMOL/L (ref 101–112)
CHOLEST SERPL-MCNC: 183 MG/DL (ref 150–200)
CO2 SERPL-SCNC: 27 MMOL/L (ref 22–30)
CREAT SERPL-MCNC: 0.99 MG/DL (ref 0.52–1.04)
DEPRECATED RDW RBC AUTO: 38.4 FL (ref 37–54)
EOSINOPHIL # BLD AUTO: 0.14 10*3/MM3 (ref 0–0.4)
EOSINOPHIL NFR BLD AUTO: 2.2 % (ref 0.3–6.2)
ERYTHROCYTE [DISTWIDTH] IN BLOOD BY AUTOMATED COUNT: 12 % (ref 12.3–15.4)
FERRITIN SERPL-MCNC: 72.1 NG/ML (ref 13–150)
GFR SERPL CREATININE-BSD FRML MDRD: 55 ML/MIN/1.73 (ref 39–90)
GLOBULIN UR ELPH-MCNC: 2.9 GM/DL (ref 2.3–3.5)
GLUCOSE SERPL-MCNC: 165 MG/DL (ref 70–99)
HBA1C MFR BLD: 7.23 % (ref 4.8–5.6)
HCT VFR BLD AUTO: 38.4 % (ref 34–46.6)
HCV AB SER DONR QL: NORMAL
HDLC SERPL-MCNC: 48 MG/DL (ref 40–59)
HGB BLD-MCNC: 13.2 G/DL (ref 12–15.9)
IRON 24H UR-MRATE: 75 MCG/DL (ref 37–145)
IRON SATN MFR SERPL: 15 % (ref 20–50)
LDLC SERPL CALC-MCNC: 101 MG/DL
LDLC/HDLC SERPL: 1.99 {RATIO} (ref 0–3.22)
LYMPHOCYTES # BLD AUTO: 1.69 10*3/MM3 (ref 0.7–3.1)
LYMPHOCYTES NFR BLD AUTO: 26 % (ref 19.6–45.3)
MCH RBC QN AUTO: 30.9 PG (ref 26.6–33)
MCHC RBC AUTO-ENTMCNC: 34.4 G/DL (ref 31.5–35.7)
MCV RBC AUTO: 89.9 FL (ref 79–97)
MONOCYTES # BLD AUTO: 0.47 10*3/MM3 (ref 0.1–0.9)
MONOCYTES NFR BLD AUTO: 7.2 % (ref 5–12)
NEUTROPHILS NFR BLD AUTO: 4.15 10*3/MM3 (ref 1.7–7)
NEUTROPHILS NFR BLD AUTO: 63.7 % (ref 42.7–76)
PLATELET # BLD AUTO: 322 10*3/MM3 (ref 140–450)
PMV BLD AUTO: 9.7 FL (ref 6–12)
POTASSIUM SERPL-SCNC: 4.4 MMOL/L (ref 3.4–5)
PROT SERPL-MCNC: 7.1 G/DL (ref 6.3–8.6)
RBC # BLD AUTO: 4.27 10*6/MM3 (ref 3.77–5.28)
SODIUM SERPL-SCNC: 135 MMOL/L (ref 137–145)
TIBC SERPL-MCNC: 495 MCG/DL (ref 298–536)
TRANSFERRIN SERPL-MCNC: 332 MG/DL (ref 200–360)
TRIGL SERPL-MCNC: 197 MG/DL
VIT B12 BLD-MCNC: 906 PG/ML (ref 211–946)
VLDLC SERPL-MCNC: 34 MG/DL (ref 5–40)
WBC # BLD AUTO: 6.51 10*3/MM3 (ref 3.4–10.8)

## 2021-05-24 PROCEDURE — 84466 ASSAY OF TRANSFERRIN: CPT | Performed by: INTERNAL MEDICINE

## 2021-05-24 PROCEDURE — 86803 HEPATITIS C AB TEST: CPT | Performed by: INTERNAL MEDICINE

## 2021-05-24 PROCEDURE — 80053 COMPREHEN METABOLIC PANEL: CPT | Performed by: INTERNAL MEDICINE

## 2021-05-24 PROCEDURE — 83036 HEMOGLOBIN GLYCOSYLATED A1C: CPT | Performed by: INTERNAL MEDICINE

## 2021-05-24 PROCEDURE — 80061 LIPID PANEL: CPT | Performed by: INTERNAL MEDICINE

## 2021-05-24 PROCEDURE — 85025 COMPLETE CBC W/AUTO DIFF WBC: CPT | Performed by: INTERNAL MEDICINE

## 2021-05-24 PROCEDURE — 82306 VITAMIN D 25 HYDROXY: CPT | Performed by: INTERNAL MEDICINE

## 2021-05-24 PROCEDURE — 82728 ASSAY OF FERRITIN: CPT | Performed by: INTERNAL MEDICINE

## 2021-05-24 PROCEDURE — 83540 ASSAY OF IRON: CPT | Performed by: INTERNAL MEDICINE

## 2021-05-24 PROCEDURE — 36415 COLL VENOUS BLD VENIPUNCTURE: CPT | Performed by: INTERNAL MEDICINE

## 2021-05-24 PROCEDURE — 82607 VITAMIN B-12: CPT | Performed by: INTERNAL MEDICINE

## 2021-06-04 ENCOUNTER — OFFICE VISIT (OUTPATIENT)
Dept: FAMILY MEDICINE CLINIC | Facility: CLINIC | Age: 71
End: 2021-06-04

## 2021-06-04 VITALS
SYSTOLIC BLOOD PRESSURE: 110 MMHG | BODY MASS INDEX: 29.02 KG/M2 | HEART RATE: 72 BPM | TEMPERATURE: 97.6 F | HEIGHT: 64 IN | DIASTOLIC BLOOD PRESSURE: 70 MMHG | WEIGHT: 170 LBS

## 2021-06-04 DIAGNOSIS — E78.5 HYPERLIPIDEMIA ASSOCIATED WITH TYPE 2 DIABETES MELLITUS (HCC): Chronic | ICD-10-CM

## 2021-06-04 DIAGNOSIS — F32.5 MAJOR DEPRESSION IN COMPLETE REMISSION (HCC): Chronic | ICD-10-CM

## 2021-06-04 DIAGNOSIS — D51.8 VITAMIN B12 DEFICIENCY (DIETARY) ANEMIA: Chronic | ICD-10-CM

## 2021-06-04 DIAGNOSIS — Z78.0 MENOPAUSE: Primary | ICD-10-CM

## 2021-06-04 DIAGNOSIS — E78.1 HYPERTRIGLYCERIDEMIA: Chronic | ICD-10-CM

## 2021-06-04 DIAGNOSIS — E11.29 TYPE 2 DIABETES MELLITUS WITH MICROALBUMINURIA, WITHOUT LONG-TERM CURRENT USE OF INSULIN (HCC): Chronic | ICD-10-CM

## 2021-06-04 DIAGNOSIS — E11.69 HYPERLIPIDEMIA ASSOCIATED WITH TYPE 2 DIABETES MELLITUS (HCC): Chronic | ICD-10-CM

## 2021-06-04 DIAGNOSIS — R80.9 TYPE 2 DIABETES MELLITUS WITH MICROALBUMINURIA, WITHOUT LONG-TERM CURRENT USE OF INSULIN (HCC): Chronic | ICD-10-CM

## 2021-06-04 DIAGNOSIS — D50.8 IRON DEFICIENCY ANEMIA SECONDARY TO INADEQUATE DIETARY IRON INTAKE: Chronic | ICD-10-CM

## 2021-06-04 DIAGNOSIS — E55.9 VITAMIN D DEFICIENCY: Chronic | ICD-10-CM

## 2021-06-04 DIAGNOSIS — I10 ESSENTIAL HYPERTENSION: Chronic | ICD-10-CM

## 2021-06-04 DIAGNOSIS — K21.9 GASTROESOPHAGEAL REFLUX DISEASE WITHOUT ESOPHAGITIS: Chronic | ICD-10-CM

## 2021-06-04 PROBLEM — N18.2 CHRONIC KIDNEY DISEASE, STAGE II (MILD): Chronic | Status: RESOLVED | Noted: 2017-04-26 | Resolved: 2021-06-04

## 2021-06-04 PROCEDURE — 99215 OFFICE O/P EST HI 40 MIN: CPT | Performed by: INTERNAL MEDICINE

## 2021-06-04 RX ORDER — EZETIMIBE 10 MG/1
10 TABLET ORAL DAILY
Qty: 90 TABLET | Refills: 3 | Status: SHIPPED | OUTPATIENT
Start: 2021-06-04 | End: 2022-05-18

## 2021-06-04 RX ORDER — MINOCYCLINE HYDROCHLORIDE 100 MG/1
100 CAPSULE ORAL DAILY
Qty: 90 CAPSULE | Refills: 3 | Status: SHIPPED | OUTPATIENT
Start: 2021-06-04 | End: 2022-07-29 | Stop reason: SDUPTHER

## 2021-06-04 RX ORDER — NATEGLINIDE 120 MG/1
TABLET ORAL
Qty: 180 TABLET | Refills: 3 | Status: SHIPPED | OUTPATIENT
Start: 2021-06-04 | End: 2022-06-22 | Stop reason: SDUPTHER

## 2021-06-04 RX ORDER — FAMOTIDINE 20 MG/1
20 TABLET, FILM COATED ORAL NIGHTLY
Qty: 90 TABLET | Refills: 3 | Status: SHIPPED | OUTPATIENT
Start: 2021-06-04 | End: 2022-05-18

## 2021-06-04 RX ORDER — MULTIPLE VITAMINS W/ MINERALS TAB 9MG-400MCG
2 TAB ORAL DAILY
COMMUNITY
End: 2022-03-01

## 2021-06-04 NOTE — PATIENT INSTRUCTIONS
Exercising to Lose Weight  Exercise is structured, repetitive physical activity to improve fitness and health. Getting regular exercise is important for everyone. It is especially important if you are overweight. Being overweight increases your risk of heart disease, stroke, diabetes, high blood pressure, and several types of cancer. Reducing your calorie intake and exercising can help you lose weight.  Exercise is usually categorized as moderate or vigorous intensity. To lose weight, most people need to do a certain amount of moderate-intensity or vigorous-intensity exercise each week.  Moderate-intensity exercise    Moderate-intensity exercise is any activity that gets you moving enough to burn at least three times more energy (calories) than if you were sitting.  Examples of moderate exercise include:  · Walking a mile in 15 minutes.  · Doing light yard work.  · Biking at an easy pace.  Most people should get at least 150 minutes (2 hours and 30 minutes) a week of moderate-intensity exercise to maintain their body weight.  Vigorous-intensity exercise  Vigorous-intensity exercise is any activity that gets you moving enough to burn at least six times more calories than if you were sitting. When you exercise at this intensity, you should be working hard enough that you are not able to carry on a conversation.  Examples of vigorous exercise include:  · Running.  · Playing a team sport, such as football, basketball, and soccer.  · Jumping rope.  Most people should get at least 75 minutes (1 hour and 15 minutes) a week of vigorous-intensity exercise to maintain their body weight.  How can exercise affect me?  When you exercise enough to burn more calories than you eat, you lose weight. Exercise also reduces body fat and builds muscle. The more muscle you have, the more calories you burn. Exercise also:  · Improves mood.  · Reduces stress and tension.  · Improves your overall fitness, flexibility, and  endurance.  · Increases bone strength.  The amount of exercise you need to lose weight depends on:  · Your age.  · The type of exercise.  · Any health conditions you have.  · Your overall physical ability.  Talk to your health care provider about how much exercise you need and what types of activities are safe for you.  What actions can I take to lose weight?  Nutrition    · Make changes to your diet as told by your health care provider or diet and nutrition specialist (dietitian). This may include:  ? Eating fewer calories.  ? Eating more protein.  ? Eating less unhealthy fats.  ? Eating a diet that includes fresh fruits and vegetables, whole grains, low-fat dairy products, and lean protein.  ? Avoiding foods with added fat, salt, and sugar.  · Drink plenty of water while you exercise to prevent dehydration or heat stroke.  Activity  · Choose an activity that you enjoy and set realistic goals. Your health care provider can help you make an exercise plan that works for you.  · Exercise at a moderate or vigorous intensity most days of the week.  ? The intensity of exercise may vary from person to person. You can tell how intense a workout is for you by paying attention to your breathing and heartbeat. Most people will notice their breathing and heartbeat get faster with more intense exercise.  · Do resistance training twice each week, such as:  ? Push-ups.  ? Sit-ups.  ? Lifting weights.  ? Using resistance bands.  · Getting short amounts of exercise can be just as helpful as long structured periods of exercise. If you have trouble finding time to exercise, try to include exercise in your daily routine.  ? Get up, stretch, and walk around every 30 minutes throughout the day.  ? Go for a walk during your lunch break.  ? Park your car farther away from your destination.  ? If you take public transportation, get off one stop early and walk the rest of the way.  ? Make phone calls while standing up and walking  around.  ? Take the stairs instead of elevators or escalators.  · Wear comfortable clothes and shoes with good support.  · Do not exercise so much that you hurt yourself, feel dizzy, or get very short of breath.  Where to find more information  · [place] Department of Health and Human Services: www.hhs.gov  · Centers for Disease Control and Prevention (CDC): www.cdc.gov  Contact a health care provider:  · Before starting a new exercise program.  · If you have questions or concerns about your weight.  · If you have a medical problem that keeps you from exercising.  Get help right away if you have any of the following while exercising:  · Injury.  · Dizziness.  · Difficulty breathing or shortness of breath that does not go away when you stop exercising.  · Chest pain.  · Rapid heartbeat.  Summary  · Being overweight increases your risk of heart disease, stroke, diabetes, high blood pressure, and several types of cancer.  · Losing weight happens when you burn more calories than you eat.  · Reducing the amount of calories you eat in addition to getting regular moderate or vigorous exercise each week helps you lose weight.  This information is not intended to replace advice given to you by your health care provider. Make sure you discuss any questions you have with your health care provider.  Document Revised: 12/31/2018 Document Reviewed: 12/31/2018  [employer] Patient Education © 2021 [employer]      Calorie Counting for Weight Loss  Calories are units of energy. Your body needs a certain amount of calories from food to keep you going throughout the day. When you eat more calories than your body needs, your body stores the extra calories as fat. When you eat fewer calories than your body needs, your body burns fat to get the energy it needs.  Calorie counting means keeping track of how many calories you eat and drink each day. Calorie counting can be helpful if you need to lose weight. If you make sure to eat fewer  calories than your body needs, you should lose weight. Ask your health care provider what a healthy weight is for you.  For calorie counting to work, you will need to eat the right number of calories in a day in order to lose a healthy amount of weight per week. A dietitian can help you determine how many calories you need in a day and will give you suggestions on how to reach your calorie goal.  · A healthy amount of weight to lose per week is usually 1-2 lb (0.5-0.9 kg). This usually means that your daily calorie intake should be reduced by 500-750 calories.  · Eating 1,200 - 1,500 calories per day can help most women lose weight.  · Eating 1,500 - 1,800 calories per day can help most men lose weight.  What is my plan?  My goal is to have __________ calories per day.  If I have this many calories per day, I should lose around __________ pounds per week.  What do I need to know about calorie counting?  In order to meet your daily calorie goal, you will need to:  · Find out how many calories are in each food you would like to eat. Try to do this before you eat.  · Decide how much of the food you plan to eat.  · Write down what you ate and how many calories it had. Doing this is called keeping a food log.  To successfully lose weight, it is important to balance calorie counting with a healthy lifestyle that includes regular activity. Aim for 150 minutes of moderate exercise (such as walking) or 75 minutes of vigorous exercise (such as running) each week.  Where do I find calorie information?    The number of calories in a food can be found on a Nutrition Facts label. If a food does not have a Nutrition Facts label, try to look up the calories online or ask your dietitian for help.  Remember that calories are listed per serving. If you choose to have more than one serving of a food, you will have to multiply the calories per serving by the amount of servings you plan to eat. For example, the label on a package of  "bread might say that a serving size is 1 slice and that there are 90 calories in a serving. If you eat 1 slice, you will have eaten 90 calories. If you eat 2 slices, you will have eaten 180 calories.  How do I keep a food log?  Immediately after each meal, record the following information in your food log:  · What you ate. Don't forget to include toppings, sauces, and other extras on the food.  · How much you ate. This can be measured in cups, ounces, or number of items.  · How many calories each food and drink had.  · The total number of calories in the meal.  Keep your food log near you, such as in a small notebook in your pocket, or use a mobile katerin or website. Some programs will calculate calories for you and show you how many calories you have left for the day to meet your goal.  What are some calorie counting tips?    · Use your calories on foods and drinks that will fill you up and not leave you hungry:  ? Some examples of foods that fill you up are nuts and nut butters, vegetables, lean proteins, and high-fiber foods like whole grains. High-fiber foods are foods with more than 5 g fiber per serving.  ? Drinks such as sodas, specialty coffee drinks, alcohol, and juices have a lot of calories, yet do not fill you up.  · Eat nutritious foods and avoid empty calories. Empty calories are calories you get from foods or beverages that do not have many vitamins or protein, such as candy, sweets, and soda. It is better to have a nutritious high-calorie food (such as an avocado) than a food with few nutrients (such as a bag of chips).  · Know how many calories are in the foods you eat most often. This will help you calculate calorie counts faster.  · Pay attention to calories in drinks. Low-calorie drinks include water and unsweetened drinks.  · Pay attention to nutrition labels for \"low fat\" or \"fat free\" foods. These foods sometimes have the same amount of calories or more calories than the full fat versions. They " also often have added sugar, starch, or salt, to make up for flavor that was removed with the fat.  · Find a way of tracking calories that works for you. Get creative. Try different apps or programs if writing down calories does not work for you.  What are some portion control tips?  · Know how many calories are in a serving. This will help you know how many servings of a certain food you can have.  · Use a measuring cup to measure serving sizes. You could also try weighing out portions on a kitchen scale. With time, you will be able to estimate serving sizes for some foods.  · Take some time to put servings of different foods on your favorite plates, bowls, and cups so you know what a serving looks like.  · Try not to eat straight from a bag or box. Doing this can lead to overeating. Put the amount you would like to eat in a cup or on a plate to make sure you are eating the right portion.  · Use smaller plates, glasses, and bowls to prevent overeating.  · Try not to multitask (for example, watch TV or use your computer) while eating. If it is time to eat, sit down at a table and enjoy your food. This will help you to know when you are full. It will also help you to be aware of what you are eating and how much you are eating.  What are tips for following this plan?  Reading food labels  · Check the calorie count compared to the serving size. The serving size may be smaller than what you are used to eating.  · Check the source of the calories. Make sure the food you are eating is high in vitamins and protein and low in saturated and trans fats.  Shopping  · Read nutrition labels while you shop. This will help you make healthy decisions before you decide to purchase your food.  · Make a grocery list and stick to it.  Cooking  · Try to cook your favorite foods in a healthier way. For example, try baking instead of frying.  · Use low-fat dairy products.  Meal planning  · Use more fruits and vegetables. Half of your  "plate should be fruits and vegetables.  · Include lean proteins like poultry and fish.  How do I count calories when eating out?  · Ask for smaller portion sizes.  · Consider sharing an entree and sides instead of getting your own entree.  · If you get your own entree, eat only half. Ask for a box at the beginning of your meal and put the rest of your entree in it so you are not tempted to eat it.  · If calories are listed on the menu, choose the lower calorie options.  · Choose dishes that include vegetables, fruits, whole grains, low-fat dairy products, and lean protein.  · Choose items that are boiled, broiled, grilled, or steamed. Stay away from items that are buttered, battered, fried, or served with cream sauce. Items labeled \"crispy\" are usually fried, unless stated otherwise.  · Choose water, low-fat milk, unsweetened iced tea, or other drinks without added sugar. If you want an alcoholic beverage, choose a lower calorie option such as a glass of wine or light beer.  · Ask for dressings, sauces, and syrups on the side. These are usually high in calories, so you should limit the amount you eat.  · If you want a salad, choose a garden salad and ask for grilled meats. Avoid extra toppings like yu, cheese, or fried items. Ask for the dressing on the side, or ask for olive oil and vinegar or lemon to use as dressing.  · Estimate how many servings of a food you are given. For example, a serving of cooked rice is ½ cup or about the size of half a baseball. Knowing serving sizes will help you be aware of how much food you are eating at restaurants. The list below tells you how big or small some common portion sizes are based on everyday objects:  ? 1 oz--4 stacked dice.  ? 3 oz--1 deck of cards.  ? 1 tsp--1 die.  ? 1 Tbsp--½ a ping-pong ball.  ? 2 Tbsp--1 ping-pong ball.  ? ½ cup--½ baseball.  ? 1 cup--1 baseball.  Summary  · Calorie counting means keeping track of how many calories you eat and drink each day. If " you eat fewer calories than your body needs, you should lose weight.  · A healthy amount of weight to lose per week is usually 1-2 lb (0.5-0.9 kg). This usually means reducing your daily calorie intake by 500-750 calories.  · The number of calories in a food can be found on a Nutrition Facts label. If a food does not have a Nutrition Facts label, try to look up the calories online or ask your dietitian for help.  · Use your calories on foods and drinks that will fill you up, and not on foods and drinks that will leave you hungry.  · Use smaller plates, glasses, and bowls to prevent overeating.  This information is not intended to replace advice given to you by your health care provider. Make sure you discuss any questions you have with your health care provider.  Document Revised: 09/06/2019 Document Reviewed: 11/17/2017  [employer] Patient Education © 2020 [employer]

## 2021-06-04 NOTE — PROGRESS NOTES
Chief Complaint  Follow-up (6 month) and Diabetes (glucose has been elevated)    Subjective          History of Present Illness     Otilia Kenny presents to the clinic for 6-month follow-up of multiple medical issues including type 2 diabetes, high cholesterol, high triglycerides, hypertension, GERD, depression, iron deficiency anemia and other issues.   She has a history of laminectomy and cervical fusion 02/2013.      With her visit six months ago, we had patient resume ferrous sulfate 325 mg twice weekly with ferritin drifting down.  Iron levels are adequate.  Hemoglobin and B-12 normal limits.       Patient has been seeing MYRA Galvan, at the dermatology office to address dermatitis to the scalp and hair loss.  She was changed from Spironolactone to daily minocycline, which is working well and is asking if I could provide refills.  I have agreed to refill the medication, reminding her to limit     Patient has been experiencing increased nausea relieved within 30 minutes after eating a couple of crackers.   She is currently taking daily Prilosec. She has tried adding OTC antacids on rare occasions.  I recommended adding Pepcid. Patient is in agreement.     Weight is up 7 pounds in the past six months.  With weight gain, diabetes has progressed slightly with A1c 7.2.  She reports glucose has been frequently ranging between 150-175 for the past 5-6 weeks.  She is taking Starlix once daily with her evening meal, which is normally her largest meal of the day.      LDL improved from six months ago, but remains above goal at 101 with Crestor 20 mg.  We tried adding Vascepa with her last visit, although, this was not covered on her formulary and was very expensive. I recommended adding Zetia and patient is in agreement.      We checked hepatitis screen with this set of labs, which is negative.     She is due repeat DEXA.  DEXA 05/2017 revealed normal bone density.  Patient prefers 2-year intervals for her  "mammograms.  She will be due repeat mammogram 12/2021.      The patient's relevant past medical, surgical, and social history was reviewed in Epic.   Lab results are reviewed with the patient today. CBC unremarkable.  Vitamin D and B-12 at goal.  Normal renal function.  Iron levels adequate with normal hemoglobin.     Objective   Vital Signs:   /70   Pulse 72   Temp 97.6 °F (36.4 °C) (Tympanic)   Ht 162.6 cm (64\")   Wt 77.1 kg (170 lb)   BMI 29.18 kg/m²       Physical Exam  Vitals reviewed.   Constitutional:       General: She is not in acute distress.     Appearance: She is well-developed.      Comments: Pleasant female, overweight.    HENT:      Head: Normocephalic and atraumatic.      Nose:      Right Sinus: No maxillary sinus tenderness or frontal sinus tenderness.      Left Sinus: No maxillary sinus tenderness or frontal sinus tenderness.      Mouth/Throat:      Mouth: No oral lesions.      Pharynx: Uvula midline.      Tonsils: No tonsillar exudate.   Eyes:      Conjunctiva/sclera: Conjunctivae normal.      Pupils: Pupils are equal, round, and reactive to light.   Neck:      Thyroid: No thyroid mass or thyromegaly.      Vascular: No carotid bruit or JVD.      Trachea: Trachea normal. No tracheal deviation.   Cardiovascular:      Rate and Rhythm: Normal rate and regular rhythm.  No extrasystoles are present.     Chest Wall: PMI is not displaced.      Heart sounds: Normal heart sounds. No murmur heard.     Pulmonary:      Effort: Pulmonary effort is normal. No accessory muscle usage or respiratory distress.      Breath sounds: Normal breath sounds. No decreased breath sounds, wheezing, rhonchi or rales.   Abdominal:      General: Bowel sounds are normal. There is no distension.      Palpations: Abdomen is soft.      Tenderness: There is no abdominal tenderness.      Comments: Overweight abdomen.     Musculoskeletal:      Cervical back: Neck supple.   Lymphadenopathy:      Cervical: No cervical " adenopathy.   Skin:     General: Skin is warm and dry.      Findings: No rash.      Nails: There is no clubbing.   Neurological:      Mental Status: She is alert and oriented to person, place, and time.      Cranial Nerves: No cranial nerve deficit.      Coordination: Coordination normal.   Psychiatric:         Speech: Speech normal.         Behavior: Behavior normal.         Thought Content: Thought content normal.         Judgment: Judgment normal.            Result Review :     CMP    CMP 3/11/21 5/3/21 5/24/21   Glucose  281 (A) 165 (A)   BUN  31 (A) 28 (A)   Creatinine  0.99 0.99   eGFR Non African Am  55 55   Sodium  134 (A) 135 (A)   Potassium 4.2 4.4 4.4   Chloride  98 (A) 99 (A)   Calcium  9.1 10.0   Albumin  4.40 4.20   Total Bilirubin  0.3 0.3   Alkaline Phosphatase  74 72   AST (SGOT)  30 27   ALT (SGPT)  27 25   (A) Abnormal value            CBC w/diff    CBC w/Diff 2/24/21 5/3/21 5/24/21   WBC 6.45 5.50 6.51   RBC 4.30 4.38 4.27   Hemoglobin 12.9 13.4 13.2   Hematocrit 38.9 39.8 38.4   MCV 90.5 90.9 89.9   MCH 30.0 30.6 30.9   MCHC 33.2 33.7 34.4   RDW 12.1 (A) 12.2 (A) 12.0 (A)   Platelets 354 348 322   Neutrophil Rel % 62.0 58.5 63.7   Lymphocyte Rel % 27.9 31.1 26.0   Monocyte Rel % 7.3 6.2 7.2   Eosinophil Rel % 1.9 2.9 2.2   Basophil Rel % 0.9 1.3 0.9   (A) Abnormal value            Lipid Panel    Lipid Panel 11/19/20 5/24/21   Total Cholesterol  183   Triglycerides  197 (A)   HDL Cholesterol  48   VLDL Cholesterol  34   LDL Cholesterol  122 (A) 101 (A)   LDL/HDL Ratio  1.99   (A) Abnormal value            TSH    TSH 11/19/20 5/3/21   TSH 2.760 3.160           A1C Last 3 Results    HGBA1C Last 3 Results 11/19/20 5/24/21   Hemoglobin A1C 7.16 (A) 7.23 (A)   (A) Abnormal value            Data reviewed: Radiologic studies DEXA          Assessment and Plan    Diagnoses and all orders for this visit:    1. Menopause (Primary)  -     DEXA Bone Density Axial; Future    2. Type 2 diabetes mellitus with  microalbuminuria, without long-term current use of insulin (CMS/[facility])  -     CBC Auto Differential; Future  -     Comprehensive Metabolic Panel; Future  -     Hemoglobin A1c; Future  -     Microalbumin / Creatinine Urine Ratio - Urine, Clean Catch; Future    3. Hyperlipidemia associated with type 2 diabetes mellitus (CMS/[facility])  -     LDL Cholesterol, Direct; Future    4. Hypertriglyceridemia  -     Triglycerides; Future    5. Essential hypertension  -     Comprehensive Metabolic Panel; Future    6. Vitamin D deficiency  -     Vitamin D 25 Hydroxy; Future    7. Gastroesophageal reflux disease without esophagitis  -     CBC Auto Differential; Future    8. Vitamin B12 deficiency (dietary) anemia  -     Vitamin B12; Future    9. Iron deficiency anemia secondary to inadequate dietary iron intake  -     CBC Auto Differential; Future  -     Ferritin; Future  -     Iron Profile; Future    10. Major depression in complete remission (CMS/[facility])    Other orders  -     nateglinide (Starlix) 120 MG tablet; 1 tab po before lunch and supper  Dispense: 180 tablet; Refill: 3  -     minocycline (MINOCIN,DYNACIN) 100 MG capsule; Take 1 capsule by mouth Daily.  Dispense: 90 capsule; Refill: 3  -     ezetimibe (Zetia) 10 MG tablet; Take 1 tablet by mouth Daily.  Dispense: 90 tablet; Refill: 3  -     famotidine (PEPCID) 20 MG tablet; Take 1 tablet by mouth Every Night.  Dispense: 90 tablet; Refill: 3         I spent 50 minutes caring for Otilia on this date of service. This time includes time spent by me in the following activities:preparing for the visit, reviewing tests, obtaining and/or reviewing a separately obtained history, performing a medically appropriate examination and/or evaluation , counseling and educating the patient/family/caregiver, ordering medications, tests, or procedures, documenting information in the medical record, independently interpreting results and communicating that information with the patient/family/caregiver and  care coordination     We will have patient add Starlix 25 mg prior to her lunch meal and continue 25 mg prior to evening meal.  Continue the Farxiga and weekly Ozempic.  Continue diabetic monitoring and notify us if glucose is not consistently    I have provided refills for the minocycline prescribed by dermatology to address dermatitis of the scalp.     Orders are placed for patient to schedule DEXA.  Last DEXA was normal, but it has been 4 years.  Continue vitamin D and calcium.  She chose to repeat mammogram.  It has been 1-1/2 years.    I sent a prescription for Pepcid 20 mg to take one q.h.s.  Continue the Prilosec 40 mg q.a.m.   She can add OTC antacids with any breakthrough symptoms.     I sent a prescription for Zetia 10 mg to take one daily in combination with the Crestor.  Continue the fenofibrate for the high triglycerides and reduce carbohydrates and increased physical activity.  Pursue weight loss.      Continue Celexa for depression and anxiety symptoms, which are stable.    Return to clinic in 6 months with fasting labs prior.  She will be due repeat mammogram 12/2021.      Scribed for Dr. Zabala by Vandana Galeas [facility].     Follow Up   Return in about 6 months (around 12/4/2021) for Follow up in six months with labs one week prior., Next scheduled follow up - labs 1 week prior.  Patient was given instructions and counseling regarding her condition or for health maintenance advice. Please see specific information pulled into the AVS if appropriate.

## 2021-06-30 RX ORDER — BLOOD SUGAR DIAGNOSTIC
STRIP MISCELLANEOUS
Qty: 100 EACH | Refills: 3 | Status: SHIPPED | OUTPATIENT
Start: 2021-06-30 | End: 2022-06-15

## 2021-06-30 RX ORDER — OMEPRAZOLE 40 MG/1
CAPSULE, DELAYED RELEASE ORAL
Qty: 90 CAPSULE | Refills: 3 | Status: SHIPPED | OUTPATIENT
Start: 2021-06-30 | End: 2021-12-21

## 2021-07-09 RX ORDER — ROSUVASTATIN CALCIUM 20 MG/1
TABLET, COATED ORAL
Qty: 90 TABLET | Refills: 3 | Status: SHIPPED | OUTPATIENT
Start: 2021-07-09 | End: 2022-08-12 | Stop reason: SDUPTHER

## 2021-08-11 RX ORDER — TIZANIDINE HYDROCHLORIDE 4 MG/1
CAPSULE, GELATIN COATED ORAL
Qty: 180 CAPSULE | Refills: 3 | Status: SHIPPED | OUTPATIENT
Start: 2021-08-11 | End: 2022-08-12 | Stop reason: SDUPTHER

## 2021-09-28 ENCOUNTER — OFFICE VISIT (OUTPATIENT)
Dept: ORTHOPEDIC SURGERY | Facility: CLINIC | Age: 71
End: 2021-09-28

## 2021-09-28 VITALS — HEIGHT: 64 IN | WEIGHT: 167 LBS | OXYGEN SATURATION: 97 % | HEART RATE: 68 BPM | BODY MASS INDEX: 28.51 KG/M2

## 2021-09-28 DIAGNOSIS — M65.311 TRIGGER FINGER OF RIGHT THUMB: ICD-10-CM

## 2021-09-28 DIAGNOSIS — M79.644 PAIN OF RIGHT THUMB: ICD-10-CM

## 2021-09-28 DIAGNOSIS — M65.341 TRIGGER FINGER, RIGHT RING FINGER: Primary | ICD-10-CM

## 2021-09-28 DIAGNOSIS — E11.9 TYPE 2 DIABETES MELLITUS WITHOUT COMPLICATION, WITHOUT LONG-TERM CURRENT USE OF INSULIN (HCC): ICD-10-CM

## 2021-09-28 DIAGNOSIS — I10 ESSENTIAL HYPERTENSION: ICD-10-CM

## 2021-09-28 PROCEDURE — 99213 OFFICE O/P EST LOW 20 MIN: CPT | Performed by: ORTHOPAEDIC SURGERY

## 2021-09-28 NOTE — PROGRESS NOTES
"Otilia Kenny is a 71 y.o. female   Primary provider:  Alexis Zabala MD       Chief Complaint   Patient presents with   • Right Hand - Pain, Initial Evaluation     Thumb pain, ring finger         HISTORY OF PRESENT ILLNESS:    Patient reports about a 1 month history of pain and locking in her right ring finger.  She also has had worsening pain in the base of her right thumb.  No specific injury.  No fevers or chills.  No numbness or tingling.     Pain  This is a new problem. The current episode started more than 1 month ago. The problem occurs intermittently. The problem has been gradually worsening. Associated symptoms include coughing and joint swelling. Associated symptoms comments: Stabbing,pain and swelling. Exacerbated by: driving. She has tried nothing for the symptoms. The treatment provided no relief.        CONCURRENT MEDICAL HISTORY:    Past Medical History:   Diagnosis Date   • Abdominal pain     Most likely related to functional colonic spasm     • Abnormal liver enzymes     Improved, 3/2015      • Allergic rhinitis     seasonal   • Allergic rhinitis    • Anemia    • Anesthesia complication     states sometime B/P drops   • Arthritis    • Cervical disc disorder     S/P cervical surgeries x2-3. Extensive fusion C 3-7. [place] neurosurgery      • Depression    • Diarrhea, unspecified     Resolved with discontinuation of metformin.    • Diverticular disease of colon    • Dysfunction of eustachian tube    • Essential hypertension     Increase lisinopril HCT, 10/12.5.      • Gastritis    • Generalized anxiety disorder    • GERD (gastroesophageal reflux disease)    • Hyperlipidemia     Intolerant of Lipitor. The patient stopped Zocor due to \"liver pain\", summer 2015. patient instructed to reattempt Zocor every other day, 10/2015    • Hyperlipidemia associated with type 2 diabetes mellitus (CMS/HCC) 11/25/2020   • Hypertriglyceridemia     Holding simvastatin 2/2015 due to slightly elevated LFTs.      • " Iron deficiency anemia    • Irritable bowel syndrome     Dr. 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 Reynolds    • Sleep disorder    • Spasm of back muscles     Dr. Castro changed Soma to Zanaflex.     • Type 2 diabetes mellitus with microalbuminuria, without long-term current use of insulin (CMS/[facility]) 5/7/2018    Added automatically from request for surgery [id number]   • Vitamin B12 deficiency (dietary) anemia     Currently on multivitamin daily         Allergies   Allergen Reactions   • Allegra [Fexofenadine] Itching   • Amaryl [Glimepiride] Myalgia   • Dyazide [Triamterene-Hctz] Itching   • Hydrochlorothiazide W-Triamterene Itching   • Lipitor [Atorvastatin] Myalgia   • Metformin And Related Diarrhea   • Naproxen Itching   • Actos [Pioglitazone] Rash   • Sulfamethoxazole-Trimethoprim Rash     dizziness         Current Outpatient Medications:   •  aspirin 81 MG EC tablet, Take 81 mg by mouth Daily., Disp: , Rfl:   •  Blood Glucose Monitoring Suppl (ONETOUCH VERIO IQ SYSTEM) w/Device kit, 1 each Daily., Disp: 1 kit, Rfl: 0  •  CALCIUM CARB-CHOLECALCIFEROL PO, Take 1 tablet by mouth 2 (two) times a day., Disp: , Rfl:   •  cetirizine (zyrTEC) 10 MG tablet, Take 10 mg by mouth Daily., Disp: , Rfl:   •  citalopram (CeleXA) 20 MG tablet, Take 1 tablet by mouth Daily., Disp: 90 tablet, Rfl: 3  •  ezetimibe (Zetia) 10 MG tablet, Take 1 tablet by mouth Daily., Disp: 90 tablet, Rfl: 3  •  famotidine (PEPCID) 20 MG tablet, Take 1 tablet by mouth Every Night., Disp: 90 tablet, Rfl: 3  •  Farxiga 10 MG tablet, TAKE 1 TABLET DAILY        (REPLACES INVOKANA), Disp: 90 tablet, Rfl: 1  •  fenofibrate (TRICOR) 145 MG tablet, TAKE 1/2 TO 1 TABLET DAILY, Disp: 90 tablet, Rfl: 3  •  glucose blood (OneTouch Verio) test strip, USE TO TEST DAILY AS       DIRECTED, Disp: 100 each, Rfl: 3  •  Insulin Pen Needle 31G X 5 MM misc, Use daily with Victoza, Disp: 90 each, Rfl: 3  •  IRON, FERROUS  SULFATE, PO, Take 65 mg by mouth 2 (Two) Times a Week., Disp: , Rfl:   •  Lancets (OneTouch Delica Plus [id number]) misc, TEST ONCE DAILY AS DIRECTED, Disp: 100 each, Rfl: 3  •  lisinopril (PRINIVIL,ZESTRIL) 2.5 MG tablet, Take 1 tablet by mouth 2 (Two) Times a Day., Disp: 180 tablet, Rfl: 3  •  meclizine (ANTIVERT) 25 MG tablet, Take 1 tablet by mouth 3 (Three) Times a Day As Needed for dizziness., Disp: 30 tablet, Rfl: 1  •  minocycline (MINOCIN,DYNACIN) 100 MG capsule, Take 1 capsule by mouth Daily., Disp: 90 capsule, Rfl: 3  •  Multiple Vitamins-Minerals (MULTIVITAMIN PO), Take 1 tablet by mouth daily., Disp: , Rfl:   •  multivitamin with minerals (HAIR/SKIN/NAILS PO), Take 2 tablets by mouth Daily. Hair skin and nails, Disp: , Rfl:   •  nateglinide (Starlix) 120 MG tablet, 1 tab po before lunch and supper, Disp: 180 tablet, Rfl: 3  •  NON FORMULARY, Take 1 tablet by mouth Daily. Eye Health and Lutein, Disp: , Rfl:   •  omeprazole (priLOSEC) 40 MG capsule, TAKE 1 CAPSULE DAILY, Disp: 90 capsule, Rfl: 3  •  rosuvastatin (CRESTOR) 20 MG tablet, TAKE 1 TABLET DAILY.       REPLACE SIMVASTATIN, Disp: 90 tablet, Rfl: 3  •  Semaglutide, 1 MG/DOSE, (Ozempic, 1 MG/DOSE,) 2 MG/1.5ML solution pen-injector, Inject 1 mg under the skin into the appropriate area as directed 1 (One) Time Per Week., Disp: 6 pen, Rfl: 3  •  TiZANidine (ZANAFLEX) 4 MG capsule, TAKE 1 CAPSULE 2 TIMES     DAILY AS NEEDED FOR MUSCLE SPASM (REPLACES SKELAXIN), Disp: 180 capsule, Rfl: 3    Past Surgical History:   Procedure Laterality Date   • BACK SURGERY      Laminectomy   • BUNIONECTOMY     • CARPAL TUNNEL RELEASE Bilateral    • CERVICAL DISC SURGERY      Laminectomy 2011. Fusion 2013.   • CHOLECYSTECTOMY     • COLONOSCOPY      Diverticulosis found in the sigmoid colon.A single diminutive polyp found in the colon.Mul.biopsies taken.Hemorrhoids found in the anus.   • ENDOSCOPY      Normal hypopharynx and esophagus.Marked irregularity of the  "Z-line,compatable with chronic reflux.Mul.biopsies.A hiatus hernia found in GE junction.Mild nonerosive gastritis.Mul.biopsies.Polyps in fundus.Mul.biopsies.Normal pylorus.Normal duodenum.   • ENDOSCOPY AND COLONOSCOPY     • FINGER/THUMB LESION/CYST EXCISION     • HEEL SPUR EXCISION     • HYSTERECTOMY     • KNEE ARTHROSCOPY Bilateral     Arthroscopy of the left knee with debridement of medial meniscus.   • KNEE SURGERY      Right unicompartmental arthroplasty.   • TOTAL KNEE ARTHROPLASTY Left 2018    Procedure: TOTAL KNEE ARTHROPLASTY ATTUNE with adductor canal block ;  Surgeon: Baron Reynolds MD;  Location: [facility];  Service: Orthopedics       Family History   Problem Relation Age of Onset   • Cancer Other         lung   • Diabetes Other    • Hypertension Other    • Stroke Other    • Breast cancer Sister         Social History     Socioeconomic History   • Marital status:      Spouse name: Not on file   • Number of children: Not on file   • Years of education: Not on file   • Highest education level: Not on file   Tobacco Use   • Smoking status: Former Smoker     Packs/day: 1.00     Years: 10.00     Pack years: 10.00     Types: Cigarettes     Quit date: 1985     Years since quittin.3   • Smokeless tobacco: Never Used   Substance and Sexual Activity   • Alcohol use: No   • Drug use: No   • Sexual activity: Defer        Review of Systems   HENT: Positive for postnasal drip.    Respiratory: Positive for cough.    Musculoskeletal: Positive for joint swelling.   Psychiatric/Behavioral: The patient is nervous/anxious.    All other systems reviewed and are negative.      PHYSICAL EXAMINATION:       Pulse 68   Ht 162.6 cm (64\")   Wt 75.8 kg (167 lb)   SpO2 97%   BMI 28.67 kg/m²     Physical Exam  Constitutional:       General: She is not in acute distress.     Appearance: Normal appearance.   Pulmonary:      Effort: Pulmonary effort is normal. No respiratory distress.   Neurological:      " Mental Status: She is alert and oriented to person, place, and time.           Right Hand Exam     Comments:  Tender palpable nodule on the volar aspect of the MCP joint of the thumb.  No active triggering but she is unable to fully flex her finger.  Good capillary refill.  The ring finger has a palpable tender nodule on the volar aspect of the MCP joint and does have active triggering with flexion.  Tender on exam.  No other acute findings.              Ordering Provider:  Baron Reynolds MD  Ordering Diagnosis/Indication:  Bilateral hand pain     Procedure:  XR HAND 3+ VW BILATERAL  Exam Date:  2/24/21     COMPARISON:  Not applicable, no relevant images available.     IMPRESSION: 3 views of both hands show no acute bony abnormality.  No fracture or dislocation is noted.  There is acceptable position alignment of both hands.  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        Baron Reynolds MD  2/24/21        ASSESSMENT:    Diagnoses and all orders for this visit:    Trigger finger, right ring finger    Pain of right thumb    Essential hypertension    Type 2 diabetes mellitus without complication, without long-term current use of insulin (HCC)    Trigger finger of right thumb          PLAN    She has pain with activity and has trouble with  using her right hand.  She has active triggering of the right ring finger and has a nodule that prevents flexion of the right thumb.  She is unhappy with her quality of life and wishes to discuss definitive treatment options.    Plan trigger release of the right thumb and right ring fingers in the office.    The patient voiced understanding of the risks, benefits, and alternative forms of treatment that were discussed and the patient consents to proceed with surgery.  All risks, benefits and alternatives were discussed.  Risks include, but not  exclusive to anesthetic complications, including death, MI, CVA, infection, bleeding DVT, fracture, residual pain and need for future surgery.    This discussion was held with the patient by Baron Reynolds MD and all questions were answered.         Return for in office procedure.    Baron Reynolds MD

## 2021-10-05 ENCOUNTER — TELEPHONE (OUTPATIENT)
Dept: ORTHOPEDIC SURGERY | Facility: CLINIC | Age: 71
End: 2021-10-05

## 2021-10-05 NOTE — TELEPHONE ENCOUNTER
PT CALLED ABOUT THE SCHEDULING OF HER IN OFFICE PROCEDURE FOR TRIGGER FINGER RELEASE. SHE WAS TOLD TO CALL TODAY IF SHE HAD NOT HEARD FROM ANYONE. HER CALL BACK NUMBER [phone]

## 2021-10-06 RX ORDER — LANCETS 33 GAUGE
EACH MISCELLANEOUS
Qty: 100 EACH | Refills: 3 | Status: SHIPPED | OUTPATIENT
Start: 2021-10-06 | End: 2022-08-17 | Stop reason: SDUPTHER

## 2021-10-20 RX ORDER — LISINOPRIL 2.5 MG/1
TABLET ORAL
Qty: 180 TABLET | Refills: 0 | Status: SHIPPED | OUTPATIENT
Start: 2021-10-20 | End: 2022-02-09 | Stop reason: SDUPTHER

## 2021-10-20 NOTE — PROGRESS NOTES
"Otilia Kenny is a 71 y.o. female returns for     Chief Complaint   Patient presents with   • Right Hand - Follow-up       HISTORY OF PRESENT ILLNESS:  Patient in for Right Trigger Thumb and Right Ring Finger Release.      CONCURRENT MEDICAL HISTORY:    The following portions of the patient's history were reviewed and updated as appropriate: allergies, current medications, past family history, past medical history, past social history, past surgical history and problem list.     ROS  No fevers or chills.  No chest pain or shortness of air.  No GI or  disturbances.    PHYSICAL EXAMINATION:       Ht 162.6 cm (64\")   Wt 75.3 kg (166 lb)   BMI 28.49 kg/m²               ASSESSMENT:    Diagnoses and all orders for this visit:    Pain of right thumb    Trigger finger, right ring finger    Trigger finger of right thumb          PLAN    PROCEDURE:   After Appropriate risk and benefit discussion with the patient, the right thumb and right ring finger was marked and the right hand was prepped and draped. The tourniquet was applied to the upper arm.  Local anesthetic 1% Lidocaine  was injected in the area of the a-1 pulley.  After compression of the hand and elevation,  the tourniquet was inflated to 250 mmHg.  The incision was made in the flexor skin crease of the right thumb and then right ring finger. The  A1 pulley was identified and released in each wound.  The patient had active flexion and extension without triggering.  The wounds were closed with interrupted 4-0 nylon suture.  Sterile dressing was applied. Tourniquet was released and circulation returned immediately to the hand. The patient was discharged to follow-up in 10 days for suture removal.    Return in about 10 days (around 10/31/2021) for recheck.    Baron Reynolds MD    "

## 2021-10-21 ENCOUNTER — PROCEDURE VISIT (OUTPATIENT)
Dept: ORTHOPEDIC SURGERY | Facility: CLINIC | Age: 71
End: 2021-10-21

## 2021-10-21 VITALS — HEIGHT: 64 IN | WEIGHT: 166 LBS | BODY MASS INDEX: 28.34 KG/M2

## 2021-10-21 DIAGNOSIS — M79.644 PAIN OF RIGHT THUMB: Primary | ICD-10-CM

## 2021-10-21 DIAGNOSIS — M65.311 TRIGGER FINGER OF RIGHT THUMB: ICD-10-CM

## 2021-10-21 DIAGNOSIS — M65.341 TRIGGER FINGER, RIGHT RING FINGER: ICD-10-CM

## 2021-10-21 PROCEDURE — 26055 INCISE FINGER TENDON SHEATH: CPT | Performed by: ORTHOPAEDIC SURGERY

## 2021-10-25 ENCOUNTER — OFFICE VISIT (OUTPATIENT)
Dept: FAMILY MEDICINE CLINIC | Facility: CLINIC | Age: 71
End: 2021-10-25

## 2021-10-25 VITALS — BODY MASS INDEX: 28.34 KG/M2 | HEIGHT: 64 IN | WEIGHT: 166 LBS

## 2021-10-25 DIAGNOSIS — Z00.00 MEDICARE ANNUAL WELLNESS VISIT, SUBSEQUENT: Primary | ICD-10-CM

## 2021-10-25 DIAGNOSIS — Z23 NEED FOR IMMUNIZATION AGAINST INFLUENZA: ICD-10-CM

## 2021-10-25 DIAGNOSIS — Z23 NEED FOR COVID-19 VACCINE: ICD-10-CM

## 2021-10-25 PROCEDURE — 1159F MED LIST DOCD IN RCRD: CPT | Performed by: INTERNAL MEDICINE

## 2021-10-25 PROCEDURE — 1126F AMNT PAIN NOTED NONE PRSNT: CPT | Performed by: INTERNAL MEDICINE

## 2021-10-25 PROCEDURE — G0439 PPPS, SUBSEQ VISIT: HCPCS | Performed by: INTERNAL MEDICINE

## 2021-10-25 NOTE — PROGRESS NOTES
You have chosen to receive care through a telephone visit. Do you consent to use a telephone visit for your medical care today? Yes    The ABCs of the Annual Wellness Visit  Subsequent Medicare Wellness Visit    Chief Complaint   Patient presents with   • Medicare Wellness-subsequent      Subjective    History of Present Illness:  Otilia Kenny is a 71 y.o. female who presents for a Subsequent Medicare Wellness Visit.    The following portions of the patient's history were reviewed and   updated as appropriate:   She  has a past medical history of Abdominal pain, Abnormal liver enzymes, Allergic rhinitis, Allergic rhinitis, Anemia, Anesthesia complication, Arthritis, Cervical disc disorder, Depression, Diarrhea, unspecified, Diverticular disease of colon, Dysfunction of eustachian tube, Essential hypertension, Gastritis, Generalized anxiety disorder, GERD (gastroesophageal reflux disease), Hyperlipidemia, Hyperlipidemia associated with type 2 diabetes mellitus (HCC) (11/25/2020), Hypertriglyceridemia, Iron deficiency anemia, Irritable bowel syndrome, Meniere's disease of both ears (6/12/2018), Osteoarthritis of knee, Sleep disorder, Spasm of back muscles, Type 2 diabetes mellitus with microalbuminuria, without long-term current use of insulin (HCC) (5/7/2018), and Vitamin B12 deficiency (dietary) anemia.  She does not have any pertinent problems on file.  She  has a past surgical history that includes Back surgery; Cervical disc surgery; endoscopy and colonoscopy; Colonoscopy; Esophagogastroduodenoscopy; Knee arthroscopy (Bilateral); Knee surgery; Hysterectomy; Cholecystectomy; Carpal tunnel release (Bilateral); Excision heel spur excision; Bunionectomy; Finger/Thumb Lesion/Cyst Excision; and Total knee arthroplasty (Left, 6/4/2018).  Her family history includes Breast cancer in her sister; Cancer in an other family member; Diabetes in an other family member; Hypertension in an other family member; Stroke in an  other family member.  She  reports that she quit smoking about 36 years ago. Her smoking use included cigarettes. She has a 10.00 pack-year smoking history. She has never used smokeless tobacco. She reports that she does not drink alcohol and does not use drugs.  Current Outpatient Medications   Medication Sig Dispense Refill   • aspirin 81 MG EC tablet Take 81 mg by mouth Daily.     • Blood Glucose Monitoring Suppl (ONETOUCH VERIO IQ SYSTEM) w/Device kit 1 each Daily. 1 kit 0   • CALCIUM CARB-CHOLECALCIFEROL PO Take 1 tablet by mouth 2 (two) times a day.     • cetirizine (zyrTEC) 10 MG tablet Take 10 mg by mouth Daily.     • citalopram (CeleXA) 20 MG tablet Take 1 tablet by mouth Daily. 90 tablet 3   • ezetimibe (Zetia) 10 MG tablet Take 1 tablet by mouth Daily. 90 tablet 3   • famotidine (PEPCID) 20 MG tablet Take 1 tablet by mouth Every Night. 90 tablet 3   • Farxiga 10 MG tablet TAKE 1 TABLET DAILY        (REPLACES INVOKANA) 90 tablet 1   • fenofibrate (TRICOR) 145 MG tablet TAKE 1/2 TO 1 TABLET DAILY 90 tablet 3   • glucose blood (OneTouch Verio) test strip USE TO TEST DAILY AS       DIRECTED 100 each 3   • Insulin Pen Needle 31G X 5 MM misc Use daily with Victoza 90 each 3   • IRON, FERROUS SULFATE, PO Take 65 mg by mouth 2 (Two) Times a Week.     • Lancets (OneTouch Delica Plus [id number]) misc TEST ONCE DAILY AS DIRECTED 100 each 3   • lisinopril (PRINIVIL,ZESTRIL) 2.5 MG tablet TAKE 1 TABLET TWICE A DAY. (LOWER DOSE) 180 tablet 0   • meclizine (ANTIVERT) 25 MG tablet Take 1 tablet by mouth 3 (Three) Times a Day As Needed for dizziness. 30 tablet 1   • minocycline (MINOCIN,DYNACIN) 100 MG capsule Take 1 capsule by mouth Daily. 90 capsule 3   • Multiple Vitamins-Minerals (MULTIVITAMIN PO) Take 1 tablet by mouth daily.     • multivitamin with minerals (HAIR/SKIN/NAILS PO) Take 2 tablets by mouth Daily. Hair skin and nails     • nateglinide (Starlix) 120 MG tablet 1 tab po before lunch and supper 180 tablet 3    • NON FORMULARY Take 1 tablet by mouth Daily. Eye Health and Lutein     • omeprazole (priLOSEC) 40 MG capsule TAKE 1 CAPSULE DAILY 90 capsule 3   • rosuvastatin (CRESTOR) 20 MG tablet TAKE 1 TABLET DAILY.       REPLACE SIMVASTATIN 90 tablet 3   • Semaglutide, 1 MG/DOSE, (Ozempic, 1 MG/DOSE,) 2 MG/1.5ML solution pen-injector Inject 1 mg under the skin into the appropriate area as directed 1 (One) Time Per Week. 6 pen 3   • TiZANidine (ZANAFLEX) 4 MG capsule TAKE 1 CAPSULE 2 TIMES     DAILY AS NEEDED FOR MUSCLE SPASM (REPLACES SKELAXIN) 180 capsule 3     No current facility-administered medications for this visit.     Current Outpatient Medications on File Prior to Visit   Medication Sig   • aspirin 81 MG EC tablet Take 81 mg by mouth Daily.   • Blood Glucose Monitoring Suppl (ONETOUCH VERIO IQ SYSTEM) w/Device kit 1 each Daily.   • CALCIUM CARB-CHOLECALCIFEROL PO Take 1 tablet by mouth 2 (two) times a day.   • cetirizine (zyrTEC) 10 MG tablet Take 10 mg by mouth Daily.   • citalopram (CeleXA) 20 MG tablet Take 1 tablet by mouth Daily.   • ezetimibe (Zetia) 10 MG tablet Take 1 tablet by mouth Daily.   • famotidine (PEPCID) 20 MG tablet Take 1 tablet by mouth Every Night.   • Farxiga 10 MG tablet TAKE 1 TABLET DAILY        (REPLACES INVOKANA)   • fenofibrate (TRICOR) 145 MG tablet TAKE 1/2 TO 1 TABLET DAILY   • glucose blood (OneTouch Verio) test strip USE TO TEST DAILY AS       DIRECTED   • Insulin Pen Needle 31G X 5 MM misc Use daily with Victoza   • IRON, FERROUS SULFATE, PO Take 65 mg by mouth 2 (Two) Times a Week.   • Lancets (OneTouch Delica Plus [id number]) misc TEST ONCE DAILY AS DIRECTED   • lisinopril (PRINIVIL,ZESTRIL) 2.5 MG tablet TAKE 1 TABLET TWICE A DAY. (LOWER DOSE)   • meclizine (ANTIVERT) 25 MG tablet Take 1 tablet by mouth 3 (Three) Times a Day As Needed for dizziness.   • minocycline (MINOCIN,DYNACIN) 100 MG capsule Take 1 capsule by mouth Daily.   • Multiple Vitamins-Minerals (MULTIVITAMIN PO)  Take 1 tablet by mouth daily.   • multivitamin with minerals (HAIR/SKIN/NAILS PO) Take 2 tablets by mouth Daily. Hair skin and nails   • nateglinide (Starlix) 120 MG tablet 1 tab po before lunch and supper   • NON FORMULARY Take 1 tablet by mouth Daily. Eye Health and Lutein   • omeprazole (priLOSEC) 40 MG capsule TAKE 1 CAPSULE DAILY   • rosuvastatin (CRESTOR) 20 MG tablet TAKE 1 TABLET DAILY.       REPLACE SIMVASTATIN   • Semaglutide, 1 MG/DOSE, (Ozempic, 1 MG/DOSE,) 2 MG/1.5ML solution pen-injector Inject 1 mg under the skin into the appropriate area as directed 1 (One) Time Per Week.   • TiZANidine (ZANAFLEX) 4 MG capsule TAKE 1 CAPSULE 2 TIMES     DAILY AS NEEDED FOR MUSCLE SPASM (REPLACES SKELAXIN)     No current facility-administered medications on file prior to visit.     She is allergic to allegra [fexofenadine], amaryl [glimepiride], dyazide [triamterene-hctz], hydrochlorothiazide w-triamterene, lipitor [atorvastatin], metformin and related, naproxen, actos [pioglitazone], and sulfamethoxazole-trimethoprim..    Compared to one year ago, the patient feels her physical   health is the same.    Compared to one year ago, the patient feels her mental   health is the same.    Recent Hospitalizations:  She was not admitted to the hospital during the last year.       Current Medical Providers:  Patient Care Team:  Alexis Zabala MD as PCP - General  [facility], Baron Klein MD as Surgeon (Orthopedic Surgery)    Outpatient Medications Prior to Visit   Medication Sig Dispense Refill   • aspirin 81 MG EC tablet Take 81 mg by mouth Daily.     • Blood Glucose Monitoring Suppl (ONETOUCH VERIO IQ SYSTEM) w/Device kit 1 each Daily. 1 kit 0   • CALCIUM CARB-CHOLECALCIFEROL PO Take 1 tablet by mouth 2 (two) times a day.     • cetirizine (zyrTEC) 10 MG tablet Take 10 mg by mouth Daily.     • citalopram (CeleXA) 20 MG tablet Take 1 tablet by mouth Daily. 90 tablet 3   • ezetimibe (Zetia) 10 MG tablet Take 1 tablet by mouth  Daily. 90 tablet 3   • famotidine (PEPCID) 20 MG tablet Take 1 tablet by mouth Every Night. 90 tablet 3   • Farxiga 10 MG tablet TAKE 1 TABLET DAILY        (REPLACES INVOKANA) 90 tablet 1   • fenofibrate (TRICOR) 145 MG tablet TAKE 1/2 TO 1 TABLET DAILY 90 tablet 3   • glucose blood (OneTouch Verio) test strip USE TO TEST DAILY AS       DIRECTED 100 each 3   • Insulin Pen Needle 31G X 5 MM misc Use daily with Victoza 90 each 3   • IRON, FERROUS SULFATE, PO Take 65 mg by mouth 2 (Two) Times a Week.     • Lancets (OneTouch Delica Plus [id number]) misc TEST ONCE DAILY AS DIRECTED 100 each 3   • lisinopril (PRINIVIL,ZESTRIL) 2.5 MG tablet TAKE 1 TABLET TWICE A DAY. (LOWER DOSE) 180 tablet 0   • meclizine (ANTIVERT) 25 MG tablet Take 1 tablet by mouth 3 (Three) Times a Day As Needed for dizziness. 30 tablet 1   • minocycline (MINOCIN,DYNACIN) 100 MG capsule Take 1 capsule by mouth Daily. 90 capsule 3   • Multiple Vitamins-Minerals (MULTIVITAMIN PO) Take 1 tablet by mouth daily.     • multivitamin with minerals (HAIR/SKIN/NAILS PO) Take 2 tablets by mouth Daily. Hair skin and nails     • nateglinide (Starlix) 120 MG tablet 1 tab po before lunch and supper 180 tablet 3   • NON FORMULARY Take 1 tablet by mouth Daily. Eye Health and Lutein     • omeprazole (priLOSEC) 40 MG capsule TAKE 1 CAPSULE DAILY 90 capsule 3   • rosuvastatin (CRESTOR) 20 MG tablet TAKE 1 TABLET DAILY.       REPLACE SIMVASTATIN 90 tablet 3   • Semaglutide, 1 MG/DOSE, (Ozempic, 1 MG/DOSE,) 2 MG/1.5ML solution pen-injector Inject 1 mg under the skin into the appropriate area as directed 1 (One) Time Per Week. 6 pen 3   • TiZANidine (ZANAFLEX) 4 MG capsule TAKE 1 CAPSULE 2 TIMES     DAILY AS NEEDED FOR MUSCLE SPASM (REPLACES SKELAXIN) 180 capsule 3     No facility-administered medications prior to visit.       No opioid medication identified on active medication list. I have reviewed chart for other potential  high risk medication/s and harmful drug  "interactions in the elderly.          Aspirin is on active medication list. Aspirin use is indicated based on review of current medical condition/s. Pros and cons of this therapy have been discussed today. Benefits of this medication outweigh potential harm.  Patient has been encouraged to continue taking this medication.  .      Patient Active Problem List   Diagnosis   • Vitamin B12 deficiency (dietary) anemia   • Spasm of back muscles   • Sleep disorder   • Osteoarthritis of knee   • Irritable bowel syndrome   • Iron deficiency anemia   • Hypertriglyceridemia   • GERD (gastroesophageal reflux disease)   • Generalized anxiety disorder   • Gastritis   • Essential hypertension   • Dysfunction of eustachian tube   • Diverticular disease of colon   • Cervical disc disorder   • Diarrhea, unspecified   • Anemia   • Allergic rhinitis   • Abnormal liver enzymes   • Abdominal pain   • Major depression in complete remission (HCC)   • Chronic pain of both knees   • Internal derangement of left knee   • Primary osteoarthritis of left knee   • Chronic pain of left knee   • Type 2 diabetes mellitus with microalbuminuria, without long-term current use of insulin (HCC)   • Meniere's disease of both ears   • Vertigo   • Vitamin D deficiency   • Primary osteoarthritis of right wrist   • Right wrist pain   • Radial styloid tenosynovitis of right hand   • Trigger finger, left middle finger   • Hyperlipidemia associated with type 2 diabetes mellitus (HCC)   • Bilateral hand pain   • Cervical pseudoarthrosis (HCC)   • Neck pain   • S/P cervical spinal fusion   • Pain of right thumb     Advance Care Planning  Advance Directive is on file.  ACP discussion was held with the patient during this visit. Patient has an advance directive in EMR which is still valid.           Objective    Vitals:    10/25/21 1135   Weight: 75.3 kg (166 lb)   Height: 162.6 cm (64\")   PainSc:   3   PainLoc: Hand  Comment: back/neck     No blood pressure obtained " today with this telephone visit due to coronavirus pandemic.    BMI Readings from Last 1 Encounters:   10/25/21 28.49 kg/m²   BMI is above normal parameters. Recommendations include: exercise counseling and nutrition counseling    Does the patient have evidence of cognitive impairment? No    Physical Exam            HEALTH RISK ASSESSMENT    Smoking Status:  Social History     Tobacco Use   Smoking Status Former Smoker   • Packs/day: 1.00   • Years: 10.00   • Pack years: 10.00   • Types: Cigarettes   • Quit date: 1985   • Years since quittin.4   Smokeless Tobacco Never Used     Alcohol Consumption:  Social History     Substance and Sexual Activity   Alcohol Use No     Fall Risk Screen:    NANCYADI Fall Risk Assessment was completed, and patient is at HIGH risk for falls. Assessment completed on:10/25/2021    Depression Screening:  PHQ-2/PHQ-9 Depression Screening 10/25/2021   Little interest or pleasure in doing things 0   Feeling down, depressed, or hopeless 0   Trouble falling or staying asleep, or sleeping too much -   Feeling tired or having little energy -   Poor appetite or overeating -   Feeling bad about yourself - or that you are a failure or have let yourself or your family down -   Trouble concentrating on things, such as reading the newspaper or watching television -   Moving or speaking so slowly that other people could have noticed. Or the opposite - being so fidgety or restless that you have been moving around a lot more than usual -   Thoughts that you would be better off dead, or of hurting yourself in some way -   Total Score 0   If you checked off any problems, how difficult have these problems made it for you to do your work, take care of things at home, or get along with other people? -       Health Habits and Functional and Cognitive Screening:  Functional & Cognitive Status 10/25/2021   Do you have difficulty preparing food and eating? No   Do you have difficulty bathing yourself,  getting dressed or grooming yourself? No   Do you have difficulty using the toilet? No   Do you have difficulty moving around from place to place? No   Do you have trouble with steps or getting out of a bed or a chair? No   Current Diet Limited Junk Food   Dental Exam Up to date   Eye Exam Up to date   Exercise (times per week) 5 times per week   Current Exercises Include Yard Work;House Cleaning   Current Exercise Activities Include -   Do you need help using the phone?  No   Are you deaf or do you have serious difficulty hearing?  No   Do you need help with transportation? No   Do you need help shopping? No   Do you need help preparing meals?  No   Do you need help with housework?  No   Do you need help with laundry? No   Do you need help taking your medications? No   Do you need help managing money? No   Do you ever drive or ride in a car without wearing a seat belt? No   Have you felt unusual stress, anger or loneliness in the last month? No   Who do you live with? Spouse   If you need help, do you have trouble finding someone available to you? No   Have you been bothered in the last four weeks by sexual problems? No   Do you have difficulty concentrating, remembering or making decisions? Yes       Age-appropriate Screening Schedule:  Refer to the list below for future screening recommendations based on patient's age, sex and/or medical conditions. Orders for these recommended tests are listed in the plan section. The patient has been provided with a written plan.    Health Maintenance   Topic Date Due   • DIABETIC FOOT EXAM  Never done   • DXA SCAN  05/16/2019   • DIABETIC EYE EXAM  03/27/2020   • MAMMOGRAM  12/18/2020   • INFLUENZA VACCINE  08/01/2021   • URINE MICROALBUMIN  11/19/2021   • HEMOGLOBIN A1C  11/24/2021   • LIPID PANEL  05/24/2022   • TDAP/TD VACCINES (2 - Td or Tdap) 09/24/2030   • ZOSTER VACCINE  Completed              Assessment/Plan   CMS Preventative Services Quick Reference  Risk Factors  Identified During Encounter  Chronic Pain   Immunizations Discussed/Encouraged (specific Immunizations; Influenza and COVID19  Obesity/Overweight   The above risks/problems have been discussed with the patient.  She agrees to COVID booster and influenza vaccination ASAP.  She is already scheduled for her next mammogram and DEXA.  Follow up actions/plans if indicated are seen below in the Assessment/Plan Section.  Pertinent information has been shared with the patient in the After Visit Summary.    Diagnoses and all orders for this visit:    1. Medicare annual wellness visit, subsequent (Primary)    2. Need for COVID-19 vaccine    3. Need for immunization against influenza        Follow Up:   Return in about 1 year (around 10/25/2022) for Medicare Wellness.     An After Visit Summary and PPPS were made available to the patient.

## 2021-10-25 NOTE — PATIENT INSTRUCTIONS
Medicare Wellness  Personal Prevention Plan of Service     Date of Office Visit:  10/25/2021  Encounter Provider:  Alexis Zabala MD  Place of Service:  [facility] - [place]  Patient Name: Otilia Kenny  :  1950    As part of the Medicare Wellness portion of your visit today, we are providing you with this personalized preventive plan of services (PPPS). This plan is based upon recommendations of the [place] Preventive Services Task Force (USPSTF) and the Advisory Committee on Immunization Practices (ACIP).    This lists the preventive care services that should be considered, and provides dates of when you are due. Items listed as completed are up-to-date and do not require any further intervention.    Health Maintenance   Topic Date Due   • DIABETIC FOOT EXAM  Never done   • DXA SCAN  2019   • DIABETIC EYE EXAM  2020   • MAMMOGRAM  2020   • INFLUENZA VACCINE  2021   • COVID-19 Vaccine (3 - Pfizer booster) 2021   • ANNUAL WELLNESS VISIT  2021   • URINE MICROALBUMIN  2021   • HEMOGLOBIN A1C  2021   • LIPID PANEL  2022   • COLORECTAL CANCER SCREENING  2026   • TDAP/TD VACCINES (2 - Td or Tdap) 2030   • HEPATITIS C SCREENING  Completed   • Pneumococcal Vaccine 65+  Completed   • ZOSTER VACCINE  Completed       No orders of the defined types were placed in this encounter.      Return in about 1 year (around 10/25/2022) for Medicare Wellness.          Exercising to Lose Weight  Exercise is structured, repetitive physical activity to improve fitness and health. Getting regular exercise is important for everyone. It is especially important if you are overweight. Being overweight increases your risk of heart disease, stroke, diabetes, high blood pressure, and several types of cancer. Reducing your calorie intake and exercising can help you lose weight.  Exercise is usually categorized as moderate or  vigorous intensity. To lose weight, most people need to do a certain amount of moderate-intensity or vigorous-intensity exercise each week.  Moderate-intensity exercise    Moderate-intensity exercise is any activity that gets you moving enough to burn at least three times more energy (calories) than if you were sitting.  Examples of moderate exercise include:  · Walking a mile in 15 minutes.  · Doing light yard work.  · Biking at an easy pace.  Most people should get at least 150 minutes (2 hours and 30 minutes) a week of moderate-intensity exercise to maintain their body weight.  Vigorous-intensity exercise  Vigorous-intensity exercise is any activity that gets you moving enough to burn at least six times more calories than if you were sitting. When you exercise at this intensity, you should be working hard enough that you are not able to carry on a conversation.  Examples of vigorous exercise include:  · Running.  · Playing a team sport, such as football, basketball, and soccer.  · Jumping rope.  Most people should get at least 75 minutes (1 hour and 15 minutes) a week of vigorous-intensity exercise to maintain their body weight.  How can exercise affect me?  When you exercise enough to burn more calories than you eat, you lose weight. Exercise also reduces body fat and builds muscle. The more muscle you have, the more calories you burn. Exercise also:  · Improves mood.  · Reduces stress and tension.  · Improves your overall fitness, flexibility, and endurance.  · Increases bone strength.  The amount of exercise you need to lose weight depends on:  · Your age.  · The type of exercise.  · Any health conditions you have.  · Your overall physical ability.  Talk to your health care provider about how much exercise you need and what types of activities are safe for you.  What actions can I take to lose weight?  Nutrition    · Make changes to your diet as told by your health care provider or diet and nutrition  specialist (dietitian). This may include:  ? Eating fewer calories.  ? Eating more protein.  ? Eating less unhealthy fats.  ? Eating a diet that includes fresh fruits and vegetables, whole grains, low-fat dairy products, and lean protein.  ? Avoiding foods with added fat, salt, and sugar.  · Drink plenty of water while you exercise to prevent dehydration or heat stroke.    Activity  · Choose an activity that you enjoy and set realistic goals. Your health care provider can help you make an exercise plan that works for you.  · Exercise at a moderate or vigorous intensity most days of the week.  ? The intensity of exercise may vary from person to person. You can tell how intense a workout is for you by paying attention to your breathing and heartbeat. Most people will notice their breathing and heartbeat get faster with more intense exercise.  · Do resistance training twice each week, such as:  ? Push-ups.  ? Sit-ups.  ? Lifting weights.  ? Using resistance bands.  · Getting short amounts of exercise can be just as helpful as long structured periods of exercise. If you have trouble finding time to exercise, try to include exercise in your daily routine.  ? Get up, stretch, and walk around every 30 minutes throughout the day.  ? Go for a walk during your lunch break.  ? Park your car farther away from your destination.  ? If you take public transportation, get off one stop early and walk the rest of the way.  ? Make phone calls while standing up and walking around.  ? Take the stairs instead of elevators or escalators.  · Wear comfortable clothes and shoes with good support.  · Do not exercise so much that you hurt yourself, feel dizzy, or get very short of breath.  Where to find more information  · [place] Department of Health and Human Services: www.hhs.gov  · Centers for Disease Control and Prevention (CDC): www.cdc.gov  Contact a health care provider:  · Before starting a new exercise program.  · If you have questions  or concerns about your weight.  · If you have a medical problem that keeps you from exercising.  Get help right away if you have any of the following while exercising:  · Injury.  · Dizziness.  · Difficulty breathing or shortness of breath that does not go away when you stop exercising.  · Chest pain.  · Rapid heartbeat.  Summary  · Being overweight increases your risk of heart disease, stroke, diabetes, high blood pressure, and several types of cancer.  · Losing weight happens when you burn more calories than you eat.  · Reducing the amount of calories you eat in addition to getting regular moderate or vigorous exercise each week helps you lose weight.  This information is not intended to replace advice given to you by your health care provider. Make sure you discuss any questions you have with your health care provider.  Document Revised: 04/15/2021 Document Reviewed: 04/15/2021  [employer] Patient Education © 2021 [employer]      Calorie Counting for Weight Loss  Calories are units of energy. Your body needs a certain number of calories from food to keep going throughout the day. When you eat or drink more calories than your body needs, your body stores the extra calories mostly as fat. When you eat or drink fewer calories than your body needs, your body burns fat to get the energy it needs.  Calorie counting means keeping track of how many calories you eat and drink each day. Calorie counting can be helpful if you need to lose weight. If you eat fewer calories than your body needs, you should lose weight. Ask your health care provider what a healthy weight is for you.  For calorie counting to work, you will need to eat the right number of calories each day to lose a healthy amount of weight per week. A dietitian can help you figure out how many calories you need in a day and will suggest ways to reach your calorie goal.  · A healthy amount of weight to lose each week is usually 1-2 lb (0.5-0.9 kg). This usually  means that your daily calorie intake should be reduced by 500-750 calories.  · Eating 1,200-1,500 calories a day can help most women lose weight.  · Eating 1,500-1,800 calories a day can help most men lose weight.  What do I need to know about calorie counting?  Work with your health care provider or dietitian to determine how many calories you should get each day. To meet your daily calorie goal, you will need to:  · Find out how many calories are in each food that you would like to eat. Try to do this before you eat.  · Decide how much of the food you plan to eat.  · Keep a food log. Do this by writing down what you ate and how many calories it had.  To successfully lose weight, it is important to balance calorie counting with a healthy lifestyle that includes regular activity.  Where do I find calorie information?    The number of calories in a food can be found on a Nutrition Facts label. If a food does not have a Nutrition Facts label, try to look up the calories online or ask your dietitian for help.  Remember that calories are listed per serving. If you choose to have more than one serving of a food, you will have to multiply the calories per serving by the number of servings you plan to eat. For example, the label on a package of bread might say that a serving size is 1 slice and that there are 90 calories in a serving. If you eat 1 slice, you will have eaten 90 calories. If you eat 2 slices, you will have eaten 180 calories.  How do I keep a food log?  After each time that you eat, record the following in your food log as soon as possible:  · What you ate. Be sure to include toppings, sauces, and other extras on the food.  · How much you ate. This can be measured in cups, ounces, or number of items.  · How many calories were in each food and drink.  · The total number of calories in the food you ate.  Keep your food log near you, such as in a pocket-sized notebook or on an katerin or website on your mobile  phone. Some programs will calculate calories for you and show you how many calories you have left to meet your daily goal.  What are some portion-control tips?  · Know how many calories are in a serving. This will help you know how many servings you can have of a certain food.  · Use a measuring cup to measure serving sizes. You could also try weighing out portions on a kitchen scale. With time, you will be able to estimate serving sizes for some foods.  · Take time to put servings of different foods on your favorite plates or in your favorite bowls and cups so you know what a serving looks like.  · Try not to eat straight from a food's packaging, such as from a bag or box. Eating straight from the package makes it hard to see how much you are eating and can lead to overeating. Put the amount you would like to eat in a cup or on a plate to make sure you are eating the right portion.  · Use smaller plates, glasses, and bowls for smaller portions and to prevent overeating.  · Try not to multitask. For example, avoid watching TV or using your computer while eating. If it is time to eat, sit down at a table and enjoy your food. This will help you recognize when you are full. It will also help you be more mindful of what and how much you are eating.  What are tips for following this plan?  Reading food labels  · Check the calorie count compared with the serving size. The serving size may be smaller than what you are used to eating.  · Check the source of the calories. Try to choose foods that are high in protein, fiber, and vitamins, and low in saturated fat, trans fat, and sodium.  Shopping  · Read nutrition labels while you shop. This will help you make healthy decisions about which foods to buy.  · Pay attention to nutrition labels for low-fat or fat-free foods. These foods sometimes have the same number of calories or more calories than the full-fat versions. They also often have added sugar, starch, or salt to make  up for flavor that was removed with the fat.  · Make a grocery list of lower-calorie foods and stick to it.  Cooking  · Try to cook your favorite foods in a healthier way. For example, try baking instead of frying.  · Use low-fat dairy products.  Meal planning  · Use more fruits and vegetables. One-half of your plate should be fruits and vegetables.  · Include lean proteins, such as chicken, turkey, and fish.  Lifestyle  Each week, aim to do one of the following:  · 150 minutes of moderate exercise, such as walking.  · 75 minutes of vigorous exercise, such as running.  General information  · Know how many calories are in the foods you eat most often. This will help you calculate calorie counts faster.  · Find a way of tracking calories that works for you. Get creative. Try different apps or programs if writing down calories does not work for you.  What foods should I eat?    · Eat nutritious foods. It is better to have a nutritious, high-calorie food, such as an avocado, than a food with few nutrients, such as a bag of potato chips.  · Use your calories on foods and drinks that will fill you up and will not leave you hungry soon after eating.  ? Examples of foods that fill you up are nuts and nut butters, vegetables, lean proteins, and high-fiber foods such as whole grains. High-fiber foods are foods with more than 5 g of fiber per serving.  · Pay attention to calories in drinks. Low-calorie drinks include water and unsweetened drinks.  The items listed above may not be a complete list of foods and beverages you can eat. Contact a dietitian for more information.  What foods should I limit?  Limit foods or drinks that are not good sources of vitamins, minerals, or protein or that are high in unhealthy fats. These include:  · Candy.  · Other sweets.  · Sodas, specialty coffee drinks, alcohol, and juice.  The items listed above may not be a complete list of foods and beverages you should avoid. Contact a dietitian for  more information.  How do I count calories when eating out?  · Pay attention to portions. Often, portions are much larger when eating out. Try these tips to keep portions smaller:  ? Consider sharing a meal instead of getting your own.  ? If you get your own meal, eat only half of it. Before you start eating, ask for a container and put half of your meal into it.  ? When available, consider ordering smaller portions from the menu instead of full portions.  · Pay attention to your food and drink choices. Knowing the way food is cooked and what is included with the meal can help you eat fewer calories.  ? If calories are listed on the menu, choose the lower-calorie options.  ? Choose dishes that include vegetables, fruits, whole grains, low-fat dairy products, and lean proteins.  ? Choose items that are boiled, broiled, grilled, or steamed. Avoid items that are buttered, battered, fried, or served with cream sauce. Items labeled as crispy are usually fried, unless stated otherwise.  ? Choose water, low-fat milk, unsweetened iced tea, or other drinks without added sugar. If you want an alcoholic beverage, choose a lower-calorie option, such as a glass of wine or light beer.  ? Ask for dressings, sauces, and syrups on the side. These are usually high in calories, so you should limit the amount you eat.  ? If you want a salad, choose a garden salad and ask for grilled meats. Avoid extra toppings such as yu, cheese, or fried items. Ask for the dressing on the side, or ask for olive oil and vinegar or lemon to use as dressing.  · Estimate how many servings of a food you are given. Knowing serving sizes will help you be aware of how much food you are eating at restaurants.  Where to find more information  · Centers for Disease Control and Prevention: www.cdc.gov  · [place] Department of Agriculture: myplate.gov  Summary  · Calorie counting means keeping track of how many calories you eat and drink each day. If you eat fewer  calories than your body needs, you should lose weight.  · A healthy amount of weight to lose per week is usually 1-2 lb (0.5-0.9 kg). This usually means reducing your daily calorie intake by 500-750 calories.  · The number of calories in a food can be found on a Nutrition Facts label. If a food does not have a Nutrition Facts label, try to look up the calories online or ask your dietitian for help.  · Use smaller plates, glasses, and bowls for smaller portions and to prevent overeating.  · Use your calories on foods and drinks that will fill you up and not leave you hungry shortly after a meal.  This information is not intended to replace advice given to you by your health care provider. Make sure you discuss any questions you have with your health care provider.  Document Revised: 01/28/2021 Document Reviewed: 01/28/2021  [employer] Patient Education © 2021 [employer]

## 2021-10-26 ENCOUNTER — TELEPHONE (OUTPATIENT)
Dept: FAMILY MEDICINE CLINIC | Facility: CLINIC | Age: 71
End: 2021-10-26

## 2021-10-26 RX ORDER — ONDANSETRON 4 MG/1
4 TABLET, ORALLY DISINTEGRATING ORAL EVERY 8 HOURS PRN
Qty: 10 TABLET | Refills: 1 | Status: SHIPPED | OUTPATIENT
Start: 2021-10-26 | End: 2022-05-23 | Stop reason: SDUPTHER

## 2021-10-26 RX ORDER — MECLIZINE HYDROCHLORIDE 25 MG/1
25 TABLET ORAL 3 TIMES DAILY PRN
Qty: 30 TABLET | Refills: 1 | Status: SHIPPED | OUTPATIENT
Start: 2021-10-26

## 2021-10-26 NOTE — TELEPHONE ENCOUNTER
Patient called and states having severe attack of Meniere's and request meds for vertigo and nausea. Dr. Zabala agreed to meds. TP

## 2021-11-01 ENCOUNTER — OFFICE VISIT (OUTPATIENT)
Dept: ORTHOPEDIC SURGERY | Facility: CLINIC | Age: 71
End: 2021-11-01

## 2021-11-01 VITALS — HEIGHT: 64 IN | WEIGHT: 166 LBS | BODY MASS INDEX: 28.34 KG/M2

## 2021-11-01 DIAGNOSIS — M65.311 TRIGGER FINGER OF RIGHT THUMB: ICD-10-CM

## 2021-11-01 DIAGNOSIS — Z98.890 S/P TRIGGER FINGER RELEASE: Primary | ICD-10-CM

## 2021-11-01 DIAGNOSIS — M65.341 TRIGGER FINGER, RIGHT RING FINGER: ICD-10-CM

## 2021-11-01 PROCEDURE — 99024 POSTOP FOLLOW-UP VISIT: CPT | Performed by: NURSE PRACTITIONER

## 2021-11-01 NOTE — PROGRESS NOTES
"Otilia Kenny is a 71 y.o. female returns for right ring finger and thumb trigger finger release.       Chief Complaint   Patient presents with   • Right Hand - Follow-up       HISTORY OF PRESENT ILLNESS: This 71-year-old female patient presents today for 2-week follow-up status post ring finger and thumb trigger finger release.  This patient denies numbness or tingling this visit.  Reports pain is much better and she is able to move all fingers.  The patient is not in physical therapy but has been doing home exercises shown to her by Dr. Reynolds according to the patient.  Patient denies pain today.  Patient reports she takes Tylenol arthritis for pain if she has any.     CONCURRENT MEDICAL HISTORY:    The following portions of the patient's history were reviewed and updated as appropriate: allergies, current medications, past family history, past medical history, past social history, past surgical history and problem list.     ROS  No fevers or chills.  No chest pain or shortness of air.  No GI or  disturbances.    PHYSICAL EXAMINATION:       Ht 162.6 cm (64\")   Wt 75.3 kg (166 lb)   BMI 28.49 kg/m²     Physical Exam    GAIT:     []  Normal  []  Antalgic    Assistive device: [x]  None  []  Walker     []  Crutches  []  Cane     []  Wheelchair  []  Stretcher    Right Hand Exam     Tenderness   The patient is experiencing no tenderness.     Other   Erythema: absent  Scars: present  Sensation: normal    Comments:  Patient has scars at the base of her first and fourth digit status post surgical procedure for trigger finger release.  Postsurgical incisions healing well.  There is no erythema, odor, drainage or increased warmth noted to the incision sites.  Full range of motion noted.  Sensation intact.  Cap refill less than 3.              No results found.          ASSESSMENT:    Diagnoses and all orders for this visit:    S/P trigger finger release    Trigger finger, right ring finger    Trigger finger of right " thumb      PLAN:  1.  Sutures removed Steri-Strips applied.  Steri-Strip teaching performed.  Instructed patient to not pull or tug on strips, she should allow them to fall off.  Patient remain in place for 10 to 14 days.  Patient advised not to soak her hands in dishwater, the pool, hot tub and/or any other body of water.  Patient provided additional Steri-Strips in the event they should fall off.  2.  The patient is not attending physical therapy; however, instructed on home exercise activities to perform for improved range of motion, strength and flexibility.  Patient instructed to use an exercise ball to squeeze as well as tendon glide exercises.  3.  Patient is taking Tylenol arthritis for pain, instructed to continue use as needed per manufactures label for pain as needed.  4.  Instructed patient on signs and symptoms of infection and educated to call the office if she notices any changes in the incision sites.  5.  Instructed patient to follow-up in 4 weeks.    All questions and concerns are addressed with understanding noted. They are aware and are in agreement to this plan.    Return in about 4 weeks (around 11/29/2021) for Recheck.    MYRA Abraham    This document has been electronically signed by MYRA Abraham on November 1, 2021 15:31 CDT

## 2021-11-03 RX ORDER — DAPAGLIFLOZIN 10 MG/1
TABLET, FILM COATED ORAL
Qty: 90 TABLET | Refills: 1 | Status: SHIPPED | OUTPATIENT
Start: 2021-11-03 | End: 2022-05-04

## 2021-12-07 ENCOUNTER — LAB (OUTPATIENT)
Dept: LAB | Facility: OTHER | Age: 71
End: 2021-12-07

## 2021-12-07 DIAGNOSIS — E55.9 VITAMIN D DEFICIENCY: Chronic | ICD-10-CM

## 2021-12-07 DIAGNOSIS — E78.1 HYPERTRIGLYCERIDEMIA: Chronic | ICD-10-CM

## 2021-12-07 DIAGNOSIS — E11.69 HYPERLIPIDEMIA ASSOCIATED WITH TYPE 2 DIABETES MELLITUS (HCC): Chronic | ICD-10-CM

## 2021-12-07 DIAGNOSIS — E11.29 TYPE 2 DIABETES MELLITUS WITH MICROALBUMINURIA, WITHOUT LONG-TERM CURRENT USE OF INSULIN (HCC): Chronic | ICD-10-CM

## 2021-12-07 DIAGNOSIS — D50.8 IRON DEFICIENCY ANEMIA SECONDARY TO INADEQUATE DIETARY IRON INTAKE: Chronic | ICD-10-CM

## 2021-12-07 DIAGNOSIS — R80.9 TYPE 2 DIABETES MELLITUS WITH MICROALBUMINURIA, WITHOUT LONG-TERM CURRENT USE OF INSULIN (HCC): Chronic | ICD-10-CM

## 2021-12-07 DIAGNOSIS — K21.9 GASTROESOPHAGEAL REFLUX DISEASE WITHOUT ESOPHAGITIS: Chronic | ICD-10-CM

## 2021-12-07 DIAGNOSIS — E78.5 HYPERLIPIDEMIA ASSOCIATED WITH TYPE 2 DIABETES MELLITUS (HCC): Chronic | ICD-10-CM

## 2021-12-07 DIAGNOSIS — I10 ESSENTIAL HYPERTENSION: Chronic | ICD-10-CM

## 2021-12-07 DIAGNOSIS — D51.8 VITAMIN B12 DEFICIENCY (DIETARY) ANEMIA: Chronic | ICD-10-CM

## 2021-12-07 LAB
25(OH)D3 SERPL-MCNC: 48 NG/ML (ref 30–100)
ALBUMIN SERPL-MCNC: 4.3 G/DL (ref 3.5–5)
ALBUMIN/GLOB SERPL: 1.4 G/DL (ref 1.1–1.8)
ALP SERPL-CCNC: 74 U/L (ref 38–126)
ALT SERPL W P-5'-P-CCNC: 31 U/L
ANION GAP SERPL CALCULATED.3IONS-SCNC: 8 MMOL/L (ref 5–15)
ARTICHOKE IGE QN: 87 MG/DL (ref 0–100)
AST SERPL-CCNC: 46 U/L (ref 14–36)
BASOPHILS # BLD AUTO: 0.06 10*3/MM3 (ref 0–0.2)
BASOPHILS NFR BLD AUTO: 1.1 % (ref 0–1.5)
BILIRUB SERPL-MCNC: 0.6 MG/DL (ref 0.2–1.3)
BUN SERPL-MCNC: 28 MG/DL (ref 7–23)
BUN/CREAT SERPL: 29.8 (ref 7–25)
CALCIUM SPEC-SCNC: 9.4 MG/DL (ref 8.4–10.2)
CHLORIDE SERPL-SCNC: 102 MMOL/L (ref 101–112)
CO2 SERPL-SCNC: 26 MMOL/L (ref 22–30)
CREAT SERPL-MCNC: 0.94 MG/DL (ref 0.52–1.04)
DEPRECATED RDW RBC AUTO: 40.8 FL (ref 37–54)
EOSINOPHIL # BLD AUTO: 0.19 10*3/MM3 (ref 0–0.4)
EOSINOPHIL NFR BLD AUTO: 3.4 % (ref 0.3–6.2)
ERYTHROCYTE [DISTWIDTH] IN BLOOD BY AUTOMATED COUNT: 12.6 % (ref 12.3–15.4)
FERRITIN SERPL-MCNC: 51.4 NG/ML (ref 13–150)
GFR SERPL CREATININE-BSD FRML MDRD: 58 ML/MIN/1.73 (ref 39–90)
GLOBULIN UR ELPH-MCNC: 3 GM/DL (ref 2.3–3.5)
GLUCOSE SERPL-MCNC: 173 MG/DL (ref 70–99)
HBA1C MFR BLD: 7.13 % (ref 4.8–5.6)
HCT VFR BLD AUTO: 38.9 % (ref 34–46.6)
HGB BLD-MCNC: 12.6 G/DL (ref 12–15.9)
IRON 24H UR-MRATE: 80 MCG/DL (ref 37–145)
IRON SATN MFR SERPL: 16 % (ref 20–50)
LYMPHOCYTES # BLD AUTO: 1.6 10*3/MM3 (ref 0.7–3.1)
LYMPHOCYTES NFR BLD AUTO: 28.4 % (ref 19.6–45.3)
MCH RBC QN AUTO: 29.4 PG (ref 26.6–33)
MCHC RBC AUTO-ENTMCNC: 32.4 G/DL (ref 31.5–35.7)
MCV RBC AUTO: 90.9 FL (ref 79–97)
MONOCYTES # BLD AUTO: 0.47 10*3/MM3 (ref 0.1–0.9)
MONOCYTES NFR BLD AUTO: 8.3 % (ref 5–12)
NEUTROPHILS NFR BLD AUTO: 3.31 10*3/MM3 (ref 1.7–7)
NEUTROPHILS NFR BLD AUTO: 58.8 % (ref 42.7–76)
PLATELET # BLD AUTO: 306 10*3/MM3 (ref 140–450)
PMV BLD AUTO: 10 FL (ref 6–12)
POTASSIUM SERPL-SCNC: 4.2 MMOL/L (ref 3.4–5)
PROT SERPL-MCNC: 7.3 G/DL (ref 6.3–8.6)
RBC # BLD AUTO: 4.28 10*6/MM3 (ref 3.77–5.28)
SODIUM SERPL-SCNC: 136 MMOL/L (ref 137–145)
TIBC SERPL-MCNC: 505 MCG/DL (ref 298–536)
TRANSFERRIN SERPL-MCNC: 339 MG/DL (ref 200–360)
TRIGL SERPL-MCNC: 127 MG/DL
VIT B12 BLD-MCNC: 926 PG/ML (ref 211–946)
WBC NRBC COR # BLD: 5.63 10*3/MM3 (ref 3.4–10.8)

## 2021-12-07 PROCEDURE — 83036 HEMOGLOBIN GLYCOSYLATED A1C: CPT | Performed by: INTERNAL MEDICINE

## 2021-12-07 PROCEDURE — 82570 ASSAY OF URINE CREATININE: CPT | Performed by: INTERNAL MEDICINE

## 2021-12-07 PROCEDURE — 83540 ASSAY OF IRON: CPT | Performed by: INTERNAL MEDICINE

## 2021-12-07 PROCEDURE — 84478 ASSAY OF TRIGLYCERIDES: CPT | Performed by: INTERNAL MEDICINE

## 2021-12-07 PROCEDURE — 82728 ASSAY OF FERRITIN: CPT | Performed by: INTERNAL MEDICINE

## 2021-12-07 PROCEDURE — 82607 VITAMIN B-12: CPT | Performed by: INTERNAL MEDICINE

## 2021-12-07 PROCEDURE — 83721 ASSAY OF BLOOD LIPOPROTEIN: CPT | Performed by: INTERNAL MEDICINE

## 2021-12-07 PROCEDURE — 80053 COMPREHEN METABOLIC PANEL: CPT | Performed by: INTERNAL MEDICINE

## 2021-12-07 PROCEDURE — 84466 ASSAY OF TRANSFERRIN: CPT | Performed by: INTERNAL MEDICINE

## 2021-12-07 PROCEDURE — 82043 UR ALBUMIN QUANTITATIVE: CPT | Performed by: INTERNAL MEDICINE

## 2021-12-07 PROCEDURE — 85025 COMPLETE CBC W/AUTO DIFF WBC: CPT | Performed by: INTERNAL MEDICINE

## 2021-12-07 PROCEDURE — 82306 VITAMIN D 25 HYDROXY: CPT | Performed by: INTERNAL MEDICINE

## 2021-12-07 PROCEDURE — 36415 COLL VENOUS BLD VENIPUNCTURE: CPT | Performed by: INTERNAL MEDICINE

## 2021-12-08 LAB
ALBUMIN UR-MCNC: <1.2 MG/DL
CREAT UR-MCNC: 88.8 MG/DL
MICROALBUMIN/CREAT UR: NORMAL MG/G{CREAT}

## 2021-12-14 ENCOUNTER — OFFICE VISIT (OUTPATIENT)
Dept: FAMILY MEDICINE CLINIC | Facility: CLINIC | Age: 71
End: 2021-12-14

## 2021-12-14 VITALS
TEMPERATURE: 97.5 F | DIASTOLIC BLOOD PRESSURE: 88 MMHG | SYSTOLIC BLOOD PRESSURE: 136 MMHG | BODY MASS INDEX: 28.38 KG/M2 | HEIGHT: 64 IN | HEART RATE: 68 BPM | WEIGHT: 166.2 LBS

## 2021-12-14 DIAGNOSIS — D50.8 IRON DEFICIENCY ANEMIA SECONDARY TO INADEQUATE DIETARY IRON INTAKE: Chronic | ICD-10-CM

## 2021-12-14 DIAGNOSIS — E78.5 HYPERLIPIDEMIA ASSOCIATED WITH TYPE 2 DIABETES MELLITUS (HCC): Chronic | ICD-10-CM

## 2021-12-14 DIAGNOSIS — M72.2 PLANTAR FASCIITIS OF RIGHT FOOT: ICD-10-CM

## 2021-12-14 DIAGNOSIS — R80.9 TYPE 2 DIABETES MELLITUS WITH MICROALBUMINURIA, WITHOUT LONG-TERM CURRENT USE OF INSULIN (HCC): Primary | Chronic | ICD-10-CM

## 2021-12-14 DIAGNOSIS — R42 VERTIGO: ICD-10-CM

## 2021-12-14 DIAGNOSIS — E55.9 VITAMIN D DEFICIENCY: Chronic | ICD-10-CM

## 2021-12-14 DIAGNOSIS — E11.29 TYPE 2 DIABETES MELLITUS WITH MICROALBUMINURIA, WITHOUT LONG-TERM CURRENT USE OF INSULIN (HCC): Primary | Chronic | ICD-10-CM

## 2021-12-14 DIAGNOSIS — D51.8 VITAMIN B12 DEFICIENCY (DIETARY) ANEMIA: Chronic | ICD-10-CM

## 2021-12-14 DIAGNOSIS — K21.9 GASTROESOPHAGEAL REFLUX DISEASE WITHOUT ESOPHAGITIS: Chronic | ICD-10-CM

## 2021-12-14 DIAGNOSIS — I10 ESSENTIAL HYPERTENSION: Chronic | ICD-10-CM

## 2021-12-14 DIAGNOSIS — E11.69 HYPERLIPIDEMIA ASSOCIATED WITH TYPE 2 DIABETES MELLITUS (HCC): Chronic | ICD-10-CM

## 2021-12-14 DIAGNOSIS — E78.1 HYPERTRIGLYCERIDEMIA: Chronic | ICD-10-CM

## 2021-12-14 DIAGNOSIS — F32.5 MAJOR DEPRESSION IN COMPLETE REMISSION (HCC): Chronic | ICD-10-CM

## 2021-12-14 DIAGNOSIS — H81.03 MENIERE'S DISEASE OF BOTH EARS: Chronic | ICD-10-CM

## 2021-12-14 DIAGNOSIS — F41.1 GENERALIZED ANXIETY DISORDER: Chronic | ICD-10-CM

## 2021-12-14 PROCEDURE — 99214 OFFICE O/P EST MOD 30 MIN: CPT | Performed by: INTERNAL MEDICINE

## 2021-12-14 RX ORDER — MELATONIN
1000
COMMUNITY

## 2021-12-14 NOTE — PROGRESS NOTES
Chief Complaint  Follow-up (6 month), Pain (right foot chronic x months and getting worse ), and Dizziness (has Meniere)    Subjective          History of Present Illness     Otilia Kenny presents to the clinic for 6-month follow-up of multiple medical issues including type 2 diabetes, high cholesterol, high triglycerides, hypertension, GERD, depression, iron deficiency anemia and other issues.   She has a history of laminectomy and cervical fusion 02/2013.  She reports a recent Meniere's flare, which she describes as her worse flare ever since diagnosis at age 21.  She reports symptoms almost fully resolved with some occasional dizziness.  She feels this latest flare of Meniere's as well as her daughter's recent heart stent triggered some increase anxiety despite taking Celexa 20 mg daily, although, she feels she is managing okay with the Celexa. We discussed using Pristiq in the future, but she is not ready to make the change today.  She has seen Dr. Tha Stanford, ENT, in [place] in the past.  I offered referral back to Dr. Stanford to address the Meniere's, but she would like to hold off for now.      She is noticing breakthrough belching and burping despite taking Prilosec 40 mg q.a.m. and Pepcid 20 mg q.h.s.  Denies dysphagia.  I offered GI referral.  She is in agreement.       Patient reports worsening right foot pain for the past few months.  She seems to be describing some component of plantar fasciitis of the right foot as well as some anterior ankle pain.  She goes barefoot in the home quite often.  I offered podiatry referral   She is in agreement.  She has been taking 2 Tylenol on most days.        DEXA 05/2017 revealed normal bone density.  Patient prefers 2-year intervals for her mammograms.  She is scheduled for repeat DEXA and mammogram 12/22/2021.  She continues on calcium and vitamin D 1000 mcg daily.     Weight is down 4 pounds in the past six months.  Six months ago, I added Starlix 25 mg with  "her lunch meal in addition to continuing 25 mg with evening meal. She continues Farxiga and weekly Ozempic.  She has reported intolerances to Metformin, Actos, Amaryl.  Diabetes control very slightly improved with A1c 7.1 decreased from 7.2.     The patient's relevant past medical, surgical, and social history was reviewed in Epic.   Lab results are reviewed with the patient today. CBC unremarkable.  Vitamin D and B-12 at goal.  Fasting glucose 173.  Normal renal function.  ALT mildly elevated at 46.  Iron saturation remains a little low, although, blood counts normal range with oral iron.  B-12 at goal without oral B-12.       Objective   Vital Signs:   /88   Pulse 68   Temp 97.5 °F (36.4 °C) (Tympanic)   Ht 162.6 cm (64\")   Wt 75.4 kg (166 lb 3.2 oz)   BMI 28.53 kg/m²       Physical Exam  Vitals reviewed.   Constitutional:       General: She is not in acute distress.     Appearance: She is well-developed.      Comments: Pleasant female, overweight.     HENT:      Head: Normocephalic and atraumatic.      Nose:      Right Sinus: No maxillary sinus tenderness or frontal sinus tenderness.      Left Sinus: No maxillary sinus tenderness or frontal sinus tenderness.      Mouth/Throat:      Mouth: No oral lesions.      Pharynx: Uvula midline.      Tonsils: No tonsillar exudate.   Eyes:      Conjunctiva/sclera: Conjunctivae normal.      Pupils: Pupils are equal, round, and reactive to light.   Neck:      Thyroid: No thyroid mass or thyromegaly.      Vascular: No carotid bruit or JVD.      Trachea: Trachea normal. No tracheal deviation.   Cardiovascular:      Rate and Rhythm: Normal rate and regular rhythm.  No extrasystoles are present.     Chest Wall: PMI is not displaced.      Heart sounds: Normal heart sounds. No murmur heard.      Pulmonary:      Effort: Pulmonary effort is normal. No accessory muscle usage or respiratory distress.      Breath sounds: Normal breath sounds. No decreased breath sounds, " wheezing, rhonchi or rales.   Abdominal:      General: Bowel sounds are normal. There is no distension.      Palpations: Abdomen is soft.      Tenderness: There is no abdominal tenderness.      Comments: Overweight abdomen.    Musculoskeletal:      Cervical back: Neck supple.   Lymphadenopathy:      Cervical: No cervical adenopathy.   Skin:     General: Skin is warm and dry.      Findings: No rash.      Nails: There is no clubbing.   Neurological:      Mental Status: She is alert and oriented to person, place, and time.      Cranial Nerves: No cranial nerve deficit.      Coordination: Coordination normal.   Psychiatric:         Speech: Speech normal.         Behavior: Behavior normal.         Thought Content: Thought content normal.         Judgment: Judgment normal.            Result Review :     CMP    CMP 5/3/21 5/24/21 12/7/21   Glucose 281 (A) 165 (A) 173 (A)   BUN 31 (A) 28 (A) 28 (A)   Creatinine 0.99 0.99 0.94   eGFR Non African Am 55 55 58   Sodium 134 (A) 135 (A) 136 (A)   Potassium 4.4 4.4 4.2   Chloride 98 (A) 99 (A) 102   Calcium 9.1 10.0 9.4   Albumin 4.40 4.20 4.30   Total Bilirubin 0.3 0.3 0.6   Alkaline Phosphatase 74 72 74   AST (SGOT) 30 27 46 (A)   ALT (SGPT) 27 25 31   (A) Abnormal value            CBC w/diff    CBC w/Diff 5/3/21 5/24/21 12/7/21   WBC 5.50 6.51 5.63   RBC 4.38 4.27 4.28   Hemoglobin 13.4 13.2 12.6   Hematocrit 39.8 38.4 38.9   MCV 90.9 89.9 90.9   MCH 30.6 30.9 29.4   MCHC 33.7 34.4 32.4   RDW 12.2 (A) 12.0 (A) 12.6   Platelets 348 322 306   Neutrophil Rel % 58.5 63.7 58.8   Lymphocyte Rel % 31.1 26.0 28.4   Monocyte Rel % 6.2 7.2 8.3   Eosinophil Rel % 2.9 2.2 3.4   Basophil Rel % 1.3 0.9 1.1   (A) Abnormal value            Lipid Panel    Lipid Panel 5/24/21 12/7/21 12/7/21     0824 0824   Total Cholesterol 183     Triglycerides 197 (A) 127    HDL Cholesterol 48     VLDL Cholesterol 34     LDL Cholesterol  101 (A)  87   LDL/HDL Ratio 1.99     (A) Abnormal value            TSH     TSH 5/3/21   TSH 3.160           A1C Last 3 Results    HGBA1C Last 3 Results 5/24/21 12/7/21   Hemoglobin A1C 7.23 (A) 7.13 (A)   (A) Abnormal value            Data reviewed: Radiologic studies DEXA 05/2017          Assessment and Plan    Diagnoses and all orders for this visit:    1. Type 2 diabetes mellitus with microalbuminuria, without long-term current use of insulin (HCC) (Primary)  -     CBC Auto Differential; Future  -     Comprehensive Metabolic Panel; Future  -     Hemoglobin A1c; Future  -     Lipid Panel; Future  -     TSH; Future  -     Vitamin B12; Future    2. Hyperlipidemia associated with type 2 diabetes mellitus (HCC)  -     Lipid Panel; Future    3. Essential hypertension  -     Comprehensive Metabolic Panel; Future    4. Hypertriglyceridemia  -     Lipid Panel; Future    5. Meniere's disease of both ears    6. Vitamin D deficiency  -     Vitamin D 25 Hydroxy; Future    7. Gastroesophageal reflux disease without esophagitis  -     Ambulatory Referral to Gastroenterology  -     CBC Auto Differential; Future    8. Vitamin B12 deficiency (dietary) anemia  -     Folate; Future  -     Vitamin B12; Future    9. Iron deficiency anemia secondary to inadequate dietary iron intake  -     Ferritin; Future  -     Iron Profile; Future    10. Generalized anxiety disorder    11. Major depression in complete remission (HCC)    12. Vertigo    13. Plantar fasciitis of right foot  -     Ambulatory Referral to Podiatry         I spent 33 minutes caring for Otilia on this date of service. This time includes time spent by me in the following activities:preparing for the visit, reviewing tests, obtaining and/or reviewing a separately obtained history, performing a medically appropriate examination and/or evaluation , counseling and educating the patient/family/caregiver, ordering medications, tests, or procedures, referring and communicating with other health care professionals  and documenting information in the medical  record     Refer to Dr. Enoch Shelton, podiatry, for evaluation of right foot     Refer to Dr. Castillo, GI, for evaluation of breakthrough GERD symptoms despite taking Prilosec each morning and Pepcid each evening.  I recommended  OTC simethicone and OTC antacids as needed.  She may be air gulping with episodes of increased anxiety.        For the Meniere's, I offered to refer patient back to Dr. Paul Stanford, ENT, whom she has seen in the past.  She declines at this point as she is improving.  She has Meclizine and Zofran on hand for flares of Meniere's.    Continue Crestor and Zetia.  LDL cholesterol at goal.  Continue Tricor for hypertriglyceridemia.     Diabetes management acceptable.  Continue the Starlix, Farxiga, and weekly Ozempic for diabetes management. Continue diabetic monitoring and notify us if glucose is not consistently at goal.        Continue Celexa for depression and anxiety symptoms.  She feels she is managing fairly well for now.  We may try Pristiq in the future if patient doesn't feel Celexa is managing symptoms.  Her mood and affect do not quite seem to be at baseline today.    Return to clinic in 6 months with fasting labs prior.  She is scheduled for mammogram and DEXA later this month 12/22/2021.      Scribed for Dr. Zabala by Vandana Galeas [facility].     Follow Up   Return in about 6 months (around 6/14/2022) for Follow up in six months with labs one week prior., Next scheduled follow up - labs 1 week prior.  Patient was given instructions and counseling regarding her condition or for health maintenance advice. Please see specific information pulled into the AVS if appropriate.

## 2021-12-14 NOTE — PATIENT INSTRUCTIONS
Exercising to Lose Weight  Exercise is structured, repetitive physical activity to improve fitness and health. Getting regular exercise is important for everyone. It is especially important if you are overweight. Being overweight increases your risk of heart disease, stroke, diabetes, high blood pressure, and several types of cancer. Reducing your calorie intake and exercising can help you lose weight.  Exercise is usually categorized as moderate or vigorous intensity. To lose weight, most people need to do a certain amount of moderate-intensity or vigorous-intensity exercise each week.  Moderate-intensity exercise    Moderate-intensity exercise is any activity that gets you moving enough to burn at least three times more energy (calories) than if you were sitting.  Examples of moderate exercise include:  · Walking a mile in 15 minutes.  · Doing light yard work.  · Biking at an easy pace.  Most people should get at least 150 minutes (2 hours and 30 minutes) a week of moderate-intensity exercise to maintain their body weight.  Vigorous-intensity exercise  Vigorous-intensity exercise is any activity that gets you moving enough to burn at least six times more calories than if you were sitting. When you exercise at this intensity, you should be working hard enough that you are not able to carry on a conversation.  Examples of vigorous exercise include:  · Running.  · Playing a team sport, such as football, basketball, and soccer.  · Jumping rope.  Most people should get at least 75 minutes (1 hour and 15 minutes) a week of vigorous-intensity exercise to maintain their body weight.  How can exercise affect me?  When you exercise enough to burn more calories than you eat, you lose weight. Exercise also reduces body fat and builds muscle. The more muscle you have, the more calories you burn. Exercise also:  · Improves mood.  · Reduces stress and tension.  · Improves your overall fitness, flexibility, and  endurance.  · Increases bone strength.  The amount of exercise you need to lose weight depends on:  · Your age.  · The type of exercise.  · Any health conditions you have.  · Your overall physical ability.  Talk to your health care provider about how much exercise you need and what types of activities are safe for you.  What actions can I take to lose weight?  Nutrition    · Make changes to your diet as told by your health care provider or diet and nutrition specialist (dietitian). This may include:  ? Eating fewer calories.  ? Eating more protein.  ? Eating less unhealthy fats.  ? Eating a diet that includes fresh fruits and vegetables, whole grains, low-fat dairy products, and lean protein.  ? Avoiding foods with added fat, salt, and sugar.  · Drink plenty of water while you exercise to prevent dehydration or heat stroke.    Activity  · Choose an activity that you enjoy and set realistic goals. Your health care provider can help you make an exercise plan that works for you.  · Exercise at a moderate or vigorous intensity most days of the week.  ? The intensity of exercise may vary from person to person. You can tell how intense a workout is for you by paying attention to your breathing and heartbeat. Most people will notice their breathing and heartbeat get faster with more intense exercise.  · Do resistance training twice each week, such as:  ? Push-ups.  ? Sit-ups.  ? Lifting weights.  ? Using resistance bands.  · Getting short amounts of exercise can be just as helpful as long structured periods of exercise. If you have trouble finding time to exercise, try to include exercise in your daily routine.  ? Get up, stretch, and walk around every 30 minutes throughout the day.  ? Go for a walk during your lunch break.  ? Park your car farther away from your destination.  ? If you take public transportation, get off one stop early and walk the rest of the way.  ? Make phone calls while standing up and walking  around.  ? Take the stairs instead of elevators or escalators.  · Wear comfortable clothes and shoes with good support.  · Do not exercise so much that you hurt yourself, feel dizzy, or get very short of breath.  Where to find more information  · [place] Department of Health and Human Services: www.hhs.gov  · Centers for Disease Control and Prevention (CDC): www.cdc.gov  Contact a health care provider:  · Before starting a new exercise program.  · If you have questions or concerns about your weight.  · If you have a medical problem that keeps you from exercising.  Get help right away if you have any of the following while exercising:  · Injury.  · Dizziness.  · Difficulty breathing or shortness of breath that does not go away when you stop exercising.  · Chest pain.  · Rapid heartbeat.  Summary  · Being overweight increases your risk of heart disease, stroke, diabetes, high blood pressure, and several types of cancer.  · Losing weight happens when you burn more calories than you eat.  · Reducing the amount of calories you eat in addition to getting regular moderate or vigorous exercise each week helps you lose weight.  This information is not intended to replace advice given to you by your health care provider. Make sure you discuss any questions you have with your health care provider.  Document Revised: 04/15/2021 Document Reviewed: 04/15/2021  [employer] Patient Education © 2021 [employer]      Calorie Counting for Weight Loss  Calories are units of energy. Your body needs a certain number of calories from food to keep going throughout the day. When you eat or drink more calories than your body needs, your body stores the extra calories mostly as fat. When you eat or drink fewer calories than your body needs, your body burns fat to get the energy it needs.  Calorie counting means keeping track of how many calories you eat and drink each day. Calorie counting can be helpful if you need to lose weight. If you eat  fewer calories than your body needs, you should lose weight. Ask your health care provider what a healthy weight is for you.  For calorie counting to work, you will need to eat the right number of calories each day to lose a healthy amount of weight per week. A dietitian can help you figure out how many calories you need in a day and will suggest ways to reach your calorie goal.  · A healthy amount of weight to lose each week is usually 1-2 lb (0.5-0.9 kg). This usually means that your daily calorie intake should be reduced by 500-750 calories.  · Eating 1,200-1,500 calories a day can help most women lose weight.  · Eating 1,500-1,800 calories a day can help most men lose weight.  What do I need to know about calorie counting?  Work with your health care provider or dietitian to determine how many calories you should get each day. To meet your daily calorie goal, you will need to:  · Find out how many calories are in each food that you would like to eat. Try to do this before you eat.  · Decide how much of the food you plan to eat.  · Keep a food log. Do this by writing down what you ate and how many calories it had.  To successfully lose weight, it is important to balance calorie counting with a healthy lifestyle that includes regular activity.  Where do I find calorie information?    The number of calories in a food can be found on a Nutrition Facts label. If a food does not have a Nutrition Facts label, try to look up the calories online or ask your dietitian for help.  Remember that calories are listed per serving. If you choose to have more than one serving of a food, you will have to multiply the calories per serving by the number of servings you plan to eat. For example, the label on a package of bread might say that a serving size is 1 slice and that there are 90 calories in a serving. If you eat 1 slice, you will have eaten 90 calories. If you eat 2 slices, you will have eaten 180 calories.  How do I keep a  food log?  After each time that you eat, record the following in your food log as soon as possible:  · What you ate. Be sure to include toppings, sauces, and other extras on the food.  · How much you ate. This can be measured in cups, ounces, or number of items.  · How many calories were in each food and drink.  · The total number of calories in the food you ate.  Keep your food log near you, such as in a pocket-sized notebook or on an katerin or website on your mobile phone. Some programs will calculate calories for you and show you how many calories you have left to meet your daily goal.  What are some portion-control tips?  · Know how many calories are in a serving. This will help you know how many servings you can have of a certain food.  · Use a measuring cup to measure serving sizes. You could also try weighing out portions on a kitchen scale. With time, you will be able to estimate serving sizes for some foods.  · Take time to put servings of different foods on your favorite plates or in your favorite bowls and cups so you know what a serving looks like.  · Try not to eat straight from a food's packaging, such as from a bag or box. Eating straight from the package makes it hard to see how much you are eating and can lead to overeating. Put the amount you would like to eat in a cup or on a plate to make sure you are eating the right portion.  · Use smaller plates, glasses, and bowls for smaller portions and to prevent overeating.  · Try not to multitask. For example, avoid watching TV or using your computer while eating. If it is time to eat, sit down at a table and enjoy your food. This will help you recognize when you are full. It will also help you be more mindful of what and how much you are eating.  What are tips for following this plan?  Reading food labels  · Check the calorie count compared with the serving size. The serving size may be smaller than what you are used to eating.  · Check the source of the  calories. Try to choose foods that are high in protein, fiber, and vitamins, and low in saturated fat, trans fat, and sodium.  Shopping  · Read nutrition labels while you shop. This will help you make healthy decisions about which foods to buy.  · Pay attention to nutrition labels for low-fat or fat-free foods. These foods sometimes have the same number of calories or more calories than the full-fat versions. They also often have added sugar, starch, or salt to make up for flavor that was removed with the fat.  · Make a grocery list of lower-calorie foods and stick to it.  Cooking  · Try to cook your favorite foods in a healthier way. For example, try baking instead of frying.  · Use low-fat dairy products.  Meal planning  · Use more fruits and vegetables. One-half of your plate should be fruits and vegetables.  · Include lean proteins, such as chicken, turkey, and fish.  Lifestyle  Each week, aim to do one of the following:  · 150 minutes of moderate exercise, such as walking.  · 75 minutes of vigorous exercise, such as running.  General information  · Know how many calories are in the foods you eat most often. This will help you calculate calorie counts faster.  · Find a way of tracking calories that works for you. Get creative. Try different apps or programs if writing down calories does not work for you.  What foods should I eat?    · Eat nutritious foods. It is better to have a nutritious, high-calorie food, such as an avocado, than a food with few nutrients, such as a bag of potato chips.  · Use your calories on foods and drinks that will fill you up and will not leave you hungry soon after eating.  ? Examples of foods that fill you up are nuts and nut butters, vegetables, lean proteins, and high-fiber foods such as whole grains. High-fiber foods are foods with more than 5 g of fiber per serving.  · Pay attention to calories in drinks. Low-calorie drinks include water and unsweetened drinks.  The items listed  above may not be a complete list of foods and beverages you can eat. Contact a dietitian for more information.  What foods should I limit?  Limit foods or drinks that are not good sources of vitamins, minerals, or protein or that are high in unhealthy fats. These include:  · Candy.  · Other sweets.  · Sodas, specialty coffee drinks, alcohol, and juice.  The items listed above may not be a complete list of foods and beverages you should avoid. Contact a dietitian for more information.  How do I count calories when eating out?  · Pay attention to portions. Often, portions are much larger when eating out. Try these tips to keep portions smaller:  ? Consider sharing a meal instead of getting your own.  ? If you get your own meal, eat only half of it. Before you start eating, ask for a container and put half of your meal into it.  ? When available, consider ordering smaller portions from the menu instead of full portions.  · Pay attention to your food and drink choices. Knowing the way food is cooked and what is included with the meal can help you eat fewer calories.  ? If calories are listed on the menu, choose the lower-calorie options.  ? Choose dishes that include vegetables, fruits, whole grains, low-fat dairy products, and lean proteins.  ? Choose items that are boiled, broiled, grilled, or steamed. Avoid items that are buttered, battered, fried, or served with cream sauce. Items labeled as crispy are usually fried, unless stated otherwise.  ? Choose water, low-fat milk, unsweetened iced tea, or other drinks without added sugar. If you want an alcoholic beverage, choose a lower-calorie option, such as a glass of wine or light beer.  ? Ask for dressings, sauces, and syrups on the side. These are usually high in calories, so you should limit the amount you eat.  ? If you want a salad, choose a garden salad and ask for grilled meats. Avoid extra toppings such as yu, cheese, or fried items. Ask for the dressing on  the side, or ask for olive oil and vinegar or lemon to use as dressing.  · Estimate how many servings of a food you are given. Knowing serving sizes will help you be aware of how much food you are eating at restaurants.  Where to find more information  · Centers for Disease Control and Prevention: www.cdc.gov  · [place] Department of Agriculture: myplate.gov  Summary  · Calorie counting means keeping track of how many calories you eat and drink each day. If you eat fewer calories than your body needs, you should lose weight.  · A healthy amount of weight to lose per week is usually 1-2 lb (0.5-0.9 kg). This usually means reducing your daily calorie intake by 500-750 calories.  · The number of calories in a food can be found on a Nutrition Facts label. If a food does not have a Nutrition Facts label, try to look up the calories online or ask your dietitian for help.  · Use smaller plates, glasses, and bowls for smaller portions and to prevent overeating.  · Use your calories on foods and drinks that will fill you up and not leave you hungry shortly after a meal.  This information is not intended to replace advice given to you by your health care provider. Make sure you discuss any questions you have with your health care provider.  Document Revised: 01/28/2021 Document Reviewed: 01/28/2021  [employer] Patient Education © 2021 [employer]

## 2021-12-21 ENCOUNTER — OFFICE VISIT (OUTPATIENT)
Dept: GASTROENTEROLOGY | Facility: CLINIC | Age: 71
End: 2021-12-21

## 2021-12-21 ENCOUNTER — OFFICE VISIT (OUTPATIENT)
Dept: PODIATRY | Facility: CLINIC | Age: 71
End: 2021-12-21

## 2021-12-21 VITALS
SYSTOLIC BLOOD PRESSURE: 152 MMHG | DIASTOLIC BLOOD PRESSURE: 82 MMHG | BODY MASS INDEX: 28.34 KG/M2 | WEIGHT: 166 LBS | HEART RATE: 87 BPM | OXYGEN SATURATION: 95 % | HEIGHT: 64 IN

## 2021-12-21 VITALS
BODY MASS INDEX: 28.38 KG/M2 | SYSTOLIC BLOOD PRESSURE: 137 MMHG | HEART RATE: 80 BPM | HEIGHT: 64 IN | DIASTOLIC BLOOD PRESSURE: 71 MMHG | WEIGHT: 166.2 LBS

## 2021-12-21 DIAGNOSIS — M19.079 ARTHRITIS OF MIDFOOT: ICD-10-CM

## 2021-12-21 DIAGNOSIS — K21.9 GASTROESOPHAGEAL REFLUX DISEASE, UNSPECIFIED WHETHER ESOPHAGITIS PRESENT: Primary | ICD-10-CM

## 2021-12-21 DIAGNOSIS — M79.671 RIGHT FOOT PAIN: Primary | ICD-10-CM

## 2021-12-21 PROCEDURE — 99203 OFFICE O/P NEW LOW 30 MIN: CPT | Performed by: PODIATRIST

## 2021-12-21 PROCEDURE — 99204 OFFICE O/P NEW MOD 45 MIN: CPT | Performed by: INTERNAL MEDICINE

## 2021-12-21 PROCEDURE — 20600 DRAIN/INJ JOINT/BURSA W/O US: CPT | Performed by: PODIATRIST

## 2021-12-21 RX ORDER — DEXTROSE AND SODIUM CHLORIDE 5; .45 G/100ML; G/100ML
30 INJECTION, SOLUTION INTRAVENOUS CONTINUOUS PRN
Status: CANCELLED | OUTPATIENT
Start: 2021-12-28

## 2021-12-21 RX ORDER — TRIAMCINOLONE ACETONIDE 40 MG/ML
40 INJECTION, SUSPENSION INTRA-ARTICULAR; INTRAMUSCULAR ONCE
Status: COMPLETED | OUTPATIENT
Start: 2021-12-21 | End: 2021-12-21

## 2021-12-21 RX ORDER — DEXAMETHASONE SODIUM PHOSPHATE 4 MG/ML
2 INJECTION, SOLUTION INTRA-ARTICULAR; INTRALESIONAL; INTRAMUSCULAR; INTRAVENOUS; SOFT TISSUE ONCE
Status: COMPLETED | OUTPATIENT
Start: 2021-12-21 | End: 2021-12-21

## 2021-12-21 RX ORDER — OMEPRAZOLE 40 MG/1
40 CAPSULE, DELAYED RELEASE ORAL 2 TIMES DAILY
Qty: 60 CAPSULE | Refills: 11 | Status: SHIPPED | OUTPATIENT
Start: 2021-12-21 | End: 2022-01-20

## 2021-12-21 RX ADMIN — DEXAMETHASONE SODIUM PHOSPHATE 2 MG: 4 INJECTION, SOLUTION INTRA-ARTICULAR; INTRALESIONAL; INTRAMUSCULAR; INTRAVENOUS; SOFT TISSUE at 14:20

## 2021-12-21 RX ADMIN — TRIAMCINOLONE ACETONIDE 40 MG: 40 INJECTION, SUSPENSION INTRA-ARTICULAR; INTRAMUSCULAR at 14:20

## 2021-12-21 NOTE — PROGRESS NOTES
"Otilia Kenny  1950  71 y.o. female  PCP: Alexis Zabala MD: 12/14/2021  BS: 173 per pt     Patient presents to clinic today with the complaint of right foot pain.   12/21/2021  Chief Complaint   Patient presents with   • Right Foot - Pain           History of Present Illness    Otilia Kenny is a 71 y.o. female who presents for evaluation of right foot pain.  Pain primarily located in her midfoot extending into her arch.  States that for several years she has noticed a knot to the top of her foot which is intermittently painful however has become increasingly painful over the past several weeks.  She denies any known trauma or injuries.  Denies any previous attempted treatments.    Past Medical History:   Diagnosis Date   • Abdominal pain     Most likely related to functional colonic spasm     • Abnormal liver enzymes     Improved, 3/2015      • Allergic rhinitis     seasonal   • Allergic rhinitis    • Anemia    • Anesthesia complication     states sometime B/P drops   • Arthritis    • Cervical disc disorder     S/P cervical surgeries x2-3. Extensive fusion C 3-7. [place] [facility]      • Depression    • Diarrhea, unspecified     Resolved with discontinuation of metformin.    • Diverticular disease of colon    • Dysfunction of eustachian tube    • Essential hypertension     Increase lisinopril HCT, 10/12.5.      • Gastritis    • Generalized anxiety disorder    • GERD (gastroesophageal reflux disease)    • Hyperlipidemia     Intolerant of Lipitor. The patient stopped Zocor due to \"liver pain\", summer 2015. patient instructed to reattempt Zocor every other day, 10/2015    • Hyperlipidemia associated with type 2 diabetes mellitus (HCC) 11/25/2020   • Hypertriglyceridemia     Holding simvastatin 2/2015 due to slightly elevated LFTs.      • Iron deficiency anemia    • Irritable bowel syndrome     Dr. 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 Reynolds    • Sleep " disorder    • Spasm of back muscles     Dr. Castro changed Soma to Zanaflex.     • Type 2 diabetes mellitus with microalbuminuria, without long-term current use of insulin ([facility]) 2018    Added automatically from request for surgery [id number]   • Vitamin B12 deficiency (dietary) anemia     Currently on multivitamin daily           Past Surgical History:   Procedure Laterality Date   • BACK SURGERY      Laminectomy   • BUNIONECTOMY     • CARPAL TUNNEL RELEASE Bilateral    • CERVICAL DISC SURGERY      Laminectomy . Fusion .   • CHOLECYSTECTOMY     • COLONOSCOPY      Diverticulosis found in the sigmoid colon.A single diminutive polyp found in the colon.Mul.biopsies taken.Hemorrhoids found in the anus.   • ENDOSCOPY      Normal hypopharynx and esophagus.Marked irregularity of the Z-line,compatable with chronic reflux.Mul.biopsies.A hiatus hernia found in GE junction.Mild nonerosive gastritis.Mul.biopsies.Polyps in fundus.Mul.biopsies.Normal pylorus.Normal duodenum.   • ENDOSCOPY AND COLONOSCOPY     • FINGER/THUMB LESION/CYST EXCISION     • HEEL SPUR EXCISION     • HYSTERECTOMY     • KNEE ARTHROSCOPY Bilateral     Arthroscopy of the left knee with debridement of medial meniscus.   • KNEE SURGERY      Right unicompartmental arthroplasty.   • TOTAL KNEE ARTHROPLASTY Left 2018    Procedure: TOTAL KNEE ARTHROPLASTY ATTUNE with adductor canal block ;  Surgeon: Baron Reynolds MD;  Location: [facility];  Service: Orthopedics         Family History   Problem Relation Age of Onset   • Cancer Other         lung   • Diabetes Other    • Hypertension Other    • Stroke Other    • Breast cancer Sister          Social History     Socioeconomic History   • Marital status:    Tobacco Use   • Smoking status: Former Smoker     Packs/day: 1.00     Years: 10.00     Pack years: 10.00     Types: Cigarettes     Quit date: 1985     Years since quittin.5   • Smokeless tobacco: Never Used   Vaping Use   • Vaping Use:  Never used   Substance and Sexual Activity   • Alcohol use: No   • Drug use: No   • Sexual activity: Defer         Current Outpatient Medications   Medication Sig Dispense Refill   • aspirin 81 MG EC tablet Take 81 mg by mouth Daily.     • Blood Glucose Monitoring Suppl (ONETOUCH VERIO IQ SYSTEM) w/Device kit 1 each Daily. 1 kit 0   • CALCIUM CARB-CHOLECALCIFEROL PO Take 1 tablet by mouth 2 (two) times a day.     • cetirizine (zyrTEC) 10 MG tablet Take 10 mg by mouth Daily.     • cholecalciferol (Vitamin D) 25 MCG (1000 UT) tablet Take 1 tablet by mouth Daily.     • citalopram (CeleXA) 20 MG tablet Take 1 tablet by mouth Daily. 90 tablet 3   • ezetimibe (Zetia) 10 MG tablet Take 1 tablet by mouth Daily. 90 tablet 3   • famotidine (PEPCID) 20 MG tablet Take 1 tablet by mouth Every Night. 90 tablet 3   • Farxiga 10 MG tablet TAKE 1 TABLET DAILY        (REPLACES INVOKANA) 90 tablet 1   • fenofibrate (TRICOR) 145 MG tablet TAKE 1/2 TO 1 TABLET DAILY 90 tablet 3   • glucose blood (OneTouch Verio) test strip USE TO TEST DAILY AS       DIRECTED 100 each 3   • Insulin Pen Needle 31G X 5 MM misc Use daily with Victoza 90 each 3   • IRON, FERROUS SULFATE, PO Take 65 mg by mouth 2 (Two) Times a Week.     • Lancets (OneTouch Delica Plus [id number]) misc TEST ONCE DAILY AS DIRECTED 100 each 3   • lisinopril (PRINIVIL,ZESTRIL) 2.5 MG tablet TAKE 1 TABLET TWICE A DAY. (LOWER DOSE) 180 tablet 0   • meclizine (ANTIVERT) 25 MG tablet Take 1 tablet by mouth 3 (Three) Times a Day As Needed for Dizziness. 30 tablet 1   • minocycline (MINOCIN,DYNACIN) 100 MG capsule Take 1 capsule by mouth Daily. 90 capsule 3   • Multiple Vitamins-Minerals (MULTIVITAMIN PO) Take 1 tablet by mouth daily.     • multivitamin with minerals (HAIR/SKIN/NAILS PO) Take 2 tablets by mouth Daily. Hair skin and nails     • nateglinide (Starlix) 120 MG tablet 1 tab po before lunch and supper 180 tablet 3   • NON FORMULARY Take 1 tablet by mouth Daily. Eye Health and  "Lutein     • omeprazole (priLOSEC) 40 MG capsule Take 1 capsule by mouth 2 (Two) Times a Day for 30 days. 60 capsule 11   • ondansetron ODT (Zofran ODT) 4 MG disintegrating tablet Place 1 tablet on the tongue Every 8 (Eight) Hours As Needed for Nausea or Vomiting. 10 tablet 1   • rosuvastatin (CRESTOR) 20 MG tablet TAKE 1 TABLET DAILY.       REPLACE SIMVASTATIN 90 tablet 3   • Semaglutide, 1 MG/DOSE, (Ozempic, 1 MG/DOSE,) 2 MG/1.5ML solution pen-injector Inject 1 mg under the skin into the appropriate area as directed 1 (One) Time Per Week. 6 pen 3   • TiZANidine (ZANAFLEX) 4 MG capsule TAKE 1 CAPSULE 2 TIMES     DAILY AS NEEDED FOR MUSCLE SPASM (REPLACES SKELAXIN) 180 capsule 3   • VITAMIN D PO Take  by mouth.       No current facility-administered medications for this visit.         OBJECTIVE    /82   Pulse 87   Ht 162.6 cm (64\")   Wt 75.3 kg (166 lb)   SpO2 95%   BMI 28.49 kg/m²       Review of Systems   Constitutional: Negative.    HENT: Negative.    Eyes: Negative.    Respiratory: Negative.    Cardiovascular: Negative.    Gastrointestinal: Positive for nausea.   Endocrine: Negative.    Genitourinary: Negative.    Musculoskeletal: Positive for arthralgias.        Foot pain  Ankle pain     Skin: Negative.    Allergic/Immunologic: Negative.    Neurological: Negative.    Hematological: Negative.    Psychiatric/Behavioral:        Depression.          Physical Exam   Constitutional: she appears well-developed and well-nourished.   HEENT: Normocephalic. Atraumatic.  CV: No CP. RRR  Resp: Non-labored respirations.  Psychiatric: she has a normal mood and affect. her behavior is normal.         Lower Extremity Exam:  Vascular: DP/PT pulses palpable 2+.   Trace edema, dorsal right foot  Foot warm  CFT wnl  Neuro: Protective sensation intact, b/l.  DTRs intact  Negative Tinel over dorsal midfoot, right.  Integument: No open wounds or lesions.  No erythema, scaling  No masses  Musculoskeletal: LE muscle strength " 5/5.   Gait normal  Ankle ROM full without pain or crepitus.  Mild gastrocnemius equinus  STJ ROM full without pain or crepitus  Tenderness on palpation of dorsal midfoot, right.  Tenderness with first ray range of motion.  +osseous prominence to dorsal midfoot, right.  Centered over second and third tarsometatarsal joints  No gross instability or crepitus      Small joint injection:  Risks and benefits discussed. Written consent obtained.  Skin prepped with alcohol  Right foot intercuneiform injection performed with 1cc 0.5% Marcaine, 1 cc dexamethasone phosphate, 0.5 cc Kenalog 40 with 27g needle  Band aid applied. Patient tolerated well.          ASSESSMENT AND PLAN    Diagnoses and all orders for this visit:    1. Right foot pain (Primary)  -     XR Foot 3+ View Right  -     dexamethasone (DECADRON) injection 2 mg  -     triamcinolone acetonide (KENALOG-40) injection 40 mg    2. Arthritis of midfoot      -Comprehensive foot and ankle exam performed  -Radiographs reviewed  -Educated pt on diagnosis, etiology and treatment of midfoot arthritis  -Recommend soft topped motion control shoe gear with lacing modification.  Recommend power step over-the-counter insoles for increased arch support  -Corticosteroid injection as above  -Hold NSAIDs due to prior GI issues.  -Recheck 4 weeks if symptoms fail to improve          This document has been electronically signed by Eonch Shelton DPM on December 21, 2021 14:30 CST       Much of this encounter note is an electronic transcription/translation of spoken language to printed text.   Enoch Shelton DPM  12/21/2021  14:30 CST

## 2021-12-21 NOTE — PATIENT INSTRUCTIONS
"BMI for Adults  What is BMI?  Body mass index (BMI) is a number that is calculated from a person's weight and height. BMI can help estimate how much of a person's weight is composed of fat. BMI does not measure body fat directly. Rather, it is an alternative to procedures that directly measure body fat, which can be difficult and expensive.  BMI can help identify people who may be at higher risk for certain medical problems.  What are BMI measurements used for?  BMI is used as a screening tool to identify possible weight problems. It helps determine whether a person is obese, overweight, a healthy weight, or underweight.  BMI is useful for:  · Identifying a weight problem that may be related to a medical condition or may increase the risk for medical problems.  · Promoting changes, such as changes in diet and exercise, to help reach a healthy weight. BMI screening can be repeated to see if these changes are working.  How is BMI calculated?  BMI involves measuring your weight in relation to your height. Both height and weight are measured, and the BMI is calculated from those numbers. This can be done either in English ([place] or metric measurements. Note that charts and online BMI calculators are available to help you find your BMI quickly and easily without having to do these calculations yourself.  To calculate your BMI in English ([place] measurements:    1. Measure your weight in pounds (lb).  2. Multiply the number of pounds by 703.  ? For example, for a person who weighs 180 lb, multiply that number by 703, which equals 126,540.  3. Measure your height in inches. Then multiply that number by itself to get a measurement called \"inches squared.\"  ? For example, for a person who is 70 inches tall, the \"inches squared\" measurement is 70 inches x 70 inches, which equals 4,900 inches squared.  4. Divide the total from step 2 (number of lb x 703) by the total from step 3 (inches squared): 126,540 ÷ 4,900 = 25.8. This is " "your BMI.    To calculate your BMI in metric measurements:  1. Measure your weight in kilograms (kg).  2. Measure your height in meters (m). Then multiply that number by itself to get a measurement called \"meters squared.\"  ? For example, for a person who is 1.75 m tall, the \"meters squared\" measurement is 1.75 m x 1.75 m, which is equal to 3.1 meters squared.  3. Divide the number of kilograms (your weight) by the meters squared number. In this example: 70 ÷ 3.1 = 22.6. This is your BMI.  What do the results mean?  BMI charts are used to identify whether you are underweight, normal weight, overweight, or obese. The following guidelines will be used:  · Underweight: BMI less than 18.5.  · Normal weight: BMI between 18.5 and 24.9.  · Overweight: BMI between 25 and 29.9.  · Obese: BMI of 30 or above.  Keep these notes in mind:  · Weight includes both fat and muscle, so someone with a muscular build, such as an athlete, may have a BMI that is higher than 24.9. In cases like these, BMI is not an accurate measure of body fat.  · To determine if excess body fat is the cause of a BMI of 25 or higher, further assessments may need to be done by a health care provider.  · BMI is usually interpreted in the same way for men and women.  Where to find more information  For more information about BMI, including tools to quickly calculate your BMI, go to these websites:  · Centers for Disease Control and Prevention: www.cdc.gov  · American Heart Association: www.heart.org  · National Heart, Lung, and Blood [place]: www.nhlbi.nih.gov  Summary  · Body mass index (BMI) is a number that is calculated from a person's weight and height.  · BMI may help estimate how much of a person's weight is composed of fat. BMI can help identify those who may be at higher risk for certain medical problems.  · BMI can be measured using English measurements or metric measurements.  · BMI charts are used to identify whether you are underweight, normal " weight, overweight, or obese.  This information is not intended to replace advice given to you by your health care provider. Make sure you discuss any questions you have with your health care provider.  Document Revised: 09/09/2020 Document Reviewed: 07/17/2020  [employer]vier Patient Education © 2021 [employer]

## 2021-12-21 NOTE — PROGRESS NOTES
"[facility]      Chief Complaint:   Chief Complaint   Patient presents with   • Heartburn       Subjective     HPI:   Ms. Kenny is a 71-year-old  female with past medical history of arthritis, anemia, depression, eustachian dysfunction, hypertension, anxiety, GERD, hyperlipidemia, Ménière's disease, arthritis presented for evaluation for nausea and belching.  She has GERD for past several years with symptoms well controlled on Prilosec daily and developed nausea and belching recently.  Denied abdominal pain, vomiting, diarrhea, constipation, rectal bleeding or weight loss.  She is currently taking Prilosec in the morning and Pepcid at night and Tums as needed.  She denied NSAID usage.  Her last colonoscopy several years ago was consistent with diverticulosis per patient.    Past Medical History:   Past Medical History:   Diagnosis Date   • Abdominal pain     Most likely related to functional colonic spasm     • Abnormal liver enzymes     Improved, 3/2015      • Allergic rhinitis     seasonal   • Allergic rhinitis    • Anemia    • Anesthesia complication     states sometime B/P drops   • Arthritis    • Cervical disc disorder     S/P cervical surgeries x2-3. Extensive fusion C 3-7. [place] neuro[facility]      • Depression    • Diarrhea, unspecified     Resolved with discontinuation of metformin.    • Diverticular disease of colon    • Dysfunction of eustachian tube    • Essential hypertension     Increase lisinopril HCT, 10/12.5.      • Gastritis    • Generalized anxiety disorder    • GERD (gastroesophageal reflux disease)    • Hyperlipidemia     Intolerant of Lipitor. The patient stopped Zocor due to \"liver pain\", summer 2015. patient instructed to reattempt Zocor every other day, 10/2015    • Hyperlipidemia associated with type 2 diabetes mellitus (HCC) 11/25/2020   • Hypertriglyceridemia     Holding simvastatin 2/2015 due to slightly elevated LFTs.      • Iron deficiency anemia  "   • Irritable bowel syndrome     Dr. 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 Reynolds    • Sleep disorder    • Spasm of back muscles     Dr. Castro changed Soma to Zanaflex.     • Type 2 diabetes mellitus with microalbuminuria, without long-term current use of insulin ([facility]) 5/7/2018    Added automatically from request for surgery [id number]   • Vitamin B12 deficiency (dietary) anemia     Currently on multivitamin daily         Past Surgical History:  Past Surgical History:   Procedure Laterality Date   • BACK SURGERY      Laminectomy   • BUNIONECTOMY     • CARPAL TUNNEL RELEASE Bilateral    • CERVICAL DISC SURGERY      Laminectomy 2011. Fusion 2013.   • CHOLECYSTECTOMY     • COLONOSCOPY      Diverticulosis found in the sigmoid colon.A single diminutive polyp found in the colon.Mul.biopsies taken.Hemorrhoids found in the anus.   • ENDOSCOPY      Normal hypopharynx and esophagus.Marked irregularity of the Z-line,compatable with chronic reflux.Mul.biopsies.A hiatus hernia found in GE junction.Mild nonerosive gastritis.Mul.biopsies.Polyps in fundus.Mul.biopsies.Normal pylorus.Normal duodenum.   • ENDOSCOPY AND COLONOSCOPY     • FINGER/THUMB LESION/CYST EXCISION     • HEEL SPUR EXCISION     • HYSTERECTOMY     • KNEE ARTHROSCOPY Bilateral     Arthroscopy of the left knee with debridement of medial meniscus.   • KNEE SURGERY      Right unicompartmental arthroplasty.   • TOTAL KNEE ARTHROPLASTY Left 6/4/2018    Procedure: TOTAL KNEE ARTHROPLASTY ATTUNE with adductor canal block ;  Surgeon: Baron Reynolds MD;  [facility]: [facility];  Service: Orthopedics       Family History:  Family History   Problem Relation Age of Onset   • Cancer Other         lung   • Diabetes Other    • Hypertension Other    • Stroke Other    • Breast cancer Sister        Social History:   reports that she quit smoking about 36 years ago. Her smoking use included cigarettes. She has a 10.00 pack-year  smoking history. She has never used smokeless tobacco. She reports that she does not drink alcohol and does not use drugs.    Medications:   Prior to Admission medications    Medication Sig Start Date End Date Taking? Authorizing Provider   aspirin 81 MG EC tablet Take 81 mg by mouth Daily.   Yes Leila Orlando MD   Blood Glucose Monitoring Suppl (ONETOUCH VERIO IQ SYSTEM) w/Device kit 1 each Daily. 11/18/19  Yes Alexis Zabala MD   CALCIUM CARB-CHOLECALCIFEROL PO Take 1 tablet by mouth 2 (two) times a day.   Yes Leila Orlando MD   cetirizine (zyrTEC) 10 MG tablet Take 10 mg by mouth Daily.   Yes Leila Orlando MD   cholecalciferol (Vitamin D) 25 MCG (1000 UT) tablet Take 1 tablet by mouth Daily.   Yes Alexis Zabala MD   citalopram (CeleXA) 20 MG tablet Take 1 tablet by mouth Daily. 5/11/21  Yes Alexis Zabala MD   ezetimibe (Zetia) 10 MG tablet Take 1 tablet by mouth Daily. 6/4/21  Yes Alexis Zabala MD   famotidine (PEPCID) 20 MG tablet Take 1 tablet by mouth Every Night. 6/4/21  Yes Alexis Zabala MD   Farxiga 10 MG tablet TAKE 1 TABLET DAILY        (REPLACES INVOKANA) 11/3/21  Yes Alexis Zabala MD   fenofibrate (TRICOR) 145 MG tablet TAKE 1/2 TO 1 TABLET DAILY 2/10/21  Yes Alexis Zabala MD   glucose blood (OneTouch Verio) test strip USE TO TEST DAILY AS       DIRECTED 6/30/21  Yes Alexis Zabala MD   Insulin Pen Needle 31G X 5 MM misc Use daily with Victoza 5/30/18  Yes Alexis Zabala MD   IRON, FERROUS SULFATE, PO Take 65 mg by mouth 2 (Two) Times a Week.   Yes Leila Orlando MD   Lancets (OneTouch Delica Plus [id number]) misc TEST ONCE DAILY AS DIRECTED 10/6/21  Yes Alexis Zabala MD   lisinopril (PRINIVIL,ZESTRIL) 2.5 MG tablet TAKE 1 TABLET TWICE A DAY. (LOWER DOSE) 10/20/21  Yes Alexis Zabala MD   meclizine (ANTIVERT) 25 MG tablet Take 1 tablet by mouth 3 (Three) Times a Day As Needed for Dizziness. 10/26/21  Yes Alexis Zabala MD   minocycline (MINOCIN,DYNACIN) 100 MG  capsule Take 1 capsule by mouth Daily. 6/4/21  Yes Alexis Zabala MD   Multiple Vitamins-Minerals (MULTIVITAMIN PO) Take 1 tablet by mouth daily.   Yes Leila Orlando MD   multivitamin with minerals (HAIR/SKIN/NAILS PO) Take 2 tablets by mouth Daily. Hair skin and nails   Yes Leila Orlando MD   nateglinide (Starlix) 120 MG tablet 1 tab po before lunch and supper 6/4/21  Yes Alexis Zabala MD   NON FORMULARY Take 1 tablet by mouth Daily. Eye Health and Lutein   Yes Leila Orlando MD   ondansetron ODT (Zofran ODT) 4 MG disintegrating tablet Place 1 tablet on the tongue Every 8 (Eight) Hours As Needed for Nausea or Vomiting. 10/26/21  Yes Alexis Zabala MD   rosuvastatin (CRESTOR) 20 MG tablet TAKE 1 TABLET DAILY.       REPLACE SIMVASTATIN 7/9/21  Yes Alexis Zabala MD   Semaglutide, 1 MG/DOSE, (Ozempic, 1 MG/DOSE,) 2 MG/1.5ML solution pen-injector Inject 1 mg under the skin into the appropriate area as directed 1 (One) Time Per Week. 9/24/20  Yes Alexis Zabala MD   TiZANidine (ZANAFLEX) 4 MG capsule TAKE 1 CAPSULE 2 TIMES     DAILY AS NEEDED FOR MUSCLE SPASM (REPLACES SKELAXIN) 8/11/21  Yes Alexis Zabala MD   VITAMIN D PO Take  by mouth.   Yes Leila Orlando MD   omeprazole (priLOSEC) 40 MG capsule TAKE 1 CAPSULE DAILY 6/30/21 12/21/21 Yes Alexis Zabala MD   omeprazole (priLOSEC) 40 MG capsule Take 1 capsule by mouth 2 (Two) Times a Day for 30 days. 12/21/21 1/20/22  Samra Castillo MD       Allergies:  Allegra [fexofenadine], Amaryl [glimepiride], Dyazide [triamterene-hctz], Hydrochlorothiazide w-triamterene, Lipitor [atorvastatin], Metformin and related, Naproxen, Actos [pioglitazone], and Sulfamethoxazole-trimethoprim    ROS:    Review of Systems   Constitutional: Negative for chills, fatigue, fever and unexpected weight change.   HENT: Negative for congestion, ear discharge, hearing loss, nosebleeds and sore throat.    Eyes: Negative for pain, discharge and redness.  "  Respiratory: Negative for cough, chest tightness, shortness of breath and wheezing.    Cardiovascular: Negative for chest pain and palpitations.   Gastrointestinal: Positive for nausea. Negative for abdominal distention, abdominal pain, blood in stool, constipation, diarrhea and vomiting.   Endocrine: Negative for cold intolerance, polydipsia, polyphagia and polyuria.   Genitourinary: Negative for dysuria, flank pain, frequency, hematuria and urgency.   Musculoskeletal: Negative for arthralgias, back pain, joint swelling and myalgias.   Skin: Negative for color change, pallor and rash.   Neurological: Negative for tremors, seizures, syncope, weakness and headaches.   Hematological: Negative for adenopathy. Does not bruise/bleed easily.   Psychiatric/Behavioral: Negative for behavioral problems, confusion, dysphoric mood, hallucinations and suicidal ideas. The patient is not nervous/anxious.      Objective     Blood pressure 137/71, pulse 80, height 162.6 cm (64\"), weight 75.4 kg (166 lb 3.2 oz), not currently breastfeeding.    Physical Exam  Constitutional:       Appearance: She is well-developed.   HENT:      Head: Normocephalic and atraumatic.   Eyes:      Conjunctiva/sclera: Conjunctivae normal.      Pupils: Pupils are equal, round, and reactive to light.   Neck:      Thyroid: No thyromegaly.   Cardiovascular:      Rate and Rhythm: Normal rate and regular rhythm.      Heart sounds: Normal heart sounds. No murmur heard.      Pulmonary:      Effort: Pulmonary effort is normal.      Breath sounds: Normal breath sounds. No wheezing.   Abdominal:      General: Bowel sounds are normal. There is no distension.      Palpations: Abdomen is soft. There is no mass.      Tenderness: There is no abdominal tenderness.      Hernia: No hernia is present.   Genitourinary:     Comments: No lesions noted  Musculoskeletal:         General: No tenderness. Normal range of motion.      Cervical back: Normal range of motion and neck " supple.   Lymphadenopathy:      Cervical: No cervical adenopathy.   Skin:     General: Skin is warm and dry.      Findings: No rash.   Neurological:      Mental Status: She is alert and oriented to person, place, and time.      Cranial Nerves: No cranial nerve deficit.   Psychiatric:         Thought Content: Thought content normal.        Extremities: No edema, cyanosis or clubbing.    Assessment/Plan    1.  Belching, likely due to GERD.  Increase Prilosec to twice daily.  Continue Pepcid nightly.  Continue Tums as needed.  Proceed with EGD to rule out complications of the GERD.  2.  Longstanding GERD with symptoms well controlled with Prilosec, continue Prilosec and antireflux lifestyle.  3.  Nausea rule out gastroparesis.  Continue PPI and Pepcid.  Proceed with EGD for further evaluation.  Gastric emptying scan if EGD is unremarkable.  4.  High BMI, recommend exercise and diet control.  Diagnoses and all orders for this visit:    1. Gastroesophageal reflux disease, unspecified whether esophagitis present (Primary)  -     Case Request; Standing  -     COVID PRE-OP / PRE-PROCEDURE SCREENING ORDER (NO ISOLATION) - Swab, Nasopharynx; Future  -     Case Request    Other orders  -     Follow Anesthesia Guidelines / Standing Orders; Future  -     Obtain Informed Consent; Future  -     omeprazole (priLOSEC) 40 MG capsule; Take 1 capsule by mouth 2 (Two) Times a Day for 30 days.  Dispense: 60 capsule; Refill: 11        ESOPHAGOGASTRODUODENOSCOPY (N/A)     Diagnosis Plan   1. Gastroesophageal reflux disease, unspecified whether esophagitis present  Case Request    COVID PRE-OP / PRE-PROCEDURE SCREENING ORDER (NO ISOLATION) - Swab, Nasopharynx    Case Request       Anticipated Surgical Procedure:  Orders Placed This Encounter   Procedures   • COVID PRE-OP / PRE-PROCEDURE SCREENING ORDER (NO ISOLATION) - Swab, Nasopharynx     Standing Status:   Future     Standing Expiration Date:   12/21/2022     Order Specific Question:    Release to patient     Answer:   Immediate     Order Specific Question:   Please select your location:     Answer:   [facility]     Order Specific Question:   COVID Screening Order:     Answer:   In-House: PRE-OP: BD MAX, 8-10 HR TAT [[id number]]     Order Specific Question:   Previously tested for COVID-19?     Answer:   No     Order Specific Question:   Employed in healthcare setting?     Answer:   No     Order Specific Question:   Symptomatic for COVID-19 as defined by CDC?     Answer:   No     Order Specific Question:   Hospitalized for COVID-19?     Answer:   No     Order Specific Question:   Admitted to ICU for COVID-19?     Answer:   No     Order Specific Question:   Resident in a congregate (group) care setting?     Answer:   No     Order Specific Question:   Pregnant?     Answer:   No   • Follow Anesthesia Guidelines / Standing Orders     Standing Status:   Future   • Obtain Informed Consent     Standing Status:   Future     Order Specific Question:   Informed Consent Given For     Answer:   ESOPHAGOGASTRODUODENOSCOPY       The risks, benefits, and alternatives of this procedure have been discussed with the patient or the responsible party- the patient understands and agrees to proceed.            This document has been electronically signed by Samra Castillo MD on December 21, 2021 12:34 CST

## 2021-12-22 ENCOUNTER — PATIENT ROUNDING (BHMG ONLY) (OUTPATIENT)
Dept: PODIATRY | Facility: CLINIC | Age: 71
End: 2021-12-22

## 2021-12-22 NOTE — PROGRESS NOTES
December 22, 2021    Hello, may I speak with Otilia Kenny?    My name is REYNOLD GOLD    I am  with [facility]  [address]   [place] [address] [phone].    Before we get started may I verify your date of birth? 1950    I am calling to officially welcome you to our practice and ask about your recent visit. Is this a good time to talk? yes    Tell me about your visit with us. What things went well?  EVERYTHING WENT REAL WELL, DOCTOR HAD A GOOD PERSONALITY, I BELIEVE HE KNOWS WHAT HE IS DOING, EVERYONE ENCOUNTER IN OFFICE WAS NICE.       We're always looking for ways to make our patients' experiences even better. Do you have recommendations on ways we may improve?  no    Overall were you satisfied with your first visit to our practice? yes       I appreciate you taking the time to speak with me today. Is there anything else I can do for you? no      Thank you, and have a great day.

## 2021-12-27 ENCOUNTER — LAB (OUTPATIENT)
Dept: LAB | Facility: HOSPITAL | Age: 71
End: 2021-12-27

## 2021-12-27 DIAGNOSIS — K21.9 GASTROESOPHAGEAL REFLUX DISEASE, UNSPECIFIED WHETHER ESOPHAGITIS PRESENT: ICD-10-CM

## 2021-12-27 LAB — SARS-COV-2 N GENE RESP QL NAA+PROBE: NOT DETECTED

## 2021-12-27 PROCEDURE — C9803 HOPD COVID-19 SPEC COLLECT: HCPCS

## 2021-12-27 PROCEDURE — 87635 SARS-COV-2 COVID-19 AMP PRB: CPT

## 2021-12-28 ENCOUNTER — HOSPITAL ENCOUNTER (OUTPATIENT)
Facility: HOSPITAL | Age: 71
Setting detail: HOSPITAL OUTPATIENT SURGERY
Discharge: HOME | End: 2021-12-28
Attending: INTERNAL MEDICINE | Admitting: INTERNAL MEDICINE

## 2021-12-28 ENCOUNTER — ANESTHESIA EVENT (OUTPATIENT)
Dept: GASTROENTEROLOGY | Facility: HOSPITAL | Age: 71
End: 2021-12-28

## 2021-12-28 ENCOUNTER — ANESTHESIA (OUTPATIENT)
Dept: GASTROENTEROLOGY | Facility: HOSPITAL | Age: 71
End: 2021-12-28

## 2021-12-28 VITALS
TEMPERATURE: 96.9 F | SYSTOLIC BLOOD PRESSURE: 128 MMHG | HEIGHT: 64 IN | BODY MASS INDEX: 28.38 KG/M2 | HEART RATE: 68 BPM | RESPIRATION RATE: 16 BRPM | OXYGEN SATURATION: 96 % | WEIGHT: 166.23 LBS | DIASTOLIC BLOOD PRESSURE: 71 MMHG

## 2021-12-28 DIAGNOSIS — K21.9 GASTROESOPHAGEAL REFLUX DISEASE, UNSPECIFIED WHETHER ESOPHAGITIS PRESENT: ICD-10-CM

## 2021-12-28 LAB — GLUCOSE BLDC GLUCOMTR-MCNC: 126 MG/DL (ref 70–130)

## 2021-12-28 PROCEDURE — 88305 TISSUE EXAM BY PATHOLOGIST: CPT

## 2021-12-28 PROCEDURE — 43239 EGD BIOPSY SINGLE/MULTIPLE: CPT | Performed by: INTERNAL MEDICINE

## 2021-12-28 PROCEDURE — 25010000002 PROPOFOL 10 MG/ML EMULSION: Performed by: NURSE ANESTHETIST, CERTIFIED REGISTERED

## 2021-12-28 PROCEDURE — 82962 GLUCOSE BLOOD TEST: CPT

## 2021-12-28 RX ORDER — LIDOCAINE HYDROCHLORIDE 20 MG/ML
INJECTION, SOLUTION INTRAVENOUS AS NEEDED
Status: DISCONTINUED | OUTPATIENT
Start: 2021-12-28 | End: 2021-12-28 | Stop reason: SURG

## 2021-12-28 RX ORDER — DEXTROSE AND SODIUM CHLORIDE 5; .45 G/100ML; G/100ML
30 INJECTION, SOLUTION INTRAVENOUS CONTINUOUS PRN
Status: DISCONTINUED | OUTPATIENT
Start: 2021-12-28 | End: 2021-12-28 | Stop reason: HOSPADM

## 2021-12-28 RX ORDER — PROPOFOL 10 MG/ML
VIAL (ML) INTRAVENOUS AS NEEDED
Status: DISCONTINUED | OUTPATIENT
Start: 2021-12-28 | End: 2021-12-28 | Stop reason: SURG

## 2021-12-28 RX ADMIN — DEXTROSE AND SODIUM CHLORIDE 30 ML/HR: 5; 450 INJECTION, SOLUTION INTRAVENOUS at 13:38

## 2021-12-28 RX ADMIN — PROPOFOL 80 MG: 10 INJECTION, EMULSION INTRAVENOUS at 14:17

## 2021-12-28 RX ADMIN — LIDOCAINE HYDROCHLORIDE 100 MG: 20 INJECTION, SOLUTION INTRAVENOUS at 14:17

## 2021-12-28 RX ADMIN — PROPOFOL 20 MG: 10 INJECTION, EMULSION INTRAVENOUS at 14:21

## 2021-12-28 NOTE — ANESTHESIA PREPROCEDURE EVALUATION
Anesthesia Evaluation     NPO Solid Status: > 8 hours  NPO Liquid Status: > 2 hours           Airway   Mallampati: II  TM distance: >3 FB  Neck ROM: full  no difficulty expected  Dental    (+) lower dentures and upper dentures    Pulmonary - normal exam   Cardiovascular - normal exam    (+) hypertension, hyperlipidemia,       Neuro/Psych  (+) dizziness/light headedness, psychiatric history,     GI/Hepatic/Renal/Endo    (+)  GERD,  diabetes mellitus,     Musculoskeletal     (+) neck pain,   Abdominal    Substance History      OB/GYN          Other   arthritis,                    Anesthesia Plan    ASA 3     MAC     intravenous induction     Anesthetic plan, all risks, benefits, and alternatives have been provided, discussed and informed consent has been obtained with: patient.

## 2021-12-28 NOTE — ANESTHESIA POSTPROCEDURE EVALUATION
Patient: Otilia Kenny    Procedure Summary     Date: 12/28/21 Room / Location: [facility] ENDOSCOPY 2 / [facility] ENDOSCOPY    Anesthesia Start: 1416 Anesthesia Stop: 1423    Procedure: ESOPHAGOGASTRODUODENOSCOPY (N/A ) Diagnosis:       Gastroesophageal reflux disease, unspecified whether esophagitis present      (Gastroesophageal reflux disease, unspecified whether esophagitis present [K21.9])    Surgeons: Samra Castillo MD Provider: Phong Fabian CRNA    Anesthesia Type: MAC ASA Status: 3          Anesthesia Type: MAC    Vitals  No vitals data found for the desired time range.          Post Anesthesia Care and Evaluation    Patient location during evaluation: bedside  Patient participation: waiting for patient participation  Level of consciousness: responsive to verbal stimuli  Pain management: adequate  Airway patency: patent  Anesthetic complications: No anesthetic complications  PONV Status: none  Cardiovascular status: acceptable  Respiratory status: acceptable  Hydration status: acceptable    Comments: ---  ---------------------------

## 2022-01-03 LAB
LAB AP CASE REPORT: NORMAL
PATH REPORT.FINAL DX SPEC: NORMAL

## 2022-01-25 ENCOUNTER — OFFICE VISIT (OUTPATIENT)
Dept: GASTROENTEROLOGY | Facility: CLINIC | Age: 72
End: 2022-01-25

## 2022-01-25 VITALS
DIASTOLIC BLOOD PRESSURE: 74 MMHG | HEIGHT: 64 IN | BODY MASS INDEX: 28.82 KG/M2 | WEIGHT: 168.8 LBS | HEART RATE: 75 BPM | SYSTOLIC BLOOD PRESSURE: 125 MMHG

## 2022-01-25 DIAGNOSIS — R10.10 PAIN OF UPPER ABDOMEN: Primary | ICD-10-CM

## 2022-01-25 PROCEDURE — 99214 OFFICE O/P EST MOD 30 MIN: CPT | Performed by: INTERNAL MEDICINE

## 2022-01-25 RX ORDER — SUCRALFATE 1 G/1
1 TABLET ORAL 4 TIMES DAILY
Qty: 120 TABLET | Refills: 4 | Status: SHIPPED | OUTPATIENT
Start: 2022-01-25 | End: 2022-02-24

## 2022-02-09 RX ORDER — LISINOPRIL 2.5 MG/1
2.5 TABLET ORAL 2 TIMES DAILY
Qty: 180 TABLET | Refills: 3 | Status: SHIPPED | OUTPATIENT
Start: 2022-02-09 | End: 2023-01-09 | Stop reason: SDUPTHER

## 2022-02-10 ENCOUNTER — TRANSCRIBE ORDERS (OUTPATIENT)
Dept: CT IMAGING | Facility: HOSPITAL | Age: 72
End: 2022-02-10

## 2022-02-10 DIAGNOSIS — R10.10 UPPER ABDOMINAL PAIN: Primary | ICD-10-CM

## 2022-02-11 RX ORDER — FENOFIBRATE 145 MG/1
72.5-145 TABLET, COATED ORAL DAILY
Qty: 90 TABLET | Refills: 3 | Status: SHIPPED | OUTPATIENT
Start: 2022-02-11 | End: 2022-10-27

## 2022-02-13 NOTE — PROGRESS NOTES
"Chief Complaint   Patient presents with   • EGD Performed 12/28/2021     Gastroesophageal Reflux Disease       Subjective    Otilia Kenny is a 72 y.o. female.    History of Present Illness  Patient presented to clinic for follow-up visit today.  Has occasional symptoms with epigastric pain and nausea.  Feels better otherwise.  Denied vomiting, diarrhea, constipation, rectal bleeding or weight loss.  EGD was consistent with hiatal hernia, esophagitis and gastritis.  Path was consistent with reactive gastropathy.       The following portions of the patient's history were reviewed and updated as appropriate:   Past Medical History:   Diagnosis Date   • Abdominal pain     Most likely related to functional colonic spasm     • Abnormal liver enzymes     Improved, 3/2015      • Allergic rhinitis     seasonal   • Allergic rhinitis    • Anemia    • Anesthesia complication     states sometime B/P drops   • Arthritis    • Cervical disc disorder     S/P cervical surgeries x2-3. Extensive fusion C 3-7. [place] [facility]      • Depression    • Diarrhea, unspecified     Resolved with discontinuation of metformin.    • Diverticular disease of colon    • Dysfunction of eustachian tube    • Essential hypertension     Increase lisinopril HCT, 10/12.5.      • Gastritis    • Generalized anxiety disorder    • GERD (gastroesophageal reflux disease)    • History of transfusion    • Hyperlipidemia     Intolerant of Lipitor. The patient stopped Zocor due to \"liver pain\", summer 2015. patient instructed to reattempt Zocor every other day, 10/2015    • Hyperlipidemia associated with type 2 diabetes mellitus (HCC) 11/25/2020   • Hypertriglyceridemia     Holding simvastatin 2/2015 due to slightly elevated LFTs.      • Iron deficiency anemia    • Irritable bowel syndrome     Dr. 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 Reynolds    • Sleep disorder    • Spasm of back muscles     Dr. Castro " changed Soma to Zanaflex.     • Type 2 diabetes mellitus with microalbuminuria, without long-term current use of insulin (HCC) 2018    Added automatically from request for surgery [id number]   • Vitamin B12 deficiency (dietary) anemia     Currently on multivitamin daily       Past Surgical History:   Procedure Laterality Date   • BACK SURGERY      Laminectomy   • BUNIONECTOMY     • CARPAL TUNNEL RELEASE Bilateral    • CERVICAL DISC SURGERY      Laminectomy . Fusion .   • CHOLECYSTECTOMY     • COLONOSCOPY      Diverticulosis found in the sigmoid colon.A single diminutive polyp found in the colon.Mul.biopsies taken.Hemorrhoids found in the anus.   • ENDOSCOPY      Normal hypopharynx and esophagus.Marked irregularity of the Z-line,compatable with chronic reflux.Mul.biopsies.A hiatus hernia found in GE junction.Mild nonerosive gastritis.Mul.biopsies.Polyps in fundus.Mul.biopsies.Normal pylorus.Normal duodenum.   • ENDOSCOPY N/A 2021    Procedure: ESOPHAGOGASTRODUODENOSCOPY;  Surgeon: Samra Castillo MD;  Location: [facility] ENDOSCOPY;  Service: Gastroenterology;  Laterality: N/A;   • ENDOSCOPY AND COLONOSCOPY     • FINGER/THUMB LESION/CYST EXCISION     • HEEL SPUR EXCISION     • HYSTERECTOMY     • JOINT REPLACEMENT Bilateral    • KNEE ARTHROSCOPY Bilateral     Arthroscopy of the left knee with debridement of medial meniscus.   • KNEE SURGERY      Right unicompartmental arthroplasty.   • TOTAL KNEE ARTHROPLASTY Left 2018    Procedure: TOTAL KNEE ARTHROPLASTY ATTUNE with adductor canal block ;  Surgeon: Baron Reynolds MD;  Location: [facility] OR;  Service: Orthopedics   • UPPER GASTROINTESTINAL ENDOSCOPY  2021    Dr. Samra Castillo M.D., [facility], [place], [place]     Family History   Problem Relation Age of Onset   • Cancer Other         lung   • Diabetes Other    • Hypertension Other    • Stroke Other    • Breast cancer Sister      OB History        4    Para   4     Term   4            AB        Living           SAB        IAB        Ectopic        Molar        Multiple        Live Births                  Prior to Admission medications    Medication Sig Start Date End Date Taking? Authorizing Provider   aspirin 81 MG EC tablet Take 81 mg by mouth Daily.   Yes Leila Orlando MD   Blood Glucose Monitoring Suppl (ONETOUCH VERIO IQ SYSTEM) w/Device kit 1 each Daily. 19  Yes Alexis Zabala MD   CALCIUM CARB-CHOLECALCIFEROL PO Take 1 tablet by mouth 2 (two) times a day.   Yes Leila Orlando MD   cetirizine (zyrTEC) 10 MG tablet Take 10 mg by mouth Daily.   Yes Leila Orlando MD   cholecalciferol (Vitamin D) 25 MCG (1000 UT) tablet Take 1 tablet by mouth Daily.   Yes Alexis Zabala MD   citalopram (CeleXA) 20 MG tablet Take 1 tablet by mouth Daily. 21  Yes Alexis Zabala MD   ezetimibe (Zetia) 10 MG tablet Take 1 tablet by mouth Daily. 21  Yes Alexis Zabala MD   famotidine (PEPCID) 20 MG tablet Take 1 tablet by mouth Every Night. 21  Yes Alexis Zabala MD   Farxiga 10 MG tablet TAKE 1 TABLET DAILY        (REPLACES INVOKANA) 11/3/21  Yes Alexis Zabala MD   glucose blood (OneTouch Verio) test strip USE TO TEST DAILY AS       DIRECTED 21  Yes Alexis Zabala MD   Insulin Pen Needle 31G X 5 MM misc Use daily with Victoza 18  Yes Alexis Zabala MD   IRON, FERROUS SULFATE, PO Take 65 mg by mouth 2 (Two) Times a Week.   Yes Leila Orlando MD   Lancets (OneTouch Delica Plus [id number]) misc TEST ONCE DAILY AS DIRECTED 10/6/21  Yes Alexis Zabala MD   meclizine (ANTIVERT) 25 MG tablet Take 1 tablet by mouth 3 (Three) Times a Day As Needed for Dizziness. 10/26/21  Yes Alexis Zabala MD   minocycline (MINOCIN,DYNACIN) 100 MG capsule Take 1 capsule by mouth Daily. 21  Yes Alexis Zabala MD   Multiple Vitamins-Minerals (MULTIVITAMIN PO) Take 1 tablet by mouth daily.   Yes Leila Orlando MD   multivitamin with minerals  (HAIR/SKIN/NAILS PO) Take 2 tablets by mouth Daily. Hair skin and nails   Yes ProviderLeila MD   nateglinide (Starlix) 120 MG tablet 1 tab po before lunch and supper 6/4/21  Yes Alexis Zabala MD   NON FORMULARY Take 1 tablet by mouth Daily. Eye Health and Lutein   Yes ProviderLeila MD   ondansetron ODT (Zofran ODT) 4 MG disintegrating tablet Place 1 tablet on the tongue Every 8 (Eight) Hours As Needed for Nausea or Vomiting. 10/26/21  Yes Alexis Zabala MD   rosuvastatin (CRESTOR) 20 MG tablet TAKE 1 TABLET DAILY.       REPLACE SIMVASTATIN 7/9/21  Yes Alexis Zabala MD   Semaglutide, 1 MG/DOSE, (Ozempic, 1 MG/DOSE,) 2 MG/1.5ML solution pen-injector Inject 1 mg under the skin into the appropriate area as directed 1 (One) Time Per Week. 9/24/20  Yes Alexis Zabala MD   TiZANidine (ZANAFLEX) 4 MG capsule TAKE 1 CAPSULE 2 TIMES     DAILY AS NEEDED FOR MUSCLE SPASM (REPLACES SKELAXIN) 8/11/21  Yes Alexis Zabala MD   VITAMIN D PO Take  by mouth.   Yes Provider, MD Leila   fenofibrate (TRICOR) 145 MG tablet Take 0.5-1 tablets by mouth Daily. 2/11/22   Alexis Zabala MD   lisinopril (PRINIVIL,ZESTRIL) 2.5 MG tablet Take 1 tablet by mouth 2 (Two) Times a Day. 2/9/22   Alexis Zabala MD   sucralfate (Carafate) 1 g tablet Take 1 tablet by mouth 4 (Four) Times a Day for 30 days. 1/25/22 2/24/22  Samra Castillo MD     Allergies   Allergen Reactions   • Allegra [Fexofenadine] Itching   • Amaryl [Glimepiride] Myalgia   • Dyazide [Triamterene-Hctz] Itching   • Hydrochlorothiazide W-Triamterene Itching   • Lipitor [Atorvastatin] Myalgia   • Metformin And Related Diarrhea   • Naproxen Itching   • Actos [Pioglitazone] Rash   • Sulfamethoxazole-Trimethoprim Rash     dizziness     Social History     Socioeconomic History   • Marital status:    Tobacco Use   • Smoking status: Former Smoker     Packs/day: 1.00     Years: 10.00     Pack years: 10.00     Types: Cigarettes     Quit date: 6/2/1985     Years  "since quittin.7   • Smokeless tobacco: Never Used   Vaping Use   • Vaping Use: Never used   Substance and Sexual Activity   • Alcohol use: No   • Drug use: No   • Sexual activity: Defer       Review of Systems  Review of Systems   Constitutional: Negative for chills, fatigue, fever and unexpected weight change.   HENT: Negative for congestion, ear discharge, hearing loss, nosebleeds and sore throat.    Eyes: Negative for pain, discharge and redness.   Respiratory: Negative for cough, chest tightness, shortness of breath and wheezing.    Cardiovascular: Negative for chest pain and palpitations.   Gastrointestinal: Positive for abdominal pain and nausea. Negative for abdominal distention, blood in stool, constipation, diarrhea and vomiting.   Endocrine: Negative for cold intolerance, polydipsia, polyphagia and polyuria.   Genitourinary: Negative for dysuria, flank pain, frequency, hematuria and urgency.   Musculoskeletal: Negative for arthralgias, back pain, joint swelling and myalgias.   Skin: Negative for color change, pallor and rash.   Neurological: Negative for tremors, seizures, syncope, weakness and headaches.   Hematological: Negative for adenopathy. Does not bruise/bleed easily.   Psychiatric/Behavioral: Negative for behavioral problems, confusion, dysphoric mood, hallucinations and suicidal ideas. The patient is not nervous/anxious.         /74   Pulse 75   Ht 161.3 cm (63.5\")   Wt 76.6 kg (168 lb 12.8 oz)   BMI 29.43 kg/m²     Objective    Physical Exam  Constitutional:       Appearance: She is well-developed.   HENT:      Head: Normocephalic and atraumatic.   Eyes:      Conjunctiva/sclera: Conjunctivae normal.      Pupils: Pupils are equal, round, and reactive to light.   Neck:      Thyroid: No thyromegaly.   Cardiovascular:      Rate and Rhythm: Normal rate and regular rhythm.      Heart sounds: Normal heart sounds. No murmur heard.      Pulmonary:      Effort: Pulmonary effort is normal. "      Breath sounds: Normal breath sounds. No wheezing.   Abdominal:      General: Bowel sounds are normal. There is no distension.      Palpations: Abdomen is soft. There is no mass.      Tenderness: There is no abdominal tenderness.      Hernia: No hernia is present.   Genitourinary:     Comments: No lesions noted  Musculoskeletal:         General: No tenderness. Normal range of motion.      Cervical back: Normal range of motion and neck supple.   Lymphadenopathy:      Cervical: No cervical adenopathy.   Skin:     General: Skin is warm and dry.      Findings: No rash.   Neurological:      Mental Status: She is alert and oriented to person, place, and time.      Cranial Nerves: No cranial nerve deficit.   Psychiatric:         Thought Content: Thought content normal.       Admission on 12/28/2021, Discharged on 12/28/2021   Component Date Value Ref Range Status   • Glucose 12/28/2021 126  70 - 130 mg/dL Final    [id number] [id number]   • Case Report 12/28/2021    Final                    Value:Surgical Pathology Report                         Case: [id number]                                  Authorizing Provider:  Samra Castillo MD      Collected:           12/28/2021 02:23 PM          Ordering Location:     [facility]   Received:            12/29/2021 06:49 AM                                 [facility] [facility]                                                     Pathologist:           Dennis Chairez MD                                                           Specimens:   1) - Small Intestine, Duodenum, CJ                                                                  2) - Gastric, Antrum, CJ                                                                            3) - Esophagus, EGJ   CJ                                                                  • Final Diagnosis 12/28/2021    Final                    Value:This result contains rich text  formatting which cannot be displayed here.     Assessment/Plan      1. Pain of upper abdomen    1.  Epigastric pain, improving but persistent.  Rule out pancreaticobiliary pathology.  Continue Prilosec po twice daily.  Continue Pepcid nightly.  Continue Tums as needed.    Add Carafate 1 g p.o. 4 times daily.  Obtain CT abdomen.  2.  Longstanding GERD with symptoms well controlled with Prilosec, continue Prilosec and antireflux lifestyle.  3.  Nausea,  likely due to reactive gastropathy.  Continue PPI and Pepcid.  Add Carafate. Gastric emptying scan if continues.  4.  High BMI, recommend exercise and diet control.      Orders placed during this encounter include:  Orders Placed This Encounter   Procedures   • CT Abdomen With Contrast     Standing Status:   Future     Standing Expiration Date:   1/25/2023     Order Specific Question:   Will Oral Contrast be needed for this procedure?     Answer:   Yes     Order Specific Question:   Release to patient     Answer:   Immediate       * Surgery not found *    Review and/or summary of lab tests, radiology, procedures, medications. Review and summary of old records and obtaining of history. The risks and benefits of my recommendations, as well as other treatment options were discussed with the patient today. Questions were answered.    New Medications Ordered This Visit   Medications   • sucralfate (Carafate) 1 g tablet     Sig: Take 1 tablet by mouth 4 (Four) Times a Day for 30 days.     Dispense:  120 tablet     Refill:  4       Follow-up: No follow-ups on file.               Results for orders placed or performed during the hospital encounter of 12/28/21   Tissue Pathology Exam    Specimen: A: Small Intestine, Duodenum; Tissue    B: Gastric, Antrum; Tissue    C: Esophagus; Tissue   Result Value Ref Range    Case Report       Surgical Pathology Report                         Case: [id number]                                  Authorizing Provider:  Samra Castillo MD       Collected:           12/28/2021 02:23 PM          Ordering Location:     [facility]   Received:            12/29/2021 06:49 AM                                 [place] [facility]                                                     Pathologist:           Dennis Chairez MD                                                           Specimens:   1) - Small Intestine, Duodenum, CJ                                                                  2) - Gastric, Antrum, CJ                                                                            3) - Esophagus, EGJ   CJ                                                                   Final Diagnosis       SEE SCANNED REPORT       POC Glucose Once    Specimen: Blood   Result Value Ref Range    Glucose 126 70 - 130 mg/dL   Results for orders placed or performed in visit on 12/27/21   COVID-19, [facility] IN-HOUSE, NP SWAB IN TRANSPORT MEDIA 8-10 HR TAT - Swab, Nasopharynx    Specimen: Nasopharynx; Swab   Result Value Ref Range    COVID19 Not Detected Not Detected - Ref. Range   Results for orders placed or performed in visit on 12/07/21   CBC Auto Differential    Specimen: Blood   Result Value Ref Range    WBC 5.63 3.40 - 10.80 10*3/mm3    RBC 4.28 3.77 - 5.28 10*6/mm3    Hemoglobin 12.6 12.0 - 15.9 g/dL    Hematocrit 38.9 34.0 - 46.6 %    MCV 90.9 79.0 - 97.0 fL    MCH 29.4 26.6 - 33.0 pg    MCHC 32.4 31.5 - 35.7 g/dL    RDW 12.6 12.3 - 15.4 %    RDW-SD 40.8 37.0 - 54.0 fl    MPV 10.0 6.0 - 12.0 fL    Platelets 306 140 - 450 10*3/mm3    Neutrophil % 58.8 42.7 - 76.0 %    Lymphocyte % 28.4 19.6 - 45.3 %    Monocyte % 8.3 5.0 - 12.0 %    Eosinophil % 3.4 0.3 - 6.2 %    Basophil % 1.1 0.0 - 1.5 %    Neutrophils, Absolute 3.31 1.70 - 7.00 10*3/mm3    Lymphocytes, Absolute 1.60 0.70 - 3.10 10*3/mm3    Monocytes, Absolute 0.47 0.10 - 0.90 10*3/mm3    Eosinophils, Absolute 0.19 0.00 - 0.40 10*3/mm3    Basophils, Absolute 0.06 0.00 - 0.20 10*3/mm3   Iron Profile     Specimen: Blood   Result Value Ref Range    Iron 80 37 - 145 mcg/dL    Iron Saturation 16 (L) 20 - 50 %    Transferrin 339 200 - 360 mg/dL    TIBC 505 298 - 536 mcg/dL   Microalbumin / Creatinine Urine Ratio - Urine, Clean Catch    Specimen: Urine, Clean Catch   Result Value Ref Range    Microalbumin/Creatinine Ratio      Creatinine, Urine 88.8 mg/dL    Microalbumin, Urine <1.2 mg/dL   Vitamin D 25 Hydroxy    Specimen: Blood   Result Value Ref Range    25 Hydroxy, Vitamin D 48.0 30.0 - 100.0 ng/ml   Triglycerides    Specimen: Blood   Result Value Ref Range    Triglycerides 127 <=150 mg/dL   LDL Cholesterol, Direct    Specimen: Blood   Result Value Ref Range    LDL Cholesterol  87 0 - 100 mg/dL   Hemoglobin A1c    Specimen: Blood   Result Value Ref Range    Hemoglobin A1C 7.13 (H) 4.80 - 5.60 %   Ferritin    Specimen: Blood   Result Value Ref Range    Ferritin 51.40 13.00 - 150.00 ng/mL   Vitamin B12    Specimen: Blood   Result Value Ref Range    Vitamin B-12 926 211 - 946 pg/mL   Comprehensive Metabolic Panel    Specimen: Blood   Result Value Ref Range    Glucose 173 (H) 70 - 99 mg/dL    BUN 28 (H) 7 - 23 mg/dL    Creatinine 0.94 0.52 - 1.04 mg/dL    Sodium 136 (L) 137 - 145 mmol/L    Potassium 4.2 3.4 - 5.0 mmol/L    Chloride 102 101 - 112 mmol/L    CO2 26.0 22.0 - 30.0 mmol/L    Calcium 9.4 8.4 - 10.2 mg/dL    Total Protein 7.3 6.3 - 8.6 g/dL    Albumin 4.30 3.50 - 5.00 g/dL    ALT (SGPT) 31 <=35 U/L    AST (SGOT) 46 (H) 14 - 36 U/L    Alkaline Phosphatase 74 38 - 126 U/L    Total Bilirubin 0.6 0.2 - 1.3 mg/dL    eGFR Non African Amer 58 39 - 90 mL/min/1.73    Globulin 3.0 2.3 - 3.5 gm/dL    A/G Ratio 1.4 1.1 - 1.8 g/dL    BUN/Creatinine Ratio 29.8 (H) 7.0 - 25.0    Anion Gap 8.0 5.0 - 15.0 mmol/L   Results for orders placed or performed in visit on 05/24/21   CBC Auto Differential    Specimen: Blood   Result Value Ref Range    WBC 6.51 3.40 - 10.80 10*3/mm3    RBC 4.27 3.77 - 5.28 10*6/mm3    Hemoglobin 13.2 12.0  - 15.9 g/dL    Hematocrit 38.4 34.0 - 46.6 %    MCV 89.9 79.0 - 97.0 fL    MCH 30.9 26.6 - 33.0 pg    MCHC 34.4 31.5 - 35.7 g/dL    RDW 12.0 (L) 12.3 - 15.4 %    RDW-SD 38.4 37.0 - 54.0 fl    MPV 9.7 6.0 - 12.0 fL    Platelets 322 140 - 450 10*3/mm3    Neutrophil % 63.7 42.7 - 76.0 %    Lymphocyte % 26.0 19.6 - 45.3 %    Monocyte % 7.2 5.0 - 12.0 %    Eosinophil % 2.2 0.3 - 6.2 %    Basophil % 0.9 0.0 - 1.5 %    Neutrophils, Absolute 4.15 1.70 - 7.00 10*3/mm3    Lymphocytes, Absolute 1.69 0.70 - 3.10 10*3/mm3    Monocytes, Absolute 0.47 0.10 - 0.90 10*3/mm3    Eosinophils, Absolute 0.14 0.00 - 0.40 10*3/mm3    Basophils, Absolute 0.06 0.00 - 0.20 10*3/mm3   Hepatitis C Antibody    Specimen: Blood   Result Value Ref Range    Hepatitis C Ab Non-Reactive Non-Reactive   Iron Profile    Specimen: Blood   Result Value Ref Range    Iron 75 37 - 145 mcg/dL    Iron Saturation 15 (L) 20 - 50 %    Transferrin 332 200 - 360 mg/dL    TIBC 495 298 - 536 mcg/dL   Vitamin D 25 Hydroxy    Specimen: Blood   Result Value Ref Range    25 Hydroxy, Vitamin D 44.4 30.0 - 100.0 ng/ml   Hemoglobin A1c    Specimen: Blood   Result Value Ref Range    Hemoglobin A1C 7.23 (H) 4.80 - 5.60 %   Ferritin    Specimen: Blood   Result Value Ref Range    Ferritin 72.10 13.00 - 150.00 ng/mL   Vitamin B12    Specimen: Blood   Result Value Ref Range    Vitamin B-12 906 211 - 946 pg/mL   Lipid Panel    Specimen: Blood   Result Value Ref Range    Total Cholesterol 183 150 - 200 mg/dL    Triglycerides 197 (H) <=150 mg/dL    HDL Cholesterol 48 40 - 59 mg/dL    LDL Cholesterol  101 (H) <=100 mg/dL    VLDL Cholesterol 34 5 - 40 mg/dL    LDL/HDL Ratio 1.99 0.00 - 3.22   Comprehensive Metabolic Panel    Specimen: Blood   Result Value Ref Range    Glucose 165 (H) 70 - 99 mg/dL    BUN 28 (H) 7 - 23 mg/dL    Creatinine 0.99 0.52 - 1.04 mg/dL    Sodium 135 (L) 137 - 145 mmol/L    Potassium 4.4 3.4 - 5.0 mmol/L    Chloride 99 (L) 101 - 112 mmol/L    CO2 27.0 22.0 -  30.0 mmol/L    Calcium 10.0 8.4 - 10.2 mg/dL    Total Protein 7.1 6.3 - 8.6 g/dL    Albumin 4.20 3.50 - 5.00 g/dL    ALT (SGPT) 25 <=35 U/L    AST (SGOT) 27 14 - 36 U/L    Alkaline Phosphatase 72 38 - 126 U/L    Total Bilirubin 0.3 0.2 - 1.3 mg/dL    eGFR Non African Amer 55 39 - 90 mL/min/1.73    Globulin 2.9 2.3 - 3.5 gm/dL    A/G Ratio 1.4 1.1 - 1.8 g/dL    BUN/Creatinine Ratio 28.3 (H) 7.0 - 25.0    Anion Gap 9.0 5.0 - 15.0 mmol/L     *Note: Due to a large number of results and/or encounters for the requested time period, some results have not been displayed. A complete set of results can be found in Results Review.         This document has been electronically signed by Samra Castillo MD on February 12, 2022 20:48 CST

## 2022-02-15 ENCOUNTER — HOSPITAL ENCOUNTER (OUTPATIENT)
Dept: CT IMAGING | Facility: HOSPITAL | Age: 72
Discharge: HOME | End: 2022-02-15

## 2022-02-15 ENCOUNTER — LAB (OUTPATIENT)
Dept: LAB | Facility: HOSPITAL | Age: 72
End: 2022-02-15

## 2022-02-15 DIAGNOSIS — R10.10 UPPER ABDOMINAL PAIN: ICD-10-CM

## 2022-02-15 DIAGNOSIS — R10.10 PAIN OF UPPER ABDOMEN: ICD-10-CM

## 2022-02-15 LAB
BUN SERPL-MCNC: 28 MG/DL (ref 8–23)
CREAT SERPL-MCNC: 0.97 MG/DL (ref 0.57–1)
GFR SERPL CREATININE-BSD FRML MDRD: 56 ML/MIN/1.73

## 2022-02-15 PROCEDURE — 36415 COLL VENOUS BLD VENIPUNCTURE: CPT

## 2022-02-15 PROCEDURE — 25010000002 IOPAMIDOL 61 % SOLUTION: Performed by: INTERNAL MEDICINE

## 2022-02-15 PROCEDURE — 82565 ASSAY OF CREATININE: CPT

## 2022-02-15 PROCEDURE — 84520 ASSAY OF UREA NITROGEN: CPT

## 2022-02-15 PROCEDURE — 74160 CT ABDOMEN W/CONTRAST: CPT

## 2022-02-15 RX ORDER — SEMAGLUTIDE 1.34 MG/ML
1 INJECTION, SOLUTION SUBCUTANEOUS WEEKLY
Qty: 6 PEN | Refills: 3 | Status: SHIPPED | OUTPATIENT
Start: 2022-02-15

## 2022-02-15 RX ADMIN — IOPAMIDOL 90 ML: 612 INJECTION, SOLUTION INTRAVENOUS at 10:00

## 2022-02-22 ENCOUNTER — OFFICE VISIT (OUTPATIENT)
Dept: GASTROENTEROLOGY | Facility: CLINIC | Age: 72
End: 2022-02-22

## 2022-02-22 VITALS
WEIGHT: 168.4 LBS | HEIGHT: 64 IN | HEART RATE: 85 BPM | DIASTOLIC BLOOD PRESSURE: 76 MMHG | BODY MASS INDEX: 28.75 KG/M2 | SYSTOLIC BLOOD PRESSURE: 125 MMHG

## 2022-02-22 DIAGNOSIS — K59.09 OTHER CONSTIPATION: Primary | ICD-10-CM

## 2022-02-22 DIAGNOSIS — R10.10 PAIN OF UPPER ABDOMEN: ICD-10-CM

## 2022-02-22 PROCEDURE — 99214 OFFICE O/P EST MOD 30 MIN: CPT | Performed by: INTERNAL MEDICINE

## 2022-02-22 NOTE — PATIENT INSTRUCTIONS
MyPlate from USDA    MyPlate is an outline of a general healthy diet based on the 2010 Dietary Guidelines for Americans, from the U.S. Department of Agriculture (USDA). It sets guidelines for how much food you should eat from each food group based on your age, sex, and level of physical activity.  What are tips for following MyPlate?  To follow MyPlate recommendations:  · Eat a wide variety of fruits and vegetables, grains, and protein foods.  · Serve smaller portions and eat less food throughout the day.  · Limit portion sizes to avoid overeating.  · Enjoy your food.  · Get at least 150 minutes of exercise every week. This is about 30 minutes each day, 5 or more days per week.  It can be difficult to have every meal look like MyPlate. Think about MyPlate as eating guidelines for an entire day, rather than each individual meal.  Fruits and vegetables  · Make half of your plate fruits and vegetables.  · Eat many different colors of fruits and vegetables each day.  · For a 2,000 calorie daily food plan, eat:  ? 2½ cups of vegetables every day.  ? 2 cups of fruit every day.  · 1 cup is equal to:  ? 1 cup raw or cooked vegetables.  ? 1 cup raw fruit.  ? 1 medium-sized orange, apple, or banana.  ? 1 cup 100% fruit or vegetable juice.  ? 2 cups raw leafy greens, such as lettuce, spinach, or kale.  ? ½ cup dried fruit.  Grains  · One fourth of your plate should be grains.  · Make at least half of the grains you eat each day whole grains.  · For a 2,000 calorie daily food plan, eat 6 oz of grains every day.  · 1 oz is equal to:  ? 1 slice bread.  ? 1 cup cereal.  ? ½ cup cooked rice, cereal, or pasta.  Protein  · One fourth of your plate should be protein.  · Eat a wide variety of protein foods, including meat, poultry, fish, eggs, beans, nuts, and tofu.  · For a 2,000 calorie daily food plan, eat 5½ oz of protein every day.  · 1 oz is equal to:  ? 1 oz meat, poultry, or fish.  ? ¼ cup cooked beans.  ? 1 egg.  ? ½ oz nuts  or seeds.  ? 1 Tbsp peanut butter.  Dairy  · Drink fat-free or low-fat (1%) milk.  · Eat or drink dairy as a side to meals.  · For a 2,000 calorie daily food plan, eat or drink 3 cups of dairy every day.  · 1 cup is equal to:  ? 1 cup milk, yogurt, cottage cheese, or soy milk (soy beverage).  ? 2 oz processed cheese.  ? 1½ oz natural cheese.  Fats, oils, salt, and sugars  · Only small amounts of oils are recommended.  · Avoid foods that are high in calories and low in nutritional value (empty calories), like foods high in fat or added sugars.  · Choose foods that are low in salt (sodium). Choose foods that have less than 140 milligrams (mg) of sodium per serving.  · Drink water instead of sugary drinks. Drink enough water each day to keep your urine pale yellow.  Where to find support  · Work with your health care provider or a nutrition specialist (dietitian) to develop a customized eating plan that is right for you.  · Download an katerin (mobile application) to help you track your daily food intake.  Where to find more information  · Go to ChooseMyPlate.gov for more information.  Summary  · MyPlate is a general guideline for healthy eating from the USDA. It is based on the 2010 Dietary Guidelines for Americans.  · In general, fruits and vegetables should take up ½ of your plate, grains should take up ¼ of your plate, and protein should take up ¼ of your plate.  This information is not intended to replace advice given to you by your health care provider. Make sure you discuss any questions you have with your health care provider.  Document Revised: 05/21/2020 Document Reviewed: 03/19/2018  Elsevier Patient Education © 2021 [employer]

## 2022-02-23 NOTE — PROGRESS NOTES
"Chief Complaint   Patient presents with   • CT Abdomen With Contrast Performed 02/15/2022       Subjective    Otilia Kenny is a 72 y.o. female.    History of Present Illness  Patient presented [facility] for follow-up visit today.  Feels better currently but abdominal pain is improving.  Has intermittent constipation.  Denied nausea, vomiting, diarrhea, rectal bleeding or weight loss.  CT abdomen pelvis was consistent with fatty liver disease, cholecystectomy and adrenal nodule.       The following portions of the patient's history were reviewed and updated as appropriate:   Past Medical History:   Diagnosis Date   • Abdominal pain     Most likely related to functional colonic spasm     • Abnormal liver enzymes     Improved, 3/2015      • Allergic rhinitis     seasonal   • Allergic rhinitis    • Anemia    • Anesthesia complication     states sometime B/P drops   • Arthritis    • Cervical disc disorder     S/P cervical surgeries x2-3. Extensive fusion C 3-7. [facility]      • Depression    • Diarrhea, unspecified     Resolved with discontinuation of metformin.    • Diverticular disease of colon    • Dysfunction of eustachian tube    • Essential hypertension     Increase lisinopril HCT, 10/12.5.      • Gastritis    • Generalized anxiety disorder    • GERD (gastroesophageal reflux disease)    • History of transfusion    • Hyperlipidemia     Intolerant of Lipitor. The patient stopped Zocor due to \"liver pain\", summer 2015. patient instructed to reattempt Zocor every other day, 10/2015    • Hyperlipidemia associated with type 2 diabetes mellitus (HCC) 11/25/2020   • Hypertriglyceridemia     Holding simvastatin 2/2015 due to slightly elevated LFTs.      • Iron deficiency anemia    • Irritable bowel syndrome     Dr. 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 Reynolds    • Sleep disorder    • Spasm of back muscles     Dr. Castro changed Soma to Zanaflex.     • Type 2 " diabetes mellitus with microalbuminuria, without long-term current use of insulin (HCC) 2018    Added automatically from request for surgery [id number]   • Vitamin B12 deficiency (dietary) anemia     Currently on multivitamin daily       Past Surgical History:   Procedure Laterality Date   • BACK SURGERY      Laminectomy   • BUNIONECTOMY     • CARPAL TUNNEL RELEASE Bilateral    • CERVICAL DISC SURGERY      Laminectomy . Fusion .   • CHOLECYSTECTOMY     • COLONOSCOPY      Diverticulosis found in the sigmoid colon.A single diminutive polyp found in the colon.Mul.biopsies taken.Hemorrhoids found in the anus.   • ENDOSCOPY      Normal hypopharynx and esophagus.Marked irregularity of the Z-line,compatable with chronic reflux.Mul.biopsies.A hiatus hernia found in GE junction.Mild nonerosive gastritis.Mul.biopsies.Polyps in fundus.Mul.biopsies.Normal pylorus.Normal duodenum.   • ENDOSCOPY N/A 2021    Procedure: ESOPHAGOGASTRODUODENOSCOPY;  Surgeon: Samra Castillo MD;  Location: [facility] ENDOSCOPY;  Service: Gastroenterology;  Laterality: N/A;   • ENDOSCOPY AND COLONOSCOPY     • FINGER/THUMB LESION/CYST EXCISION     • HEEL SPUR EXCISION     • HYSTERECTOMY     • JOINT REPLACEMENT Bilateral    • KNEE ARTHROSCOPY Bilateral     Arthroscopy of the left knee with debridement of medial meniscus.   • KNEE SURGERY      Right unicompartmental arthroplasty.   • TOTAL KNEE ARTHROPLASTY Left 2018    Procedure: TOTAL KNEE ARTHROPLASTY ATTUNE with adductor canal block ;  Surgeon: Baron Reynolds MD;  Location: [facility] OR;  Service: Orthopedics   • UPPER GASTROINTESTINAL ENDOSCOPY  2021    Dr. Samra Castillo M.D., [facility], [place], [place]     Family History   Problem Relation Age of Onset   • Cancer Other         lung   • Diabetes Other    • Hypertension Other    • Stroke Other    • Breast cancer Sister      OB History        4    Para   4    Term   4            AB         Living           SAB        IAB        Ectopic        Molar        Multiple        Live Births                  Prior to Admission medications    Medication Sig Start Date End Date Taking? Authorizing Provider   aspirin 81 MG EC tablet Take 81 mg by mouth Daily.   Yes Leila Orlando MD   Blood Glucose Monitoring Suppl (ONETOUCH VERIO IQ SYSTEM) w/Device kit 1 each Daily. 11/18/19  Yes Alexis Zabala MD   CALCIUM CARB-CHOLECALCIFEROL PO Take 1 tablet by mouth 2 (two) times a day.   Yes Leila Orlando MD   cetirizine (zyrTEC) 10 MG tablet Take 10 mg by mouth Daily.   Yes Leila Orlando MD   cholecalciferol (Vitamin D) 25 MCG (1000 UT) tablet Take 1,000 Units by mouth. 4 Times Per Week   Yes Alexis Zabala MD   citalopram (CeleXA) 20 MG tablet Take 1 tablet by mouth Daily. 5/11/21  Yes Alexis Zabala MD   ezetimibe (Zetia) 10 MG tablet Take 1 tablet by mouth Daily. 6/4/21  Yes Alexis Zabala MD   famotidine (PEPCID) 20 MG tablet Take 1 tablet by mouth Every Night. 6/4/21  Yes Alexis Zabala MD   Farxiga 10 MG tablet TAKE 1 TABLET DAILY        (REPLACES INVOKANA) 11/3/21  Yes Alexis Zabala MD   fenofibrate (TRICOR) 145 MG tablet Take 0.5-1 tablets by mouth Daily. 2/11/22  Yes Alexis Zabala MD   glucose blood (OneTouch Verio) test strip USE TO TEST DAILY AS       DIRECTED 6/30/21  Yes Alexis Zabala MD   Insulin Pen Needle 31G X 5 MM misc Use daily with Victoza 5/30/18  Yes Alexis Zabala MD   IRON, FERROUS SULFATE, PO Take 65 mg by mouth 2 (Two) Times a Week.   Yes Leila Orlando MD   Lancets (OneTouch Delica Plus [id number]) misc TEST ONCE DAILY AS DIRECTED 10/6/21  Yes Alexis Zabala MD   lisinopril (PRINIVIL,ZESTRIL) 2.5 MG tablet Take 1 tablet by mouth 2 (Two) Times a Day. 2/9/22  Yes Alexis Zabala MD   meclizine (ANTIVERT) 25 MG tablet Take 1 tablet by mouth 3 (Three) Times a Day As Needed for Dizziness. 10/26/21  Yes Alexis Zabala MD   minocycline (MINOCIN,DYNACIN) 100 MG capsule  Take 1 capsule by mouth Daily. 6/4/21  Yes Alexis Zabala MD   Multiple Vitamins-Minerals (MULTIVITAMIN PO) Take 1 tablet by mouth daily.   Yes Leila Orlando MD   multivitamin with minerals (HAIR/SKIN/NAILS PO) Take 2 tablets by mouth Daily. Hair skin and nails   Yes Leila Orlando MD   nateglinide (Starlix) 120 MG tablet 1 tab po before lunch and supper 6/4/21  Yes Alexis Zabala MD   ondansetron ODT (Zofran ODT) 4 MG disintegrating tablet Place 1 tablet on the tongue Every 8 (Eight) Hours As Needed for Nausea or Vomiting. 10/26/21  Yes Alexis Zabala MD   rosuvastatin (CRESTOR) 20 MG tablet TAKE 1 TABLET DAILY.       REPLACE SIMVASTATIN 7/9/21  Yes Alexis Zabala MD   Semaglutide, 1 MG/DOSE, (Ozempic, 1 MG/DOSE,) 2 MG/1.5ML solution pen-injector Inject 1 mg under the skin into the appropriate area as directed 1 (One) Time Per Week. 2/15/22  Yes Alexis Zabala MD   sucralfate (Carafate) 1 g tablet Take 1 tablet by mouth 4 (Four) Times a Day for 30 days. 1/25/22 2/24/22 Yes Samra Castillo MD   TiZANidine (ZANAFLEX) 4 MG capsule TAKE 1 CAPSULE 2 TIMES     DAILY AS NEEDED FOR MUSCLE SPASM (REPLACES SKELAXIN) 8/11/21  Yes Alexis Zabala MD   NON FORMULARY Take 1 tablet by mouth Daily. Eye Health and Lutein  2/22/22  Leila Orlando MD   VITAMIN D PO Take  by mouth.  2/22/22  Leila Orlando MD     Allergies   Allergen Reactions   • Allegra [Fexofenadine] Itching   • Amaryl [Glimepiride] Myalgia   • Dyazide [Triamterene-Hctz] Itching   • Hydrochlorothiazide W-Triamterene Itching   • Lipitor [Atorvastatin] Myalgia   • Metformin And Related Diarrhea   • Naproxen Itching   • Actos [Pioglitazone] Rash   • Sulfamethoxazole-Trimethoprim Rash     dizziness     Social History     Socioeconomic History   • Marital status:    Tobacco Use   • Smoking status: Former Smoker     Packs/day: 1.00     Years: 10.00     Pack years: 10.00     Types: Cigarettes     Quit date: 6/2/1985     Years since  "quittin.7   • Smokeless tobacco: Never Used   Vaping Use   • Vaping Use: Never used   Substance and Sexual Activity   • Alcohol use: No   • Drug use: No   • Sexual activity: Defer       Review of Systems  Review of Systems   Constitutional: Negative for chills, fatigue, fever and unexpected weight change.   HENT: Negative for congestion, ear discharge, hearing loss, nosebleeds and sore throat.    Eyes: Negative for pain, discharge and redness.   Respiratory: Negative for cough, chest tightness, shortness of breath and wheezing.    Cardiovascular: Negative for chest pain and palpitations.   Gastrointestinal: Positive for abdominal pain and constipation. Negative for abdominal distention, blood in stool, diarrhea, nausea and vomiting.   Endocrine: Negative for cold intolerance, polydipsia, polyphagia and polyuria.   Genitourinary: Negative for dysuria, flank pain, frequency, hematuria and urgency.   Musculoskeletal: Negative for arthralgias, back pain, joint swelling and myalgias.   Skin: Negative for color change, pallor and rash.   Neurological: Negative for tremors, seizures, syncope, weakness and headaches.   Hematological: Negative for adenopathy. Does not bruise/bleed easily.   Psychiatric/Behavioral: Negative for behavioral problems, confusion, dysphoric mood, hallucinations and suicidal ideas. The patient is not nervous/anxious.         /76   Pulse 85   Ht 161.3 cm (63.5\")   Wt 76.4 kg (168 lb 6.4 oz)   BMI 29.36 kg/m²     Objective    Physical Exam  Constitutional:       Appearance: She is well-developed.   HENT:      Head: Normocephalic and atraumatic.   Eyes:      Conjunctiva/sclera: Conjunctivae normal.      Pupils: Pupils are equal, round, and reactive to light.   Neck:      Thyroid: No thyromegaly.   Cardiovascular:      Rate and Rhythm: Normal rate and regular rhythm.      Heart sounds: Normal heart sounds. No murmur heard.      Pulmonary:      Effort: Pulmonary effort is normal.      " Breath sounds: Normal breath sounds. No wheezing.   Abdominal:      General: Bowel sounds are normal. There is no distension.      Palpations: Abdomen is soft. There is no mass.      Tenderness: There is no abdominal tenderness.      Hernia: No hernia is present.   Genitourinary:     Comments: No lesions noted  Musculoskeletal:         General: No tenderness. Normal range of motion.      Cervical back: Normal range of motion and neck supple.   Lymphadenopathy:      Cervical: No cervical adenopathy.   Skin:     General: Skin is warm and dry.      Findings: No rash.   Neurological:      Mental Status: She is alert and oriented to person, place, and time.      Cranial Nerves: No cranial nerve deficit.   Psychiatric:         Thought Content: Thought content normal.       Lab on 02/15/2022   Component Date Value Ref Range Status   • BUN 02/15/2022 28* 8 - 23 mg/dL Final   • Creatinine 02/15/2022 0.97  0.57 - 1.00 mg/dL Final   • eGFR Non  Amer 02/15/2022 56* >60 mL/min/1.73 Final     Assessment/Plan    No diagnosis found..   1.  Epigastric pain, improving. Continue Prilosec po twice daily, Pepcid nightly,Tums as needed and Carafate 1 g p.o. 4 times daily.  Resolved.  2.  Longstanding GERD with symptoms well controlled with Prilosec, continue Prilosec and antireflux lifestyle.  3.  Nausea,  likely due to reactive gastropathy.  Continue PPI, Pepcid and Carafate. Gastric emptying scan if continues.  4.  Constipation, add high-fiber diet and MiraLAX.  5.  Adrenal nodule, recommend evaluation by Dr. Zabala.  6.  High BMI, recommend exercise and diet control.    Orders placed during this encounter include:  No orders of the defined types were placed in this encounter.      * Surgery not found *    Review and/or summary of lab tests, radiology, procedures, medications. Review and summary of old records and obtaining of history. The risks and benefits of my recommendations, as well as other treatment options were discussed  with the patient today. Questions were answered.    No orders of the defined types were placed in this encounter.      Follow-up: Return in about 3 months (around 5/22/2022).               Results for orders placed or performed in visit on 02/15/22   BUN    Specimen: Blood   Result Value Ref Range    BUN 28 (H) 8 - 23 mg/dL   Creatinine, Serum    Specimen: Blood   Result Value Ref Range    Creatinine 0.97 0.57 - 1.00 mg/dL    eGFR Non African Amer 56 (L) >60 mL/min/1.73   Results for orders placed or performed during the hospital encounter of 12/28/21   Tissue Pathology Exam    Specimen: A: Small Intestine, Duodenum; Tissue    B: Gastric, Antrum; Tissue    C: Esophagus; Tissue   Result Value Ref Range    Case Report       Surgical Pathology Report                         Case: [id number]                                  Authorizing Provider:  Samra Castillo MD      Collected:           12/28/2021 02:23 PM          Ordering Location:     [facility]   Received:            12/29/2021 06:49 AM                                 [place] [facility]                                                     Pathologist:           Dennis Chairez MD                                                           Specimens:   1) - Small Intestine, Duodenum, CJ                                                                  2) - Gastric, Antrum, CJ                                                                            3) - Esophagus, EGJ   CJ                                                                   Final Diagnosis       SEE SCANNED REPORT       POC Glucose Once    Specimen: Blood   Result Value Ref Range    Glucose 126 70 - 130 mg/dL   Results for orders placed or performed in visit on 12/27/21   COVID-19, [facility] IN-HOUSE, NP SWAB IN TRANSPORT MEDIA 8-10 HR TAT - Swab, Nasopharynx    Specimen: Nasopharynx; Swab   Result Value Ref Range    COVID19 Not Detected Not Detected - Ref. Range   Results for  orders placed or performed in visit on 12/07/21   CBC Auto Differential    Specimen: Blood   Result Value Ref Range    WBC 5.63 3.40 - 10.80 10*3/mm3    RBC 4.28 3.77 - 5.28 10*6/mm3    Hemoglobin 12.6 12.0 - 15.9 g/dL    Hematocrit 38.9 34.0 - 46.6 %    MCV 90.9 79.0 - 97.0 fL    MCH 29.4 26.6 - 33.0 pg    MCHC 32.4 31.5 - 35.7 g/dL    RDW 12.6 12.3 - 15.4 %    RDW-SD 40.8 37.0 - 54.0 fl    MPV 10.0 6.0 - 12.0 fL    Platelets 306 140 - 450 10*3/mm3    Neutrophil % 58.8 42.7 - 76.0 %    Lymphocyte % 28.4 19.6 - 45.3 %    Monocyte % 8.3 5.0 - 12.0 %    Eosinophil % 3.4 0.3 - 6.2 %    Basophil % 1.1 0.0 - 1.5 %    Neutrophils, Absolute 3.31 1.70 - 7.00 10*3/mm3    Lymphocytes, Absolute 1.60 0.70 - 3.10 10*3/mm3    Monocytes, Absolute 0.47 0.10 - 0.90 10*3/mm3    Eosinophils, Absolute 0.19 0.00 - 0.40 10*3/mm3    Basophils, Absolute 0.06 0.00 - 0.20 10*3/mm3   Iron Profile    Specimen: Blood   Result Value Ref Range    Iron 80 37 - 145 mcg/dL    Iron Saturation 16 (L) 20 - 50 %    Transferrin 339 200 - 360 mg/dL    TIBC 505 298 - 536 mcg/dL   Microalbumin / Creatinine Urine Ratio - Urine, Clean Catch    Specimen: Urine, Clean Catch   Result Value Ref Range    Microalbumin/Creatinine Ratio      Creatinine, Urine 88.8 mg/dL    Microalbumin, Urine <1.2 mg/dL   Vitamin D 25 Hydroxy    Specimen: Blood   Result Value Ref Range    25 Hydroxy, Vitamin D 48.0 30.0 - 100.0 ng/ml   Triglycerides    Specimen: Blood   Result Value Ref Range    Triglycerides 127 <=150 mg/dL   LDL Cholesterol, Direct    Specimen: Blood   Result Value Ref Range    LDL Cholesterol  87 0 - 100 mg/dL   Hemoglobin A1c    Specimen: Blood   Result Value Ref Range    Hemoglobin A1C 7.13 (H) 4.80 - 5.60 %   Ferritin    Specimen: Blood   Result Value Ref Range    Ferritin 51.40 13.00 - 150.00 ng/mL   Vitamin B12    Specimen: Blood   Result Value Ref Range    Vitamin B-12 926 211 - 946 pg/mL   Comprehensive Metabolic Panel    Specimen: Blood   Result Value Ref  Range    Glucose 173 (H) 70 - 99 mg/dL    BUN 28 (H) 7 - 23 mg/dL    Creatinine 0.94 0.52 - 1.04 mg/dL    Sodium 136 (L) 137 - 145 mmol/L    Potassium 4.2 3.4 - 5.0 mmol/L    Chloride 102 101 - 112 mmol/L    CO2 26.0 22.0 - 30.0 mmol/L    Calcium 9.4 8.4 - 10.2 mg/dL    Total Protein 7.3 6.3 - 8.6 g/dL    Albumin 4.30 3.50 - 5.00 g/dL    ALT (SGPT) 31 <=35 U/L    AST (SGOT) 46 (H) 14 - 36 U/L    Alkaline Phosphatase 74 38 - 126 U/L    Total Bilirubin 0.6 0.2 - 1.3 mg/dL    eGFR Non African Amer 58 39 - 90 mL/min/1.73    Globulin 3.0 2.3 - 3.5 gm/dL    A/G Ratio 1.4 1.1 - 1.8 g/dL    BUN/Creatinine Ratio 29.8 (H) 7.0 - 25.0    Anion Gap 8.0 5.0 - 15.0 mmol/L   Results for orders placed or performed in visit on 05/24/21   CBC Auto Differential    Specimen: Blood   Result Value Ref Range    WBC 6.51 3.40 - 10.80 10*3/mm3    RBC 4.27 3.77 - 5.28 10*6/mm3    Hemoglobin 13.2 12.0 - 15.9 g/dL    Hematocrit 38.4 34.0 - 46.6 %    MCV 89.9 79.0 - 97.0 fL    MCH 30.9 26.6 - 33.0 pg    MCHC 34.4 31.5 - 35.7 g/dL    RDW 12.0 (L) 12.3 - 15.4 %    RDW-SD 38.4 37.0 - 54.0 fl    MPV 9.7 6.0 - 12.0 fL    Platelets 322 140 - 450 10*3/mm3    Neutrophil % 63.7 42.7 - 76.0 %    Lymphocyte % 26.0 19.6 - 45.3 %    Monocyte % 7.2 5.0 - 12.0 %    Eosinophil % 2.2 0.3 - 6.2 %    Basophil % 0.9 0.0 - 1.5 %    Neutrophils, Absolute 4.15 1.70 - 7.00 10*3/mm3    Lymphocytes, Absolute 1.69 0.70 - 3.10 10*3/mm3    Monocytes, Absolute 0.47 0.10 - 0.90 10*3/mm3    Eosinophils, Absolute 0.14 0.00 - 0.40 10*3/mm3    Basophils, Absolute 0.06 0.00 - 0.20 10*3/mm3   Hepatitis C Antibody    Specimen: Blood   Result Value Ref Range    Hepatitis C Ab Non-Reactive Non-Reactive   Iron Profile    Specimen: Blood   Result Value Ref Range    Iron 75 37 - 145 mcg/dL    Iron Saturation 15 (L) 20 - 50 %    Transferrin 332 200 - 360 mg/dL    TIBC 495 298 - 536 mcg/dL   Vitamin D 25 Hydroxy    Specimen: Blood   Result Value Ref Range    25 Hydroxy, Vitamin D 44.4 30.0  - 100.0 ng/ml   Hemoglobin A1c    Specimen: Blood   Result Value Ref Range    Hemoglobin A1C 7.23 (H) 4.80 - 5.60 %   Ferritin    Specimen: Blood   Result Value Ref Range    Ferritin 72.10 13.00 - 150.00 ng/mL   Vitamin B12    Specimen: Blood   Result Value Ref Range    Vitamin B-12 906 211 - 946 pg/mL   Lipid Panel    Specimen: Blood   Result Value Ref Range    Total Cholesterol 183 150 - 200 mg/dL    Triglycerides 197 (H) <=150 mg/dL    HDL Cholesterol 48 40 - 59 mg/dL    LDL Cholesterol  101 (H) <=100 mg/dL    VLDL Cholesterol 34 5 - 40 mg/dL    LDL/HDL Ratio 1.99 0.00 - 3.22   Comprehensive Metabolic Panel    Specimen: Blood   Result Value Ref Range    Glucose 165 (H) 70 - 99 mg/dL    BUN 28 (H) 7 - 23 mg/dL    Creatinine 0.99 0.52 - 1.04 mg/dL    Sodium 135 (L) 137 - 145 mmol/L    Potassium 4.4 3.4 - 5.0 mmol/L    Chloride 99 (L) 101 - 112 mmol/L    CO2 27.0 22.0 - 30.0 mmol/L    Calcium 10.0 8.4 - 10.2 mg/dL    Total Protein 7.1 6.3 - 8.6 g/dL    Albumin 4.20 3.50 - 5.00 g/dL    ALT (SGPT) 25 <=35 U/L    AST (SGOT) 27 14 - 36 U/L    Alkaline Phosphatase 72 38 - 126 U/L    Total Bilirubin 0.3 0.2 - 1.3 mg/dL    eGFR Non African Amer 55 39 - 90 mL/min/1.73    Globulin 2.9 2.3 - 3.5 gm/dL    A/G Ratio 1.4 1.1 - 1.8 g/dL    BUN/Creatinine Ratio 28.3 (H) 7.0 - 25.0    Anion Gap 9.0 5.0 - 15.0 mmol/L     *Note: Due to a large number of results and/or encounters for the requested time period, some results have not been displayed. A complete set of results can be found in Results Review.         This document has been electronically signed by Samra Castillo MD on February 22, 2022 19:34 CST

## 2022-03-01 ENCOUNTER — OFFICE VISIT (OUTPATIENT)
Dept: FAMILY MEDICINE CLINIC | Facility: CLINIC | Age: 72
End: 2022-03-01

## 2022-03-01 VITALS
BODY MASS INDEX: 28.82 KG/M2 | WEIGHT: 168.8 LBS | DIASTOLIC BLOOD PRESSURE: 78 MMHG | HEIGHT: 64 IN | SYSTOLIC BLOOD PRESSURE: 120 MMHG | TEMPERATURE: 97.6 F | HEART RATE: 84 BPM

## 2022-03-01 DIAGNOSIS — E27.8 ADRENAL NODULE: ICD-10-CM

## 2022-03-01 DIAGNOSIS — K21.9 GASTROESOPHAGEAL REFLUX DISEASE WITHOUT ESOPHAGITIS: Primary | Chronic | ICD-10-CM

## 2022-03-01 DIAGNOSIS — F32.5 MAJOR DEPRESSION IN COMPLETE REMISSION: Chronic | ICD-10-CM

## 2022-03-01 DIAGNOSIS — I10 ESSENTIAL HYPERTENSION: Chronic | ICD-10-CM

## 2022-03-01 DIAGNOSIS — K29.30 CHRONIC SUPERFICIAL GASTRITIS WITHOUT BLEEDING: Chronic | ICD-10-CM

## 2022-03-01 DIAGNOSIS — E66.3 OVERWEIGHT (BMI 25.0-29.9): Chronic | ICD-10-CM

## 2022-03-01 DIAGNOSIS — F41.1 GENERALIZED ANXIETY DISORDER: Chronic | ICD-10-CM

## 2022-03-01 PROCEDURE — 99215 OFFICE O/P EST HI 40 MIN: CPT | Performed by: INTERNAL MEDICINE

## 2022-03-01 RX ORDER — OMEPRAZOLE 40 MG/1
40 CAPSULE, DELAYED RELEASE ORAL EVERY MORNING
Qty: 90 CAPSULE | Refills: 3 | COMMUNITY
Start: 2022-03-01 | End: 2022-06-15

## 2022-03-01 RX ORDER — DESVENLAFAXINE SUCCINATE 50 MG/1
50 TABLET, EXTENDED RELEASE ORAL DAILY
Qty: 10 TABLET | Refills: 0 | Status: SHIPPED | OUTPATIENT
Start: 2022-03-01 | End: 2022-04-14

## 2022-03-01 RX ORDER — DESVENLAFAXINE 100 MG/1
100 TABLET, EXTENDED RELEASE ORAL DAILY
Qty: 90 TABLET | Refills: 3 | Status: SHIPPED | OUTPATIENT
Start: 2022-03-01 | End: 2022-09-19 | Stop reason: DRUGHIGH

## 2022-03-01 NOTE — PROGRESS NOTES
"Chief Complaint  Abdominal Pain (has improved and has been seeing Dr. Castillo. She states Dr. Catsillo wanted her to f/u with PCP )    Subjective          History of Present Illness     Otilia Kenny presents to follow up on GI workup.  When she was in the office in December, she reported  breakthrough GERD symptoms despite taking Prilosec each morning and Pepcid each evening, for which we referred patient to Dr. Castillo, GI.   EGD was consistent with hiatal hernia, esophagitis and gastritis.  Path was consistent with reactive gastropathy.  CT abdomen pelvis 02/15/2022 was consistent with fatty liver disease, cholecystectomy and adrenal nodule.  Dr. Castillo increased Prilosec to b.i.d. for a short period as well as implementing a 1-month course of Carafate in addition to  Pepcid with Tums as needed.  She is now back to her original regimen taking Prilosec each morning and Pepcid each evening.  She states persistent daytime mild abdominal discomfort, although, mproved with nausea has mostly resolved.  She stopped taking a regular aspirin switching to enteric coated aspirin.  Family history of CVA in both parents.     There was incidental finding of bilateral adrenal nodules demonstrated on CT of the abdomen. A follow-up CT was scheduled for May to follow up.     Patient reports depression and PARMJIT symptoms are not adequately managed with Celexa 20 mg.  Her daughter has heart issues and son has chronic urticaria as well as her youngest sister being diagnosed with metastatic cancer.     Objective   Vital Signs:   /78   Pulse 84   Temp 97.6 °F (36.4 °C) (Tympanic)   Ht 161.3 cm (63.5\")   Wt 76.6 kg (168 lb 12.8 oz)   BMI 29.43 kg/m²       Physical Exam  Constitutional:       General: She is not in acute distress.     Appearance: She is well-developed.      Comments: Pleasant female, overweight.    HENT:      Head: Normocephalic and atraumatic.      Nose: Nose normal.      Mouth/Throat:      Pharynx: No " oropharyngeal exudate.   Eyes:      Pupils: Pupils are equal, round, and reactive to light.   Neck:      Thyroid: No thyromegaly.      Vascular: No JVD.   Cardiovascular:      Rate and Rhythm: Normal rate and regular rhythm.      Heart sounds: Normal heart sounds.   Pulmonary:      Effort: Pulmonary effort is normal. No accessory muscle usage or respiratory distress.      Breath sounds: Normal breath sounds. No wheezing or rales.   Abdominal:      General: Bowel sounds are normal. There is no distension.      Palpations: Abdomen is soft.      Tenderness: There is no abdominal tenderness.      Comments: Overweight abdomen.    Musculoskeletal:      Cervical back: Neck supple.   Lymphadenopathy:      Cervical: No cervical adenopathy.   Neurological:      Mental Status: She is alert and oriented to person, place, and time.      Cranial Nerves: No cranial nerve deficit.   Psychiatric:         Speech: Speech normal.         Behavior: Behavior normal.         Thought Content: Thought content normal.         Judgment: Judgment normal.          Result Review :     Common labs    Common Labsle 5/24/21 5/24/21 5/24/21 5/24/21 12/7/21 12/7/21 12/7/21 12/7/21 12/7/21 12/7/21 2/15/22 2/15/22    0857 0857 0857 0857 0824 0824 0824 0824 0824 0940 0845 0845   Glucose  165 (A)     173 (A)        BUN  28 (A)     28 (A)    28 (A)    Creatinine  0.99     0.94     0.97   eGFR Non African Am  55     58     56 (A)   Sodium  135 (A)     136 (A)        Potassium  4.4     4.2        Chloride  99 (A)     102        Calcium  10.0     9.4        Albumin  4.20     4.30        Total Bilirubin  0.3     0.6        Alkaline Phosphatase  72     74        AST (SGOT)  27     46 (A)        ALT (SGPT)  25     31        WBC 6.51    5.63          Hemoglobin 13.2    12.6          Hematocrit 38.4    38.9          Platelets 322    306          Total Cholesterol   183            Triglycerides   197 (A)   127         HDL Cholesterol   48            LDL  Cholesterol    101 (A)      87      Hemoglobin A1C    7.23 (A)    7.13 (A)       Microalbumin, Urine          <1.2     (A) Abnormal value            Data reviewed: Radiologic studies  CT abdomen pelvis 02/15/2022, Consultant notes Dr. Castillo, GI.  and GI studies EGD 12/2021          Assessment and Plan    Diagnoses and all orders for this visit:    1. Gastroesophageal reflux disease without esophagitis (Primary)    2. Chronic superficial gastritis without bleeding    3. Generalized anxiety disorder    4. Major depression in complete remission (HCC)    5. Adrenal nodule (HCC)    Other orders  -     desvenlafaxine (Pristiq) 100 MG 24 hr tablet; Take 1 tablet by mouth Daily.  Dispense: 90 tablet; Refill: 3  -     desvenlafaxine (Pristiq) 50 MG 24 hr tablet; Take 1 tablet by mouth Daily.  Dispense: 10 tablet; Refill: 0      I spent 41 minutes caring for Otilia on this date of service. This time includes time spent by me in the following activities:preparing for the visit, reviewing tests, obtaining and/or reviewing a separately obtained history, performing a medically appropriate examination and/or evaluation , counseling and educating the patient/family/caregiver, ordering medications, tests, or procedures and documenting information in the medical record     Continue the Prilosec each morning and Pepcid 20 mg q.h.s. taking OTC antacids as needed.  I offered a short course of Carafate, although, she feels she is managing okay for now with the current regimen.  Due to her family history of CVA, we will have patient continue on daily aspirin, which she has switched to enteric coated aspirin   Avoid high fat, spicy foods to prevent recurrence of the gastritis symptoms. . Keep follow up appointments as scheduled with Dr. Castillo.  She is scheduled for repeat CT in May to follow up on bilateral adrenal nodules demonstrated on CT of the abdomen    Pursue intensified diabetic diet, exercise, and weight loss to help with  hypertension, diabetes, and GERD issues.  We will need to watch her dose of Ozempic closely due to her upper GI issues, as this may be contributing.    For the increased PARMJIT/depression, we will transition from Celexa to Pristiq.   Starting tomorrow, patient will take Pristiq 50 mg q.d. with food and Celexa 20 mg 1/2 tablet daily. Prescription is sent for 10-day supply of Pristiq 50 mg and the higher dose of Pristiq 100 mg is sent to mail order.  In one week or when higher dose arrives by mail order, she will stop Celexa and start increased dose of Pristiq 100 mg q.d. with food.  She has Zofran to use for any nausea experienced while starting Pristiq.     PFollow Up   Return if symptoms worsen or fail to improve, for Next scheduled follow up.  Patient was given instructions and counseling regarding her condition or for health maintenance advice. Please see specific information pulled into the AVS if appropriate.

## 2022-03-04 ENCOUNTER — DOCUMENTATION (OUTPATIENT)
Dept: FAMILY MEDICINE CLINIC | Facility: CLINIC | Age: 72
End: 2022-03-04

## 2022-03-04 NOTE — PROGRESS NOTES
We received a PA request on Pristiq and I completed and faxed back. It will be scanned to media. TP

## 2022-03-30 ENCOUNTER — PRIOR AUTHORIZATION (OUTPATIENT)
Dept: FAMILY MEDICINE CLINIC | Facility: CLINIC | Age: 72
End: 2022-03-30

## 2022-03-30 NOTE — TELEPHONE ENCOUNTER
DRISS WATTS Key: [id number] - PA Case ID: [id number] - Rx #: [id number] March 29  Your request has been denied  Drug  Desvenlafaxine Succinate ER 50MG er tablets  Form  [employer] Electroni

## 2022-04-14 ENCOUNTER — OFFICE VISIT (OUTPATIENT)
Dept: PODIATRY | Facility: CLINIC | Age: 72
End: 2022-04-14

## 2022-04-14 VITALS — WEIGHT: 168.8 LBS | BODY MASS INDEX: 28.82 KG/M2 | HEART RATE: 81 BPM | HEIGHT: 64 IN | OXYGEN SATURATION: 97 %

## 2022-04-14 DIAGNOSIS — M79.674 PAIN OF TOE OF RIGHT FOOT: ICD-10-CM

## 2022-04-14 DIAGNOSIS — L60.0 INGROWN TOENAIL: ICD-10-CM

## 2022-04-14 DIAGNOSIS — M79.671 RIGHT FOOT PAIN: ICD-10-CM

## 2022-04-14 DIAGNOSIS — M19.079 ARTHRITIS OF MIDFOOT: Primary | ICD-10-CM

## 2022-04-14 PROCEDURE — 20600 DRAIN/INJ JOINT/BURSA W/O US: CPT | Performed by: PODIATRIST

## 2022-04-14 PROCEDURE — 99212 OFFICE O/P EST SF 10 MIN: CPT | Performed by: PODIATRIST

## 2022-04-14 RX ORDER — BETAMETHASONE SODIUM PHOSPHATE AND BETAMETHASONE ACETATE 3; 3 MG/ML; MG/ML
6 INJECTION, SUSPENSION INTRA-ARTICULAR; INTRALESIONAL; INTRAMUSCULAR; SOFT TISSUE ONCE
Status: COMPLETED | OUTPATIENT
Start: 2022-04-14 | End: 2022-04-14

## 2022-04-14 RX ADMIN — BETAMETHASONE SODIUM PHOSPHATE AND BETAMETHASONE ACETATE 6 MG: 3; 3 INJECTION, SUSPENSION INTRA-ARTICULAR; INTRALESIONAL; INTRAMUSCULAR; SOFT TISSUE at 15:35

## 2022-04-14 NOTE — PROGRESS NOTES
"Otilia Kenny  1950  72 y.o. female  PCP: Alexis Zabala MD: 12/14/2021  BS: 136 per pt     Patient presents to clinic today with the complaint of right foot pain and right hallux nail pain. Xrays obtained today.     04/14/2022  Chief Complaint   Patient presents with   • Right Foot - Follow-up           History of Present Illness    Otilia Kenny is a 72 y.o. female who presents for evaluation of recurrent dorsal right foot pain resultant from chronic midfoot arthritis.  Treated with corticosteroid injection and shoe gear modification at last visit which helped moderately.  Does note recent mild tenderness to palpation of inner edge of her right great toenail as well.  Past Medical History:   Diagnosis Date   • Abdominal pain     Most likely related to functional colonic spasm     • Abnormal liver enzymes     Improved, 3/2015      • Allergic rhinitis     seasonal   • Allergic rhinitis    • Anemia    • Anesthesia complication     states sometime B/P drops   • Arthritis    • Cervical disc disorder     S/P cervical surgeries x2-3. Extensive fusion C 3-7. [facility]      • Depression    • Diarrhea, unspecified     Resolved with discontinuation of metformin.    • Diverticular disease of colon    • Dysfunction of eustachian tube    • Essential hypertension     Increase lisinopril HCT, 10/12.5.      • Gastritis    • Generalized anxiety disorder    • GERD (gastroesophageal reflux disease)    • History of transfusion    • Hyperlipidemia     Intolerant of Lipitor. The patient stopped Zocor due to \"liver pain\", summer 2015. patient instructed to reattempt Zocor every other day, 10/2015    • Hyperlipidemia associated with type 2 diabetes mellitus (HCC) 11/25/2020   • Hypertriglyceridemia     Holding simvastatin 2/2015 due to slightly elevated LFTs.      • Iron deficiency anemia    • Irritable bowel syndrome     Dr. 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 Reynolds  "   • Sleep disorder    • Spasm of back muscles     Dr. Castro changed Soma to Zanaflex.     • Type 2 diabetes mellitus with microalbuminuria, without long-term current use of insulin ([facility]) 5/7/2018    Added automatically from request for surgery [id number]   • Vitamin B12 deficiency (dietary) anemia     Currently on multivitamin daily           Past Surgical History:   Procedure Laterality Date   • BACK SURGERY      Laminectomy   • BUNIONECTOMY     • CARPAL TUNNEL RELEASE Bilateral    • CERVICAL DISC SURGERY      Laminectomy 2011. Fusion 2013.   • CHOLECYSTECTOMY     • COLONOSCOPY      Diverticulosis found in the sigmoid colon.A single diminutive polyp found in the colon.Mul.biopsies taken.Hemorrhoids found in the anus.   • ENDOSCOPY      Normal hypopharynx and esophagus.Marked irregularity of the Z-line,compatable with chronic reflux.Mul.biopsies.A hiatus hernia found in GE junction.Mild nonerosive gastritis.Mul.biopsies.Polyps in fundus.Mul.biopsies.Normal pylorus.Normal duodenum.   • ENDOSCOPY N/A 12/28/2021    Procedure: ESOPHAGOGASTRODUODENOSCOPY;  Surgeon: Samra Castillo MD;  Location: [facility] ENDOSCOPY;  Service: Gastroenterology;  Laterality: N/A;   • ENDOSCOPY AND COLONOSCOPY     • FINGER/THUMB LESION/CYST EXCISION     • HEEL SPUR EXCISION     • HYSTERECTOMY     • JOINT REPLACEMENT Bilateral    • KNEE ARTHROSCOPY Bilateral     Arthroscopy of the left knee with debridement of medial meniscus.   • KNEE SURGERY      Right unicompartmental arthroplasty.   • TOTAL KNEE ARTHROPLASTY Left 6/4/2018    Procedure: TOTAL KNEE ARTHROPLASTY ATTUNE with adductor canal block ;  Surgeon: Baron Reynolds MD;  Location: [facility] OR;  Service: Orthopedics   • UPPER GASTROINTESTINAL ENDOSCOPY  12/28/2021    Dr. Samra Castillo M.D., [facility], [place], [place]         Family History   Problem Relation Age of Onset   • Cancer Other         lung   • Diabetes Other    • Hypertension Other    • Stroke Other    •  Breast cancer Sister          Social History     Socioeconomic History   • Marital status:    Tobacco Use   • Smoking status: Former Smoker     Packs/day: 1.00     Years: 10.00     Pack years: 10.00     Types: Cigarettes     Quit date: 1985     Years since quittin.8   • Smokeless tobacco: Never Used   Vaping Use   • Vaping Use: Never used   Substance and Sexual Activity   • Alcohol use: No   • Drug use: No   • Sexual activity: Defer         Current Outpatient Medications   Medication Sig Dispense Refill   • aspirin 81 MG EC tablet Take 81 mg by mouth Daily.     • Blood Glucose Monitoring Suppl (ONETOUCH VERIO IQ SYSTEM) w/Device kit 1 each Daily. 1 kit 0   • CALCIUM CARB-CHOLECALCIFEROL PO Take 1 tablet by mouth 2 (two) times a day.     • cetirizine (zyrTEC) 10 MG tablet Take 10 mg by mouth Daily.     • cholecalciferol (VITAMIN D3) 25 MCG (1000 UT) tablet Take 1,000 Units by mouth. 4 Times Per Week     • desvenlafaxine (Pristiq) 100 MG 24 hr tablet Take 1 tablet by mouth Daily. 90 tablet 3   • ezetimibe (Zetia) 10 MG tablet Take 1 tablet by mouth Daily. 90 tablet 3   • famotidine (PEPCID) 20 MG tablet Take 1 tablet by mouth Every Night. 90 tablet 3   • Farxiga 10 MG tablet TAKE 1 TABLET DAILY        (REPLACES INVOKANA) 90 tablet 1   • fenofibrate (TRICOR) 145 MG tablet Take 0.5-1 tablets by mouth Daily. 90 tablet 3   • glucose blood (OneTouch Verio) test strip USE TO TEST DAILY AS       DIRECTED 100 each 3   • Insulin Pen Needle 31G X 5 MM misc Use daily with Victoza 90 each 3   • IRON, FERROUS SULFATE, PO Take 65 mg by mouth 2 (Two) Times a Week.     • Lancets (OneTouch Delica Plus [id number]) misc TEST ONCE DAILY AS DIRECTED 100 each 3   • lisinopril (PRINIVIL,ZESTRIL) 2.5 MG tablet Take 1 tablet by mouth 2 (Two) Times a Day. 180 tablet 3   • meclizine (ANTIVERT) 25 MG tablet Take 1 tablet by mouth 3 (Three) Times a Day As Needed for Dizziness. 30 tablet 1   • minocycline (MINOCIN,DYNACIN) 100 MG  "capsule Take 1 capsule by mouth Daily. 90 capsule 3   • Multiple Vitamins-Minerals (MULTIVITAMIN PO) Take 1 tablet by mouth daily.     • nateglinide (Starlix) 120 MG tablet 1 tab po before lunch and supper 180 tablet 3   • omeprazole (priLOSEC) 40 MG capsule Take 1 capsule by mouth Every Morning. For stomach acid or reflux 90 capsule 3   • ondansetron ODT (Zofran ODT) 4 MG disintegrating tablet Place 1 tablet on the tongue Every 8 (Eight) Hours As Needed for Nausea or Vomiting. 10 tablet 1   • rosuvastatin (CRESTOR) 20 MG tablet TAKE 1 TABLET DAILY.       REPLACE SIMVASTATIN 90 tablet 3   • Semaglutide, 1 MG/DOSE, (Ozempic, 1 MG/DOSE,) 2 MG/1.5ML solution pen-injector Inject 1 mg under the skin into the appropriate area as directed 1 (One) Time Per Week. 6 pen 3   • TiZANidine (ZANAFLEX) 4 MG capsule TAKE 1 CAPSULE 2 TIMES     DAILY AS NEEDED FOR MUSCLE SPASM (REPLACES SKELAXIN) 180 capsule 3     Current Facility-Administered Medications   Medication Dose Route Frequency Provider Last Rate Last Admin   • betamethasone acetate-betamethasone sodium phosphate (CELESTONE SOLUSPAN) injection 6 mg  6 mg Intra-articular Once Enoch Shelton, ELVIS             OBJECTIVE    Pulse 81   Ht 161.3 cm (63.5\")   Wt 76.6 kg (168 lb 12.8 oz)   SpO2 97%   BMI 29.43 kg/m²       Review of Systems   Constitutional: Negative.    HENT: Negative.    Eyes: Negative.    Respiratory: Negative.    Cardiovascular: Negative.    Gastrointestinal: Positive for nausea.   Endocrine: Negative.    Genitourinary: Negative.    Musculoskeletal: Positive for arthralgias.        Foot pain  Ankle pain     Skin: Negative.    Allergic/Immunologic: Negative.    Neurological: Negative.    Hematological: Negative.    Psychiatric/Behavioral:        Depression.          Physical Exam   Constitutional: she appears well-developed and well-nourished.   HEENT: Normocephalic. Atraumatic.  CV: No CP. RRR  Resp: Non-labored respirations.  Psychiatric: she has a " normal mood and affect. her behavior is normal.         Lower Extremity Exam:  Vascular: DP/PT pulses palpable 2+.   Trace edema, dorsal right foot  Foot warm  CFT wnl  Neuro: Protective sensation intact, b/l.  DTRs intact  Negative Tinel over dorsal midfoot, right.  Integument: No open wounds or lesions.  Mild ingrowing lateral nail border right hallux.  No paronychia.  No erythema, scaling  No masses  Musculoskeletal: LE muscle strength 5/5.   Gait normal  Ankle ROM full without pain or crepitus.  Mild gastrocnemius equinus  STJ ROM full without pain or crepitus  Tenderness on palpation of dorsal midfoot, right.  Tenderness with first ray range of motion.  +osseous prominence to dorsal midfoot, right.  Centered over second and third tarsometatarsal joints  No gross instability or crepitus      Small joint injection:  Risks and benefits discussed. Written consent obtained.  Skin prepped with alcohol  Right foot navicular cuneiform injection performed with 1cc 0.5% Marcaine, 1 cc Celestone Soluspan with 27g needle  Band aid applied. Patient tolerated well.          ASSESSMENT AND PLAN    Diagnoses and all orders for this visit:    1. Arthritis of midfoot (Primary)  -     betamethasone acetate-betamethasone sodium phosphate (CELESTONE SOLUSPAN) injection 6 mg    2. Right foot pain  -     XR Foot 3+ View Right    3. Ingrown toenail    4. Pain of toe of right foot      -Comprehensive foot and ankle exam performed  -Radiographs reviewed  -Educated pt on diagnosis, etiology and treatment of midfoot arthritis, mild ingrowing right great toenail  -Continue soft topped motion control shoe gear with lacing modification.  Recommend power step over-the-counter insoles for increased arch support  -Corticosteroid injection as above  -Simple slant back debridement of right hallux nail performed.  Discussed possible more permanent nail avulsion procedure in future if needed  -Recheck 4 weeks if symptoms fail to  improve          This document has been electronically signed by Enoch Shelton DPM on April 15, 2022 07:30 CDT       Much of this encounter note is an electronic transcription/translation of spoken language to printed text.   Enoch Shelton DPM  4/15/2022  07:30 CDT

## 2022-05-04 RX ORDER — DAPAGLIFLOZIN 10 MG/1
TABLET, FILM COATED ORAL
Qty: 90 TABLET | Refills: 1 | Status: SHIPPED | OUTPATIENT
Start: 2022-05-04 | End: 2022-10-27

## 2022-05-18 RX ORDER — EZETIMIBE 10 MG/1
TABLET ORAL
Qty: 90 TABLET | Refills: 3 | Status: SHIPPED | OUTPATIENT
Start: 2022-05-18

## 2022-05-18 RX ORDER — FAMOTIDINE 20 MG/1
TABLET, FILM COATED ORAL
Qty: 90 TABLET | Refills: 3 | Status: SHIPPED | OUTPATIENT
Start: 2022-05-18 | End: 2023-01-09

## 2022-05-23 ENCOUNTER — OFFICE VISIT (OUTPATIENT)
Dept: GASTROENTEROLOGY | Facility: CLINIC | Age: 72
End: 2022-05-23

## 2022-05-23 VITALS
HEIGHT: 64 IN | WEIGHT: 164.8 LBS | HEART RATE: 81 BPM | DIASTOLIC BLOOD PRESSURE: 75 MMHG | BODY MASS INDEX: 28.13 KG/M2 | SYSTOLIC BLOOD PRESSURE: 118 MMHG

## 2022-05-23 DIAGNOSIS — K21.9 GASTROESOPHAGEAL REFLUX DISEASE, UNSPECIFIED WHETHER ESOPHAGITIS PRESENT: Primary | ICD-10-CM

## 2022-05-23 PROCEDURE — 99214 OFFICE O/P EST MOD 30 MIN: CPT | Performed by: INTERNAL MEDICINE

## 2022-05-23 RX ORDER — ONDANSETRON 8 MG/1
8 TABLET, ORALLY DISINTEGRATING ORAL EVERY 8 HOURS PRN
Qty: 30 TABLET | Refills: 11 | Status: SHIPPED | OUTPATIENT
Start: 2022-05-23 | End: 2022-06-22

## 2022-05-23 RX ORDER — ONDANSETRON 4 MG/1
4 TABLET, ORALLY DISINTEGRATING ORAL EVERY 8 HOURS PRN
Qty: 10 TABLET | Refills: 1 | Status: SHIPPED | OUTPATIENT
Start: 2022-05-23 | End: 2022-06-22 | Stop reason: SDUPTHER

## 2022-05-23 NOTE — PROGRESS NOTES
"Chief Complaint   Patient presents with   • 3 month f/u        Subjective    Otilia Kenny is a 72 y.o. female.    History of Present Illness  Patient presented to the GI clinic for follow-up visit today.  Has continued symptoms of nausea and vomiting once or twice a week.  Denied abdominal pain.  Bowel movements regular.  Weight is stable.  Has high stress level in life due to son passing away recently.         The following portions of the patient's history were reviewed and updated as appropriate:   Past Medical History:   Diagnosis Date   • Abdominal pain     Most likely related to functional colonic spasm     • Abnormal liver enzymes     Improved, 3/2015      • Allergic rhinitis     seasonal   • Allergic rhinitis    • Anemia    • Anesthesia complication     states sometime B/P drops   • Arthritis    • Cervical disc disorder     S/P cervical surgeries x2-3. Extensive fusion C 3-7. [facility]      • Depression    • Diarrhea, unspecified     Resolved with discontinuation of metformin.    • Diverticular disease of colon    • Dysfunction of eustachian tube    • Essential hypertension     Increase lisinopril HCT, 10/12.5.      • Gastritis    • Generalized anxiety disorder    • GERD (gastroesophageal reflux disease)    • History of transfusion    • Hyperlipidemia     Intolerant of Lipitor. The patient stopped Zocor due to \"liver pain\", summer 2015. patient instructed to reattempt Zocor every other day, 10/2015    • Hyperlipidemia associated with type 2 diabetes mellitus (HCC) 11/25/2020   • Hypertriglyceridemia     Holding simvastatin 2/2015 due to slightly elevated LFTs.      • Iron deficiency anemia    • Irritable bowel syndrome     Dr. 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 Reynolds    • Sleep disorder    • Spasm of back muscles     Dr. Castro changed Soma to Zanaflex.     • Type 2 diabetes mellitus with microalbuminuria, without long-term current use of " insulin (HCC) 2018    Added automatically from request for surgery [id number]   • Vitamin B12 deficiency (dietary) anemia     Currently on multivitamin daily       Past Surgical History:   Procedure Laterality Date   • BACK SURGERY      Laminectomy   • BUNIONECTOMY     • CARPAL TUNNEL RELEASE Bilateral    • CERVICAL DISC SURGERY      Laminectomy . Fusion .   • CHOLECYSTECTOMY     • COLONOSCOPY      Diverticulosis found in the sigmoid colon.A single diminutive polyp found in the colon.Mul.biopsies taken.Hemorrhoids found in the anus.   • ENDOSCOPY      Normal hypopharynx and esophagus.Marked irregularity of the Z-line,compatable with chronic reflux.Mul.biopsies.A hiatus hernia found in GE junction.Mild nonerosive gastritis.Mul.biopsies.Polyps in fundus.Mul.biopsies.Normal pylorus.Normal duodenum.   • ENDOSCOPY N/A 2021    Procedure: ESOPHAGOGASTRODUODENOSCOPY;  Surgeon: Samra Castillo MD;  Location: [facility] ENDOSCOPY;  Service: Gastroenterology;  Laterality: N/A;   • ENDOSCOPY AND COLONOSCOPY     • FINGER/THUMB LESION/CYST EXCISION     • HEEL SPUR EXCISION     • HYSTERECTOMY     • JOINT REPLACEMENT Bilateral    • KNEE ARTHROSCOPY Bilateral     Arthroscopy of the left knee with debridement of medial meniscus.   • KNEE SURGERY      Right unicompartmental arthroplasty.   • TOTAL KNEE ARTHROPLASTY Left 2018    Procedure: TOTAL KNEE ARTHROPLASTY ATTUNE with adductor canal block ;  Surgeon: Baron Reynolds MD;  Location: [facility] OR;  Service: Orthopedics   • UPPER GASTROINTESTINAL ENDOSCOPY  2021    Dr. Samra Castillo M.D., [facility], [place], [place]     Family History   Problem Relation Age of Onset   • Cancer Other         lung   • Diabetes Other    • Hypertension Other    • Stroke Other    • Breast cancer Sister      OB History        4    Para   4    Term   4            AB        Living           SAB        IAB        Ectopic        Molar         Multiple        Live Births                  Prior to Admission medications    Medication Sig Start Date End Date Taking? Authorizing Provider   aspirin 81 MG EC tablet Take 81 mg by mouth Daily.   Yes Leila Orlando MD   Blood Glucose Monitoring Suppl (ONETOUCH VERIO IQ SYSTEM) w/Device kit 1 each Daily. 11/18/19  Yes Alexis Zabala MD   CALCIUM CARB-CHOLECALCIFEROL PO Take 1 tablet by mouth 2 (two) times a day.   Yes Leila Orlando MD   cetirizine (zyrTEC) 10 MG tablet Take 10 mg by mouth Daily.   Yes Leila Orlando MD   cholecalciferol (VITAMIN D3) 25 MCG (1000 UT) tablet Take 1,000 Units by mouth. 4 Times Per Week   Yes Alexis Zabala MD   desvenlafaxine (Pristiq) 100 MG 24 hr tablet Take 1 tablet by mouth Daily. 3/1/22  Yes Alexis Zabala MD   ezetimibe (ZETIA) 10 MG tablet TAKE 1 TABLET DAILY 5/18/22  Yes Alexis Zabala MD   famotidine (PEPCID) 20 MG tablet TAKE 1 TABLET EVERY NIGHT 5/18/22  Yes Alexis Zabala MD   Farxiga 10 MG tablet TAKE 1 TABLET DAILY        (REPLACES INVOKANA) 5/4/22  Yes Alexis Zabala MD   fenofibrate (TRICOR) 145 MG tablet Take 0.5-1 tablets by mouth Daily. 2/11/22  Yes Alexis Zabala MD   glucose blood (OneTouch Verio) test strip USE TO TEST DAILY AS       DIRECTED 6/30/21  Yes Alexis Zabala MD   Insulin Pen Needle 31G X 5 MM misc Use daily with Victoza 5/30/18  Yes Alexis Zabala MD   IRON, FERROUS SULFATE, PO Take 65 mg by mouth 2 (Two) Times a Week.   Yes Leila Orlando MD   Lancets (OneTouch Delica Plus Moblpp14W) misc TEST ONCE DAILY AS DIRECTED 10/6/21  Yes Alexis Zabala MD   lisinopril (PRINIVIL,ZESTRIL) 2.5 MG tablet Take 1 tablet by mouth 2 (Two) Times a Day. 2/9/22  Yes Alexis Zabala MD   meclizine (ANTIVERT) 25 MG tablet Take 1 tablet by mouth 3 (Three) Times a Day As Needed for Dizziness. 10/26/21  Yes Alexis Zabala MD   minocycline (MINOCIN,DYNACIN) 100 MG capsule Take 1 capsule by mouth Daily. 6/4/21  Yes Alexis Zabala MD   Multiple  Vitamins-Minerals (MULTIVITAMIN PO) Take 1 tablet by mouth daily.   Yes Leila Orlando MD   nateglinide (Starlix) 120 MG tablet 1 tab po before lunch and supper 6/4/21  Yes Alexis Zabala MD   omeprazole (priLOSEC) 40 MG capsule Take 1 capsule by mouth Every Morning. For stomach acid or reflux 3/1/22  Yes Alexis Zabala MD   ondansetron ODT (Zofran ODT) 4 MG disintegrating tablet Place 1 tablet on the tongue Every 8 (Eight) Hours As Needed for Nausea or Vomiting. 5/23/22  Yes Samra Castillo MD   rosuvastatin (CRESTOR) 20 MG tablet TAKE 1 TABLET DAILY.       REPLACE SIMVASTATIN 7/9/21  Yes Alexis Zabala MD   Semaglutide, 1 MG/DOSE, (Ozempic, 1 MG/DOSE,) 2 MG/1.5ML solution pen-injector Inject 1 mg under the skin into the appropriate area as directed 1 (One) Time Per Week. 2/15/22  Yes Alexis Zabala MD   TiZANidine (ZANAFLEX) 4 MG capsule TAKE 1 CAPSULE 2 TIMES     DAILY AS NEEDED FOR MUSCLE SPASM (REPLACES SKELAXIN) 8/11/21  Yes Alexis Zabala MD   ondansetron ODT (Zofran ODT) 4 MG disintegrating tablet Place 1 tablet on the tongue Every 8 (Eight) Hours As Needed for Nausea or Vomiting. 10/26/21 5/23/22 Yes Alexis Zabala MD   ondansetron ODT (Zofran ODT) 8 MG disintegrating tablet Place 1 tablet on the tongue Every 8 (Eight) Hours As Needed for Nausea or Vomiting for up to 30 days. 5/23/22 6/22/22  Samra Castillo MD     Allergies   Allergen Reactions   • Allegra [Fexofenadine] Itching   • Amaryl [Glimepiride] Myalgia   • Dyazide [Triamterene-Hctz] Itching   • Hydrochlorothiazide W-Triamterene Itching   • Lipitor [Atorvastatin] Myalgia   • Metformin And Related Diarrhea   • Naproxen Itching   • Actos [Pioglitazone] Rash   • Sulfamethoxazole-Trimethoprim Rash     dizziness     Social History     Socioeconomic History   • Marital status:    Tobacco Use   • Smoking status: Former Smoker     Packs/day: 1.00     Years: 10.00     Pack years: 10.00     Types: Cigarettes     Quit date: 6/2/1985      "Years since quittin.9   • Smokeless tobacco: Never Used   Vaping Use   • Vaping Use: Never used   Substance and Sexual Activity   • Alcohol use: No   • Drug use: No   • Sexual activity: Defer       Review of Systems  Review of Systems   Constitutional: Negative for chills, fatigue, fever and unexpected weight change.   HENT: Negative for congestion, ear discharge, hearing loss, nosebleeds and sore throat.    Eyes: Negative for pain, discharge and redness.   Respiratory: Negative for cough, chest tightness, shortness of breath and wheezing.    Cardiovascular: Negative for chest pain and palpitations.   Gastrointestinal: Positive for nausea and vomiting. Negative for abdominal distention, abdominal pain, blood in stool, constipation and diarrhea.   Endocrine: Negative for cold intolerance, polydipsia, polyphagia and polyuria.   Genitourinary: Negative for dysuria, flank pain, frequency, hematuria and urgency.   Musculoskeletal: Negative for arthralgias, back pain, joint swelling and myalgias.   Skin: Negative for color change, pallor and rash.   Neurological: Negative for tremors, seizures, syncope, weakness and headaches.   Hematological: Negative for adenopathy. Does not bruise/bleed easily.   Psychiatric/Behavioral: Negative for behavioral problems, confusion, dysphoric mood, hallucinations and suicidal ideas. The patient is not nervous/anxious.         /75 (BP Location: Left arm)   Pulse 81   Ht 161.3 cm (63.5\")   Wt 74.8 kg (164 lb 12.8 oz)   BMI 28.74 kg/m²     Objective    Physical Exam  Constitutional:       Appearance: She is well-developed.   HENT:      Head: Normocephalic and atraumatic.   Eyes:      Conjunctiva/sclera: Conjunctivae normal.      Pupils: Pupils are equal, round, and reactive to light.   Neck:      Thyroid: No thyromegaly.   Cardiovascular:      Rate and Rhythm: Normal rate and regular rhythm.      Heart sounds: Normal heart sounds. No murmur heard.  Pulmonary:      Effort: " Pulmonary effort is normal.      Breath sounds: Normal breath sounds. No wheezing.   Abdominal:      General: Bowel sounds are normal. There is no distension.      Palpations: Abdomen is soft. There is no mass.      Tenderness: There is no abdominal tenderness.      Hernia: No hernia is present.   Genitourinary:     Comments: No lesions noted  Musculoskeletal:         General: No tenderness. Normal range of motion.      Cervical back: Normal range of motion and neck supple.   Lymphadenopathy:      Cervical: No cervical adenopathy.   Skin:     General: Skin is warm and dry.      Findings: No rash.   Neurological:      Mental Status: She is alert and oriented to person, place, and time.      Cranial Nerves: No cranial nerve deficit.   Psychiatric:         Thought Content: Thought content normal.       Lab on 02/15/2022   Component Date Value Ref Range Status   • BUN 02/15/2022 28 (A) 8 - 23 mg/dL Final   • Creatinine 02/15/2022 0.97  0.57 - 1.00 mg/dL Final   • eGFR Non  Amer 02/15/2022 56 (A) >60 mL/min/1.73 Final     Assessment & Plan      1. Gastroesophageal reflux disease, unspecified whether esophagitis present    1.  Epigastric pain, resolved. Continue Prilosec po twice daily, Pepcid nightly,Tums as needed and Carafate 1 g p.o. 4 times daily.   2.  Longstanding GERD with symptoms well controlled with Prilosec, continue Prilosec and antireflux lifestyle.  3.  Nausea,  likely due to reactive gastropathy.  Need to rule out gastroparesis.  Continue PPI, Pepcid and Carafate.  Add Zofran as needed.  Obtain gastric emptying scan.    4.  Constipation, add high-fiber diet and MiraLAX.  5.  Adrenal nodule, PCP evaluation.  6.  High BMI, recommend exercise and diet control.      Orders placed during this encounter include:  Orders Placed This Encounter   Procedures   • NM Gastric Emptying     Standing Status:   Future     Standing Expiration Date:   5/23/2023       * Surgery not found *    Review and/or summary of  lab tests, radiology, procedures, medications. Review and summary of old records and obtaining of history. The risks and benefits of my recommendations, as well as other treatment options were discussed with the patient today. Questions were answered.    New Medications Ordered This Visit   Medications   • ondansetron ODT (Zofran ODT) 8 MG disintegrating tablet     Sig: Place 1 tablet on the tongue Every 8 (Eight) Hours As Needed for Nausea or Vomiting for up to 30 days.     Dispense:  30 tablet     Refill:  11   • ondansetron ODT (Zofran ODT) 4 MG disintegrating tablet     Sig: Place 1 tablet on the tongue Every 8 (Eight) Hours As Needed for Nausea or Vomiting.     Dispense:  10 tablet     Refill:  1       Follow-up: Return in about 1 month (around 6/23/2022).               Results for orders placed or performed in visit on 02/15/22   BUN    Specimen: Blood   Result Value Ref Range    BUN 28 (H) 8 - 23 mg/dL   Creatinine, Serum    Specimen: Blood   Result Value Ref Range    Creatinine 0.97 0.57 - 1.00 mg/dL    eGFR Non African Amer 56 (L) >60 mL/min/1.73   Results for orders placed or performed during the hospital encounter of 12/28/21   Tissue Pathology Exam    Specimen: A: Small Intestine, Duodenum; Tissue    B: Gastric, Antrum; Tissue    C: Esophagus; Tissue   Result Value Ref Range    Case Report       Surgical Pathology Report                         Case: [id number]                                  Authorizing Provider:  Samra Castillo MD      Collected:           12/28/2021 02:23 PM          Ordering Location:     [facility]   Received:            12/29/2021 06:49 AM                                 [facility]                                                     Pathologist:           Dennis Chairez MD                                                           Specimens:   1) - Small Intestine, Duodenum, CJ                                                                  2) -  Gastric, Antrum, CJ                                                                            3) - Esophagus, EGJ   CJ                                                                   Final Diagnosis       SEE SCANNED REPORT       POC Glucose Once    Specimen: Blood   Result Value Ref Range    Glucose 126 70 - 130 mg/dL   Results for orders placed or performed in visit on 12/27/21   COVID-19, [facility] IN-HOUSE, NP SWAB IN TRANSPORT MEDIA 8-10 HR TAT - Swab, Nasopharynx    Specimen: Nasopharynx; Swab   Result Value Ref Range    COVID19 Not Detected Not Detected - Ref. Range   Results for orders placed or performed in visit on 12/07/21   CBC Auto Differential    Specimen: Blood   Result Value Ref Range    WBC 5.63 3.40 - 10.80 10*3/mm3    RBC 4.28 3.77 - 5.28 10*6/mm3    Hemoglobin 12.6 12.0 - 15.9 g/dL    Hematocrit 38.9 34.0 - 46.6 %    MCV 90.9 79.0 - 97.0 fL    MCH 29.4 26.6 - 33.0 pg    MCHC 32.4 31.5 - 35.7 g/dL    RDW 12.6 12.3 - 15.4 %    RDW-SD 40.8 37.0 - 54.0 fl    MPV 10.0 6.0 - 12.0 fL    Platelets 306 140 - 450 10*3/mm3    Neutrophil % 58.8 42.7 - 76.0 %    Lymphocyte % 28.4 19.6 - 45.3 %    Monocyte % 8.3 5.0 - 12.0 %    Eosinophil % 3.4 0.3 - 6.2 %    Basophil % 1.1 0.0 - 1.5 %    Neutrophils, Absolute 3.31 1.70 - 7.00 10*3/mm3    Lymphocytes, Absolute 1.60 0.70 - 3.10 10*3/mm3    Monocytes, Absolute 0.47 0.10 - 0.90 10*3/mm3    Eosinophils, Absolute 0.19 0.00 - 0.40 10*3/mm3    Basophils, Absolute 0.06 0.00 - 0.20 10*3/mm3   Iron Profile    Specimen: Blood   Result Value Ref Range    Iron 80 37 - 145 mcg/dL    Iron Saturation 16 (L) 20 - 50 %    Transferrin 339 200 - 360 mg/dL    TIBC 505 298 - 536 mcg/dL   Microalbumin / Creatinine Urine Ratio - Urine, Clean Catch    Specimen: Urine, Clean Catch   Result Value Ref Range    Microalbumin/Creatinine Ratio      Creatinine, Urine 88.8 mg/dL    Microalbumin, Urine <1.2 mg/dL   Vitamin D 25 Hydroxy    Specimen: Blood   Result Value Ref Range    25 Hydroxy,  Vitamin D 48.0 30.0 - 100.0 ng/ml   Triglycerides    Specimen: Blood   Result Value Ref Range    Triglycerides 127 <=150 mg/dL   LDL Cholesterol, Direct    Specimen: Blood   Result Value Ref Range    LDL Cholesterol  87 0 - 100 mg/dL   Hemoglobin A1c    Specimen: Blood   Result Value Ref Range    Hemoglobin A1C 7.13 (H) 4.80 - 5.60 %   Ferritin    Specimen: Blood   Result Value Ref Range    Ferritin 51.40 13.00 - 150.00 ng/mL   Vitamin B12    Specimen: Blood   Result Value Ref Range    Vitamin B-12 926 211 - 946 pg/mL   Comprehensive Metabolic Panel    Specimen: Blood   Result Value Ref Range    Glucose 173 (H) 70 - 99 mg/dL    BUN 28 (H) 7 - 23 mg/dL    Creatinine 0.94 0.52 - 1.04 mg/dL    Sodium 136 (L) 137 - 145 mmol/L    Potassium 4.2 3.4 - 5.0 mmol/L    Chloride 102 101 - 112 mmol/L    CO2 26.0 22.0 - 30.0 mmol/L    Calcium 9.4 8.4 - 10.2 mg/dL    Total Protein 7.3 6.3 - 8.6 g/dL    Albumin 4.30 3.50 - 5.00 g/dL    ALT (SGPT) 31 <=35 U/L    AST (SGOT) 46 (H) 14 - 36 U/L    Alkaline Phosphatase 74 38 - 126 U/L    Total Bilirubin 0.6 0.2 - 1.3 mg/dL    eGFR Non African Amer 58 39 - 90 mL/min/1.73    Globulin 3.0 2.3 - 3.5 gm/dL    A/G Ratio 1.4 1.1 - 1.8 g/dL    BUN/Creatinine Ratio 29.8 (H) 7.0 - 25.0    Anion Gap 8.0 5.0 - 15.0 mmol/L   Results for orders placed or performed in visit on 05/24/21   CBC Auto Differential    Specimen: Blood   Result Value Ref Range    WBC 6.51 3.40 - 10.80 10*3/mm3    RBC 4.27 3.77 - 5.28 10*6/mm3    Hemoglobin 13.2 12.0 - 15.9 g/dL    Hematocrit 38.4 34.0 - 46.6 %    MCV 89.9 79.0 - 97.0 fL    MCH 30.9 26.6 - 33.0 pg    MCHC 34.4 31.5 - 35.7 g/dL    RDW 12.0 (L) 12.3 - 15.4 %    RDW-SD 38.4 37.0 - 54.0 fl    MPV 9.7 6.0 - 12.0 fL    Platelets 322 140 - 450 10*3/mm3    Neutrophil % 63.7 42.7 - 76.0 %    Lymphocyte % 26.0 19.6 - 45.3 %    Monocyte % 7.2 5.0 - 12.0 %    Eosinophil % 2.2 0.3 - 6.2 %    Basophil % 0.9 0.0 - 1.5 %    Neutrophils, Absolute 4.15 1.70 - 7.00 10*3/mm3     Lymphocytes, Absolute 1.69 0.70 - 3.10 10*3/mm3    Monocytes, Absolute 0.47 0.10 - 0.90 10*3/mm3    Eosinophils, Absolute 0.14 0.00 - 0.40 10*3/mm3    Basophils, Absolute 0.06 0.00 - 0.20 10*3/mm3   Hepatitis C Antibody    Specimen: Blood   Result Value Ref Range    Hepatitis C Ab Non-Reactive Non-Reactive   Iron Profile    Specimen: Blood   Result Value Ref Range    Iron 75 37 - 145 mcg/dL    Iron Saturation 15 (L) 20 - 50 %    Transferrin 332 200 - 360 mg/dL    TIBC 495 298 - 536 mcg/dL   Vitamin D 25 Hydroxy    Specimen: Blood   Result Value Ref Range    25 Hydroxy, Vitamin D 44.4 30.0 - 100.0 ng/ml   Hemoglobin A1c    Specimen: Blood   Result Value Ref Range    Hemoglobin A1C 7.23 (H) 4.80 - 5.60 %   Ferritin    Specimen: Blood   Result Value Ref Range    Ferritin 72.10 13.00 - 150.00 ng/mL   Vitamin B12    Specimen: Blood   Result Value Ref Range    Vitamin B-12 906 211 - 946 pg/mL   Lipid Panel    Specimen: Blood   Result Value Ref Range    Total Cholesterol 183 150 - 200 mg/dL    Triglycerides 197 (H) <=150 mg/dL    HDL Cholesterol 48 40 - 59 mg/dL    LDL Cholesterol  101 (H) <=100 mg/dL    VLDL Cholesterol 34 5 - 40 mg/dL    LDL/HDL Ratio 1.99 0.00 - 3.22   Comprehensive Metabolic Panel    Specimen: Blood   Result Value Ref Range    Glucose 165 (H) 70 - 99 mg/dL    BUN 28 (H) 7 - 23 mg/dL    Creatinine 0.99 0.52 - 1.04 mg/dL    Sodium 135 (L) 137 - 145 mmol/L    Potassium 4.4 3.4 - 5.0 mmol/L    Chloride 99 (L) 101 - 112 mmol/L    CO2 27.0 22.0 - 30.0 mmol/L    Calcium 10.0 8.4 - 10.2 mg/dL    Total Protein 7.1 6.3 - 8.6 g/dL    Albumin 4.20 3.50 - 5.00 g/dL    ALT (SGPT) 25 <=35 U/L    AST (SGOT) 27 14 - 36 U/L    Alkaline Phosphatase 72 38 - 126 U/L    Total Bilirubin 0.3 0.2 - 1.3 mg/dL    eGFR Non African Amer 55 39 - 90 mL/min/1.73    Globulin 2.9 2.3 - 3.5 gm/dL    A/G Ratio 1.4 1.1 - 1.8 g/dL    BUN/Creatinine Ratio 28.3 (H) 7.0 - 25.0    Anion Gap 9.0 5.0 - 15.0 mmol/L     *Note: Due to a large  number of results and/or encounters for the requested time period, some results have not been displayed. A complete set of results can be found in Results Review.         This document has been electronically signed by Samra Castillo MD on May 23, 2022 11:04 CDT

## 2022-06-02 ENCOUNTER — APPOINTMENT (OUTPATIENT)
Dept: NUCLEAR MEDICINE | Facility: HOSPITAL | Age: 72
End: 2022-06-02

## 2022-06-14 ENCOUNTER — LAB (OUTPATIENT)
Dept: LAB | Facility: OTHER | Age: 72
End: 2022-06-14

## 2022-06-14 DIAGNOSIS — E11.69 HYPERLIPIDEMIA ASSOCIATED WITH TYPE 2 DIABETES MELLITUS: Chronic | ICD-10-CM

## 2022-06-14 DIAGNOSIS — D51.8 VITAMIN B12 DEFICIENCY (DIETARY) ANEMIA: Chronic | ICD-10-CM

## 2022-06-14 DIAGNOSIS — E11.29 TYPE 2 DIABETES MELLITUS WITH MICROALBUMINURIA, WITHOUT LONG-TERM CURRENT USE OF INSULIN: Chronic | ICD-10-CM

## 2022-06-14 DIAGNOSIS — D50.8 IRON DEFICIENCY ANEMIA SECONDARY TO INADEQUATE DIETARY IRON INTAKE: Chronic | ICD-10-CM

## 2022-06-14 DIAGNOSIS — E78.1 HYPERTRIGLYCERIDEMIA: Chronic | ICD-10-CM

## 2022-06-14 DIAGNOSIS — K21.9 GASTROESOPHAGEAL REFLUX DISEASE WITHOUT ESOPHAGITIS: Chronic | ICD-10-CM

## 2022-06-14 DIAGNOSIS — R80.9 TYPE 2 DIABETES MELLITUS WITH MICROALBUMINURIA, WITHOUT LONG-TERM CURRENT USE OF INSULIN: Chronic | ICD-10-CM

## 2022-06-14 DIAGNOSIS — I10 ESSENTIAL HYPERTENSION: Chronic | ICD-10-CM

## 2022-06-14 DIAGNOSIS — E78.5 HYPERLIPIDEMIA ASSOCIATED WITH TYPE 2 DIABETES MELLITUS: Chronic | ICD-10-CM

## 2022-06-14 DIAGNOSIS — E55.9 VITAMIN D DEFICIENCY: Chronic | ICD-10-CM

## 2022-06-14 LAB
25(OH)D3 SERPL-MCNC: 50.6 NG/ML (ref 30–100)
ALBUMIN SERPL-MCNC: 4.6 G/DL (ref 3.5–5)
ALBUMIN/GLOB SERPL: 1.5 G/DL (ref 1.1–1.8)
ALP SERPL-CCNC: 97 U/L (ref 38–126)
ALT SERPL W P-5'-P-CCNC: 28 U/L
ANION GAP SERPL CALCULATED.3IONS-SCNC: 11 MMOL/L (ref 5–15)
AST SERPL-CCNC: 30 U/L (ref 14–36)
BASOPHILS # BLD AUTO: 0.04 10*3/MM3 (ref 0–0.2)
BASOPHILS NFR BLD AUTO: 0.6 % (ref 0–1.5)
BILIRUB SERPL-MCNC: 0.4 MG/DL (ref 0.2–1.3)
BUN SERPL-MCNC: 28 MG/DL (ref 7–23)
BUN/CREAT SERPL: 27.2 (ref 7–25)
CALCIUM SPEC-SCNC: 9.7 MG/DL (ref 8.4–10.2)
CHLORIDE SERPL-SCNC: 104 MMOL/L (ref 101–112)
CHOLEST SERPL-MCNC: 180 MG/DL (ref 150–200)
CO2 SERPL-SCNC: 23 MMOL/L (ref 22–30)
CREAT SERPL-MCNC: 1.03 MG/DL (ref 0.52–1.04)
DEPRECATED RDW RBC AUTO: 40.7 FL (ref 37–54)
EGFRCR SERPLBLD CKD-EPI 2021: 57.2 ML/MIN/1.73
EOSINOPHIL # BLD AUTO: 0.17 10*3/MM3 (ref 0–0.4)
EOSINOPHIL NFR BLD AUTO: 2.5 % (ref 0.3–6.2)
ERYTHROCYTE [DISTWIDTH] IN BLOOD BY AUTOMATED COUNT: 12.8 % (ref 12.3–15.4)
FERRITIN SERPL-MCNC: 64.7 NG/ML (ref 13–150)
FOLATE SERPL-MCNC: >20 NG/ML (ref 4.78–24.2)
GLOBULIN UR ELPH-MCNC: 3.1 GM/DL (ref 2.3–3.5)
GLUCOSE SERPL-MCNC: 226 MG/DL (ref 70–99)
HBA1C MFR BLD: 7.9 % (ref 4.8–5.6)
HCT VFR BLD AUTO: 43.6 % (ref 34–46.6)
HDLC SERPL-MCNC: 52 MG/DL (ref 40–59)
HGB BLD-MCNC: 14.5 G/DL (ref 12–15.9)
IRON 24H UR-MRATE: 72 MCG/DL (ref 37–145)
IRON SATN MFR SERPL: 14 % (ref 20–50)
LDLC SERPL CALC-MCNC: 88 MG/DL
LDLC/HDLC SERPL: 1.55 {RATIO} (ref 0–3.22)
LYMPHOCYTES # BLD AUTO: 2.63 10*3/MM3 (ref 0.7–3.1)
LYMPHOCYTES NFR BLD AUTO: 38.3 % (ref 19.6–45.3)
MCH RBC QN AUTO: 29.5 PG (ref 26.6–33)
MCHC RBC AUTO-ENTMCNC: 33.3 G/DL (ref 31.5–35.7)
MCV RBC AUTO: 88.6 FL (ref 79–97)
MONOCYTES # BLD AUTO: 0.58 10*3/MM3 (ref 0.1–0.9)
MONOCYTES NFR BLD AUTO: 8.5 % (ref 5–12)
NEUTROPHILS NFR BLD AUTO: 3.44 10*3/MM3 (ref 1.7–7)
NEUTROPHILS NFR BLD AUTO: 50.1 % (ref 42.7–76)
PLATELET # BLD AUTO: 364 10*3/MM3 (ref 140–450)
PMV BLD AUTO: 10 FL (ref 6–12)
POTASSIUM SERPL-SCNC: 4.5 MMOL/L (ref 3.4–5)
PROT SERPL-MCNC: 7.7 G/DL (ref 6.3–8.6)
RBC # BLD AUTO: 4.92 10*6/MM3 (ref 3.77–5.28)
SODIUM SERPL-SCNC: 138 MMOL/L (ref 137–145)
TIBC SERPL-MCNC: 520 MCG/DL (ref 298–536)
TRANSFERRIN SERPL-MCNC: 349 MG/DL (ref 200–360)
TRIGL SERPL-MCNC: 238 MG/DL
TSH SERPL DL<=0.05 MIU/L-ACNC: 3.85 UIU/ML (ref 0.27–4.2)
VIT B12 BLD-MCNC: 716 PG/ML (ref 211–946)
VLDLC SERPL-MCNC: 40 MG/DL (ref 5–40)
WBC NRBC COR # BLD: 6.86 10*3/MM3 (ref 3.4–10.8)

## 2022-06-14 PROCEDURE — 83036 HEMOGLOBIN GLYCOSYLATED A1C: CPT | Performed by: INTERNAL MEDICINE

## 2022-06-14 PROCEDURE — 84443 ASSAY THYROID STIM HORMONE: CPT | Performed by: INTERNAL MEDICINE

## 2022-06-14 PROCEDURE — 82306 VITAMIN D 25 HYDROXY: CPT | Performed by: INTERNAL MEDICINE

## 2022-06-14 PROCEDURE — 83540 ASSAY OF IRON: CPT | Performed by: INTERNAL MEDICINE

## 2022-06-14 PROCEDURE — 82746 ASSAY OF FOLIC ACID SERUM: CPT | Performed by: INTERNAL MEDICINE

## 2022-06-14 PROCEDURE — 85025 COMPLETE CBC W/AUTO DIFF WBC: CPT | Performed by: INTERNAL MEDICINE

## 2022-06-14 PROCEDURE — 84466 ASSAY OF TRANSFERRIN: CPT | Performed by: INTERNAL MEDICINE

## 2022-06-14 PROCEDURE — 82728 ASSAY OF FERRITIN: CPT | Performed by: INTERNAL MEDICINE

## 2022-06-14 PROCEDURE — 82607 VITAMIN B-12: CPT | Performed by: INTERNAL MEDICINE

## 2022-06-14 PROCEDURE — 80053 COMPREHEN METABOLIC PANEL: CPT | Performed by: INTERNAL MEDICINE

## 2022-06-14 PROCEDURE — 36415 COLL VENOUS BLD VENIPUNCTURE: CPT | Performed by: INTERNAL MEDICINE

## 2022-06-14 PROCEDURE — 80061 LIPID PANEL: CPT | Performed by: INTERNAL MEDICINE

## 2022-06-15 RX ORDER — OMEPRAZOLE 40 MG/1
CAPSULE, DELAYED RELEASE ORAL
Qty: 90 CAPSULE | Refills: 3 | Status: SHIPPED | OUTPATIENT
Start: 2022-06-15 | End: 2022-06-21 | Stop reason: SDUPTHER

## 2022-06-15 RX ORDER — BLOOD SUGAR DIAGNOSTIC
STRIP MISCELLANEOUS
Qty: 100 EACH | Refills: 3 | Status: SHIPPED | OUTPATIENT
Start: 2022-06-15 | End: 2022-08-12 | Stop reason: SDUPTHER

## 2022-06-16 ENCOUNTER — HOSPITAL ENCOUNTER (OUTPATIENT)
Dept: NUCLEAR MEDICINE | Facility: HOSPITAL | Age: 72
Discharge: HOME | End: 2022-06-16

## 2022-06-16 DIAGNOSIS — K21.9 GASTROESOPHAGEAL REFLUX DISEASE, UNSPECIFIED WHETHER ESOPHAGITIS PRESENT: ICD-10-CM

## 2022-06-16 PROCEDURE — 78264 GASTRIC EMPTYING IMG STUDY: CPT

## 2022-06-16 PROCEDURE — A9541 TC99M SULFUR COLLOID: HCPCS | Performed by: INTERNAL MEDICINE

## 2022-06-16 PROCEDURE — 0 TECHNETIUM SULFUR COLLOID: Performed by: INTERNAL MEDICINE

## 2022-06-16 RX ADMIN — TECHNETIUM TC 99M SULFUR COLLOID 1 DOSE: KIT at 10:40

## 2022-06-21 RX ORDER — OMEPRAZOLE 40 MG/1
40 CAPSULE, DELAYED RELEASE ORAL DAILY
Qty: 90 CAPSULE | Refills: 3 | Status: SHIPPED | OUTPATIENT
Start: 2022-06-21

## 2022-06-22 ENCOUNTER — OFFICE VISIT (OUTPATIENT)
Dept: FAMILY MEDICINE CLINIC | Facility: CLINIC | Age: 72
End: 2022-06-22

## 2022-06-22 VITALS
HEIGHT: 64 IN | TEMPERATURE: 98.1 F | BODY MASS INDEX: 28.27 KG/M2 | DIASTOLIC BLOOD PRESSURE: 88 MMHG | HEART RATE: 82 BPM | WEIGHT: 165.6 LBS | OXYGEN SATURATION: 98 % | SYSTOLIC BLOOD PRESSURE: 152 MMHG

## 2022-06-22 DIAGNOSIS — I10 ESSENTIAL HYPERTENSION: Chronic | ICD-10-CM

## 2022-06-22 DIAGNOSIS — D50.8 IRON DEFICIENCY ANEMIA SECONDARY TO INADEQUATE DIETARY IRON INTAKE: Chronic | ICD-10-CM

## 2022-06-22 DIAGNOSIS — F33.1 MODERATE EPISODE OF RECURRENT MAJOR DEPRESSIVE DISORDER: Chronic | ICD-10-CM

## 2022-06-22 DIAGNOSIS — E55.9 VITAMIN D DEFICIENCY: Chronic | ICD-10-CM

## 2022-06-22 DIAGNOSIS — E11.29 TYPE 2 DIABETES MELLITUS WITH MICROALBUMINURIA, WITHOUT LONG-TERM CURRENT USE OF INSULIN: Primary | Chronic | ICD-10-CM

## 2022-06-22 DIAGNOSIS — E11.69 HYPERLIPIDEMIA ASSOCIATED WITH TYPE 2 DIABETES MELLITUS: Chronic | ICD-10-CM

## 2022-06-22 DIAGNOSIS — R11.2 NAUSEA AND VOMITING IN ADULT: ICD-10-CM

## 2022-06-22 DIAGNOSIS — F41.1 GENERALIZED ANXIETY DISORDER: Chronic | ICD-10-CM

## 2022-06-22 DIAGNOSIS — R80.9 TYPE 2 DIABETES MELLITUS WITH MICROALBUMINURIA, WITHOUT LONG-TERM CURRENT USE OF INSULIN: Primary | Chronic | ICD-10-CM

## 2022-06-22 DIAGNOSIS — D51.8 VITAMIN B12 DEFICIENCY (DIETARY) ANEMIA: Chronic | ICD-10-CM

## 2022-06-22 DIAGNOSIS — E78.1 HYPERTRIGLYCERIDEMIA: Chronic | ICD-10-CM

## 2022-06-22 DIAGNOSIS — E66.3 OVERWEIGHT (BMI 25.0-29.9): Chronic | ICD-10-CM

## 2022-06-22 DIAGNOSIS — G47.9 SLEEP DISORDER: Chronic | ICD-10-CM

## 2022-06-22 DIAGNOSIS — E78.5 HYPERLIPIDEMIA ASSOCIATED WITH TYPE 2 DIABETES MELLITUS: Chronic | ICD-10-CM

## 2022-06-22 DIAGNOSIS — F43.21 GRIEF AT LOSS OF CHILD: ICD-10-CM

## 2022-06-22 DIAGNOSIS — Z63.4 GRIEF AT LOSS OF CHILD: ICD-10-CM

## 2022-06-22 DIAGNOSIS — K21.9 GASTROESOPHAGEAL REFLUX DISEASE WITHOUT ESOPHAGITIS: Chronic | ICD-10-CM

## 2022-06-22 PROCEDURE — 99214 OFFICE O/P EST MOD 30 MIN: CPT | Performed by: INTERNAL MEDICINE

## 2022-06-22 RX ORDER — CHLORAL HYDRATE 500 MG
CAPSULE ORAL
COMMUNITY

## 2022-06-22 RX ORDER — GLIPIZIDE 2.5 MG/1
2.5 TABLET, EXTENDED RELEASE ORAL DAILY
Qty: 90 TABLET | Refills: 3 | Status: SHIPPED | OUTPATIENT
Start: 2022-06-22 | End: 2022-08-25

## 2022-06-22 RX ORDER — QUETIAPINE FUMARATE 25 MG/1
25 TABLET, FILM COATED ORAL NIGHTLY
Qty: 90 TABLET | Refills: 3 | Status: SHIPPED | OUTPATIENT
Start: 2022-06-22 | End: 2023-01-09

## 2022-06-22 RX ORDER — NATEGLINIDE 120 MG/1
TABLET ORAL
Qty: 180 TABLET | Refills: 3 | Status: SHIPPED | OUTPATIENT
Start: 2022-06-22 | End: 2022-08-25

## 2022-06-22 SDOH — SOCIAL STABILITY - SOCIAL INSECURITY: DISSAPEARANCE AND DEATH OF FAMILY MEMBER: Z63.4

## 2022-06-22 NOTE — PROGRESS NOTES
Chief Complaint  Follow-up (6 month), Depression (Son recently passed away), and Vomiting (Sees Dr Reid and she did a gastric emptying)    Subjective        History of Present Illness     Otilia Kenny presents to the office for 6-month follow-up of multiple medical issues including type 2 diabetes, high cholesterol, high triglycerides, hypertension, GERD, depression, iron deficiency anemia and other issues.   She has a history of laminectomy and cervical fusion 02/2013.     We switched patient from Celexa to Pristiq in March due to increased stress/depression.  Unfortunately, since that time, her 51-year-old son passed away after GI blood clot/ishemiccolitis.  Her younger sister also has stage IV ovarian cancer.  Even before her son's passing, she had not noticed significant improvement in PARMJIT/depression with the Pristiq.  Denies SI or SA.  She is having difficulty sleeping.  We discussed options to help manage grief and depression.  She responded well to low dose Seroquel 25 mg as adjunctive therapy in the past, for which I recommended we step up therapy by adding the Seroquel to the Pristiq.     Dr. Castillo, GI, is addressing patient's persistent nausea and vomiting.  She had gastric emptying on the 16th revealing slightly delayed emptying, although, patient is on weekly Ozempic. She is scheduled to follow with Dr. Castillo next Monday.  Patient continues on Prilosec b.i.d. and  Pepcid q.h.s.  to help manage GERD.  She used Carafate for while, which was stopped.  Dr. Castillo increased her dose of Zofran from 4 mg to 8 mg. .    DEXA 12/22/2021 revealed  normal bone density, although, Bone density has decreased 6% in the past 4 years.  She continues calcium and vitamin D     Weight is down 1 pound in the past six months.  Diabetes progressed with A1c 7.9 increased from 7.1 with Farxiga, weekly Ozempic 1 mg weekly, and Starlix with lunch and supper meals.  She was intolerant of Amaryl previously.  She has noticed  "fasting glucose has been higher later in the day.  They do admit to eating higher carbohydrate Mexican food rather frequently.  We discussed treatment options to help improve diabetes management and I recommended adding Glucotrol.     BP above goal in the office at 152/88.      The patient's relevant past medical, surgical, and social history was reviewed in Epic.   Lab results are reviewed with the patient today.  CBC reveals improved hemoglobin at 14.  Fasting glucose 226.   A1c 7.9. Normal renal and liver function.  Cholesterol at goal with Crestor and Zetia, although, triglycerides above goal at 238.        Objective   Vital Signs:  /88   Pulse 82   Temp 98.1 °F (36.7 °C) (Tympanic)   Ht 161.3 cm (63.5\")   Wt 75.1 kg (165 lb 9.6 oz)   SpO2 98%   BMI 28.87 kg/m²   Estimated body mass index is 28.87 kg/m² as calculated from the following:    Height as of this encounter: 161.3 cm (63.5\").    Weight as of this encounter: 75.1 kg (165 lb 9.6 oz).          Physical Exam  Vitals reviewed.   Constitutional:       General: She is not in acute distress.     Appearance: She is well-developed.      Comments: Pleasant female, overweight.   Tearful in the office today with the passing of her son a couple of months ago.   HENT:      Head: Normocephalic and atraumatic.      Nose:      Right Sinus: No maxillary sinus tenderness or frontal sinus tenderness.      Left Sinus: No maxillary sinus tenderness or frontal sinus tenderness.      Mouth/Throat:      Mouth: No oral lesions.      Pharynx: Uvula midline.      Tonsils: No tonsillar exudate.   Eyes:      Conjunctiva/sclera: Conjunctivae normal.      Pupils: Pupils are equal, round, and reactive to light.   Neck:      Thyroid: No thyroid mass or thyromegaly.      Vascular: No carotid bruit or JVD.      Trachea: Trachea normal. No tracheal deviation.   Cardiovascular:      Rate and Rhythm: Normal rate and regular rhythm.  No extrasystoles are present.     Chest Wall: " PMI is not displaced.      Heart sounds: Normal heart sounds. No murmur heard.  Pulmonary:      Effort: Pulmonary effort is normal. No accessory muscle usage or respiratory distress.      Breath sounds: Normal breath sounds. No decreased breath sounds, wheezing, rhonchi or rales.   Abdominal:      General: Bowel sounds are normal. There is no distension.      Palpations: Abdomen is soft.      Tenderness: There is no abdominal tenderness.   Musculoskeletal:      Cervical back: Neck supple.   Lymphadenopathy:      Cervical: No cervical adenopathy.   Skin:     General: Skin is warm and dry.      Findings: No rash.      Nails: There is no clubbing.   Neurological:      Mental Status: She is alert and oriented to person, place, and time.      Cranial Nerves: No cranial nerve deficit.      Coordination: Coordination normal.   Psychiatric:         Speech: Speech normal.         Behavior: Behavior normal.         Thought Content: Thought content normal.         Judgment: Judgment normal.            Result Review :    CMP    CMP 12/7/21 2/15/22 2/15/22 6/14/22     0845 0845    Glucose 173 (A)   226 (A)   BUN 28 (A) 28 (A)  28 (A)   Creatinine 0.94  0.97 1.03   eGFR Non  Am 58  56 (A)    Sodium 136 (A)   138   Potassium 4.2   4.5   Chloride 102   104   Calcium 9.4   9.7   Albumin 4.30   4.60   Total Bilirubin 0.6   0.4   Alkaline Phosphatase 74   97   AST (SGOT) 46 (A)   30   ALT (SGPT) 31   28   (A) Abnormal value            CBC w/diff    CBC w/Diff 12/7/21 6/14/22   WBC 5.63 6.86   RBC 4.28 4.92   Hemoglobin 12.6 14.5   Hematocrit 38.9 43.6   MCV 90.9 88.6   MCH 29.4 29.5   MCHC 32.4 33.3   RDW 12.6 12.8   Platelets 306 364   Neutrophil Rel % 58.8 50.1   Lymphocyte Rel % 28.4 38.3   Monocyte Rel % 8.3 8.5   Eosinophil Rel % 3.4 2.5   Basophil Rel % 1.1 0.6           Lipid Panel    Lipid Panel 12/7/21 12/7/21 6/14/22    0824 0824    Total Cholesterol   180   Triglycerides 127  238 (A)   HDL Cholesterol   52   VLDL  Cholesterol   40   LDL Cholesterol   87 88   LDL/HDL Ratio   1.55   (A) Abnormal value            TSH    TSH 6/14/22   TSH 3.850           A1C Last 3 Results    HGBA1C Last 3 Results 12/7/21 6/14/22   Hemoglobin A1C 7.13 (A) 7.90 (A)   (A) Abnormal value            Data reviewed: Radiologic studies DEXA 12/22/2021          Assessment and Plan {CC Problem List  Visit Diagnosis   ROS  Review (Popup)  Health Maintenance  Quality  BestPractice  Medications  SmartSets  SnapShot Encounters  Media :23}  Diagnoses and all orders for this visit:    1. Type 2 diabetes mellitus with microalbuminuria, without long-term current use of insulin (HCC) (Primary)  -     CBC Auto Differential; Future  -     Comprehensive Metabolic Panel; Future  -     Hemoglobin A1c; Future  -     LDL Cholesterol, Direct; Future  -     Triglycerides; Future  -     Microalbumin / Creatinine Urine Ratio - Urine, Clean Catch; Future  -     Vitamin B12; Future    2. Hyperlipidemia associated with type 2 diabetes mellitus (HCC)  -     LDL Cholesterol, Direct; Future    3. Hypertriglyceridemia  -     Triglycerides; Future    4. Essential hypertension  -     Comprehensive Metabolic Panel; Future    5. Overweight (BMI 25.0-29.9)  -     Comprehensive Metabolic Panel; Future    6. Vitamin D deficiency  -     Vitamin D 25 Hydroxy; Future    7. Gastroesophageal reflux disease without esophagitis    8. Vitamin B12 deficiency (dietary) anemia  -     CBC Auto Differential; Future  -     Vitamin B12; Future    9. Iron deficiency anemia secondary to inadequate dietary iron intake  -     CBC Auto Differential; Future  -     Iron Profile; Future    10. Generalized anxiety disorder    11. Moderate episode of recurrent major depressive disorder (HCC)    12. Sleep disorder    13. Grief at loss of child    14. Nausea and vomiting in adult    Other orders  -     nateglinide (Starlix) 120 MG tablet; 1 tab po before lunch and supper  Dispense: 180 tablet; Refill:  3  -     QUEtiapine (SEROquel) 25 MG tablet; Take 1 tablet by mouth Every Night.  Dispense: 90 tablet; Refill: 3  -     glipizide (Glucotrol XL) 2.5 MG 24 hr tablet; Take 1 tablet by mouth Daily.  Dispense: 90 tablet; Refill: 3           I spent 34 minutes caring for Otilia on this date of service. This time includes time spent by me in the following activities:preparing for the visit, reviewing tests, obtaining and/or reviewing a separately obtained history, performing a medically appropriate examination and/or evaluation , counseling and educating the patient/family/caregiver, ordering medications, tests, or procedures and documenting information in the medical record     To help manage PARMJIT/depression, we will add low dose Seroquel 25 mg to her Pristiq.100 mg daily.  Notify us if patient doesn't not feel the combination is helping her adequately control grief and depression after the recent passing of her son.    A prescription is sent for Glucotrol XL 2.5 mg q.d.  Continue other diabetic medications and monitoring.  Notify us if glucose is not consistently < 150.  Reviewed diabetic diet principles.  Decrease carbohydrates, which will not only improve diabetic management, but also help lower triglycerides.       Continue oral iron twice weekly.    Continue Zetia and Crestor.  Cholesterol at goal, although, triglycerides are above goal at 238.  Reduce carbohydrate intake and increase physical activity to lower the triglycerides.  Pursue intensified dietary efforts.     Continue to follow with Dr. Castillo for GI issues.  Continue Zofran for nausea.  It is quite possible that the Ozempic is a significant factor in her nausea/vomiting.  She may require a lower dose in order to tolerated adequately.    Continue oral vitamin D/calcuim.  She will be due repeat DEXA 12/2023.    Return in six months for routine follow up with      Scribed for Dr. Zabala by Vandana Galeas [facility].     Follow Up   Return in about 6 months  (around 12/22/2022) for Follow up in six months with labs one week prior..  Patient was given instructions and counseling regarding her condition or for health maintenance advice. Please see specific information pulled into the AVS if appropriate.

## 2022-06-23 PROBLEM — F33.1 MODERATE EPISODE OF RECURRENT MAJOR DEPRESSIVE DISORDER: Chronic | Status: ACTIVE | Noted: 2022-06-23

## 2022-06-27 ENCOUNTER — OFFICE VISIT (OUTPATIENT)
Dept: GASTROENTEROLOGY | Facility: CLINIC | Age: 72
End: 2022-06-27

## 2022-06-27 VITALS
HEIGHT: 64 IN | DIASTOLIC BLOOD PRESSURE: 81 MMHG | HEART RATE: 82 BPM | BODY MASS INDEX: 28.38 KG/M2 | SYSTOLIC BLOOD PRESSURE: 132 MMHG | WEIGHT: 166.2 LBS

## 2022-06-27 DIAGNOSIS — R11.0 NAUSEA: Primary | ICD-10-CM

## 2022-06-27 PROCEDURE — 99213 OFFICE O/P EST LOW 20 MIN: CPT | Performed by: INTERNAL MEDICINE

## 2022-06-27 RX ORDER — METOCLOPRAMIDE 10 MG/1
10 TABLET ORAL 4 TIMES DAILY
Qty: 120 TABLET | Refills: 3 | Status: SHIPPED | OUTPATIENT
Start: 2022-06-27 | End: 2022-07-27

## 2022-06-27 NOTE — PROGRESS NOTES
"Chief Complaint   Patient presents with   • Gastric Emptying Study Performed 06/16/2022       Subjective    Otilia Kenny is a 72 y.o. female.    History of Present Illness  Patient presented to GI clinic for follow-up visit today.  Feels some better currently.  Has intermittent nausea and vomiting.  Denied abdominal pain.  Bowel movements regular.  Weight is stable.  Gastric emptying scan was consistent with mild gastroparesis.       The following portions of the patient's history were reviewed and updated as appropriate:   Past Medical History:   Diagnosis Date   • Abdominal pain     Most likely related to functional colonic spasm     • Abnormal liver enzymes     Improved, 3/2015      • Allergic rhinitis     seasonal   • Allergic rhinitis    • Anemia    • Anesthesia complication     states sometime B/P drops   • Arthritis    • Cervical disc disorder     S/P cervical surgeries x2-3. Extensive fusion C 3-7. [facility]      • Depression    • Diarrhea, unspecified     Resolved with discontinuation of metformin.    • Diverticular disease of colon    • Dysfunction of eustachian tube    • Essential hypertension     Increase lisinopril HCT, 10/12.5.      • Gastritis    • Generalized anxiety disorder    • GERD (gastroesophageal reflux disease)    • History of transfusion    • Hyperlipidemia     Intolerant of Lipitor. The patient stopped Zocor due to \"liver pain\", summer 2015. patient instructed to reattempt Zocor every other day, 10/2015    • Hyperlipidemia associated with type 2 diabetes mellitus (HCC) 11/25/2020   • Hypertriglyceridemia     Holding simvastatin 2/2015 due to slightly elevated LFTs.      • Iron deficiency anemia    • Irritable bowel syndrome     Dr. 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 Reynolds    • Sleep disorder    • Spasm of back muscles     Dr. Castro changed Soma to Zanaflex.     • Type 2 diabetes mellitus with microalbuminuria, without " long-term current use of insulin (HCC) 2018    Added automatically from request for surgery [id number]   • Vitamin B12 deficiency (dietary) anemia     Currently on multivitamin daily       Past Surgical History:   Procedure Laterality Date   • BACK SURGERY      Laminectomy   • BUNIONECTOMY     • CARPAL TUNNEL RELEASE Bilateral    • CERVICAL DISC SURGERY      Laminectomy . Fusion .   • CHOLECYSTECTOMY     • COLONOSCOPY      Diverticulosis found in the sigmoid colon.A single diminutive polyp found in the colon.Mul.biopsies taken.Hemorrhoids found in the anus.   • ENDOSCOPY      Normal hypopharynx and esophagus.Marked irregularity of the Z-line,compatable with chronic reflux.Mul.biopsies.A hiatus hernia found in GE junction.Mild nonerosive gastritis.Mul.biopsies.Polyps in fundus.Mul.biopsies.Normal pylorus.Normal duodenum.   • ENDOSCOPY N/A 2021    Procedure: ESOPHAGOGASTRODUODENOSCOPY;  Surgeon: Samra Castillo MD;  Location: [facility] ENDOSCOPY;  Service: Gastroenterology;  Laterality: N/A;   • ENDOSCOPY AND COLONOSCOPY     • FINGER/THUMB LESION/CYST EXCISION     • HEEL SPUR EXCISION     • HYSTERECTOMY     • JOINT REPLACEMENT Bilateral    • KNEE ARTHROSCOPY Bilateral     Arthroscopy of the left knee with debridement of medial meniscus.   • KNEE SURGERY      Right unicompartmental arthroplasty.   • TOTAL KNEE ARTHROPLASTY Left 2018    Procedure: TOTAL KNEE ARTHROPLASTY ATTUNE with adductor canal block ;  Surgeon: Baron Reynolds MD;  Location: [facility] OR;  Service: Orthopedics   • UPPER GASTROINTESTINAL ENDOSCOPY  2021    Dr. Samra Castillo M.D., [facility], [place], [place]     Family History   Problem Relation Age of Onset   • Cancer Other         lung   • Diabetes Other    • Hypertension Other    • Stroke Other    • Breast cancer Sister      OB History        4    Para   4    Term   4            AB        Living           SAB        IAB        Ectopic         Molar        Multiple        Live Births                  Prior to Admission medications    Medication Sig Start Date End Date Taking? Authorizing Provider   aspirin 81 MG EC tablet Take 81 mg by mouth Daily.   Yes Leila Orlando MD   Blood Glucose Monitoring Suppl (ONETOUCH VERIO IQ SYSTEM) w/Device kit 1 each Daily. 11/18/19  Yes Alexis Zabala MD   CALCIUM CARB-CHOLECALCIFEROL PO Take 1 tablet by mouth 2 (two) times a day.   Yes Leila Orlando MD   cetirizine (zyrTEC) 10 MG tablet Take 10 mg by mouth Daily.   Yes Leila Orlando MD   cholecalciferol (VITAMIN D3) 25 MCG (1000 UT) tablet Take 1,000 Units by mouth. 4 Times Per Week   Yes Alexis Zabala MD   desvenlafaxine (Pristiq) 100 MG 24 hr tablet Take 1 tablet by mouth Daily. 3/1/22  Yes Alexis Zabala MD   ezetimibe (ZETIA) 10 MG tablet TAKE 1 TABLET DAILY 5/18/22  Yes Alexis Zabala MD   famotidine (PEPCID) 20 MG tablet TAKE 1 TABLET EVERY NIGHT 5/18/22  Yes Alexis Zabala MD   Farxiga 10 MG tablet TAKE 1 TABLET DAILY        (REPLACES INVOKANA) 5/4/22  Yes Alexis Zabala MD   fenofibrate (TRICOR) 145 MG tablet Take 0.5-1 tablets by mouth Daily. 2/11/22  Yes Alexis Zabala MD   glipizide (Glucotrol XL) 2.5 MG 24 hr tablet Take 1 tablet by mouth Daily. 6/22/22  Yes Alexis Zabala MD   Insulin Pen Needle 31G X 5 MM misc Use daily with Victoza 5/30/18  Yes Alexis Zabala MD   IRON, FERROUS SULFATE, PO Take 65 mg by mouth 2 (Two) Times a Week.   Yes Leila Orlando MD   Lancets (OneTouch Delica Plus [id number]) misc TEST ONCE DAILY AS DIRECTED 10/6/21  Yes Alexis Zabala MD   lisinopril (PRINIVIL,ZESTRIL) 2.5 MG tablet Take 1 tablet by mouth 2 (Two) Times a Day. 2/9/22  Yes Alexis Zabala MD   meclizine (ANTIVERT) 25 MG tablet Take 1 tablet by mouth 3 (Three) Times a Day As Needed for Dizziness. 10/26/21  Yes Alexis Zabala MD   minocycline (MINOCIN,DYNACIN) 100 MG capsule Take 1 capsule by mouth Daily. 6/4/21  Yes Alexis Zabala MD    Multiple Vitamins-Minerals (MULTIVITAMIN PO) Take 1 tablet by mouth daily.   Yes Leila Orlando MD   nateglinide (Starlix) 120 MG tablet 1 tab po before lunch and supper 22  Yes Alexis Zabala MD   Omega-3 Fatty Acids (Omega-3 Fish Oil) 1000 MG capsule Take  by mouth 4 (Four) Times a Week.   Yes Leila Orlando MD   omeprazole (priLOSEC) 40 MG capsule Take 1 capsule by mouth Daily. 22  Yes Alexis Zabala MD   OneTouch Verio test strip USE TO TEST DAILY AS       DIRECTED 6/15/22  Yes Alexis Zabala MD   QUEtiapine (SEROquel) 25 MG tablet Take 1 tablet by mouth Every Night. 22  Yes Alexis Zabala MD   rosuvastatin (CRESTOR) 20 MG tablet TAKE 1 TABLET DAILY.       REPLACE SIMVASTATIN 21  Yes Alexis Zabala MD   Semaglutide, 1 MG/DOSE, (Ozempic, 1 MG/DOSE,) 2 MG/1.5ML solution pen-injector Inject 1 mg under the skin into the appropriate area as directed 1 (One) Time Per Week. 2/15/22  Yes Alexis Zabala MD   TiZANidine (ZANAFLEX) 4 MG capsule TAKE 1 CAPSULE 2 TIMES     DAILY AS NEEDED FOR MUSCLE SPASM (REPLACES SKELAXIN) 21  Yes Alexis Zabala MD   metoclopramide (REGLAN) 10 MG tablet Take 1 tablet by mouth 4 (Four) Times a Day for 30 days. 22  Samra Castillo MD     Allergies   Allergen Reactions   • Allegra [Fexofenadine] Itching   • Amaryl [Glimepiride] Myalgia   • Dyazide [Triamterene-Hctz] Itching   • Hydrochlorothiazide W-Triamterene Itching   • Lipitor [Atorvastatin] Myalgia   • Metformin And Related Diarrhea   • Naproxen Itching   • Actos [Pioglitazone] Rash   • Sulfamethoxazole-Trimethoprim Rash     dizziness     Social History     Socioeconomic History   • Marital status:    Tobacco Use   • Smoking status: Former Smoker     Packs/day: 1.00     Years: 10.00     Pack years: 10.00     Types: Cigarettes     Quit date: 1985     Years since quittin.0   • Smokeless tobacco: Never Used   Vaping Use   • Vaping Use: Never used   Substance and Sexual  "Activity   • Alcohol use: No   • Drug use: No   • Sexual activity: Defer       Review of Systems  Review of Systems   Constitutional: Negative for chills, fatigue, fever and unexpected weight change.   HENT: Negative for congestion, ear discharge, hearing loss, nosebleeds and sore throat.    Eyes: Negative for pain, discharge and redness.   Respiratory: Negative for cough, chest tightness, shortness of breath and wheezing.    Cardiovascular: Negative for chest pain and palpitations.   Gastrointestinal: Positive for nausea and vomiting. Negative for abdominal distention, abdominal pain, blood in stool, constipation and diarrhea.   Endocrine: Negative for cold intolerance, polydipsia, polyphagia and polyuria.   Genitourinary: Negative for dysuria, flank pain, frequency, hematuria and urgency.   Musculoskeletal: Negative for arthralgias, back pain, joint swelling and myalgias.   Skin: Negative for color change, pallor and rash.   Neurological: Negative for tremors, seizures, syncope, weakness and headaches.   Hematological: Negative for adenopathy. Does not bruise/bleed easily.   Psychiatric/Behavioral: Negative for behavioral problems, confusion, dysphoric mood, hallucinations and suicidal ideas. The patient is not nervous/anxious.         /81   Pulse 82   Ht 161.9 cm (63.75\")   Wt 75.4 kg (166 lb 3.2 oz)   BMI 28.75 kg/m²     Objective    Physical Exam  Constitutional:       Appearance: She is well-developed.   HENT:      Head: Normocephalic and atraumatic.   Eyes:      Conjunctiva/sclera: Conjunctivae normal.      Pupils: Pupils are equal, round, and reactive to light.   Neck:      Thyroid: No thyromegaly.   Cardiovascular:      Rate and Rhythm: Normal rate and regular rhythm.      Heart sounds: Normal heart sounds. No murmur heard.  Pulmonary:      Effort: Pulmonary effort is normal.      Breath sounds: Normal breath sounds. No wheezing.   Abdominal:      General: Bowel sounds are normal. There is no " distension.      Palpations: Abdomen is soft. There is no mass.      Tenderness: There is no abdominal tenderness.      Hernia: No hernia is present.   Genitourinary:     Comments: No lesions noted  Musculoskeletal:         General: No tenderness. Normal range of motion.      Cervical back: Normal range of motion and neck supple.   Lymphadenopathy:      Cervical: No cervical adenopathy.   Skin:     General: Skin is warm and dry.      Findings: No rash.   Neurological:      Mental Status: She is alert and oriented to person, place, and time.      Cranial Nerves: No cranial nerve deficit.   Psychiatric:         Thought Content: Thought content normal.       Lab on 06/14/2022   Component Date Value Ref Range Status   • WBC 06/14/2022 6.86  3.40 - 10.80 10*3/mm3 Final   • RBC 06/14/2022 4.92  3.77 - 5.28 10*6/mm3 Final   • Hemoglobin 06/14/2022 14.5  12.0 - 15.9 g/dL Final   • Hematocrit 06/14/2022 43.6  34.0 - 46.6 % Final   • MCV 06/14/2022 88.6  79.0 - 97.0 fL Final   • MCH 06/14/2022 29.5  26.6 - 33.0 pg Final   • MCHC 06/14/2022 33.3  31.5 - 35.7 g/dL Final   • RDW 06/14/2022 12.8  12.3 - 15.4 % Final   • RDW-SD 06/14/2022 40.7  37.0 - 54.0 fl Final   • MPV 06/14/2022 10.0  6.0 - 12.0 fL Final   • Platelets 06/14/2022 364  140 - 450 10*3/mm3 Final   • Neutrophil % 06/14/2022 50.1  42.7 - 76.0 % Final   • Lymphocyte % 06/14/2022 38.3  19.6 - 45.3 % Final   • Monocyte % 06/14/2022 8.5  5.0 - 12.0 % Final   • Eosinophil % 06/14/2022 2.5  0.3 - 6.2 % Final   • Basophil % 06/14/2022 0.6  0.0 - 1.5 % Final   • Neutrophils, Absolute 06/14/2022 3.44  1.70 - 7.00 10*3/mm3 Final   • Lymphocytes, Absolute 06/14/2022 2.63  0.70 - 3.10 10*3/mm3 Final   • Monocytes, Absolute 06/14/2022 0.58  0.10 - 0.90 10*3/mm3 Final   • Eosinophils, Absolute 06/14/2022 0.17  0.00 - 0.40 10*3/mm3 Final   • Basophils, Absolute 06/14/2022 0.04  0.00 - 0.20 10*3/mm3 Final   • Glucose 06/14/2022 226 (A) 70 - 99 mg/dL Final   • BUN 06/14/2022 28 (A)  7 - 23 mg/dL Final   • Creatinine 06/14/2022 1.03  0.52 - 1.04 mg/dL Final   • Sodium 06/14/2022 138  137 - 145 mmol/L Final   • Potassium 06/14/2022 4.5  3.4 - 5.0 mmol/L Final   • Chloride 06/14/2022 104  101 - 112 mmol/L Final   • CO2 06/14/2022 23.0  22.0 - 30.0 mmol/L Final   • Calcium 06/14/2022 9.7  8.4 - 10.2 mg/dL Final   • Total Protein 06/14/2022 7.7  6.3 - 8.6 g/dL Final   • Albumin 06/14/2022 4.60  3.50 - 5.00 g/dL Final   • ALT (SGPT) 06/14/2022 28  <=35 U/L Final   • AST (SGOT) 06/14/2022 30  14 - 36 U/L Final   • Alkaline Phosphatase 06/14/2022 97  38 - 126 U/L Final   • Total Bilirubin 06/14/2022 0.4  0.2 - 1.3 mg/dL Final   • Globulin 06/14/2022 3.1  2.3 - 3.5 gm/dL Final   • A/G Ratio 06/14/2022 1.5  1.1 - 1.8 g/dL Final   • BUN/Creatinine Ratio 06/14/2022 27.2 (A) 7.0 - 25.0 Final   • Anion Gap 06/14/2022 11.0  5.0 - 15.0 mmol/L Final   • eGFR 06/14/2022 57.2 (A) >60.0 mL/min/1.73 Final    National Kidney Foundation and American Society of Nephrology (ASN) Task Force recommended calculation based on the Chronic Kidney Disease Epidemiology Collaboration (CKD-EPI) equation refit without adjustment for race.   • Hemoglobin A1C 06/14/2022 7.90 (A) 4.80 - 5.60 % Final   • Total Cholesterol 06/14/2022 180  150 - 200 mg/dL Final   • Triglycerides 06/14/2022 238 (A) <=150 mg/dL Final   • HDL Cholesterol 06/14/2022 52  40 - 59 mg/dL Final   • LDL Cholesterol  06/14/2022 88  <=100 mg/dL Final   • VLDL Cholesterol 06/14/2022 40  5 - 40 mg/dL Final   • LDL/HDL Ratio 06/14/2022 1.55  0.00 - 3.22 Final   • TSH 06/14/2022 3.850  0.270 - 4.200 uIU/mL Final   • Ferritin 06/14/2022 64.70  13.00 - 150.00 ng/mL Final   • Folate 06/14/2022 >20.00  4.78 - 24.20 ng/mL Final   • Iron 06/14/2022 72  37 - 145 mcg/dL Final   • Iron Saturation 06/14/2022 14 (A) 20 - 50 % Final   • Transferrin 06/14/2022 349  200 - 360 mg/dL Final   • TIBC 06/14/2022 520  298 - 536 mcg/dL Final   • Vitamin B-12 06/14/2022 716  402 - 946  pg/mL Final   • 25 Hydroxy, Vitamin D 06/14/2022 50.6  30.0 - 100.0 ng/ml Final     Assessment & Plan    No diagnosis found..   1.  Epigastric pain, resolved. Continue Prilosec po twice daily, Pepcid nightly,Tums as needed and Carafate 1 g p.o. 4 times daily.   2.  Longstanding GERD with symptoms well controlled with Prilosec, continue Prilosec and antireflux lifestyle.  3.  Nausea,  likely due to r mild gastroparesis and eactive gastropathy. Continue PPI, Pepcid and Carafate.    Continue Zofran as needed.  Add Reglan.  4.  Constipation, add high-fiber diet and MiraLAX.  5.  Adrenal nodule, PCP evaluation.  6.  High BMI, recommend exercise and diet control.    Orders placed during this encounter include:  No orders of the defined types were placed in this encounter.      * Surgery not found *    Review and/or summary of lab tests, radiology, procedures, medications. Review and summary of old records and obtaining of history. The risks and benefits of my recommendations, as well as other treatment options were discussed with the patient today. Questions were answered.    New Medications Ordered This Visit   Medications   • metoclopramide (REGLAN) 10 MG tablet     Sig: Take 1 tablet by mouth 4 (Four) Times a Day for 30 days.     Dispense:  120 tablet     Refill:  3       Follow-up: Return in about 1 month (around 7/27/2022).               Results for orders placed or performed in visit on 06/14/22   CBC Auto Differential    Specimen: Blood   Result Value Ref Range    WBC 6.86 3.40 - 10.80 10*3/mm3    RBC 4.92 3.77 - 5.28 10*6/mm3    Hemoglobin 14.5 12.0 - 15.9 g/dL    Hematocrit 43.6 34.0 - 46.6 %    MCV 88.6 79.0 - 97.0 fL    MCH 29.5 26.6 - 33.0 pg    MCHC 33.3 31.5 - 35.7 g/dL    RDW 12.8 12.3 - 15.4 %    RDW-SD 40.7 37.0 - 54.0 fl    MPV 10.0 6.0 - 12.0 fL    Platelets 364 140 - 450 10*3/mm3    Neutrophil % 50.1 42.7 - 76.0 %    Lymphocyte % 38.3 19.6 - 45.3 %    Monocyte % 8.5 5.0 - 12.0 %    Eosinophil % 2.5 0.3  - 6.2 %    Basophil % 0.6 0.0 - 1.5 %    Neutrophils, Absolute 3.44 1.70 - 7.00 10*3/mm3    Lymphocytes, Absolute 2.63 0.70 - 3.10 10*3/mm3    Monocytes, Absolute 0.58 0.10 - 0.90 10*3/mm3    Eosinophils, Absolute 0.17 0.00 - 0.40 10*3/mm3    Basophils, Absolute 0.04 0.00 - 0.20 10*3/mm3   Iron Profile    Specimen: Blood   Result Value Ref Range    Iron 72 37 - 145 mcg/dL    Iron Saturation 14 (L) 20 - 50 %    Transferrin 349 200 - 360 mg/dL    TIBC 520 298 - 536 mcg/dL   Vitamin D 25 Hydroxy    Specimen: Blood   Result Value Ref Range    25 Hydroxy, Vitamin D 50.6 30.0 - 100.0 ng/ml   TSH    Specimen: Blood   Result Value Ref Range    TSH 3.850 0.270 - 4.200 uIU/mL   Hemoglobin A1c    Specimen: Blood   Result Value Ref Range    Hemoglobin A1C 7.90 (H) 4.80 - 5.60 %   Folate    Specimen: Blood   Result Value Ref Range    Folate >20.00 4.78 - 24.20 ng/mL   Ferritin    Specimen: Blood   Result Value Ref Range    Ferritin 64.70 13.00 - 150.00 ng/mL   Vitamin B12    Specimen: Blood   Result Value Ref Range    Vitamin B-12 716 211 - 946 pg/mL   Lipid Panel    Specimen: Blood   Result Value Ref Range    Total Cholesterol 180 150 - 200 mg/dL    Triglycerides 238 (H) <=150 mg/dL    HDL Cholesterol 52 40 - 59 mg/dL    LDL Cholesterol  88 <=100 mg/dL    VLDL Cholesterol 40 5 - 40 mg/dL    LDL/HDL Ratio 1.55 0.00 - 3.22   Comprehensive Metabolic Panel    Specimen: Blood   Result Value Ref Range    Glucose 226 (H) 70 - 99 mg/dL    BUN 28 (H) 7 - 23 mg/dL    Creatinine 1.03 0.52 - 1.04 mg/dL    Sodium 138 137 - 145 mmol/L    Potassium 4.5 3.4 - 5.0 mmol/L    Chloride 104 101 - 112 mmol/L    CO2 23.0 22.0 - 30.0 mmol/L    Calcium 9.7 8.4 - 10.2 mg/dL    Total Protein 7.7 6.3 - 8.6 g/dL    Albumin 4.60 3.50 - 5.00 g/dL    ALT (SGPT) 28 <=35 U/L    AST (SGOT) 30 14 - 36 U/L    Alkaline Phosphatase 97 38 - 126 U/L    Total Bilirubin 0.4 0.2 - 1.3 mg/dL    Globulin 3.1 2.3 - 3.5 gm/dL    A/G Ratio 1.5 1.1 - 1.8 g/dL    BUN/Creatinine  Ratio 27.2 (H) 7.0 - 25.0    Anion Gap 11.0 5.0 - 15.0 mmol/L    eGFR 57.2 (L) >60.0 mL/min/1.73   Results for orders placed or performed in visit on 02/15/22   BUN    Specimen: Blood   Result Value Ref Range    BUN 28 (H) 8 - 23 mg/dL   Creatinine, Serum    Specimen: Blood   Result Value Ref Range    Creatinine 0.97 0.57 - 1.00 mg/dL    eGFR Non African Amer 56 (L) >60 mL/min/1.73   Results for orders placed or performed during the hospital encounter of 12/28/21   Tissue Pathology Exam    Specimen: A: Small Intestine, Duodenum; Tissue    B: Gastric, Antrum; Tissue    C: Esophagus; Tissue   Result Value Ref Range    Case Report       Surgical Pathology Report                         Case: [id number]                                  Authorizing Provider:  Samra Castillo MD      Collected:           12/28/2021 02:23 PM          Ordering Location:     [facility]   Received:            12/29/2021 06:49 AM                                 [facility]                                                     Pathologist:           Dennis Chairez MD                                                           Specimens:   1) - Small Intestine, Duodenum, CJ                                                                  2) - Gastric, Antrum, CJ                                                                            3) - Esophagus, EGJ   CJ                                                                   Final Diagnosis       SEE SCANNED REPORT       POC Glucose Once    Specimen: Blood   Result Value Ref Range    Glucose 126 70 - 130 mg/dL   Results for orders placed or performed in visit on 12/27/21   COVID-19, [facility] IN-HOUSE, NP SWAB IN TRANSPORT MEDIA 8-10 HR TAT - Swab, Nasopharynx    Specimen: Nasopharynx; Swab   Result Value Ref Range    COVID19 Not Detected Not Detected - Ref. Range   Results for orders placed or performed in visit on 12/07/21   CBC Auto Differential    Specimen: Blood    Result Value Ref Range    WBC 5.63 3.40 - 10.80 10*3/mm3    RBC 4.28 3.77 - 5.28 10*6/mm3    Hemoglobin 12.6 12.0 - 15.9 g/dL    Hematocrit 38.9 34.0 - 46.6 %    MCV 90.9 79.0 - 97.0 fL    MCH 29.4 26.6 - 33.0 pg    MCHC 32.4 31.5 - 35.7 g/dL    RDW 12.6 12.3 - 15.4 %    RDW-SD 40.8 37.0 - 54.0 fl    MPV 10.0 6.0 - 12.0 fL    Platelets 306 140 - 450 10*3/mm3    Neutrophil % 58.8 42.7 - 76.0 %    Lymphocyte % 28.4 19.6 - 45.3 %    Monocyte % 8.3 5.0 - 12.0 %    Eosinophil % 3.4 0.3 - 6.2 %    Basophil % 1.1 0.0 - 1.5 %    Neutrophils, Absolute 3.31 1.70 - 7.00 10*3/mm3    Lymphocytes, Absolute 1.60 0.70 - 3.10 10*3/mm3    Monocytes, Absolute 0.47 0.10 - 0.90 10*3/mm3    Eosinophils, Absolute 0.19 0.00 - 0.40 10*3/mm3    Basophils, Absolute 0.06 0.00 - 0.20 10*3/mm3   Iron Profile    Specimen: Blood   Result Value Ref Range    Iron 80 37 - 145 mcg/dL    Iron Saturation 16 (L) 20 - 50 %    Transferrin 339 200 - 360 mg/dL    TIBC 505 298 - 536 mcg/dL   Microalbumin / Creatinine Urine Ratio - Urine, Clean Catch    Specimen: Urine, Clean Catch   Result Value Ref Range    Microalbumin/Creatinine Ratio      Creatinine, Urine 88.8 mg/dL    Microalbumin, Urine <1.2 mg/dL   Vitamin D 25 Hydroxy    Specimen: Blood   Result Value Ref Range    25 Hydroxy, Vitamin D 48.0 30.0 - 100.0 ng/ml   Triglycerides    Specimen: Blood   Result Value Ref Range    Triglycerides 127 <=150 mg/dL   LDL Cholesterol, Direct    Specimen: Blood   Result Value Ref Range    LDL Cholesterol  87 0 - 100 mg/dL   Hemoglobin A1c    Specimen: Blood   Result Value Ref Range    Hemoglobin A1C 7.13 (H) 4.80 - 5.60 %   Ferritin    Specimen: Blood   Result Value Ref Range    Ferritin 51.40 13.00 - 150.00 ng/mL   Vitamin B12    Specimen: Blood   Result Value Ref Range    Vitamin B-12 926 211 - 946 pg/mL   Comprehensive Metabolic Panel    Specimen: Blood   Result Value Ref Range    Glucose 173 (H) 70 - 99 mg/dL    BUN 28 (H) 7 - 23 mg/dL    Creatinine 0.94 0.52 -  1.04 mg/dL    Sodium 136 (L) 137 - 145 mmol/L    Potassium 4.2 3.4 - 5.0 mmol/L    Chloride 102 101 - 112 mmol/L    CO2 26.0 22.0 - 30.0 mmol/L    Calcium 9.4 8.4 - 10.2 mg/dL    Total Protein 7.3 6.3 - 8.6 g/dL    Albumin 4.30 3.50 - 5.00 g/dL    ALT (SGPT) 31 <=35 U/L    AST (SGOT) 46 (H) 14 - 36 U/L    Alkaline Phosphatase 74 38 - 126 U/L    Total Bilirubin 0.6 0.2 - 1.3 mg/dL    eGFR Non African Amer 58 39 - 90 mL/min/1.73    Globulin 3.0 2.3 - 3.5 gm/dL    A/G Ratio 1.4 1.1 - 1.8 g/dL    BUN/Creatinine Ratio 29.8 (H) 7.0 - 25.0    Anion Gap 8.0 5.0 - 15.0 mmol/L     *Note: Due to a large number of results and/or encounters for the requested time period, some results have not been displayed. A complete set of results can be found in Results Review.         This document has been electronically signed by Samra Castillo MD on June 27, 2022 12:45 CDT

## 2022-07-22 ENCOUNTER — OFFICE VISIT (OUTPATIENT)
Dept: FAMILY MEDICINE CLINIC | Facility: CLINIC | Age: 72
End: 2022-07-22

## 2022-07-22 VITALS
TEMPERATURE: 96.8 F | HEIGHT: 64 IN | DIASTOLIC BLOOD PRESSURE: 86 MMHG | BODY MASS INDEX: 28.38 KG/M2 | SYSTOLIC BLOOD PRESSURE: 148 MMHG | HEART RATE: 72 BPM | WEIGHT: 166.2 LBS

## 2022-07-22 DIAGNOSIS — E27.8 ADRENAL NODULE: ICD-10-CM

## 2022-07-22 DIAGNOSIS — F41.1 GENERALIZED ANXIETY DISORDER: Chronic | ICD-10-CM

## 2022-07-22 DIAGNOSIS — G47.9 SLEEP DISORDER: Chronic | ICD-10-CM

## 2022-07-22 DIAGNOSIS — E11.65 TYPE 2 DIABETES MELLITUS WITH HYPERGLYCEMIA, WITHOUT LONG-TERM CURRENT USE OF INSULIN: ICD-10-CM

## 2022-07-22 DIAGNOSIS — F33.1 MODERATE EPISODE OF RECURRENT MAJOR DEPRESSIVE DISORDER: Chronic | ICD-10-CM

## 2022-07-22 DIAGNOSIS — F43.21 GRIEF AT LOSS OF CHILD: ICD-10-CM

## 2022-07-22 DIAGNOSIS — M47.12 OSTEOARTHRITIS OF CERVICAL SPINE WITH MYELOPATHY: Primary | ICD-10-CM

## 2022-07-22 DIAGNOSIS — E11.29 TYPE 2 DIABETES MELLITUS WITH MICROALBUMINURIA, WITHOUT LONG-TERM CURRENT USE OF INSULIN: ICD-10-CM

## 2022-07-22 DIAGNOSIS — Z63.4 GRIEF AT LOSS OF CHILD: ICD-10-CM

## 2022-07-22 DIAGNOSIS — M75.102 ROTATOR CUFF SYNDROME, LEFT: ICD-10-CM

## 2022-07-22 DIAGNOSIS — R80.9 TYPE 2 DIABETES MELLITUS WITH MICROALBUMINURIA, WITHOUT LONG-TERM CURRENT USE OF INSULIN: ICD-10-CM

## 2022-07-22 DIAGNOSIS — M62.838 MUSCLE SPASMS OF NECK: ICD-10-CM

## 2022-07-22 PROCEDURE — 99214 OFFICE O/P EST MOD 30 MIN: CPT | Performed by: INTERNAL MEDICINE

## 2022-07-22 SDOH — SOCIAL STABILITY - SOCIAL INSECURITY: DISSAPEARANCE AND DEATH OF FAMILY MEMBER: Z63.4

## 2022-07-22 NOTE — PROGRESS NOTES
Chief Complaint  Back Pain (And radiates to chest and left arm x 2 weeks but states has had chronic shoulder pain. Hurts with movement. She thinks related to her Degenerative disc disease )    Subjective        History of Present Illness     Otilia Kenny is our 72-year-old female with history of laminectomy and cervical fusion 02/2013 who comes in to follow up on multiple issues.  She is reporting worsening cervical DJD symptoms with left myelopathy/paresthesias.  She reports worsening thoracic back pain.  Denies thoracic radiculopathy. Thoracic x-rays 06/2019 reveals reveal multilevel degenerative changes.   Patient also reports bilateral shoulder pain for several months, which she attributed to arthritis.  However, left shoulder pain is considerably worse  regardless of which side she is lying on.    She has several allergies including sulfa drugs and has been intolerant with Mobic and naproxen.  She can tolerate Zanaflex.       Recently adding Seroquel at night to her Pristiq has resulted in at least 50% improvement in PARMJIT and grief symptoms.  She is sleeping better.  They had a son pass away in May.       Patient is following with Dr. Castillo GI issues.  She recently added metoclopramide about three weeks ago after gastric emptying scan was consistent with mild gastroparesis, which is likely related to the Ozempic. However, patient states she is asymptomatic.  She  feels Meniere's symptoms have been worse since starting the metoclopramide and prefers to stop the medication.  We discussed a weaning schedule for the Reglan, although, I did ask her to start daily Miralax to prevent/address constipation tendencies.         CT of the abdomen 02/15/2022 revealed bilateral adrenal nodules.   A follow-up CT or MRI per the adrenal protocol is recommended for further characterization.  We will plan to follow and repeat CT abdomen.      Patient states since adding Glucotrol XL, her glucose continues to fluctuate with a  "range 135-250.  This morning, glucose was 178 and 173 at noon today, but has dropped down to 80 around supper time.  She becomes symptomatic when glucose drops below 100, which is normally each day around the evening meal, but then tends to trend up in the evening hours.           Objective   Vital Signs:  /86   Pulse 72   Temp 96.8 °F (36 °C) (Tympanic)   Ht 161.3 cm (63.5\")   Wt 75.4 kg (166 lb 3.2 oz)   BMI 28.98 kg/m²   Estimated body mass index is 28.98 kg/m² as calculated from the following:    Height as of this encounter: 161.3 cm (63.5\").    Weight as of this encounter: 75.4 kg (166 lb 3.2 oz).          Physical Exam  Constitutional:       General: She is not in acute distress.     Appearance: She is well-developed.      Comments: Pleasant female, overweight.    HENT:      Head: Normocephalic and atraumatic.      Nose: Nose normal.      Mouth/Throat:      Pharynx: No oropharyngeal exudate.   Eyes:      Pupils: Pupils are equal, round, and reactive to light.   Neck:      Thyroid: No thyromegaly.      Vascular: No JVD.   Cardiovascular:      Rate and Rhythm: Normal rate and regular rhythm.      Heart sounds: Normal heart sounds.   Pulmonary:      Effort: Pulmonary effort is normal. No accessory muscle usage or respiratory distress.      Breath sounds: Normal breath sounds. No wheezing or rales.   Abdominal:      General: Bowel sounds are normal. There is no distension.      Palpations: Abdomen is soft.      Tenderness: There is no abdominal tenderness.   Musculoskeletal:      Cervical back: Neck supple.      Comments: Exam of left shoulder reveals postivie impingement sign     Lymphadenopathy:      Cervical: No cervical adenopathy.   Neurological:      Mental Status: She is alert and oriented to person, place, and time.      Cranial Nerves: No cranial nerve deficit.   Psychiatric:         Speech: Speech normal.         Behavior: Behavior normal.         Thought Content: Thought content normal.       "   Judgment: Judgment normal.        Future Appointments   Date Time Provider Department Center   8/12/2022 11:00 AM Alexis Zabala MD [facility]   12/30/2022  1:00 PM Alexis Zabala MD [facility]       Result Review :    Renal Profile    Renal Profile 12/7/21 2/15/22 2/15/22 6/14/22     0845 [id number]    BUN 28 (A) 28 (A)  28 (A)   Creatinine 0.94  0.97 1.03   eGFR Non  Am 58  56 (A)    (A) Abnormal value            Data reviewed: Radiologic studies Thoracic x-rays 06/2019 and Consultant notes Dr. Castillo, HEIDI       Future Appointments   Date Time Provider Department [facility]   8/12/2022 11:00 AM Alexis Zabala MD [facility]   12/30/2022  1:00 PM Alexis Zabala MD [facility]         Assessment and Plan   Diagnoses and all orders for this visit:    1. Osteoarthritis of cervical spine with myelopathy (Primary)  -     XR Spine Cervical Complete 4 or 5 View    2. Muscle spasms of neck    3. Rotator cuff syndrome, left  -     XR Shoulder 2+ View Left    4. Adrenal nodule (HCC)    5. Type 2 diabetes mellitus with microalbuminuria, without long-term current use of insulin (HCC)    6. Type 2 diabetes mellitus with hyperglycemia, without long-term current use of insulin (HCC)    7. Grief at loss of child    8. Generalized anxiety disorder    9. Moderate episode of recurrent major depressive disorder (HCC)    10. Sleep disorder           I spent 36 minutes caring for Otilia on this date of service. This time includes time spent by me in the following activities:preparing for the visit, reviewing tests, obtaining and/or reviewing a separately obtained history, performing a medically appropriate examination and/or evaluation , counseling and educating the patient/family/caregiver, ordering medications, tests, or procedures and documenting information in the medical record     We will initiate conservative therapy to address cervical myelopathy and left rotator cuff syndrome.  An order is placed for patient to stop by  for x-ray of the left shoulder and cervical spine.  Prescription sent for Zanaflex 4 mg to take up to one b.i.d. NSAIDs not a good choice for patient due to GI symptoms and multiple allergies/intolerance.  We will see her back in three weeks with a plan to refer for MRI if not improved.     We will have patient wean off of the metoclopramide over next 2-3 weeks.  The Ozempic is likely playing a role in the mildly delayed gastroparesis, although, patient is asymptomatic and has had increase in anticholinergic symptoms since starting the metoclopramide.  I did recommend she start daily Miralax to address chronic constipation tendencies.  I have sent Dr. Castillo note notifying her why I told and she could wean off of the metoclopramide    We will have patient split the low dose glipizide to take  1/2 in the a.m. and remaining 1/2 tablet with or after supper meal.  Monitor glucose prior to meals and at bedtime and keep a diary.  We will be seeing her back in three weeks to follow up.  I suspect she is going to need some mealtime insulin therapy.       Continue the Seroquel and Pristiq to address recently worsened anxiety and depression symptoms due to the grief of losing her adult son recently.  She reports at least 50% improvement with adding the Seroquel and is satisfied with progress for now.     We will plan to repeat CT of the abdomen to follow up on blateral adrenal nodules documented on CT abdomen approximately 02/2021.     Return 12/2022 for routine follow up with fasting labs one week prior or sooner if needed and in 3 weeks with diabetic diary.  We will most likely start patient on some mealtime insulin.  Perhaps, a single dose of 70/30 mix insulin at supper might suffice    Scribed for Dr. Zabala by Vandana Galeas [facility].       Follow Up   Return in about 3 weeks (around 8/12/2022) for Next scheduled follow up.  Patient was given instructions and counseling regarding her condition or for health maintenance  advice. Please see specific information pulled into the AVS if appropriate.

## 2022-07-27 ENCOUNTER — TELEPHONE (OUTPATIENT)
Dept: FAMILY MEDICINE CLINIC | Facility: CLINIC | Age: 72
End: 2022-07-27

## 2022-07-27 DIAGNOSIS — M25.512 ACUTE PAIN OF LEFT SHOULDER: ICD-10-CM

## 2022-07-27 DIAGNOSIS — M75.102 ROTATOR CUFF SYNDROME, LEFT: Primary | ICD-10-CM

## 2022-07-27 NOTE — TELEPHONE ENCOUNTER
----- Message from Alexis Zabala MD sent at 7/27/2022  1:17 PM CDT -----  Please inform and that x-rays of the neck reveal stable hardware.  No significant change from prior neck x-ray.  X-rays of the left shoulder reveal extensive arthritic and degenerative changes with a large spur on the humeral head, likely contributing to her problem.  If she is willing, go ahead and refer her to Dr. Reynolds or orthopedist of choice to evaluate the left shoulder.  She will be seeing me back for follow-up in a couple weeks.  EB

## 2022-07-29 RX ORDER — MINOCYCLINE HYDROCHLORIDE 100 MG/1
100 CAPSULE ORAL DAILY
Qty: 90 CAPSULE | Refills: 3 | Status: SHIPPED | OUTPATIENT
Start: 2022-07-29

## 2022-08-05 ENCOUNTER — OFFICE VISIT (OUTPATIENT)
Dept: ORTHOPEDIC SURGERY | Facility: CLINIC | Age: 72
End: 2022-08-05

## 2022-08-05 VITALS — WEIGHT: 168 LBS | HEIGHT: 64 IN | BODY MASS INDEX: 28.68 KG/M2

## 2022-08-05 DIAGNOSIS — M75.102 ROTATOR CUFF SYNDROME OF LEFT SHOULDER: ICD-10-CM

## 2022-08-05 DIAGNOSIS — M19.012 PRIMARY OSTEOARTHRITIS OF LEFT SHOULDER: ICD-10-CM

## 2022-08-05 DIAGNOSIS — M25.512 CHRONIC LEFT SHOULDER PAIN: Primary | ICD-10-CM

## 2022-08-05 DIAGNOSIS — G89.29 CHRONIC LEFT SHOULDER PAIN: Primary | ICD-10-CM

## 2022-08-05 DIAGNOSIS — M19.012 OSTEOARTHRITIS OF LEFT AC (ACROMIOCLAVICULAR) JOINT: ICD-10-CM

## 2022-08-05 DIAGNOSIS — M75.42 IMPINGEMENT SYNDROME OF LEFT SHOULDER: ICD-10-CM

## 2022-08-05 PROCEDURE — 99214 OFFICE O/P EST MOD 30 MIN: CPT | Performed by: NURSE PRACTITIONER

## 2022-08-05 RX ORDER — OXYCODONE HYDROCHLORIDE AND ACETAMINOPHEN 5; 325 MG/1; MG/1
1 TABLET ORAL EVERY 4 HOURS PRN
Qty: 18 TABLET | Refills: 0 | Status: SHIPPED | OUTPATIENT
Start: 2022-08-05 | End: 2022-12-08 | Stop reason: SDUPTHER

## 2022-08-05 NOTE — PROGRESS NOTES
Otilia Kenny is a 72 y.o. female   Primary provider:  Alexis Zabala MD       Chief Complaint   Patient presents with   • Left Shoulder - Shoulder Pain, Initial Evaluation     HISTORY OF PRESENT ILLNESS:    72-year-old female patient presents to office accompanied by her spouse for evaluation of chronic left shoulder pain.  Patient has a history of chronic bilateral shoulder pain with worse pain in the left shoulder.  She also has a history of chronic neck pain and upper thoracic pain.  The patient has a history of prior cervical spine fusion in February 2013.  Patient was recently evaluated by her primary care physician, Dr. Zabala, on 7/22/2022 with x-rays performed of the cervical spine and left shoulder.  She had stable appearing hardware in the cervical spine and advanced osteoarthritic changes in her left shoulder joint.  Patient states her left shoulder pain has progressively worsened in the past few weeks/months with no known injury or incident.  Patient complains of limited range of motion and intermittent popping sensations.  No numbness or tingling symptoms reported.  Pain is described as intermittent and moderate in severity.  Pain is described as aching and stabbing in nature with popping sensations and limited ROM.  Patient also states pain radiates into her left upper arm at times.  Patient also reports nighttime pain that disrupts her sleep.  Pain is worse with movement and use of her left arm/shoulder.  Pain improves mildly with rest and position changes.  The patient has an allergy to naproxen and has experienced itching.  She has also tried meloxicam in the past and was intolerant to this.  The patient takes Tylenol as needed but states that she tries to take as little as possible as Tylenol has affected her liver functions in the past.  Current pain scale is 4/10.    CONCURRENT MEDICAL HISTORY:    Past Medical History:   Diagnosis Date   • Abdominal pain     Most likely related to functional  "colonic spasm     • Abnormal liver enzymes     Improved, 3/2015      • Allergic rhinitis     seasonal   • Allergic rhinitis    • Anemia    • Anesthesia complication     states sometime B/P drops   • Arthritis    • Cervical disc disorder     S/P cervical surgeries x2-3. Extensive fusion C 3-7. [facility]      • Depression    • Diarrhea, unspecified     Resolved with discontinuation of metformin.    • Diverticular disease of colon    • Dysfunction of eustachian tube    • Essential hypertension     Increase lisinopril HCT, 10/12.5.      • Gastritis    • Generalized anxiety disorder    • GERD (gastroesophageal reflux disease)    • History of transfusion    • Hyperlipidemia     Intolerant of Lipitor. The patient stopped Zocor due to \"liver pain\", summer 2015. patient instructed to reattempt Zocor every other day, 10/2015    • Hyperlipidemia associated with type 2 diabetes mellitus (HCC) 11/25/2020   • Hypertriglyceridemia     Holding simvastatin 2/2015 due to slightly elevated LFTs.      • Iron deficiency anemia    • Irritable bowel syndrome     Dr. 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 Reynolds    • Sleep disorder    • Spasm of back muscles     Dr. Castro changed Soma to Zanaflex.     • Type 2 diabetes mellitus with microalbuminuria, without long-term current use of insulin (HCC) 5/7/2018    Added automatically from request for surgery [id number]   • Vitamin B12 deficiency (dietary) anemia     Currently on multivitamin daily         Allergies   Allergen Reactions   • Allegra [Fexofenadine] Itching   • Amaryl [Glimepiride] Myalgia   • Dyazide [Triamterene-Hctz] Itching   • Hydrochlorothiazide W-Triamterene Itching   • Lipitor [Atorvastatin] Myalgia   • Metformin And Related Diarrhea   • Naproxen Itching   • Actos [Pioglitazone] Rash   • Sulfamethoxazole-Trimethoprim Rash     dizziness         Current Outpatient Medications:   •  aspirin 81 MG EC tablet, Take 81 mg " by mouth Daily., Disp: , Rfl:   •  Blood Glucose Monitoring Suppl (ONETOUCH VERIO IQ SYSTEM) w/Device kit, 1 each Daily., Disp: 1 kit, Rfl: 0  •  CALCIUM CARB-CHOLECALCIFEROL PO, Take 1 tablet by mouth 2 (two) times a day., Disp: , Rfl:   •  cetirizine (zyrTEC) 10 MG tablet, Take 10 mg by mouth Daily., Disp: , Rfl:   •  cholecalciferol (VITAMIN D3) 25 MCG (1000 UT) tablet, Take 1,000 Units by mouth. 4 Times Per Week, Disp: , Rfl:   •  desvenlafaxine (Pristiq) 100 MG 24 hr tablet, Take 1 tablet by mouth Daily., Disp: 90 tablet, Rfl: 3  •  ezetimibe (ZETIA) 10 MG tablet, TAKE 1 TABLET DAILY, Disp: 90 tablet, Rfl: 3  •  famotidine (PEPCID) 20 MG tablet, TAKE 1 TABLET EVERY NIGHT, Disp: 90 tablet, Rfl: 3  •  Farxiga 10 MG tablet, TAKE 1 TABLET DAILY        (REPLACES INVOKANA), Disp: 90 tablet, Rfl: 1  •  fenofibrate (TRICOR) 145 MG tablet, Take 0.5-1 tablets by mouth Daily., Disp: 90 tablet, Rfl: 3  •  glipizide (Glucotrol XL) 2.5 MG 24 hr tablet, Take 1 tablet by mouth Daily., Disp: 90 tablet, Rfl: 3  •  Insulin Pen Needle 31G X 5 MM misc, Use daily with Victoza, Disp: 90 each, Rfl: 3  •  IRON, FERROUS SULFATE, PO, Take 65 mg by mouth 2 (Two) Times a Week., Disp: , Rfl:   •  Lancets (OneTouch Delica Plus Alppsd18C) misc, TEST ONCE DAILY AS DIRECTED, Disp: 100 each, Rfl: 3  •  lisinopril (PRINIVIL,ZESTRIL) 2.5 MG tablet, Take 1 tablet by mouth 2 (Two) Times a Day., Disp: 180 tablet, Rfl: 3  •  meclizine (ANTIVERT) 25 MG tablet, Take 1 tablet by mouth 3 (Three) Times a Day As Needed for Dizziness., Disp: 30 tablet, Rfl: 1  •  minocycline (MINOCIN,DYNACIN) 100 MG capsule, Take 1 capsule by mouth Daily., Disp: 90 capsule, Rfl: 3  •  Multiple Vitamins-Minerals (MULTIVITAMIN PO), Take 1 tablet by mouth daily., Disp: , Rfl:   •  nateglinide (Starlix) 120 MG tablet, 1 tab po before lunch and supper, Disp: 180 tablet, Rfl: 3  •  Omega-3 Fatty Acids (Omega-3 Fish Oil) 1000 MG capsule, Take  by mouth 4 (Four) Times a Week., Disp:  , Rfl:   •  omeprazole (priLOSEC) 40 MG capsule, Take 1 capsule by mouth Daily., Disp: 90 capsule, Rfl: 3  •  OneTouch Verio test strip, USE TO TEST DAILY AS       DIRECTED, Disp: 100 each, Rfl: 3  •  QUEtiapine (SEROquel) 25 MG tablet, Take 1 tablet by mouth Every Night., Disp: 90 tablet, Rfl: 3  •  rosuvastatin (CRESTOR) 20 MG tablet, TAKE 1 TABLET DAILY.       REPLACE SIMVASTATIN, Disp: 90 tablet, Rfl: 3  •  Semaglutide, 1 MG/DOSE, (Ozempic, 1 MG/DOSE,) 2 MG/1.5ML solution pen-injector, Inject 1 mg under the skin into the appropriate area as directed 1 (One) Time Per Week., Disp: 6 pen, Rfl: 3  •  TiZANidine (ZANAFLEX) 4 MG capsule, TAKE 1 CAPSULE 2 TIMES     DAILY AS NEEDED FOR MUSCLE SPASM (REPLACES SKELAXIN), Disp: 180 capsule, Rfl: 3  •  oxyCODONE-acetaminophen (PERCOCET) 5-325 MG per tablet, Take 1 tablet by mouth Every 4 (Four) Hours As Needed for Moderate Pain  or Severe Pain ., Disp: 18 tablet, Rfl: 0    Past Surgical History:   Procedure Laterality Date   • BACK SURGERY      Laminectomy   • BUNIONECTOMY     • CARPAL TUNNEL RELEASE Bilateral    • CERVICAL DISC SURGERY      Laminectomy 2011. Fusion 2013.   • CHOLECYSTECTOMY     • COLONOSCOPY      Diverticulosis found in the sigmoid colon.A single diminutive polyp found in the colon.Mul.biopsies taken.Hemorrhoids found in the anus.   • ENDOSCOPY      Normal hypopharynx and esophagus.Marked irregularity of the Z-line,compatable with chronic reflux.Mul.biopsies.A hiatus hernia found in GE junction.Mild nonerosive gastritis.Mul.biopsies.Polyps in fundus.Mul.biopsies.Normal pylorus.Normal duodenum.   • ENDOSCOPY N/A 12/28/2021    Procedure: ESOPHAGOGASTRODUODENOSCOPY;  Surgeon: Samar Castillo MD;  Location: [facility] END[facility];  Service: Gastroenterology;  Laterality: N/A;   • ENDOSCOPY AND COLONOSCOPY     • FINGER/THUMB LESION/CYST EXCISION     • HEEL SPUR EXCISION     • HYSTERECTOMY     • JOINT REPLACEMENT Bilateral    • KNEE ARTHROSCOPY Bilateral      "Arthroscopy of the left knee with debridement of medial meniscus.   • KNEE SURGERY      Right unicompartmental arthroplasty.   • TOTAL KNEE ARTHROPLASTY Left 2018    Procedure: TOTAL KNEE ARTHROPLASTY ATTUNE with adductor canal block ;  Surgeon: Baron Reynolds MD;  Location: [facility];  Service: Orthopedics   • UPPER GASTROINTESTINAL ENDOSCOPY  2021    Dr. Samra Castillo M.D., [facility], [place], [place]       Family History   Problem Relation Age of Onset   • Cancer Other         lung   • Diabetes Other    • Hypertension Other    • Stroke Other    • Breast cancer Sister         Social History     Socioeconomic History   • Marital status:    Tobacco Use   • Smoking status: Former Smoker     Packs/day: 1.00     Years: 10.00     Pack years: 10.00     Types: Cigarettes     Quit date: 1985     Years since quittin.2   • Smokeless tobacco: Never Used   Vaping Use   • Vaping Use: Never used   Substance and Sexual Activity   • Alcohol use: No   • Drug use: No   • Sexual activity: Defer        Review of Systems   Constitutional: Negative.    HENT: Negative.    Eyes: Negative.    Respiratory: Negative.    Cardiovascular: Negative.    Gastrointestinal: Negative.    Endocrine: Negative.    Genitourinary: Negative.    Musculoskeletal: Positive for arthralgias, myalgias, neck pain and neck stiffness.        Joint pain/stiffness.  Left shoulder pain.   Skin: Negative.    Allergic/Immunologic: Negative.    Neurological: Negative.    Hematological: Negative.    Psychiatric/Behavioral: Positive for sleep disturbance.       PHYSICAL EXAMINATION:       Ht 161.3 cm (63.5\")   Wt 76.2 kg (168 lb)   BMI 29.29 kg/m²     Physical Exam  Vitals reviewed.   Constitutional:       General: She is not in acute distress.     Appearance: She is well-developed. She is not ill-appearing.   HENT:      Head: Normocephalic.   Pulmonary:      Effort: Pulmonary effort is normal. No respiratory distress. "   Abdominal:      General: There is no distension.      Palpations: Abdomen is soft.   Musculoskeletal:         General: Swelling (Mild, left shoulder) and tenderness (Mild, left shoulder) present. No deformity or signs of injury.   Skin:     General: Skin is warm and dry.      Capillary Refill: Capillary refill takes less than 2 seconds.      Findings: No erythema.   Neurological:      Mental Status: She is alert and oriented to person, place, and time.      GCS: GCS eye subscore is 4. GCS verbal subscore is 5. GCS motor subscore is 6.      Sensory: No sensory deficit.   Psychiatric:         Speech: Speech normal.         Behavior: Behavior normal.         Thought Content: Thought content normal.         Judgment: Judgment normal.         GAIT:     [x]  Normal  []  Antalgic    Assistive device: [x]  None  []  Walker     []  Crutches  []  Cane     []  Wheelchair  []  Stretcher    Left Shoulder Exam     Tenderness   Left shoulder tenderness location: Mild, diffuse.    Range of Motion   Active abduction: 100   External rotation: 70   Forward flexion: 110   Internal rotation 90 degrees: 50     Muscle Strength   Abduction: 4/5   Supraspinatus: 4/5     Tests   Mckeon test: positive  Cross arm: positive  Impingement: positive  Drop arm: negative    Other   Erythema: absent  Sensation: normal  Pulse: present     Comments:  Pain and limitations with range of motion.  Mild, generalized swelling noted at the shoulder.  Empty can test positive.  Full can test positive.  Neurovascularly intact.            XR Spine Cervical Complete 4 or 5 View    Result Date: 7/25/2022  Narrative: XR CERVICAL SPINE 4-5 VIEWS COMPARISONS: None. ADDITIONAL PERTINENT HISTORY: Increasing left-sided posterior neck pain. FINDINGS: Postoperative changes: Previous anterior interbody fusion of C3-C4 and previous anterior interbody fusion of C5-T1. Previous corpectomy at C5, C6 and C7. Posterior interbody fusion of C5-T1. Vertebral body heights and  alignment:  Negative. Vertebral bodies:   Negative. Disc spaces:  Fusion across the disc space at C3-C4. Craniocervical junction:  Negative. Cervical thoracic junction:  Negative. Prevertebral soft tissues:  Negative. Surrounding soft tissues:  Negative.     Impression: 1. Postoperative changes involving the cervical spine. 2. No acute appearing bony abnormalities. Electronically signed by:  Michael Portillo MD  7/25/2022 8:22 PM CDT Workstation: [id number]    XR Shoulder 2+ View Left    Result Date: 7/22/2022  Narrative: Three view left shoulder HISTORY: Worsening left shoulder pain with decreased range of motion. AP films with the humerus in internal and external rotation and scapular Y view were obtained. COMPARISON: None FINDINGS: No fracture or dislocation. Hypertrophic change acromioclavicular joint. Degenerative changes humeral head with 2.3 cm osteophyte arising inferiorly and medially from the humeral head. Minimal synovial osteochondromatosis. Extensive postoperative changes in the cervical spine. No other osseous or articular abnormality.     Impression: CONCLUSION: Hypertrophic change acromioclavicular joint. Degenerative changes humeral head with 2.3 cm osteophyte arising inferiorly and medially from the humeral head. Minimal synovial osteochondromatosis. [id number] Electronically signed by:  Ryan Mayo MD  7/22/2022 7:42 PM CDT Workstation: [id number]    ASSESSMENT:    Diagnoses and all orders for this visit:    Chronic left shoulder pain  -     oxyCODONE-acetaminophen (PERCOCET) 5-325 MG per tablet; Take 1 tablet by mouth Every 4 (Four) Hours As Needed for Moderate Pain  or Severe Pain .  -     MRI Shoulder Left Without Contrast; Future    Primary osteoarthritis of left shoulder  -     oxyCODONE-acetaminophen (PERCOCET) 5-325 MG per tablet; Take 1 tablet by mouth Every 4 (Four) Hours As Needed for Moderate Pain  or Severe Pain .  -     MRI Shoulder Left Without Contrast; Future    Rotator cuff syndrome of left  shoulder  -     oxyCODONE-acetaminophen (PERCOCET) 5-325 MG per tablet; Take 1 tablet by mouth Every 4 (Four) Hours As Needed for Moderate Pain  or Severe Pain .  -     MRI Shoulder Left Without Contrast; Future    Osteoarthritis of left AC (acromioclavicular) joint  -     MRI Shoulder Left Without Contrast; Future    Impingement syndrome of left shoulder  -     MRI Shoulder Left Without Contrast; Future    PLAN    X-rays of the left shoulder performed previously on 7/22/2022 are independently reviewed by myself today with no acute findings noted.  The patient has advanced degenerative changes noted at the AC joint.  The patient also has degenerative changes noted at the humeral head with a large osteophyte formation noted at the inferior aspect of the humeral head.  Patient has a history of chronic left shoulder pain as well as chronic cervical spine pain and a history of previous cervical spine fusion.  She has specific complaints of increased pain in her left shoulder in recent weeks with no known injury or incident.  She has very limited range of motion in her left shoulder joint, which would be expected due to the large osteophyte formation.  We discussed that her likely only surgical option would be shoulder arthroplasty.  However, we discussed conservative management to improve her pain and symptoms.  The patient indicates that she would like to proceed with MRI imaging to establish whether or not she has chronic rotator cuff tears.  Recommend MRI of the left shoulder for further evaluation, possible surgical planning (for possible future shoulder arthroplasty) and to evaluate for rotator cuff tears, tendinitis/tendinosis, bursitis and/or impingement.    We discussed and I have offered to perform an intra-articular injection of steroid into the left shoulder today for management of joint pain/inflammation associated with her advanced osteoarthritic changes seen on x-ray.  However, the patient voices concerns  about the steroid increasing her blood sugar.  The patient has a history of type 2 diabetes and states her blood sugar has been less controlled recently but she has been followed by her PCP for this and they have made some medication changes.  I have reviewed her most recent labs from 6/14/2022 and her hemoglobin A1c is noted to been 7.9, which is higher than desired but not significantly uncontrolled.  For now, she wants to wait on the steroid injection into her left shoulder, which is reasonable.  The patient indicates that she would like to proceed with MRI imaging and in that interim, hopefully her blood sugar will continue to be better controlled and she may consider the injection at her next follow-up visit.  Physical therapy may be an option for the patient as well for conditioning and strengthening of her left arm/shoulder.    Recommend the following:     -Rest and activity modification with avoidance of heavy lifting, pulling, tugging and repetitive motions for now.  -Gentle, progressive range of motion exercises with the affected shoulder intermittently several times daily as pain allows.  -Ice therapy to the affected shoulder intermittently 3-4 times daily for 15 minutes at a time to minimize pain/inflammation.  -Gradual progression of activity and use of the affected arm/shoulder as tolerated and based on pain/symptoms.    As far as improving this patient's left shoulder pain, options are somewhat limited as she cannot take oral NSAIDs due to allergy and intolerance.  She is reluctant to take steroids, even localized injections at this time, due to recent elevations in her blood sugar.  She does take Tylenol on occasion as needed but tries to take as little of this as possible as she has had issues in the past with the Tylenol causing her liver enzymes to elevate.  We discussed pain medication in an effort to improve her pain if she needs this.  She states she would like to try some pain medication  during times of increased pain.  She states she does not tolerate hydrocodone and has tried it in the past and had adverse side effects.  She states that she does tolerate oxycodone products without any significant issues and has taken this before and tolerated it well.  Percocet 5 mg is prescribed to take as needed for moderate to severe pain. Patient is instructed to take the pain medication sparingly and to take the least amount of pain medication needed to control the pain.  Patient is cautioned that opioids can be addictive.  We discussed other potential adverse side effects of opioids including constipation, dizziness, drowsiness, increased risk for falls and/or respiratory depression.  Patient verbalized understanding of these risks.  I have encouraged the patient to focus on nonopioid pain management methods as much as possible.  Recommend to continue with Tylenol as needed for milder pain.  ALANIS is reviewed internally via Epic and is noted to be appropriate.    Follow-up after MRI to discuss results and further treatment recommendations.  Consider trial of an intra-articular injection of steroid if the patient is interested in this.  Consider referral to physical therapy.  Consider surgical consult for shoulder arthroplasty if the patient is interested in this option.     Time spent of a minimum of 30 minutes including the face to face evaluation, reviewing of medical history and prior medial records, reviewing of diagnostic studies, ordering additional tests, prescription drug management, documentation, patient education and coordination of care.     EMR [employer] Disclaimer: Some of this note may be an electronic transcription/translation of spoken language to printed text using the Dragon Dictation System.     Return for follow up after MRI.        This document has been electronically signed by MYRA Wang on August 5, 2022 12:51 CDT      MYRA Wang

## 2022-08-10 ENCOUNTER — HOSPITAL ENCOUNTER (OUTPATIENT)
Dept: MRI IMAGING | Facility: HOSPITAL | Age: 72
Discharge: HOME | End: 2022-08-10
Admitting: NURSE PRACTITIONER

## 2022-08-10 DIAGNOSIS — M19.012 OSTEOARTHRITIS OF LEFT AC (ACROMIOCLAVICULAR) JOINT: ICD-10-CM

## 2022-08-10 DIAGNOSIS — G89.29 CHRONIC LEFT SHOULDER PAIN: ICD-10-CM

## 2022-08-10 DIAGNOSIS — M19.012 PRIMARY OSTEOARTHRITIS OF LEFT SHOULDER: ICD-10-CM

## 2022-08-10 DIAGNOSIS — M75.42 IMPINGEMENT SYNDROME OF LEFT SHOULDER: ICD-10-CM

## 2022-08-10 DIAGNOSIS — M25.512 CHRONIC LEFT SHOULDER PAIN: ICD-10-CM

## 2022-08-10 DIAGNOSIS — M75.102 ROTATOR CUFF SYNDROME OF LEFT SHOULDER: ICD-10-CM

## 2022-08-10 PROCEDURE — 73221 MRI JOINT UPR EXTREM W/O DYE: CPT

## 2022-08-12 ENCOUNTER — OFFICE VISIT (OUTPATIENT)
Dept: FAMILY MEDICINE CLINIC | Facility: CLINIC | Age: 72
End: 2022-08-12

## 2022-08-12 VITALS
HEIGHT: 64 IN | SYSTOLIC BLOOD PRESSURE: 126 MMHG | HEART RATE: 72 BPM | TEMPERATURE: 97.5 F | WEIGHT: 167 LBS | DIASTOLIC BLOOD PRESSURE: 70 MMHG | BODY MASS INDEX: 28.51 KG/M2

## 2022-08-12 DIAGNOSIS — Z79.4 TYPE 2 DIABETES MELLITUS TREATED WITH INSULIN: Primary | ICD-10-CM

## 2022-08-12 DIAGNOSIS — R80.9 TYPE 2 DIABETES MELLITUS WITH MICROALBUMINURIA, WITHOUT LONG-TERM CURRENT USE OF INSULIN: Primary | Chronic | ICD-10-CM

## 2022-08-12 DIAGNOSIS — K76.0 FATTY LIVER: Chronic | ICD-10-CM

## 2022-08-12 DIAGNOSIS — E11.29 TYPE 2 DIABETES MELLITUS WITH MICROALBUMINURIA, WITHOUT LONG-TERM CURRENT USE OF INSULIN: Primary | Chronic | ICD-10-CM

## 2022-08-12 DIAGNOSIS — E11.65 TYPE 2 DIABETES MELLITUS WITH HYPERGLYCEMIA, WITH LONG-TERM CURRENT USE OF INSULIN: Chronic | ICD-10-CM

## 2022-08-12 DIAGNOSIS — Z79.4 TYPE 2 DIABETES MELLITUS WITH HYPERGLYCEMIA, WITH LONG-TERM CURRENT USE OF INSULIN: Chronic | ICD-10-CM

## 2022-08-12 DIAGNOSIS — M75.102 ROTATOR CUFF SYNDROME OF LEFT SHOULDER: ICD-10-CM

## 2022-08-12 DIAGNOSIS — I10 ESSENTIAL HYPERTENSION: Chronic | ICD-10-CM

## 2022-08-12 DIAGNOSIS — E66.3 OVERWEIGHT (BMI 25.0-29.9): Chronic | ICD-10-CM

## 2022-08-12 DIAGNOSIS — E27.8 ADRENAL NODULE: ICD-10-CM

## 2022-08-12 DIAGNOSIS — E11.9 TYPE 2 DIABETES MELLITUS TREATED WITH INSULIN: Primary | ICD-10-CM

## 2022-08-12 PROCEDURE — 99214 OFFICE O/P EST MOD 30 MIN: CPT | Performed by: INTERNAL MEDICINE

## 2022-08-12 RX ORDER — TIZANIDINE HYDROCHLORIDE 4 MG/1
4 CAPSULE, GELATIN COATED ORAL 2 TIMES DAILY PRN
Qty: 180 CAPSULE | Refills: 3 | Status: SHIPPED | OUTPATIENT
Start: 2022-08-12

## 2022-08-12 RX ORDER — ROSUVASTATIN CALCIUM 20 MG/1
TABLET, COATED ORAL
Qty: 90 TABLET | Refills: 3 | Status: SHIPPED | OUTPATIENT
Start: 2022-08-12

## 2022-08-12 NOTE — PROGRESS NOTES
Chief Complaint  Follow-up (3 week for pain neck and shoulder. She had MRI shoulder ordered by Morena RENTERIA . She has an appt to see Morena Garcia next week and will probably see Dr. Reynolds after . She states it is mainly her shoulder that hurts ), Diabetes (She has a record of glucose readings ), and Hypertension    Subjective        History of Present Illness     Otilia Kenny presents to the office for 3-week follow up on multiple issues including cervical myelopathy and left rotator cuff syndrome as well as diabetic management.  X-rays of the neck 07/2202/2 reveal stable fusion hardware with no significant change from prior neck x-ray.  X-rays of the left shoulder reveal extensive arthritic and degenerative changes with a large spur on the humeral head, likely contributing to her problems and pain.  Further evaluation was completed with MRI 08/10/2022 revealing no full thickness tears.  There is partial tear versus tendinosis of the proximal long head of the biceps tendon and tears of the superior and posterior glenoid labrum.   She saw Morena Garcia a week ago and feels symptoms have settled down with taking Zanaflex twice daily.       With her visit three weeks ago, her glucose was not at goal, so I asked patient to split her dose of Glipizide to take 1/2 tablet twice daily to see if this helped with the fluctuating glucose. Patient brings in diabetic diary revealing marginal glucose numbers with a couple of readings in the 70s, one morning and one evening with patient reporting mild vague symptoms with glucose in the 70s.  A.m. numbers were usually still above goal.  Postprandial excursions were only slightly above goal with Starlix.  We discussed using insulin to help with more consistent diabetic control.  She is in agreement.   She is only recieving 100 test strips for a 90-day supply currently and needs the quantity increased now that we are asking her to monitor more frequently and starting insulin  "therapy.  She is already used 2 pen injections due to having used Victoza for quite some time and now using weekly Ozempic pen    Dr. Castillo is following GI issues. She continues to have nausea, mainly after breakfast meal, which she reports as a light meal.   Nausea is not occurring as frequently on her current GI meds.  She had anticholinergic side effects with Reglan.  Gastric emptying study was only slightly abnormal, likely due to being on Ozempic.  Dr. Castillo doubled her dose of Zofran for use as needed, which she has rarely been taking.      CT of the abdomen 02/15/2022 revealed bilateral adrenal nodules.   A follow-up CT or MRI per the adrenal protocol was recommended for further characterization.  She is in agreement to repeat CT abdomen adrenal protocol as a 6-month follow-up.    Reassurance was given.    BP and HR at goal in the office today.   Weight is still above goal, with BMI 29.1        Objective   Vital Signs:  /70   Pulse 72   Temp 97.5 °F (36.4 °C) (Tympanic)   Ht 161.3 cm (63.5\")   Wt 75.8 kg (167 lb)   BMI 29.12 kg/m²   Estimated body mass index is 29.12 kg/m² as calculated from the following:    Height as of this encounter: 161.3 cm (63.5\").    Weight as of this encounter: 75.8 kg (167 lb).          Physical Exam  Vitals reviewed.   Constitutional:       General: She is not in acute distress.     Appearance: She is well-developed.      Comments: Very pleasant.  Overweight.  Accompanied by her , Tapan POON:      Head: Normocephalic and atraumatic.      Nose:      Right Sinus: No maxillary sinus tenderness or frontal sinus tenderness.      Left Sinus: No maxillary sinus tenderness or frontal sinus tenderness.      Mouth/Throat:      Mouth: No oral lesions.      Pharynx: Uvula midline.      Tonsils: No tonsillar exudate.   Eyes:      Conjunctiva/sclera: Conjunctivae normal.      Pupils: Pupils are equal, round, and reactive to light.   Neck:      Thyroid: No thyroid mass or " thyromegaly.      Vascular: No carotid bruit or JVD.      Trachea: Trachea normal. No tracheal deviation.   Cardiovascular:      Rate and Rhythm: Normal rate and regular rhythm.  No extrasystoles are present.     Chest Wall: PMI is not displaced.      Heart sounds: Normal heart sounds. No murmur heard.  Pulmonary:      Effort: Pulmonary effort is normal. No accessory muscle usage or respiratory distress.      Breath sounds: Normal breath sounds. No decreased breath sounds, wheezing, rhonchi or rales.   Abdominal:      General: There is no distension.      Palpations: Abdomen is soft.      Comments: Overweight abdomen   Musculoskeletal:      Cervical back: Neck supple.      Comments: Mild left-sided posterior neck/cervical muscle spasm, although, improved from last visit.  I did not put her through shoulder range of motion exam as she will follow with orthopedics next week   Lymphadenopathy:      Cervical: No cervical adenopathy.   Skin:     General: Skin is warm and dry.      Findings: No rash.      Nails: There is no clubbing.   Neurological:      General: No focal deficit present.      Mental Status: She is alert and oriented to person, place, and time.      Cranial Nerves: No cranial nerve deficit.      Coordination: Coordination normal.   Psychiatric:         Mood and Affect: Mood normal.         Speech: Speech normal.         Behavior: Behavior normal.         Thought Content: Thought content normal.         Judgment: Judgment normal.      Comments: Mood and affect clearly better            Result Review :    Common labs    Common Labsle 12/7/21 12/7/21 12/7/21 12/7/21 12/7/21 12/7/21 2/15/22 2/15/22 6/14/22 6/14/22 6/14/22 6/14/22    0824 0824 0824 0824 0824 0940 0845 0845 0821 0821 0821 0821   Glucose   173 (A)       226 (A)     BUN   28 (A)    28 (A)   28 (A)     Creatinine   0.94     0.97  1.03     eGFR Non  Am   58     56 (A)       Sodium   136 (A)       138     Potassium   4.2       4.5      Chloride   102       104     Calcium   9.4       9.7     Albumin   4.30       4.60     Total Bilirubin   0.6       0.4     Alkaline Phosphatase   74       97     AST (SGOT)   46 (A)       30     ALT (SGPT)   31       28     WBC 5.63        6.86      Hemoglobin 12.6        14.5      Hematocrit 38.9        43.6      Platelets 306        364      Total Cholesterol           180    Triglycerides  127         238 (A)    HDL Cholesterol           52    LDL Cholesterol      87      88    Hemoglobin A1C    7.13 (A)        7.90 (A)   Microalbumin, Urine      <1.2         (A) Abnormal value            A1C Last 3 Results    HGBA1C Last 3 Results 12/7/21 6/14/22   Hemoglobin A1C 7.13 (A) 7.90 (A)   (A) Abnormal value                   Future Appointments   Date Time Provider Department [place]   8/17/2022  9:30 AM Morena Garcia APRN [facility]   8/25/2022 11:00 AM Alexis Zabala MD [facility]   12/30/2022  1:00 PM Alexis Zabala MD [facility]        Assessment and Plan   Diagnoses and all orders for this visit:    1. Type 2 diabetes mellitus with microalbuminuria, without long-term current use of insulin (HCC) (Primary)    2. Type 2 diabetes mellitus with hyperglycemia, with long-term current use of insulin (HCC)    3. Adrenal nodule (HCC)  -     Cancel: CT Abdomen Pelvis With Contrast  -     CT Abdomen Pelvis With Contrast    4. Fatty liver  -     Cancel: CT Abdomen Pelvis With Contrast  -     CT Abdomen Pelvis With Contrast    5. Essential hypertension    6. Overweight (BMI 25.0-29.9)    7. Rotator cuff syndrome of left shoulder    Other orders  -     Insulin NPH Isophane & Regular (70-30) 100 UNIT/ML suspension pen-injector; 10 units SQ with supper meal  Dispense: 6 pen; Refill: 1  -     Insulin Pen Needle 31G X 5 MM misc; Use daily with insulin and weekly with Ozempic  Dispense: 100 each; Refill: 3  -     glucose blood (OneTouch Verio) test strip; 1 each by Other route 3 (Three) Times a Day.  Dispense: 300  each; Refill: 3           I spent 32 minutes caring for Otilia on this date of service. This time includes time spent by me in the following activities:preparing for the visit, reviewing tests, obtaining and/or reviewing a separately obtained history, performing a medically appropriate examination and/or evaluation , counseling and educating the patient/family/caregiver, ordering medications, tests, or procedures and documenting information in the medical record     Patient is in agreement to start  insulin to help get diabetes optimally controlled.  We will have her take 1/2 tablet of glipizide this evening.  Starting tomorrow, we will have her stop the glipizide and Starlix.  Prescription is sent for Relion 70/30 insulin to take 10 units with supper meal.   Continue the weekly Ozempic and daily Farxiga each a.m.  Monitor glucose 2-3 times daily and continue to keep a diary for me. We have sent refill on test strips with increased quantity to allow for more frequent testing.   We will have her return in 10 days with diabetic diary.    Continue current hypertension management with lisinopril.  Blood pressure is at goal.  Weight is not at goal with BMI 29.  Intensify efforts with diabetic diet and weight loss.    An order is placed for patient to schedule repeat CT of the abdomen with IV contrast, adrenal protocol to follow/further evaluate bilateral adrenal nodules that were first noted 6 months ago.  This will also reassess for fatty liver.  We will notify patient with results.     For the cervical myelopathy, posterior muscle spasm of the neck/shoulder region, and left rotator cuff syndrome, we will have patient continue Zanaflex 4 mg b.i.d. NSAIDs not a good choice for patient due to GI symptoms and multiple allergies/intolerance.  Keep follow up appointments with Morena Garcia/Dr. Reynolds.    Return in 10 days with diabetic diary as above.     Scribed for Dr. Zabala by Vandana [facility].     Follow Up   Return in  about 10 days (around 8/22/2022).  Patient was given instructions and counseling regarding her condition or for health maintenance advice. Please see specific information pulled into the AVS if appropriate.

## 2022-08-17 ENCOUNTER — OFFICE VISIT (OUTPATIENT)
Dept: ORTHOPEDIC SURGERY | Facility: CLINIC | Age: 72
End: 2022-08-17

## 2022-08-17 VITALS — BODY MASS INDEX: 28.17 KG/M2 | HEIGHT: 64 IN | WEIGHT: 165 LBS

## 2022-08-17 DIAGNOSIS — M75.42 IMPINGEMENT SYNDROME OF LEFT SHOULDER: ICD-10-CM

## 2022-08-17 DIAGNOSIS — M75.102 ROTATOR CUFF SYNDROME OF LEFT SHOULDER: ICD-10-CM

## 2022-08-17 DIAGNOSIS — G89.29 CHRONIC LEFT SHOULDER PAIN: Primary | ICD-10-CM

## 2022-08-17 DIAGNOSIS — M19.012 PRIMARY OSTEOARTHRITIS OF LEFT SHOULDER: ICD-10-CM

## 2022-08-17 DIAGNOSIS — M25.512 CHRONIC LEFT SHOULDER PAIN: Primary | ICD-10-CM

## 2022-08-17 DIAGNOSIS — E11.9 TYPE 2 DIABETES MELLITUS TREATED WITH INSULIN: Primary | ICD-10-CM

## 2022-08-17 DIAGNOSIS — M75.112 NONTRAUMATIC INCOMPLETE TEAR OF LEFT ROTATOR CUFF: ICD-10-CM

## 2022-08-17 DIAGNOSIS — Z79.4 TYPE 2 DIABETES MELLITUS TREATED WITH INSULIN: Primary | ICD-10-CM

## 2022-08-17 DIAGNOSIS — M19.012 OSTEOARTHRITIS OF LEFT AC (ACROMIOCLAVICULAR) JOINT: ICD-10-CM

## 2022-08-17 DIAGNOSIS — M24.112 DEGENERATIVE TEAR OF GLENOID LABRUM OF LEFT SHOULDER: ICD-10-CM

## 2022-08-17 PROCEDURE — 99214 OFFICE O/P EST MOD 30 MIN: CPT | Performed by: NURSE PRACTITIONER

## 2022-08-17 PROCEDURE — 20610 DRAIN/INJ JOINT/BURSA W/O US: CPT | Performed by: NURSE PRACTITIONER

## 2022-08-17 RX ORDER — TRIAMCINOLONE ACETONIDE 40 MG/ML
40 INJECTION, SUSPENSION INTRA-ARTICULAR; INTRAMUSCULAR
Status: COMPLETED | OUTPATIENT
Start: 2022-08-17 | End: 2022-08-17

## 2022-08-17 RX ORDER — LANCETS 33 GAUGE
1 EACH MISCELLANEOUS 3 TIMES DAILY
Qty: 300 EACH | Refills: 3 | Status: SHIPPED | OUTPATIENT
Start: 2022-08-17

## 2022-08-17 RX ORDER — INSULIN ASPART 100 [IU]/ML
INJECTION, SUSPENSION SUBCUTANEOUS
COMMUNITY
Start: 2022-08-12 | End: 2022-10-11 | Stop reason: SDUPTHER

## 2022-08-17 RX ORDER — LIDOCAINE HYDROCHLORIDE 10 MG/ML
2 INJECTION, SOLUTION INFILTRATION; PERINEURAL
Status: COMPLETED | OUTPATIENT
Start: 2022-08-17 | End: 2022-08-17

## 2022-08-17 RX ADMIN — LIDOCAINE HYDROCHLORIDE 2 ML: 10 INJECTION, SOLUTION INFILTRATION; PERINEURAL at 13:21

## 2022-08-17 RX ADMIN — TRIAMCINOLONE ACETONIDE 40 MG: 40 INJECTION, SUSPENSION INTRA-ARTICULAR; INTRAMUSCULAR at 13:21

## 2022-08-17 NOTE — PROGRESS NOTES
"Otilia Kenny is a 72 y.o. female returns for     Chief Complaint   Patient presents with   • Left Shoulder - Follow-up, Pain   • Results     08/10/22  MRI Shoulder Left Without Contrast      HISTORY OF PRESENT ILLNESS: Patient presents to office for follow-up of chronic left shoulder pain and to discuss MRI results of left shoulder.  Initial onset of pain occurred several years ago.  Patient has a history of chronic bilateral shoulder pain with worse pain in the left shoulder.  She also has a history of chronic neck pain and upper thoracic pain.  Additional history includes a prior cervical spine fusion performed in February 2013 with no known complications.  Patient has experienced progressively worsening left shoulder pain in recent months with no known injury or incident.  She has advanced osteoarthritic changes on x-ray imaging.  Patient has experienced limited range of motion and intermittent popping sensations.  No numbness or tingling reported.  Patient does complain of pain that radiates into her left upper arm at times.  Patient takes Tylenol as needed but tries to take as little as possible as the Tylenol has affected her liver functions in the past.  She has tried meloxicam in the past but had GI intolerance to this.  She has a documented allergy to naproxen and experienced itching in the past with this.  No new complaints or concerns noted since last office visit.  No falls or injuries reported since last office visit.  Pain scale today is 3/10.     CONCURRENT MEDICAL HISTORY:    The following portions of the patient's history were reviewed and updated as appropriate: allergies, current medications, past family history, past medical history, past social history, past surgical history and problem list.     ROS  No fevers or chills.  No chest pain or shortness of air.  No GI or  disturbances. Left shoulder pain.     PHYSICAL EXAMINATION:       Ht 161.3 cm (63.5\")   Wt 74.8 kg (165 lb)   BMI 28.77 " kg/m²     Physical Exam  Vitals reviewed.   Constitutional:       General: She is not in acute distress.     Appearance: She is well-developed. She is not ill-appearing.   HENT:      Head: Normocephalic.   Pulmonary:      Effort: Pulmonary effort is normal. No respiratory distress.   Abdominal:      General: There is no distension.      Palpations: Abdomen is soft.   Musculoskeletal:         General: Swelling (Mild, left shoulder) and tenderness (Mild, left shoulder) present. No deformity or signs of injury.   Skin:     General: Skin is warm and dry.      Capillary Refill: Capillary refill takes less than 2 seconds.      Findings: No erythema.   Neurological:      Mental Status: She is alert and oriented to person, place, and time.      GCS: GCS eye subscore is 4. GCS verbal subscore is 5. GCS motor subscore is 6.      Sensory: No sensory deficit.   Psychiatric:         Speech: Speech normal.         Behavior: Behavior normal.         Thought Content: Thought content normal.         Judgment: Judgment normal.         GAIT:     [x]  Normal  []  Antalgic    Assistive device: [x]  None  []  Walker     []  Crutches  []  Cane     []  Wheelchair  []  Stretcher    Left Shoulder Exam     Tenderness   Left shoulder tenderness location: Mild, diffuse.    Range of Motion   Active abduction: 100   External rotation: 70   Forward flexion: 110   Internal rotation 90 degrees: 50     Muscle Strength   Abduction: 4/5   Supraspinatus: 4/5     Tests   Mckeon test: positive  Cross arm: positive  Impingement: positive  Drop arm: negative    Other   Erythema: absent  Sensation: normal  Pulse: present     Comments:  Pain and limitations with range of motion.  Mild, generalized swelling noted at the shoulder.  Empty can test positive.  Full can test positive.  Neurovascularly intact.            XR Spine Cervical Complete 4 or 5 View    Result Date: 7/25/2022  Narrative: XR CERVICAL SPINE 4-5 VIEWS COMPARISONS: None. ADDITIONAL PERTINENT  HISTORY: Increasing left-sided posterior neck pain. FINDINGS: Postoperative changes: Previous anterior interbody fusion of C3-C4 and previous anterior interbody fusion of C5-T1. Previous corpectomy at C5, C6 and C7. Posterior interbody fusion of C5-T1. Vertebral body heights and alignment:  Negative. Vertebral bodies:   Negative. Disc spaces:  Fusion across the disc space at C3-C4. Craniocervical junction:  Negative. Cervical thoracic junction:  Negative. Prevertebral soft tissues:  Negative. Surrounding soft tissues:  Negative.     Impression: 1. Postoperative changes involving the cervical spine. 2. No acute appearing bony abnormalities. Electronically signed by:  Michael Portillo MD  7/25/2022 8:22 PM CDT Workstation: [id number]    XR Shoulder 2+ View Left    Result Date: 7/22/2022  Narrative: Three view left shoulder HISTORY: Worsening left shoulder pain with decreased range of motion. AP films with the humerus in internal and external rotation and scapular Y view were obtained. COMPARISON: None FINDINGS: No fracture or dislocation. Hypertrophic change acromioclavicular joint. Degenerative changes humeral head with 2.3 cm osteophyte arising inferiorly and medially from the humeral head. Minimal synovial osteochondromatosis. Extensive postoperative changes in the cervical spine. No other osseous or articular abnormality.     Impression: CONCLUSION: Hypertrophic change acromioclavicular joint. Degenerative changes humeral head with 2.3 cm osteophyte arising inferiorly and medially from the humeral head. Minimal synovial osteochondromatosis. [id number] Electronically signed by:  Ryan Mayo MD  7/22/2022 7:42 PM CDT Workstation: [id number]    MRI Shoulder Left Without Contrast    Result Date: 8/10/2022  Narrative: EXAM: MRI SHOULDER LEFT WO CONTRAST CLINICAL INDICATION: Left shoulder pain COMPARISON: 7/22/2022 TECHNIQUE: The MRI was done using axial T1 and gradient echo, coronal T1, PD and T2 fat sat and sagittal T1 and T2  fat sat images. FINDINGS: The subscapularis is intact. The long head of the biceps tendon proximally has a thickened appearance with abnormal signal suggesting partial tear or tendinosis. There is partial thickness undersurface tear of the distal supraspinatus tendon with no full-thickness tear. The infraspinatus and teres minor are intact. There are moderate degenerative changes of the glenohumeral and AC joints. The visualized osseous structures otherwise have normal morphology and signal characteristics with no evidence of bone marrow edema, occult fractures, or marrow-replacing bone lesions. There is a moderate amount of fluid in the subacromial subdeltoid bursa. Tears of the posterior and superior labrum are noted. Several loose bodies are seen within the axillary recess of the joint the largest measuring 5.7 x 3.7 mm.     Impression: 1. Partial thickness tear of the supraspinatus tendon with no full-thickness tear. 2. Moderate degenerative joint disease of the glenohumeral and AC joints including several loose bodies. 3. Partial tear versus tendinosis of the proximal long head of the biceps tendon. 4. Tears of the superior and posterior glenoid labrum. Electronically signed by:  Leodan Hagan MD  8/10/2022 5:52 PM CDT Workstation: [id number]      Large Joint Arthrocentesis: L glenohumeral  Date/Time: 8/17/2022 1:21 PM  Consent given by: patient  Timeout: Immediately prior to procedure a time out was called to verify the correct patient, procedure, equipment, support staff and site/side marked as required   Supporting Documentation  Indications: pain, diagnostic evaluation and joint swelling   Procedure Details  Location: shoulder - L glenohumeral  Preparation: Patient was prepped and draped in the usual sterile fashion  Needle size: 22 G  Approach: posterior  Medications administered: 40 mg triamcinolone acetonide 40 MG/ML; 2 mL lidocaine 1 %  Patient tolerance: patient tolerated the procedure well with no  immediate complications      ASSESSMENT:    Diagnoses and all orders for this visit:    Chronic left shoulder pain  -     Large Joint Arthrocentesis: L glenohumeral    Primary osteoarthritis of left shoulder  -     Large Joint Arthrocentesis: L glenohumeral    Rotator cuff syndrome of left shoulder  -     Large Joint Arthrocentesis: L glenohumeral    Osteoarthritis of left AC (acromioclavicular) joint  -     Large Joint Arthrocentesis: L glenohumeral    Impingement syndrome of left shoulder  -     Large Joint Arthrocentesis: L glenohumeral    Degenerative tear of glenoid labrum of left shoulder  -     Large Joint Arthrocentesis: L glenohumeral    Nontraumatic incomplete tear of left rotator cuff  -     Large Joint Arthrocentesis: L glenohumeral    PLAN    MRI of left shoulder reviewed and results are discussed with patient today.  We discussed that she has evidence of a partial-thickness tear of the supraspinatus tendon but no evidence of any full-thickness tear.  Additionally, she has evidence of a partial tear versus tendinosis of the proximal long head of the biceps tendon.  She also has evidence of tears in both the superior and posterior glenoid labrum, which we discussed would be degenerative in nature as she has not sustained any injuries and has known osteoarthritic changes in her shoulder joint.  The MRI describes moderate appearing degenerative joint disease of both the glenohumeral and AC joints including several loose bodies.  Patient has experienced chronic left shoulder pain for several years that has progressively worsened over time and has been worse in recent months with no known injury.  She has experienced limited range of motion and intermittent popping sensations.  The patient also has a history of chronic neck pain and chronic upper thoracic pain and also previously underwent prior cervical spine fusion in February 2013.  Her current symptoms are most consistent with osteoarthritis and rotator  cuff syndrome affecting the left shoulder and not consistent with cervical radiculopathy.  We discussed that there is no immediate surgical indication at this time.  We also discussed that her only surgical option if/when that time comes will be shoulder arthroplasty based on the moderate to severe osteoarthritic changes in her left shoulder joint.  I have recommended proceeding with some conservative management.  Recommend proceeding today with a trial of an intra-articular injection of steroid into the left shoulder for management of joint pain/inflammation.  Patient does have diabetes mellitus type 2 and she previously voiced concerns about hyperglycemia with use of steroids.  We again discussed this today and we discussed that she may experience some mild hyperglycemia but with a localized injection of steroid, which is a small dose, she should not have any significant hyperglycemia.  We also discussed that this should be transient and should return to baseline after just a couple of days.  As previously noted, I have reviewed her most recent labs from 6/14/2022 and her hemoglobin A1c is noted to have been 7.9, which is higher than desired but not significantly uncontrolled.  The patient wants to proceed today with a trial of an intra-articular injection of steroid.    Recommend the following:      -Rest and activity modification with avoidance of heavy lifting, pulling, tugging and repetitive motions for now.  -Gentle, progressive range of motion exercises with the affected shoulder intermittently several times daily as pain allows.  -Ice therapy to the affected shoulder intermittently 3-4 times daily for 15 minutes at a time to minimize pain/inflammation.  -Gradual progression of activity and use of the affected arm/shoulder as tolerated and based on pain/symptoms.  -Recommend Tylenol as needed for pain/discomfort.  The patient also has Percocet 5 mg at home to take as needed for worse pain.  She is reminded  to take this sparingly and only when needed fo pain uncontrolled with other methods.  Since last office visit, the patient states she has only taking 3 pain pills and they were of benefit to her and she tolerated the Percocet well.  She still has plenty of pain medication to take as needed for severe pain and denies needing any refill.  As previously noted, she has a documented allergy to naproxen and has also tried Meloxicam in the past and experienced intolerance.  For these reasons, we will continue to avoid any further oral NSAIDs.    Follow-up in 4 to 6 weeks for recheck as needed for any new, worsening or persistent symptoms.  We discussed that if she gets good relief with the injection, she can return on an as-needed basis.  Consider physical therapy if her pain persists.  We also discussed that she can follow-up with one of the orthopedic surgeons at any point when/if she is ready to discuss surgical intervention/shoulder arthroplasty.    Time spent of a minimum of 30 minutes including the face to face evaluation, reviewing of medical history and prior medial records, reviewing of diagnostic studies, documentation, patient education and coordination of care.     EMR Dragon/Transciption Disclaimer: Some of this note may be an electronic transcription/translation of spoken language to printed text using the Dragon Dictation System.     Return in about 4 weeks (around 9/14/2022), or if symptoms worsen or fail to improve, for Recheck.        This document has been electronically signed by MYRA Wang on August 17, 2022 13:23 CDT      MYRA Wang

## 2022-08-22 DIAGNOSIS — I10 ESSENTIAL HYPERTENSION: Primary | ICD-10-CM

## 2022-08-25 ENCOUNTER — OFFICE VISIT (OUTPATIENT)
Dept: FAMILY MEDICINE CLINIC | Facility: CLINIC | Age: 72
End: 2022-08-25

## 2022-08-25 ENCOUNTER — LAB (OUTPATIENT)
Dept: LAB | Facility: OTHER | Age: 72
End: 2022-08-25

## 2022-08-25 VITALS
DIASTOLIC BLOOD PRESSURE: 86 MMHG | SYSTOLIC BLOOD PRESSURE: 138 MMHG | OXYGEN SATURATION: 98 % | HEIGHT: 64 IN | WEIGHT: 164 LBS | TEMPERATURE: 97.9 F | HEART RATE: 65 BPM | BODY MASS INDEX: 28 KG/M2

## 2022-08-25 DIAGNOSIS — I10 ESSENTIAL HYPERTENSION: Chronic | ICD-10-CM

## 2022-08-25 DIAGNOSIS — Z79.4 TYPE 2 DIABETES MELLITUS WITH HYPERGLYCEMIA, WITH LONG-TERM CURRENT USE OF INSULIN: Primary | Chronic | ICD-10-CM

## 2022-08-25 DIAGNOSIS — E11.65 TYPE 2 DIABETES MELLITUS WITH HYPERGLYCEMIA, WITH LONG-TERM CURRENT USE OF INSULIN: Primary | Chronic | ICD-10-CM

## 2022-08-25 DIAGNOSIS — M19.012 PRIMARY OSTEOARTHRITIS OF LEFT SHOULDER: Chronic | ICD-10-CM

## 2022-08-25 DIAGNOSIS — E27.8 ADRENAL NODULE: Chronic | ICD-10-CM

## 2022-08-25 DIAGNOSIS — I10 ESSENTIAL HYPERTENSION: ICD-10-CM

## 2022-08-25 LAB
ALBUMIN SERPL-MCNC: 4.5 G/DL (ref 3.5–5)
ALBUMIN/GLOB SERPL: 1.6 G/DL (ref 1.1–1.8)
ALP SERPL-CCNC: 70 U/L (ref 38–126)
ALT SERPL W P-5'-P-CCNC: 34 U/L
ANION GAP SERPL CALCULATED.3IONS-SCNC: 9 MMOL/L (ref 5–15)
AST SERPL-CCNC: 33 U/L (ref 14–36)
BILIRUB SERPL-MCNC: 0.5 MG/DL (ref 0.2–1.3)
BUN SERPL-MCNC: 19 MG/DL (ref 7–23)
BUN/CREAT SERPL: 23.8 (ref 7–25)
CALCIUM SPEC-SCNC: 9 MG/DL (ref 8.4–10.2)
CHLORIDE SERPL-SCNC: 97 MMOL/L (ref 101–112)
CO2 SERPL-SCNC: 24 MMOL/L (ref 22–30)
CREAT SERPL-MCNC: 0.8 MG/DL (ref 0.52–1.04)
EGFRCR SERPLBLD CKD-EPI 2021: 77.4 ML/MIN/1.73
GLOBULIN UR ELPH-MCNC: 2.8 GM/DL (ref 2.3–3.5)
GLUCOSE SERPL-MCNC: 105 MG/DL (ref 70–99)
POTASSIUM SERPL-SCNC: 4.3 MMOL/L (ref 3.4–5)
PROT SERPL-MCNC: 7.3 G/DL (ref 6.3–8.6)
SODIUM SERPL-SCNC: 130 MMOL/L (ref 137–145)

## 2022-08-25 PROCEDURE — 80053 COMPREHEN METABOLIC PANEL: CPT | Performed by: INTERNAL MEDICINE

## 2022-08-25 PROCEDURE — 36415 COLL VENOUS BLD VENIPUNCTURE: CPT | Performed by: INTERNAL MEDICINE

## 2022-08-25 PROCEDURE — 99214 OFFICE O/P EST MOD 30 MIN: CPT | Performed by: INTERNAL MEDICINE

## 2022-08-25 RX ORDER — NATEGLINIDE 120 MG/1
TABLET ORAL
Qty: 90 TABLET | Refills: 3 | Status: SHIPPED | OUTPATIENT
Start: 2022-08-25 | End: 2022-09-27 | Stop reason: SDUPTHER

## 2022-08-25 NOTE — PROGRESS NOTES
"Chief Complaint  Follow-up (10 day f/u for diabetes - has home blood sugar readings for review)    Subjective        Otilia Kenny presents to [facility] [place]  History of Present Illness    Otilia is here for 2-week follow-up of hyperglycemia/uncontrolled diabetes.  Last A1c had increased to 7.9.  She has brought her diabetic diary again which I reviewed.  Most of the glucose control through the day has improved with the changes we made two weeks ago and she denies any significant hypoglycemic episodes.  She is compliant with 70/30 insulin pen 10 units daily with supper.  Postprandial numbers after lunch have been above goal, sometimes over 200.  She was previously taking Starlix with lunch, but I had her temporarily hold it while he initiated insulin therapy.  About 75% of her glucose readings prior to meals are now at goal/less than 160.    Her blood pressure control is marginal today.  It was much better 2 weeks ago.    She continues to have left shoulder pain.  The orthopedic service recently injected the left shoulder which provided partial symptom relief.  Glucose readings were considerably higher for 1 day after the joint injection.    CT of the abdomen 02/15/2022 revealed bilateral adrenal nodules.   A follow-up CT adrenal protocol was recommended for further characterization.  The CT scan is scheduled for next week.    Objective   Vital Signs:  /86   Pulse 65   Temp 97.9 °F (36.6 °C)   Ht 161.3 cm (63.5\")   Wt 74.4 kg (164 lb)   SpO2 98%   BMI 28.60 kg/m²   Estimated body mass index is 28.6 kg/m² as calculated from the following:    Height as of this encounter: 161.3 cm (63.5\").    Weight as of this encounter: 74.4 kg (164 lb).          Physical Exam   Result Review :    Common labs    Common Labsle 2/15/22 2/15/22 6/14/22 6/14/22 6/14/22 6/14/22 8/25/22    0845 0845 0821 0821 0821 0821    Glucose    226 (A)   105 (A)   BUN 28 (A)   28 (A)   19 "   Creatinine  0.97  1.03   0.80   eGFR Non African Am  56 (A)        Sodium    138   130 (A)   Potassium    4.5   4.3   Chloride    104   97 (A)   Calcium    9.7   9.0   Albumin    4.60   4.50   Total Bilirubin    0.4   0.5   Alkaline Phosphatase    97   70   AST (SGOT)    30   33   ALT (SGPT)    28   34   WBC   6.86       Hemoglobin   14.5       Hematocrit   43.6       Platelets   364       Total Cholesterol     180     Triglycerides     238 (A)     HDL Cholesterol     52     LDL Cholesterol      88     Hemoglobin A1C      7.90 (A)    (A) Abnormal value            A1C Last 3 Results    HGBA1C Last 3 Results 12/7/21 6/14/22   Hemoglobin A1C 7.13 (A) 7.90 (A)   (A) Abnormal value            Data reviewed: Consultant notes Orthopedics and Diabetic diary          Assessment and Plan   Diagnoses and all orders for this visit:    1. Type 2 diabetes mellitus with hyperglycemia, with long-term current use of insulin (HCC) (Primary)    2. Essential hypertension    3. Adrenal nodule (HCC)    4. Primary osteoarthritis of left shoulder    Other orders  -     nateglinide (Starlix) 120 MG tablet; 1 tab po before lunch  Dispense: 90 tablet; Refill: 3    Continue the farxiga and weekly Ozempic.  Resume Starlix 120 mg daily with lunch.  She eats a very light breakfast usually.  Continue the 70/30 insulin with supper.  She will take 10 units when glucose is less than 180 and 12 units when glucose is equal to or greater than 180 continue monitoring glucose closely and continue to pursue diabetic diet increased activity and weight loss.    New current hypertension management.  Blood pressure is at goal.  Pursue sodium restriction and weight loss.    CT of the abdomen adrenal protocol is scheduled for next week.  We will notify patient of results when they become available.    Continue to follow with Morena solo in the orthopedic service regarding the osteoarthritis issues.  She is hoping to avoid surgical intervention.      Return to  clinic in a few weeks if glucose is not consistently at goal, otherwise, return to clinic in December as previously scheduled with fasting labs prior.     I spent 34 minutes caring for Otilia on this date of service. This time includes time spent by me in the following activities:preparing for the visit, reviewing tests, obtaining and/or reviewing a separately obtained history, performing a medically appropriate examination and/or evaluation , counseling and educating the patient/family/caregiver, ordering medications, tests, or procedures and documenting information in the medical record  Follow Up   Return for Next scheduled follow up - labs 1 week prior.  Patient was given instructions and counseling regarding her condition or for health maintenance advice. Please see specific information pulled into the AVS if appropriate.

## 2022-09-19 ENCOUNTER — OFFICE VISIT (OUTPATIENT)
Dept: FAMILY MEDICINE CLINIC | Facility: CLINIC | Age: 72
End: 2022-09-19

## 2022-09-19 VITALS
TEMPERATURE: 97.3 F | DIASTOLIC BLOOD PRESSURE: 80 MMHG | HEART RATE: 80 BPM | BODY MASS INDEX: 27.45 KG/M2 | SYSTOLIC BLOOD PRESSURE: 148 MMHG | WEIGHT: 160.8 LBS | HEIGHT: 64 IN

## 2022-09-19 DIAGNOSIS — R11.2 NAUSEA AND VOMITING, UNSPECIFIED VOMITING TYPE: ICD-10-CM

## 2022-09-19 DIAGNOSIS — R80.9 TYPE 2 DIABETES MELLITUS WITH MICROALBUMINURIA, WITHOUT LONG-TERM CURRENT USE OF INSULIN: Chronic | ICD-10-CM

## 2022-09-19 DIAGNOSIS — F33.1 MODERATE EPISODE OF RECURRENT MAJOR DEPRESSIVE DISORDER: Chronic | ICD-10-CM

## 2022-09-19 DIAGNOSIS — T50.905A MEDICATION SIDE EFFECT, INITIAL ENCOUNTER: Primary | ICD-10-CM

## 2022-09-19 DIAGNOSIS — Z63.4 GRIEF AT LOSS OF CHILD: ICD-10-CM

## 2022-09-19 DIAGNOSIS — F41.1 GENERALIZED ANXIETY DISORDER: Chronic | ICD-10-CM

## 2022-09-19 DIAGNOSIS — G47.9 SLEEP DISORDER: Chronic | ICD-10-CM

## 2022-09-19 DIAGNOSIS — I10 ESSENTIAL HYPERTENSION: Chronic | ICD-10-CM

## 2022-09-19 DIAGNOSIS — F43.21 GRIEF AT LOSS OF CHILD: ICD-10-CM

## 2022-09-19 DIAGNOSIS — E11.29 TYPE 2 DIABETES MELLITUS WITH MICROALBUMINURIA, WITHOUT LONG-TERM CURRENT USE OF INSULIN: Chronic | ICD-10-CM

## 2022-09-19 PROCEDURE — 99214 OFFICE O/P EST MOD 30 MIN: CPT | Performed by: INTERNAL MEDICINE

## 2022-09-19 RX ORDER — ESCITALOPRAM OXALATE 10 MG/1
10 TABLET ORAL DAILY
Qty: 30 TABLET | Refills: 3 | Status: SHIPPED | OUTPATIENT
Start: 2022-09-19 | End: 2022-10-11 | Stop reason: SDUPTHER

## 2022-09-19 SDOH — SOCIAL STABILITY - SOCIAL INSECURITY: DISSAPEARANCE AND DEATH OF FAMILY MEMBER: Z63.4

## 2022-09-19 NOTE — PROGRESS NOTES
"Chief Complaint  Medication Reaction (Pristiq. Increased saliva, sweating, headache, anxiety, food taste funny, fatigue nausea and vomiting and she thinks that is why )    Subjective        History of Present Illness     Otilia Kenny presents to the office reporting several symptoms since we switched patient from Celexa to Pristiq in March due to increased anxiety symptoms not adequately controlled with Celexa.  She had a son pass away in May and continues to have some difficulty with sleep, but overall feels anxiety and depression symptoms are improved.  However, she has noticed headache, altered taste, nausea, and vomiting and feels symptoms are side effects of the Pristiq. Her nausea and vomiting tend to occur after taking her morning medications, which includes the Pristiq, but also includes her oral iron.  She has multiple other medication allergies/intolerance.  She reports she could not tolerate Effexor in the past due to side effects as well.  She doesn't remember trying Lexapro in the past and is willing to try making this change.     Patient reports glucose is consistently below 150 with the highest reading 168.  She has been taking her Starlix with the evening meal along with her insulin dose, which seems to be working well.  I am asking her to check some postprandial glucose readings after her lunch meal to make sure she doesn't need any Starlix with her lunch meal.       Objective   Vital Signs:  /80   Pulse 80   Temp 97.3 °F (36.3 °C) (Tympanic)   Ht 161.3 cm (63.5\")   Wt 72.9 kg (160 lb 12.8 oz)   BMI 28.04 kg/m²   Estimated body mass index is 28.04 kg/m² as calculated from the following:    Height as of this encounter: 161.3 cm (63.5\").    Weight as of this encounter: 72.9 kg (160 lb 12.8 oz).          Physical Exam  Constitutional:       General: She is not in acute distress.     Appearance: She is well-developed.      Comments: Pleasant female, overweight.  Accompanied by her .  "   HENT:      Head: Normocephalic and atraumatic.      Nose: Nose normal.      Mouth/Throat:      Pharynx: No oropharyngeal exudate.   Eyes:      Pupils: Pupils are equal, round, and reactive to light.   Neck:      Thyroid: No thyromegaly.      Vascular: No JVD.   Cardiovascular:      Rate and Rhythm: Normal rate and regular rhythm.      Heart sounds: Normal heart sounds.   Pulmonary:      Effort: Pulmonary effort is normal. No accessory muscle usage or respiratory distress.      Breath sounds: Normal breath sounds. No wheezing or rales.   Abdominal:      General: Bowel sounds are normal. There is no distension.      Palpations: Abdomen is soft.      Tenderness: There is no abdominal tenderness.   Musculoskeletal:      Cervical back: Neck supple.   Lymphadenopathy:      Cervical: No cervical adenopathy.   Neurological:      Mental Status: She is alert and oriented to person, place, and time.      Cranial Nerves: No cranial nerve deficit.   Psychiatric:         Speech: Speech normal.         Behavior: Behavior normal.         Thought Content: Thought content normal.         Judgment: Judgment normal.            Result Review :             Future Appointments   Date Time Provider Department Center   12/30/2022  1:00 PM Alexis Zabala MD [facility]        Assessment and Plan   Diagnoses and all orders for this visit:    1. Medication side effect, initial encounter (Primary)    2. Moderate episode of recurrent major depressive disorder (HCC)    3. Generalized anxiety disorder    4. Type 2 diabetes mellitus with microalbuminuria, without long-term current use of insulin (HCC)    5. Essential hypertension    6. Grief at loss of child    7. Sleep disorder    8. Nausea and vomiting, unspecified vomiting type    Other orders  -     escitalopram (Lexapro) 10 MG tablet; Take 1 tablet by mouth Daily.  Dispense: 30 tablet; Refill: 3                We will have patient stop the Pristiq by titrating off and switching to  Lexapro.  Prescription is sent to [facility] for 30-day of Lexapro.  If tolerated, we will order 90-day courses through mail order.  For the first week, we will have patient take 1/2 tablet daily of the Lexapro 10 mg and 1/2 tablet of Pristiq 100 mg daily.  Week 2, she will take a whole tablet of Lexapro 10 mg q.d. and 1/4 tablet of Pristiq 100 mg. Week 3, she will stop Pristiq completely and continue Lexapro 10 mg q.d.   We may need to increase her dose of Lexapro after a month.  I also recommended she take a 1-week break from her oral iron to see if holding the iron helps with the GI symptoms.      Since patient is taking her Starlix with evening meal and not with lunch, due to elevated bedtime glucose.  I asked her to check postprandial glucose 90 to 120 minutes after lunch meal.  Otherwise, for now continue current diabetic management plan.   She is still taking the 70/30 insulin pen 10 units with supper.  If her postprandial glucose is elevated after lunch, we will try moving Starlix back to lunch and increasing the insulin mix with supper versus staying on the same dose of insulin mix and using some Starlix with both meals.    Her sleep has not been well controlled lately.  We may consider dose increase of Seroquel in the future if needed.  We might also try using some trazodone along with low-dose Seroquel.    Return 12/30/2022 for routine follow up with fasting labs one week prior or sooner if needed.     Scribed for Dr. Zabala by Vandana Galeas [facility].     Follow Up   Return if symptoms worsen or fail to improve, for Next scheduled follow up.  Patient was given instructions and counseling regarding her condition or for health maintenance advice. Please see specific information pulled into the AVS if appropriate.

## 2022-09-27 ENCOUNTER — TELEPHONE (OUTPATIENT)
Dept: FAMILY MEDICINE CLINIC | Facility: CLINIC | Age: 72
End: 2022-09-27

## 2022-09-27 RX ORDER — NATEGLINIDE 120 MG/1
120 TABLET ORAL
Qty: 270 TABLET | Refills: 3 | Status: SHIPPED | OUTPATIENT
Start: 2022-09-27

## 2022-09-27 NOTE — TELEPHONE ENCOUNTER
----- Message from Alexis Zabala MD sent at 9/27/2022 11:28 AM CDT -----  She is describing good control.  Continue Starlix before meals.  She may need a change in the instructions of the prescription.  Continue the current 10 units of insulin as she is doing.  EB  ----- Message -----  From: Emily Lou RN  Sent: 9/27/2022  10:53 AM CDT  To: Alexis Zabala MD    Her last visit you had instructed her to take glucose readings PPBS. She did that and states they have been > 200 1-2 hr PPBS. She increased her Starlix to one before each meal and continues the 10 units of insulin. She states since doing this the numbers have been 130 1-2 hr PPBS. Please advise. Emily

## 2022-09-28 ENCOUNTER — DOCUMENTATION (OUTPATIENT)
Dept: FAMILY MEDICINE CLINIC | Facility: CLINIC | Age: 72
End: 2022-09-28

## 2022-10-07 NOTE — PROGRESS NOTES
QUICK REFERENCE INFORMATION:  The ABCs of the Annual Wellness Visit    Initial Medicare Wellness Visit    HEALTH RISK ASSESSMENT    1950    Recent Hospitalizations:  No hospitalization(s) within the last year..        Current Medical Providers:  Patient Care Team:  Alexis Zabala MD as PCP - General  Baron Reynolds MD as PCP - Surgeon (Orthopedic Surgery)        Smoking Status:  History   Smoking Status   • Former Smoker   Smokeless Tobacco   • Never Used       Alcohol Consumption:  History   Alcohol Use No       Depression Screen:   PHQ-9 Depression Screening 4/26/2017   Little interest or pleasure in doing things 1   Feeling down, depressed, or hopeless 1   Trouble falling or staying asleep, or sleeping too much 0   Feeling tired or having little energy 3   Poor appetite or overeating 3   Feeling bad about yourself - or that you are a failure or have let yourself or your family down 0   Trouble concentrating on things, such as reading the newspaper or watching television 0   Moving or speaking so slowly that other people could have noticed. Or the opposite - being so fidgety or restless that you have been moving around a lot more than usual 0   Thoughts that you would be better off dead, or of hurting yourself in some way 0   PHQ-9 Total Score 8   If you checked off any problems, how difficult have these problems made it for you to do your work, take care of things at home, or get along with other people? Somewhat difficult       Health Habits and Functional and Cognitive Screening:  Functional & Cognitive Status 6/2/2017   Do you have difficulty preparing food and eating? No   Do you have difficulty bathing yourself? No   Do you have difficulty getting dressed? No   Do you have difficulty using the toilet? No   Do you have difficulty moving around from place to place? No   In the past year have you fallen or experienced a near fall? Yes   Do you need help using the phone?  No   Are you deaf or do you  have serious difficulty hearing?  No   Do you need help with transportation? No   Do you need help shopping? No   Do you need help preparing meals?  No   Do you need help with housework?  No   Do you need help with laundry? No   Do you need help taking your medications? No   Do you need help managing money? No   Do you have difficulty concentrating, remembering or making decisions? No            Does the patient have evidence of cognitive impairment? No    Asiprin use counseling: Taking ASA appropriately as indicated      Recent Lab Results:    Visual Acuity:  No exam data present    Age-appropriate Screening Schedule:  Refer to the list below for future screening recommendations based on patient's age, sex and/or medical conditions. Orders for these recommended tests are listed in the plan section. The patient has been provided with a written plan.    Health Maintenance   Topic Date Due   • TDAP/TD VACCINES (1 - Tdap) 01/10/1967   • ZOSTER VACCINE  08/02/2016   • DIABETIC FOOT EXAM  08/31/2016   • INFLUENZA VACCINE  08/01/2017   • URINE MICROALBUMIN  10/13/2017   • HEMOGLOBIN A1C  10/18/2017   • PNEUMOCOCCAL VACCINES (65+ LOW/MEDIUM RISK) (2 of 2 - PPSV23) 10/24/2017   • DIABETIC EYE EXAM  03/29/2018   • LIPID PANEL  04/18/2018   • MAMMOGRAM  05/16/2019   • COLONOSCOPY  01/05/2026        Subjective   History of Present Illness    Otilia Kenny is a 67 y.o. female who presents for an Annual Wellness Visit.  She reports she is exercising regularly with walking and riding a recumbent bicycle.      Patient's health maintenance issues are reviewed today.      The following portions of the patient's history were reviewed and updated as appropriate: allergies, current medications, past family history, past medical history, past social history, past surgical history and problem list.    Outpatient Medications Prior to Visit   Medication Sig Dispense Refill   • amLODIPine (NORVASC) 5 MG tablet Take 5 mg by mouth Daily. 1/2  tab daily     • aspirin 81 MG chewable tablet Chew 81 mg daily.     • Blood Glucose Monitoring Suppl (ONETOUCH VERIO IQ SYSTEM) W/DEVICE kit daily.     • CALCIUM CARB-CHOLECALCIFEROL PO Take 1 tablet by mouth 2 (two) times a day.     • Canagliflozin (INVOKANA) 300 MG tablet 1/2-1 qam (Patient taking differently: 300 mg Daily.) 90 tablet 3   • citalopram (CELEXA) 20 MG tablet Take 1 tablet by mouth daily. 90 tablet 3   • Dulaglutide 0.75 MG/0.5ML solution pen-injector Inject 1 pen under the skin 1 (One) Time Per Week. 13 pen 3   • FENOFIBRATE PO Take 145 mg by mouth Daily.     • glucose blood (ONETOUCH VERIO) test strip 1 each by Other route daily. Use as instructed     • lisinopril (PRINIVIL,ZESTRIL) 20 MG tablet Take 1 tablet by mouth Daily. 90 tablet 3   • Multiple Vitamins-Minerals (MULTIVITAMIN PO) Take 1 tablet by mouth daily.     • omeprazole (PriLOSEC) 40 MG capsule Take 40 mg by mouth daily.     • ONETOUCH DELICA LANCETS 33G misc Test every day 100 each 3   • rosuvastatin (CRESTOR) 20 MG tablet Take 1 tablet by mouth Daily. 90 tablet 3   • VITAMINS A & D PO Take 1 tablet by mouth 2 (two) times a day.       No facility-administered medications prior to visit.        Patient Active Problem List   Diagnosis   • Vitamin B12 deficiency (dietary) anemia   • Type 2 diabetes mellitus without complication   • Spasm of back muscles   • Sleep disorder   • Osteoarthritis of knee   • Irritable bowel syndrome   • Iron deficiency anemia   • Hypertriglyceridemia   • Hyperlipidemia   • GERD (gastroesophageal reflux disease)   • Generalized anxiety disorder   • Gastritis   • Essential hypertension   • Dysfunction of eustachian tube   • Diverticular disease of colon   • Cervical disc disorder   • Diarrhea, unspecified   • Anemia   • Allergic rhinitis   • Abnormal liver enzymes   • Abdominal pain   • Major depression in complete remission   • Chronic kidney disease, stage II (mild)       Advance Care Planning:  has an advance  directive - a copy HAS NOT been provided    Identification of Risk Factors:  Risk factors include: weight .    Review of Systems   Constitutional: Negative for chills, fatigue and fever.   HENT: Negative for congestion, ear pain, postnasal drip, sinus pressure and sore throat.    Respiratory: Negative for cough, shortness of breath and wheezing.    Cardiovascular: Negative for chest pain, palpitations and leg swelling.   Gastrointestinal: Negative for abdominal pain, blood in stool, constipation, diarrhea, nausea and vomiting.   Endocrine: Negative for cold intolerance, heat intolerance, polydipsia and polyuria.   Genitourinary: Negative for dysuria, frequency, hematuria and urgency.   Skin: Negative for rash.   Neurological: Negative for syncope and weakness.       Compared to one year ago, the patient feels her physical health is better.  Compared to one year ago, the patient feels her mental health is the same.    Objective     Physical Exam   Constitutional: She is oriented to person, place, and time. She appears well-developed and well-nourished. No distress.   HENT:   Head: Normocephalic and atraumatic.   Nose: Right sinus exhibits no maxillary sinus tenderness and no frontal sinus tenderness. Left sinus exhibits maxillary sinus tenderness. Left sinus exhibits no frontal sinus tenderness.   Mouth/Throat: Uvula is midline, oropharynx is clear and moist and mucous membranes are normal. No oral lesions. No tonsillar exudate.   Eyes: Conjunctivae and EOM are normal. Pupils are equal, round, and reactive to light.   Neck: Trachea normal. Neck supple. No JVD present. Carotid bruit is not present. No tracheal deviation present. No thyroid mass and no thyromegaly present.   Cardiovascular: Normal rate, regular rhythm and normal heart sounds.   No extrasystoles are present. PMI is not displaced.    No murmur heard.  Pulmonary/Chest: Effort normal and breath sounds normal. No accessory muscle usage. No respiratory  "distress. She has no decreased breath sounds. She has no wheezes. She has no rhonchi. She has no rales.   Abdominal: Soft. Bowel sounds are normal. She exhibits no distension. There is no hepatosplenomegaly. There is no tenderness.       Vascular Status -  Her exam exhibits right foot vasculature normal. Her exam exhibits no right foot edema. Her exam exhibits left foot vasculature normal. Her exam exhibits no left foot edema.  Lymphadenopathy:     She has no cervical adenopathy.   Neurological: She is alert and oriented to person, place, and time. No cranial nerve deficit. Coordination normal.   Skin: Skin is warm, dry and intact. No rash noted. No cyanosis. Nails show no clubbing.   Psychiatric: She has a normal mood and affect. Her speech is normal and behavior is normal. Thought content normal.   Vitals reviewed.      Vitals:    06/02/17 1015   BP: 150/78   Pulse: 80   Temp: 98.6 °F (37 °C)   TempSrc: Oral   Weight: 180 lb (81.6 kg)   Height: 64\" (162.6 cm)   PainSc:   5   PainLoc: Neck         Body mass index is 30.9 kg/(m^2).  Discussed the patient's BMI with her. The BMI is above average; BMI management plan is completed.    Assessment/Plan   Patient Self-Management and Personalized Health Advice  The patient has been provided with information about: diet, exercise, weight management and mental health concerns and preventive services including:   · Exercise counseling provided, Fall Risk assessment done, Nutrition counseling provided.    Visit Diagnoses:    ICD-10-CM ICD-9-CM   1. Initial Medicare annual wellness visit Z00.00 V70.0        Scribed for Dr. Zabala by Vandana Galeas [facility].     No orders of the defined types were placed in this encounter.      Outpatient Encounter Prescriptions as of 6/2/2017   Medication Sig Dispense Refill   • amLODIPine (NORVASC) 5 MG tablet Take 5 mg by mouth Daily. 1/2 tab daily     • aspirin 81 MG chewable tablet Chew 81 mg daily.     • Blood Glucose Monitoring Suppl " (ONETOUCH VERIO IQ SYSTEM) W/DEVICE kit daily.     • CALCIUM CARB-CHOLECALCIFEROL PO Take 1 tablet by mouth 2 (two) times a day.     • Canagliflozin (INVOKANA) 300 MG tablet 1/2-1 qam (Patient taking differently: 300 mg Daily.) 90 tablet 3   • citalopram (CELEXA) 20 MG tablet Take 1 tablet by mouth daily. 90 tablet 3   • Dulaglutide 0.75 MG/0.5ML solution pen-injector Inject 1 pen under the skin 1 (One) Time Per Week. 13 pen 3   • FENOFIBRATE PO Take 145 mg by mouth Daily.     • glucose blood (ONETOUCH VERIO) test strip 1 each by Other route daily. Use as instructed     • lisinopril (PRINIVIL,ZESTRIL) 20 MG tablet Take 1 tablet by mouth Daily. 90 tablet 3   • Multiple Vitamins-Minerals (MULTIVITAMIN PO) Take 1 tablet by mouth daily.     • omeprazole (PriLOSEC) 40 MG capsule Take 40 mg by mouth daily.     • ONETOUCH DELICA LANCETS 33G misc Test every day 100 each 3   • rosuvastatin (CRESTOR) 20 MG tablet Take 1 tablet by mouth Daily. 90 tablet 3   • VITAMINS A & D PO Take 1 tablet by mouth 2 (two) times a day.       No facility-administered encounter medications on file as of 6/2/2017.        Reviewed use of high risk medication in the elderly: yes  Reviewed for potential of harmful drug interactions in the elderly: yes    Follow Up:  Return in about 1 year (around 6/2/2018) for Medicare Wellness.     An After Visit Summary and PPPS with all of these plans were given to the patient.           normal latch

## 2022-10-11 DIAGNOSIS — E11.9 TYPE 2 DIABETES MELLITUS TREATED WITH INSULIN: ICD-10-CM

## 2022-10-11 DIAGNOSIS — Z79.4 TYPE 2 DIABETES MELLITUS TREATED WITH INSULIN: ICD-10-CM

## 2022-10-11 RX ORDER — INSULIN ASPART 100 [IU]/ML
10 INJECTION, SUSPENSION SUBCUTANEOUS
Qty: 9 ML | Refills: 1 | Status: SHIPPED | OUTPATIENT
Start: 2022-10-11 | End: 2023-02-28 | Stop reason: CLARIF

## 2022-10-11 RX ORDER — ESCITALOPRAM OXALATE 10 MG/1
10 TABLET ORAL DAILY
Qty: 90 TABLET | Refills: 3 | Status: SHIPPED | OUTPATIENT
Start: 2022-10-11

## 2022-10-27 RX ORDER — FENOFIBRATE 145 MG/1
TABLET, COATED ORAL
Qty: 90 TABLET | Refills: 0 | Status: SHIPPED | OUTPATIENT
Start: 2022-10-27

## 2022-10-27 RX ORDER — DAPAGLIFLOZIN 10 MG/1
TABLET, FILM COATED ORAL
Qty: 90 TABLET | Refills: 0 | Status: SHIPPED | OUTPATIENT
Start: 2022-10-27 | End: 2023-01-09 | Stop reason: SDUPTHER

## 2022-11-29 ENCOUNTER — OFFICE VISIT (OUTPATIENT)
Dept: PODIATRY | Facility: CLINIC | Age: 72
End: 2022-11-29

## 2022-11-29 VITALS
DIASTOLIC BLOOD PRESSURE: 86 MMHG | HEART RATE: 62 BPM | WEIGHT: 160 LBS | SYSTOLIC BLOOD PRESSURE: 126 MMHG | OXYGEN SATURATION: 98 % | BODY MASS INDEX: 27.31 KG/M2 | HEIGHT: 64 IN

## 2022-11-29 DIAGNOSIS — M79.671 RIGHT FOOT PAIN: Primary | ICD-10-CM

## 2022-11-29 DIAGNOSIS — M19.079 ARTHRITIS OF MIDFOOT: ICD-10-CM

## 2022-11-29 PROCEDURE — 20600 DRAIN/INJ JOINT/BURSA W/O US: CPT | Performed by: PODIATRIST

## 2022-11-29 PROCEDURE — 99212 OFFICE O/P EST SF 10 MIN: CPT | Performed by: PODIATRIST

## 2022-11-29 RX ORDER — DEXAMETHASONE SODIUM PHOSPHATE 4 MG/ML
2 INJECTION, SOLUTION INTRA-ARTICULAR; INTRALESIONAL; INTRAMUSCULAR; INTRAVENOUS; SOFT TISSUE ONCE
Status: COMPLETED | OUTPATIENT
Start: 2022-11-29 | End: 2022-11-29

## 2022-11-29 RX ORDER — TRIAMCINOLONE ACETONIDE 40 MG/ML
20 INJECTION, SUSPENSION INTRA-ARTICULAR; INTRAMUSCULAR ONCE
Status: COMPLETED | OUTPATIENT
Start: 2022-11-29 | End: 2022-11-29

## 2022-11-29 RX ADMIN — TRIAMCINOLONE ACETONIDE 20 MG: 40 INJECTION, SUSPENSION INTRA-ARTICULAR; INTRAMUSCULAR at 16:35

## 2022-11-29 RX ADMIN — DEXAMETHASONE SODIUM PHOSPHATE 2 MG: 4 INJECTION, SOLUTION INTRA-ARTICULAR; INTRALESIONAL; INTRAMUSCULAR; INTRAVENOUS; SOFT TISSUE at 16:35

## 2022-11-30 NOTE — ANESTHESIA PROCEDURE NOTES
Airway  Urgency: elective    Airway not difficult    General Information and Staff    Patient location during procedure: OR    Indications and Patient Condition  Indications for airway management: airway protection    Preoxygenated: yes  MILS maintained throughout  Mask difficulty assessment: 0 - not attempted    Final Airway Details  Final airway type: supraglottic airway      Successful airway: I-gel  Size 4    Number of attempts at approach: 1               V2.0  [facility] Hospitalist Progress Note      Name:  Joseph Camejo /Age/Sex: 1929  (78 y.o. female)   MRN & CSN:  [id number] & [id number] Encounter Date/Time: 2022 2:01 PM EST    Location:  [address] [place] [address] PCP: Martina Castillo MD       Hospital Day: 17    Assessment and Plan:    Joseph Camejo is a 78 y.o. female with PMH of CAD, GERD, UTI, Hypothyroidism, HTN, Hyperlipedmia, CKD who presents with Sepsis ([facility] [address]    #Acute hypoxic respiratory failure 2/2 CAP and pulmonary edema-resolving  -Blood cultures negative  -Chest x-ray showing pulmonary edema and concern for pneumonia  -Echo: EF of 55 to 60% with RVSP of 44  -Negative respiratory PCR  -CT chest showing bilateral infiltrates  - On 2L/min when seen - apparently desaturated to 85-86% with off O2  - Not on O2 at home    Plan:  Pulmonology following, appreciate recs  Continue meropenem until  per ID - last day today  Continue DuoNebs    #Generalized pruritus 2/2 likely xerosis 2/2 AC - Resolved  -likely in the setting of dry air (patient uses humidifier at home)  -no jaundice, no rash    Plan:  Emollients     #Pyelonephritis with history of recurrent UTI - Improved  -Received ciprofloxacin, doxycycline and nitrofurantoin without relief  -Urine culture growing Enterococcus faecalis sensitive to ampicillin    Plan:  ID on consult, appreciate recs  Meropenem for 14 days, end date is  (today)    #Acute decompensated HFpEF-resolving  -Chest x-ray with pulmonary edema and elevated RVSP    Plan:  IV diuresis as needed  Strict I's and O  Daily weights    #PAUL on CKD likely prerenal in the setting of diuresis - Stable  -Baseline creatinine around 2  - Cr 2.3 today    Nephrology following, appreciate recommendations  Strict I's and O's  Family refused HD  Holding diuresis    #Suspected aspiration--> ruled out  -Seen by SLP, did not have any signs or symptoms of aspiration    Plan:  Continue to monitor    #Hyperkalemia-resolved  -Received Lokelma    Plan:  Continue monitoring BMP    #Elevated troponin likely from CKD and demand ischemia  #CAD  -Down trended  -EKG with LBBB    Plan:  Cardiology on consult, appreciate recommendations  Continue atenolol, amlodipine, Imdur, statin and aspirin  Holding telmisartan in the setting of PAUL    Chronic medical problems:    #Severe AS  -Follow-up at [facility] for TAVR balloon valvuloplasty    #Hypertension  Plan: Continue home meds except for on losartan    #GERD  Continue PPI    PT and OT both feel patient will benefit from ARU. Expedited appeal signed. Patient's LOS will likely be longer at SNF due to less rehab. Needs continuous monitoring of Cr and O2 saturation - would benefit with inpatient rehab. Diet ADULT DIET; Dysphagia - Minced and Moist; Low Potassium (Less than 3000 mg/day); 1500 ml; Please provide extra moisteners and gravies  ADULT ORAL NUTRITION SUPPLEMENT; Dinner; Frozen Oral Supplement   DVT Prophylaxis [] Lovenox, [x]  Heparin, [] SCDs, [] Ambulation,  [] Eliquis, [] Xarelto  [] Coumadin   Code Status Limited   Disposition From: home  Expected Disposition: acute rehab  Estimated Date of Discharge: Ready for discharge. Appeal pending   Surrogate Decision Maker/ CANDIS Rico     Subjective:     Chief Complaint: Shortness of Breath     Patient feeling well today. No complaints. On 2L/min when seen. She desaturated down to 85% when off O2. Review of Systems:    General: No fever, no chills  Heart: No chest pain, no palpitations  Lungs: No shortness of breath, no cough  Abdomen: No abdominal pain, no nausea, no vomiting, no constipation, no diarrhea  : No frequency, no dysuria, no urgency  MSK: No lower extremity swelling  Neuro: No confusion  Skin: No rashes      Objective:   No intake or output data in the 24 hours ending 11/30/22 1635       Vitals:   Vitals:    11/30/22 1624   BP:    Pulse:    Resp:    Temp:    SpO2: 93%       Physical Exam:     General: NAD.  On 2/min O2 when seen.   Eyes: EOMI  HENT: Atraumatic  Respiratory: no respiratory distress. B/L equal air entry. No wheeze or rhonchi  GI: nondistended. BS normal.   CVS: S1 + S2. Equal peripheral pulses.    MSK: no lower extremity edema  Skin: Intact, dry, warm, no rashes  Neuro: CN II to XII grossly intact  Psych: Normal mood    Medications:   Medications:    ipratropium-albuterol  1 ampule Inhalation TID    diphenhydrAMINE-zinc acetate   Topical 4x daily    sennosides-docusate sodium  1 tablet Oral BID    meropenem  500 mg IntraVENous Q12H    polyethylene glycol  17 g Oral Daily    docusate sodium  100 mg Oral Daily    lactobacillus  1 capsule Oral Daily with breakfast    amLODIPine  10 mg Oral Nightly    atenolol  25 mg Oral Daily    sodium bicarbonate  325 mg Oral BID    clopidogrel  75 mg Oral Daily    sodium chloride flush  5-40 mL IntraVENous 2 times per day    sodium chloride flush  5-40 mL IntraVENous 2 times per day    heparin (porcine)  5,000 Units SubCUTAneous 3 times per day    Vitamin D  2,000 Units Oral Daily    pantoprazole  40 mg Oral QAM AC    fluorometholone  1 drop Both Eyes BID    isosorbide mononitrate  60 mg Oral Daily    levothyroxine  88 mcg Oral Daily    ranolazine  500 mg Oral BID    sertraline  50 mg Oral Daily    [Held by provider] losartan  12.5 mg Oral Daily    atorvastatin  20 mg Oral Nightly      Infusions:    sodium chloride 75 mL/hr at 11/24/22 1739    sodium chloride 20 mL/hr at 11/27/22 1640     PRN Meds: hydrOXYzine pamoate, 25 mg, TID PRN  melatonin, 3 mg, Nightly PRN  sodium chloride flush, 5-40 mL, PRN  sodium chloride, , PRN  ondansetron, 4 mg, Q8H PRN   Or  ondansetron, 4 mg, Q6H PRN  polyethylene glycol, 17 g, Daily PRN  acetaminophen, 650 mg, Q6H PRN   Or  acetaminophen, 650 mg, Q6H PRN  sodium chloride flush, 5-40 mL, PRN  sodium chloride, , PRN      Labs      Recent Results (from the past 24 hour(s))   Basic Metabolic Panel    Collection Time: 11/30/22  5:38 AM   Result Value Ref Range Sodium 138 135 - 145 MMOL/L    Potassium 4.9 3.5 - 5.1 MMOL/L    Chloride 102 99 - 110 mMol/L    CO2 24 21 - 32 MMOL/L    Anion Gap 12 4 - 16    BUN 47 (H) 6 - 23 MG/DL    Creatinine 2.3 (H) 0.6 - 1.1 MG/DL    Est, Glom Filt Rate 19 (L) >60 mL/min/1.73m2    Glucose 97 70 - 99 MG/DL    Calcium 10.2 8.3 - 10.6 MG/DL          Imaging/Diagnostics Last 24 Hours   XR CHEST PORTABLE    Result Date: 11/21/2022  EXAMINATION: ONE XRAY VIEW OF THE CHEST 11/21/2022 12:19 pm COMPARISON: 11/17/2022. HISTORY: ORDERING SYSTEM PROVIDED HISTORY: sob TECHNOLOGIST PROVIDED HISTORY: Reason for exam:->sob Reason for Exam:  sob FINDINGS: Cardiomediastinal silhouette is stable. Similar bilateral airspace opacities. No pleural effusion or pneumothorax. No gross bony abnormality.      Stable chest.       Electronically signed by Warren Akhtar MD on 11/30/2022 at 4:35 PM

## 2022-12-08 ENCOUNTER — OFFICE VISIT (OUTPATIENT)
Dept: ORTHOPEDIC SURGERY | Facility: CLINIC | Age: 72
End: 2022-12-08

## 2022-12-08 VITALS — BODY MASS INDEX: 28.25 KG/M2 | HEIGHT: 64 IN | WEIGHT: 165.5 LBS

## 2022-12-08 DIAGNOSIS — M25.511 CHRONIC PAIN OF BOTH SHOULDERS: Primary | ICD-10-CM

## 2022-12-08 DIAGNOSIS — G89.29 CHRONIC PAIN OF BOTH SHOULDERS: Primary | ICD-10-CM

## 2022-12-08 DIAGNOSIS — M25.512 CHRONIC PAIN OF BOTH SHOULDERS: Primary | ICD-10-CM

## 2022-12-08 DIAGNOSIS — M19.012 PRIMARY OSTEOARTHRITIS OF BOTH SHOULDERS: ICD-10-CM

## 2022-12-08 DIAGNOSIS — M75.101 ROTATOR CUFF SYNDROME OF BOTH SHOULDERS: ICD-10-CM

## 2022-12-08 DIAGNOSIS — M75.41 IMPINGEMENT SYNDROME OF RIGHT SHOULDER: ICD-10-CM

## 2022-12-08 DIAGNOSIS — M75.112 NONTRAUMATIC INCOMPLETE TEAR OF LEFT ROTATOR CUFF: ICD-10-CM

## 2022-12-08 DIAGNOSIS — M19.011 PRIMARY OSTEOARTHRITIS OF BOTH SHOULDERS: ICD-10-CM

## 2022-12-08 DIAGNOSIS — M19.012 OSTEOARTHRITIS OF LEFT AC (ACROMIOCLAVICULAR) JOINT: ICD-10-CM

## 2022-12-08 DIAGNOSIS — M19.011 OSTEOARTHRITIS OF RIGHT AC (ACROMIOCLAVICULAR) JOINT: ICD-10-CM

## 2022-12-08 DIAGNOSIS — M75.102 ROTATOR CUFF SYNDROME OF BOTH SHOULDERS: ICD-10-CM

## 2022-12-08 DIAGNOSIS — M24.112 DEGENERATIVE TEAR OF GLENOID LABRUM OF LEFT SHOULDER: ICD-10-CM

## 2022-12-08 DIAGNOSIS — M75.42 IMPINGEMENT SYNDROME OF LEFT SHOULDER: ICD-10-CM

## 2022-12-08 PROCEDURE — 20610 DRAIN/INJ JOINT/BURSA W/O US: CPT | Performed by: NURSE PRACTITIONER

## 2022-12-08 PROCEDURE — 99214 OFFICE O/P EST MOD 30 MIN: CPT | Performed by: NURSE PRACTITIONER

## 2022-12-08 RX ORDER — OXYCODONE HYDROCHLORIDE AND ACETAMINOPHEN 5; 325 MG/1; MG/1
1 TABLET ORAL EVERY 4 HOURS PRN
Qty: 18 TABLET | Refills: 0 | Status: SHIPPED | OUTPATIENT
Start: 2022-12-08

## 2022-12-08 RX ADMIN — TRIAMCINOLONE ACETONIDE 40 MG: 40 INJECTION, SUSPENSION INTRA-ARTICULAR; INTRAMUSCULAR at 13:32

## 2022-12-08 RX ADMIN — LIDOCAINE HYDROCHLORIDE 2 ML: 10 INJECTION, SOLUTION INFILTRATION; PERINEURAL at 13:32

## 2022-12-08 RX ADMIN — TRIAMCINOLONE ACETONIDE 40 MG: 40 INJECTION, SUSPENSION INTRA-ARTICULAR; INTRAMUSCULAR at 13:33

## 2022-12-08 RX ADMIN — LIDOCAINE HYDROCHLORIDE 2 ML: 10 INJECTION, SOLUTION INFILTRATION; PERINEURAL at 13:33

## 2022-12-08 NOTE — PROGRESS NOTES
Otilia Kenny is a 72 y.o. female   Primary provider:  Alxeis Zabala MD       Chief Complaint   Patient presents with   • Right Shoulder - Pain, Initial Evaluation   • Left Shoulder - Follow-up     HISTORY OF PRESENT ILLNESS:    History of Present Illness  72-year-old female patient presents to office accompanied by her spouse for evaluation of chronic right shoulder pain and follow-up of chronic left shoulder pain.  Initial onset of bilateral shoulder pain occurred several years ago with no known injury or incident.  She has worse pain in the left shoulder compared to the right.  She has been evaluated and treated by orthopedics in the past for chronic left shoulder pain due to known, moderate to advanced osteoarthritis and partial-thickness tearing of the supraspinatus tendon per previous MRI imaging.  Previous treatments for her left shoulder pain have included intra-articular injections of steroid and Tylenol and pain medication.  Pain is described as intermittent and moderate in severity.  Pain is described as aching and stabbing in nature with associated popping sensations and limited range of motion.  Pain is worse with driving and movement/use of her bilateral shoulders/arms.  Patient also reports increased pain at nighttime that disrupts her sleep.  Pain improves mildly with rest, position changes, Tylenol occasional Percocet 5 mg, which she states she takes very sparingly.  X-rays of the right shoulder performed in office today.  Pain scale currently is 4/10.    CONCURRENT MEDICAL HISTORY:    Past Medical History:   Diagnosis Date   • Abdominal pain     Most likely related to functional colonic spasm     • Abnormal liver enzymes     Improved, 3/2015      • Allergic rhinitis     seasonal   • Allergic rhinitis    • Anemia    • Anesthesia complication     states sometime B/P drops   • Arthritis    • Cervical disc disorder     S/P cervical surgeries x2-3. Extensive fusion C 3-7. [facility]     "  • Depression    • Diarrhea, unspecified     Resolved with discontinuation of metformin.    • Diverticular disease of colon    • Dysfunction of eustachian tube    • Essential hypertension     Increase lisinopril HCT, 10/12.5.      • Gastritis    • Generalized anxiety disorder    • GERD (gastroesophageal reflux disease)    • History of transfusion    • Hyperlipidemia     Intolerant of Lipitor. The patient stopped Zocor due to \"liver pain\", summer 2015. patient instructed to reattempt Zocor every other day, 10/2015    • Hyperlipidemia associated with type 2 diabetes mellitus (Formerly [facility]) 11/25/2020   • Hypertriglyceridemia     Holding simvastatin 2/2015 due to slightly elevated LFTs.      • Iron deficiency anemia    • Irritable bowel syndrome     Dr. 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 Reynolds    • Sleep disorder    • Spasm of back muscles     Dr. Castro changed Soma to Zanaflex.     • Type 2 diabetes mellitus with microalbuminuria, without long-term current use of insulin (Formerly [facility]) 5/7/2018    Added automatically from request for surgery [id number]   • Vitamin B12 deficiency (dietary) anemia     Currently on multivitamin daily         Allergies   Allergen Reactions   • Allegra [Fexofenadine] Itching   • Amaryl [Glimepiride] Myalgia   • Dyazide [Triamterene-Hctz] Itching   • Hydrochlorothiazide W-Triamterene Itching   • Lipitor [Atorvastatin] Myalgia   • Metformin And Related Diarrhea   • Naproxen Itching   • Actos [Pioglitazone] Rash   • Sulfamethoxazole-Trimethoprim Rash     dizziness         Current Outpatient Medications:   •  aspirin 81 MG EC tablet, Take 81 mg by mouth Daily., Disp: , Rfl:   •  Blood Glucose Monitoring Suppl (ONETOUCH VERIO IQ SYSTEM) w/Device kit, 1 each Daily., Disp: 1 kit, Rfl: 0  •  CALCIUM CARB-CHOLECALCIFEROL PO, Take 1 tablet by mouth 2 (two) times a day., Disp: , Rfl:   •  cetirizine (zyrTEC) 10 MG tablet, Take 10 mg by mouth Daily., Disp: , Rfl:   •  " cholecalciferol (VITAMIN D3) 25 MCG (1000 UT) tablet, Take 1,000 Units by mouth. 4 Times Per Week, Disp: , Rfl:   •  escitalopram (Lexapro) 10 MG tablet, Take 1 tablet by mouth Daily., Disp: 90 tablet, Rfl: 3  •  ezetimibe (ZETIA) 10 MG tablet, TAKE 1 TABLET DAILY, Disp: 90 tablet, Rfl: 3  •  famotidine (PEPCID) 20 MG tablet, TAKE 1 TABLET EVERY NIGHT, Disp: 90 tablet, Rfl: 3  •  Farxiga 10 MG tablet, TAKE 1 TABLET DAILY        (REPLACES INVOKANA), Disp: 90 tablet, Rfl: 0  •  fenofibrate (TRICOR) 145 MG tablet, TAKE 1/2 TO 1 TABLET DAILY, Disp: 90 tablet, Rfl: 0  •  glucose blood (OneTouch Verio) test strip, 1 each by Other route 3 (Three) Times a Day., Disp: 300 each, Rfl: 3  •  Insulin Pen Needle 31G X 5 MM misc, Use daily with insulin and weekly with Ozempic, Disp: 100 each, Rfl: 3  •  IRON, FERROUS SULFATE, PO, Take 65 mg by mouth 2 (Two) Times a Week., Disp: , Rfl:   •  Lancets (OneTouch Delica Plus [id number]) misc, 1 each by Other route 3 (Three) Times a Day. for testing as directed, Disp: 300 each, Rfl: 3  •  lisinopril (PRINIVIL,ZESTRIL) 2.5 MG tablet, Take 1 tablet by mouth 2 (Two) Times a Day., Disp: 180 tablet, Rfl: 3  •  meclizine (ANTIVERT) 25 MG tablet, Take 1 tablet by mouth 3 (Three) Times a Day As Needed for Dizziness., Disp: 30 tablet, Rfl: 1  •  minocycline (MINOCIN,DYNACIN) 100 MG capsule, Take 1 capsule by mouth Daily., Disp: 90 capsule, Rfl: 3  •  Multiple Vitamins-Minerals (MULTIVITAMIN PO), Take 1 tablet by mouth daily., Disp: , Rfl:   •  nateglinide (Starlix) 120 MG tablet, Take 1 tablet by mouth 3 (Three) Times a Day Before Meals., Disp: 270 tablet, Rfl: 3  •  NovoLOG 70/30 FlexPen ReliOn (70-30) 100 UNIT/ML suspension pen-injector injection, Inject 0.1 mL under the skin into the appropriate area as directed Daily With Dinner for 90 days., Disp: 9 mL, Rfl: 1  •  Omega-3 Fatty Acids (Omega-3 Fish Oil) 1000 MG capsule, Take  by mouth 4 (Four) Times a Week., Disp: , Rfl:   •  omeprazole  (priLOSEC) 40 MG capsule, Take 1 capsule by mouth Daily., Disp: 90 capsule, Rfl: 3  •  QUEtiapine (SEROquel) 25 MG tablet, Take 1 tablet by mouth Every Night., Disp: 90 tablet, Rfl: 3  •  rosuvastatin (CRESTOR) 20 MG tablet, TAKE 1 TABLET DAILY.       REPLACE SIMVASTATIN, Disp: 90 tablet, Rfl: 3  •  Semaglutide, 1 MG/DOSE, (Ozempic, 1 MG/DOSE,) 2 MG/1.5ML solution pen-injector, Inject 1 mg under the skin into the appropriate area as directed 1 (One) Time Per Week., Disp: 6 pen, Rfl: 3  •  TiZANidine (ZANAFLEX) 4 MG capsule, Take 1 capsule by mouth 2 (Two) Times a Day As Needed for Muscle Spasms., Disp: 180 capsule, Rfl: 3  •  oxyCODONE-acetaminophen (PERCOCET) 5-325 MG per tablet, Take 1 tablet by mouth Every 4 (Four) Hours As Needed for Moderate Pain or Severe Pain., Disp: 18 tablet, Rfl: 0    Past Surgical History:   Procedure Laterality Date   • BACK SURGERY      Laminectomy   • BUNIONECTOMY     • CARPAL TUNNEL RELEASE Bilateral    • CERVICAL DISC SURGERY      Laminectomy 2011. Fusion 2013.   • CHOLECYSTECTOMY     • COLONOSCOPY      Diverticulosis found in the sigmoid colon.A single diminutive polyp found in the colon.Mul.biopsies taken.Hemorrhoids found in the anus.   • ENDOSCOPY      Normal hypopharynx and esophagus.Marked irregularity of the Z-line,compatable with chronic reflux.Mul.biopsies.A hiatus hernia found in GE junction.Mild nonerosive gastritis.Mul.biopsies.Polyps in fundus.Mul.biopsies.Normal pylorus.Normal duodenum.   • ENDOSCOPY N/A 12/28/2021    Procedure: ESOPHAGOGASTRODUODENOSCOPY;  Surgeon: Samra Castillo MD;  Location: [facility] ENDOSCOPY;  Service: Gastroenterology;  Laterality: N/A;   • ENDOSCOPY AND COLONOSCOPY     • FINGER/THUMB LESION/CYST EXCISION     • HEEL SPUR EXCISION     • HYSTERECTOMY     • JOINT REPLACEMENT Bilateral    • KNEE ARTHROSCOPY Bilateral     Arthroscopy of the left knee with debridement of medial meniscus.   • KNEE SURGERY      Right unicompartmental arthroplasty.   •  "TOTAL KNEE ARTHROPLASTY Left 2018    Procedure: TOTAL KNEE ARTHROPLASTY ATTUNE with adductor canal block ;  Surgeon: Baron Reynolds MD;  Location: [facility];  Service: Orthopedics   • UPPER GASTROINTESTINAL ENDOSCOPY  2021    Dr. Samra Castillo M.D., [facility], [place], [place]       Family History   Problem Relation Age of Onset   • Cancer Other         lung   • Diabetes Other    • Hypertension Other    • Stroke Other    • Breast cancer Sister         Social History     Socioeconomic History   • Marital status:    Tobacco Use   • Smoking status: Former     Packs/day: 1.00     Years: 10.00     Pack years: 10.00     Types: Cigarettes     Quit date: 1985     Years since quittin.5   • Smokeless tobacco: Never   Vaping Use   • Vaping Use: Never used   Substance and Sexual Activity   • Alcohol use: No   • Drug use: No   • Sexual activity: Defer        Review of Systems   Constitutional:        Night sweats   HENT: Positive for postnasal drip and tinnitus.    Eyes: Negative.    Respiratory: Negative.    Cardiovascular: Negative.    Gastrointestinal: Negative.    Endocrine:        Hair loss   Genitourinary: Negative.    Musculoskeletal: Positive for neck stiffness.        Joint pain/stiffness. Bilateral shoulder pain.    Skin: Negative.    Allergic/Immunologic: Negative.    Neurological: Negative.    Psychiatric/Behavioral: The patient is nervous/anxious.         Anxiety/depression       PHYSICAL EXAMINATION:       Ht 161.3 cm (63.5\")   Wt 75.1 kg (165 lb 8 oz)   BMI 28.86 kg/m²     Physical Exam  Vitals reviewed.   Constitutional:       General: She is not in acute distress.     Appearance: She is well-developed. She is not ill-appearing.   HENT:      Head: Normocephalic.   Pulmonary:      Effort: Pulmonary effort is normal. No respiratory distress.   Abdominal:      General: There is no distension.      Palpations: Abdomen is soft.   Musculoskeletal:         General: " Tenderness (Mild, bilateral shoulders) present. No swelling, deformity or signs of injury.   Skin:     General: Skin is warm and dry.      Capillary Refill: Capillary refill takes less than 2 seconds.      Findings: No erythema.   Neurological:      Mental Status: She is alert and oriented to person, place, and time.      GCS: GCS eye subscore is 4. GCS verbal subscore is 5. GCS motor subscore is 6.      Sensory: No sensory deficit.   Psychiatric:         Speech: Speech normal.         Behavior: Behavior normal.         Thought Content: Thought content normal.         Judgment: Judgment normal.         GAIT:     [x]  Normal  []  Antalgic    Assistive device: [x]  None  []  Walker     []  Crutches  []  Cane     []  Wheelchair  []  Stretcher    Right Shoulder Exam     Tenderness   Right shoulder tenderness location: Mild, diffuse.    Range of Motion   Active abduction: 120   External rotation: 70   Forward flexion: 140   Internal rotation 90 degrees: 70     Muscle Strength   Abduction: 4/5   Supraspinatus: 4/5     Tests   Mckeon test: positive  Cross arm: positive  Impingement: positive  Drop arm: negative    Other   Erythema: absent  Sensation: normal  Pulse: present    Comments:  Pain and limitations with range of motion.   Empty can test positive.  Full can test positive.  Neurovascularly intact.      Left Shoulder Exam     Tenderness   Left shoulder tenderness location: Mild, diffuse.    Range of Motion   Active abduction: 100   External rotation: 70   Forward flexion: 110   Internal rotation 90 degrees: 50     Muscle Strength   Abduction: 4/5   Supraspinatus: 4/5     Tests   Mckeon test: positive  Cross arm: positive  Impingement: positive  Drop arm: negative    Other   Erythema: absent  Sensation: normal  Pulse: present     Comments:  Pain and limitations with range of motion.   Empty can test positive.  Full can test positive.  Neurovascularly intact.            XR Spine Cervical Complete 4 or 5  View     Result Date: 7/25/2022  Narrative: XR CERVICAL SPINE 4-5 VIEWS COMPARISONS: None. ADDITIONAL PERTINENT HISTORY: Increasing left-sided posterior neck pain. FINDINGS: Postoperative changes: Previous anterior interbody fusion of C3-C4 and previous anterior interbody fusion of C5-T1. Previous corpectomy at C5, C6 and C7. Posterior interbody fusion of C5-T1. Vertebral body heights and alignment:  Negative. Vertebral bodies:   Negative. Disc spaces:  Fusion across the disc space at C3-C4. Craniocervical junction:  Negative. Cervical thoracic junction: Negative. Prevertebral soft tissues:  Negative. Surrounding soft tissues:  Negative.      Impression: 1. Postoperative changes involving the cervical spine. 2. No acute appearing bony abnormalities. Electronically signed by: Michael Portillo MD  7/25/2022 8:22 PM CDT Workstation: [id number]     XR Shoulder 2+ View Left      Result Date: 7/22/2022  Narrative: Three view left shoulder HISTORY: Worsening left shoulder pain with decreased range of motion. AP films with the humerus in internal and external rotation and scapular Y view were obtained. COMPARISON: None FINDINGS: No fracture or dislocation. Hypertrophic change acromioclavicular joint. Degenerative changes humeral head with 2.3 cm osteophyte arising inferiorly and medially from the humeral head. Minimal synovial osteochondromatosis. Extensive postoperative changes in the cervical spine. No other osseous or articular abnormality.      Impression: CONCLUSION: Hypertrophic change acromioclavicular joint. Degenerative changes humeral head with 2.3 cm osteophyte arising inferiorly and medially from the humeral head. Minimal synovial osteochondromatosis. [id number] Electronically signed by:  Ryan Mayo MD  7/22/2022 7:42 PM CDT Workstation: [id number]     MRI Shoulder Left Without Contrast     Result Date: 8/10/2022  Narrative: EXAM: MRI SHOULDER LEFT WO CONTRAST CLINICAL INDICATION: Left shoulder pain COMPARISON: 7/22/2022  TECHNIQUE: The MRI was done using axial T1 and gradient echo, coronal T1, PD and T2 fat sat and sagittal T1 and T2 fat sat images. FINDINGS: The subscapularis is intact. The long head of the biceps tendon proximally has a thickened appearance with abnormal signal suggesting partial tear or tendinosis. There is partial thickness undersurface tear of the distal supraspinatus tendon with no full-thickness tear. The infraspinatus and teres minor are intact. There are moderate degenerative changes of the glenohumeral and AC joints. The visualized osseous structures otherwise have normal morphology and signal characteristics with no evidence of bone marrow edema, occult fractures, or marrow-replacing bone lesions. There is a moderate amount of fluid in the subacromial subdeltoid bursa. Tears of the posterior and superior labrum are noted. Several loose bodies are seen within the axillary recess of the joint the largest measuring 5.7 x 3.7 mm.      Impression: 1. Partial thickness tear of the supraspinatus tendon with no full-thickness tear. 2. Moderate degenerative joint disease of the glenohumeral and AC joints including several loose bodies. 3. Partial tear versus tendinosis of the proximal long head of the biceps tendon. 4. Tears of the superior and posterior glenoid labrum. Electronically signed by: Leodan Hagan MD  8/10/2022 5:52 PM CDT Workstation:[id number]     ASSESSMENT:    Diagnoses and all orders for this visit:    Chronic pain of both shoulders  -     XR Shoulder 2+ View Right  -     oxyCODONE-acetaminophen (PERCOCET) 5-325 MG per tablet; Take 1 tablet by mouth Every 4 (Four) Hours As Needed for Moderate Pain or Severe Pain.  -     Large Joint Arthrocentesis: R subacromial bursa  -     Large Joint Arthrocentesis: L glenohumeral    Primary osteoarthritis of both shoulders  -     oxyCODONE-acetaminophen (PERCOCET) 5-325 MG per tablet; Take 1 tablet by mouth Every 4 (Four) Hours As Needed for Moderate Pain or  Severe Pain.  -     Large Joint Arthrocentesis: R subacromial bursa  -     Large Joint Arthrocentesis: L glenohumeral    Rotator cuff syndrome of both shoulders  -     oxyCODONE-acetaminophen (PERCOCET) 5-325 MG per tablet; Take 1 tablet by mouth Every 4 (Four) Hours As Needed for Moderate Pain or Severe Pain.  -     Large Joint Arthrocentesis: R subacromial bursa  -     Large Joint Arthrocentesis: L glenohumeral    Osteoarthritis of left AC (acromioclavicular) joint  -     oxyCODONE-acetaminophen (PERCOCET) 5-325 MG per tablet; Take 1 tablet by mouth Every 4 (Four) Hours As Needed for Moderate Pain or Severe Pain.  -     Large Joint Arthrocentesis: L glenohumeral    Impingement syndrome of left shoulder  -     oxyCODONE-acetaminophen (PERCOCET) 5-325 MG per tablet; Take 1 tablet by mouth Every 4 (Four) Hours As Needed for Moderate Pain or Severe Pain.  -     Large Joint Arthrocentesis: L glenohumeral    Degenerative tear of glenoid labrum of left shoulder  -     oxyCODONE-acetaminophen (PERCOCET) 5-325 MG per tablet; Take 1 tablet by mouth Every 4 (Four) Hours As Needed for Moderate Pain or Severe Pain.  -     Large Joint Arthrocentesis: L glenohumeral    Nontraumatic incomplete tear of left rotator cuff  -     oxyCODONE-acetaminophen (PERCOCET) 5-325 MG per tablet; Take 1 tablet by mouth Every 4 (Four) Hours As Needed for Moderate Pain or Severe Pain.  -     Large Joint Arthrocentesis: L glenohumeral    Osteoarthritis of right AC (acromioclavicular) joint  -     oxyCODONE-acetaminophen (PERCOCET) 5-325 MG per tablet; Take 1 tablet by mouth Every 4 (Four) Hours As Needed for Moderate Pain or Severe Pain.  -     Large Joint Arthrocentesis: R subacromial bursa    Impingement syndrome of right shoulder  -     oxyCODONE-acetaminophen (PERCOCET) 5-325 MG per tablet; Take 1 tablet by mouth Every 4 (Four) Hours As Needed for Moderate Pain or Severe Pain.  -     Large Joint Arthrocentesis: R subacromial  bursa    PLAN    Large Joint Arthrocentesis: R glenohumeral  Date/Time: 12/8/2022 1:32 PM  Consent given by: patient  Timeout: Immediately prior to procedure a time out was called to verify the correct patient, procedure, equipment, support staff and site/side marked as required   Supporting Documentation  Indications: pain and diagnostic evaluation   Procedure Details  Location: shoulder - R glenohumeral  Preparation: Patient was prepped and draped in the usual sterile fashion  Needle size: 22 G  Approach: posterior  Medications administered: 40 mg triamcinolone acetonide 40 MG/ML; 2 mL lidocaine 1 %  Patient tolerance: patient tolerated the procedure well with no immediate complications    Large Joint Arthrocentesis: L glenohumeral  Date/Time: 12/8/2022 1:33 PM  Consent given by: patient  Timeout: Immediately prior to procedure a time out was called to verify the correct patient, procedure, equipment, support staff and site/side marked as required   Supporting Documentation  Indications: pain and diagnostic evaluation   Procedure Details  Location: shoulder - L glenohumeral  Preparation: Patient was prepped and draped in the usual sterile fashion  Needle size: 22 G  Approach: posterior  Medications administered: 40 mg triamcinolone acetonide 40 MG/ML; 2 mL lidocaine 1 %  Patient tolerance: patient tolerated the procedure well with no immediate complications      X-rays of the right shoulder performed in office today and reviewed with no acute findings noted.  Patient has advanced degenerative changes at the AC joint.  There are milder appearing osteoarthritic changes at the glenohumeral joint.  There is evidence of some subchondral cystic changes in the superior lateral aspect of the humeral head.  The humeral head appears well positioned within the glenoid.  Patient complains of chronic right shoulder pain that has been progressively worsening in recent months.  She denies any known injuries regarding her right  shoulder.  Patient complains of aching and grinding pain with limited range of motion.  We again discussed multiple possibilities as potential other sources of her pain beyond the osteoarthritic changes seen on x-ray imaging including rotator cuff tears, tendinitis/tendinosis, bursitis and/or impingement.  We discussed that she may need MRI imaging of the right shoulder at some point if her pain persists.  For now, she wants to wait on MRI imaging and proceed with some conservative care.    Patient also complains of continued left shoulder pain that has been gradually worsening again in the past couple of weeks.  She has not sustained any falls or injuries.  As previously noted, MRI imaging of the left shoulder showed evidence of partial thickness tearing of the supraspinatus tendon as well as partial tear versus tendinosis of the proximal head of the biceps tendon.  Additionally, she had degenerative tears in the superior and posterior glenoid labrum and moderate appearing degenerative joint disease of both the glenohumeral and AC joints with several loose bodies.  In regards to her left shoulder, we have discussed that there was no immediate surgical indication but at some point her likely surgical option would be shoulder arthroplasty if she fails conservative management.  Patient has experienced chronic left shoulder pain for several years that has progressively worsened over time.  She has been treated conservatively as described in the HPI above.  The patient also has a history of chronic neck pain and chronic upper thoracic pain as well as a history of prior cervical spine fusion performed in February 2013.  Her pain and symptoms in the bilateral shoulders are most consistent with osteoarthritic pain.  Patient reports good pain relief with the previous left shoulder injection given and wants to proceed with bilateral shoulder injections today in an effort to improve her chronic pain.  Recommend proceeding  with intra-articular injections of steroid to the bilateral shoulders today for management of joint pain/inflammation/swelling.     Recommend the following:      -Rest and activity modification with avoidance of heavy lifting, pulling, tugging and repetitive motions for now.  -Gentle, progressive range of motion exercises with the affected shoulder intermittently several times daily as pain allows.  -Ice therapy to the affected shoulder intermittently 3-4 times daily for 15 minutes at a time to minimize pain/inflammation.  -Gradual progression of activity and use of the affected arm/shoulder as tolerated and based on pain/symptoms.    Percocet 5 mg is refilled for the patient today at her request to continue to take as needed for moderate to severe pain that is uncontrolled with nonopioid pain management methods.  Patient has tolerated Percocet well with no known adverse effects.  She has taken this very sparingly as we previously discussed and has only taking 16 tablets total since her initial prescription that was given over 4 months ago on 8/5/2022.  Patient is reminded to continue to take the pain medication sparingly and only when needed.  She is reminded to take the least amount of pain medication needed to control her pain and focus on nonopioid pain management methods as much as possible.  Recommend Tylenol as needed for milder pain.  As previously noted, she is unable to take oral NSAIDs due to allergy and intolerance/adverse side effects.  She has tried hydrocodone in the past and experienced adverse side effects.  ALANIS is reviewed internally via Epic and is noted to be appropriate.     Follow-up in 4 to 6 weeks for recheck as needed for any new, worsening or persistent symptoms.  Consider MRI imaging of the right shoulder for further evaluation if her pain persists.  Consider physical therapy referral if her pain persists.  We also discussed that if she gets good relief with the injections given today,  she can follow-up on an as-needed basis.    Time spent of a minimum of 30 minutes including the face to face evaluation, reviewing of medical history and prior medial records, reviewing of diagnostic studies, prescription drug management, documentation, patient education and coordination of care.     EMR Dragon/Transciption Disclaimer: Some of this note may be an electronic transcription/translation of spoken language to printed text using the Dragon Dictation System.     Return in about 4 weeks (around 1/5/2023), or if symptoms worsen or fail to improve, for Recheck.        This document has been electronically signed by MYRA Wang on December 13, 2022 21:08 CST      MYRA Wang

## 2022-12-13 RX ORDER — TRIAMCINOLONE ACETONIDE 40 MG/ML
40 INJECTION, SUSPENSION INTRA-ARTICULAR; INTRAMUSCULAR
Status: COMPLETED | OUTPATIENT
Start: 2022-12-08 | End: 2022-12-08

## 2022-12-13 RX ORDER — LIDOCAINE HYDROCHLORIDE 10 MG/ML
2 INJECTION, SOLUTION INFILTRATION; PERINEURAL
Status: COMPLETED | OUTPATIENT
Start: 2022-12-08 | End: 2022-12-08

## 2023-01-03 ENCOUNTER — LAB (OUTPATIENT)
Dept: LAB | Facility: OTHER | Age: 73
End: 2023-01-03
Payer: MEDICARE

## 2023-01-03 DIAGNOSIS — E78.5 HYPERLIPIDEMIA ASSOCIATED WITH TYPE 2 DIABETES MELLITUS: Chronic | ICD-10-CM

## 2023-01-03 DIAGNOSIS — E11.29 TYPE 2 DIABETES MELLITUS WITH MICROALBUMINURIA, WITHOUT LONG-TERM CURRENT USE OF INSULIN: Chronic | ICD-10-CM

## 2023-01-03 DIAGNOSIS — E11.69 HYPERLIPIDEMIA ASSOCIATED WITH TYPE 2 DIABETES MELLITUS: Chronic | ICD-10-CM

## 2023-01-03 DIAGNOSIS — I10 ESSENTIAL HYPERTENSION: Chronic | ICD-10-CM

## 2023-01-03 DIAGNOSIS — R80.9 TYPE 2 DIABETES MELLITUS WITH MICROALBUMINURIA, WITHOUT LONG-TERM CURRENT USE OF INSULIN: Chronic | ICD-10-CM

## 2023-01-03 DIAGNOSIS — E78.1 HYPERTRIGLYCERIDEMIA: Chronic | ICD-10-CM

## 2023-01-03 DIAGNOSIS — D50.8 IRON DEFICIENCY ANEMIA SECONDARY TO INADEQUATE DIETARY IRON INTAKE: Chronic | ICD-10-CM

## 2023-01-03 DIAGNOSIS — E55.9 VITAMIN D DEFICIENCY: Chronic | ICD-10-CM

## 2023-01-03 DIAGNOSIS — D51.8 VITAMIN B12 DEFICIENCY (DIETARY) ANEMIA: Chronic | ICD-10-CM

## 2023-01-03 DIAGNOSIS — E66.3 OVERWEIGHT (BMI 25.0-29.9): Chronic | ICD-10-CM

## 2023-01-03 LAB
25(OH)D3 SERPL-MCNC: 47.8 NG/ML (ref 30–100)
ALBUMIN SERPL-MCNC: 4.3 G/DL (ref 3.5–5)
ALBUMIN UR-MCNC: <1.2 MG/DL
ALBUMIN/GLOB SERPL: 1.5 G/DL (ref 1.1–1.8)
ALP SERPL-CCNC: 70 U/L (ref 38–126)
ALT SERPL W P-5'-P-CCNC: 31 U/L
ANION GAP SERPL CALCULATED.3IONS-SCNC: 9 MMOL/L (ref 5–15)
ARTICHOKE IGE QN: 81 MG/DL (ref 0–100)
AST SERPL-CCNC: 32 U/L (ref 14–36)
BASOPHILS # BLD AUTO: 0.06 10*3/MM3 (ref 0–0.2)
BASOPHILS NFR BLD AUTO: 1.1 % (ref 0–1.5)
BILIRUB SERPL-MCNC: 0.5 MG/DL (ref 0.2–1.3)
BUN SERPL-MCNC: 22 MG/DL (ref 7–23)
BUN/CREAT SERPL: 23.7 (ref 7–25)
CALCIUM SPEC-SCNC: 9.2 MG/DL (ref 8.4–10.2)
CHLORIDE SERPL-SCNC: 97 MMOL/L (ref 101–112)
CO2 SERPL-SCNC: 26 MMOL/L (ref 22–30)
CREAT SERPL-MCNC: 0.93 MG/DL (ref 0.52–1.04)
CREAT UR-MCNC: 93.7 MG/DL
DEPRECATED RDW RBC AUTO: 37.8 FL (ref 37–54)
EGFRCR SERPLBLD CKD-EPI 2021: 64.6 ML/MIN/1.73
EOSINOPHIL # BLD AUTO: 0.14 10*3/MM3 (ref 0–0.4)
EOSINOPHIL NFR BLD AUTO: 2.6 % (ref 0.3–6.2)
ERYTHROCYTE [DISTWIDTH] IN BLOOD BY AUTOMATED COUNT: 12.3 % (ref 12.3–15.4)
GLOBULIN UR ELPH-MCNC: 2.8 GM/DL (ref 2.3–3.5)
GLUCOSE SERPL-MCNC: 116 MG/DL (ref 70–99)
HBA1C MFR BLD: 6.6 % (ref 4.8–5.6)
HCT VFR BLD AUTO: 36.6 % (ref 34–46.6)
HGB BLD-MCNC: 12.9 G/DL (ref 12–15.9)
IRON 24H UR-MRATE: 76 MCG/DL (ref 37–145)
IRON SATN MFR SERPL: 13 % (ref 20–50)
LYMPHOCYTES # BLD AUTO: 1.47 10*3/MM3 (ref 0.7–3.1)
LYMPHOCYTES NFR BLD AUTO: 26.8 % (ref 19.6–45.3)
MCH RBC QN AUTO: 30.4 PG (ref 26.6–33)
MCHC RBC AUTO-ENTMCNC: 35.2 G/DL (ref 31.5–35.7)
MCV RBC AUTO: 86.3 FL (ref 79–97)
MICROALBUMIN/CREAT UR: NORMAL MG/G{CREAT}
MONOCYTES # BLD AUTO: 0.48 10*3/MM3 (ref 0.1–0.9)
MONOCYTES NFR BLD AUTO: 8.7 % (ref 5–12)
NEUTROPHILS NFR BLD AUTO: 3.34 10*3/MM3 (ref 1.7–7)
NEUTROPHILS NFR BLD AUTO: 60.8 % (ref 42.7–76)
PLATELET # BLD AUTO: 325 10*3/MM3 (ref 140–450)
PMV BLD AUTO: 9.2 FL (ref 6–12)
POTASSIUM SERPL-SCNC: 4.2 MMOL/L (ref 3.4–5)
PROT SERPL-MCNC: 7.1 G/DL (ref 6.3–8.6)
RBC # BLD AUTO: 4.24 10*6/MM3 (ref 3.77–5.28)
SODIUM SERPL-SCNC: 132 MMOL/L (ref 137–145)
TIBC SERPL-MCNC: 597 MCG/DL (ref 298–536)
TRANSFERRIN SERPL-MCNC: 401 MG/DL (ref 200–360)
TRIGL SERPL-MCNC: 67 MG/DL
VIT B12 BLD-MCNC: 684 PG/ML (ref 211–946)
WBC NRBC COR # BLD: 5.49 10*3/MM3 (ref 3.4–10.8)

## 2023-01-03 PROCEDURE — 82306 VITAMIN D 25 HYDROXY: CPT | Performed by: INTERNAL MEDICINE

## 2023-01-03 PROCEDURE — 83036 HEMOGLOBIN GLYCOSYLATED A1C: CPT | Performed by: INTERNAL MEDICINE

## 2023-01-03 PROCEDURE — 82607 VITAMIN B-12: CPT | Performed by: INTERNAL MEDICINE

## 2023-01-03 PROCEDURE — 83540 ASSAY OF IRON: CPT | Performed by: INTERNAL MEDICINE

## 2023-01-03 PROCEDURE — 85025 COMPLETE CBC W/AUTO DIFF WBC: CPT | Performed by: INTERNAL MEDICINE

## 2023-01-03 PROCEDURE — 80053 COMPREHEN METABOLIC PANEL: CPT | Performed by: INTERNAL MEDICINE

## 2023-01-03 PROCEDURE — 84478 ASSAY OF TRIGLYCERIDES: CPT | Performed by: INTERNAL MEDICINE

## 2023-01-03 PROCEDURE — 84466 ASSAY OF TRANSFERRIN: CPT | Performed by: INTERNAL MEDICINE

## 2023-01-03 PROCEDURE — 83721 ASSAY OF BLOOD LIPOPROTEIN: CPT | Performed by: INTERNAL MEDICINE

## 2023-01-03 PROCEDURE — 82570 ASSAY OF URINE CREATININE: CPT | Performed by: INTERNAL MEDICINE

## 2023-01-03 PROCEDURE — 36415 COLL VENOUS BLD VENIPUNCTURE: CPT | Performed by: INTERNAL MEDICINE

## 2023-01-03 PROCEDURE — 82043 UR ALBUMIN QUANTITATIVE: CPT | Performed by: INTERNAL MEDICINE

## 2023-01-09 ENCOUNTER — OFFICE VISIT (OUTPATIENT)
Dept: FAMILY MEDICINE CLINIC | Facility: CLINIC | Age: 73
End: 2023-01-09
Payer: MEDICARE

## 2023-01-09 VITALS
WEIGHT: 166 LBS | OXYGEN SATURATION: 98 % | HEIGHT: 64 IN | TEMPERATURE: 97.6 F | BODY MASS INDEX: 28.34 KG/M2 | DIASTOLIC BLOOD PRESSURE: 72 MMHG | HEART RATE: 83 BPM | SYSTOLIC BLOOD PRESSURE: 126 MMHG

## 2023-01-09 DIAGNOSIS — E11.29 TYPE 2 DIABETES MELLITUS WITH MICROALBUMINURIA, WITHOUT LONG-TERM CURRENT USE OF INSULIN: Chronic | ICD-10-CM

## 2023-01-09 DIAGNOSIS — E55.9 VITAMIN D DEFICIENCY: Chronic | ICD-10-CM

## 2023-01-09 DIAGNOSIS — E66.3 OVERWEIGHT (BMI 25.0-29.9): Chronic | ICD-10-CM

## 2023-01-09 DIAGNOSIS — M19.012 PRIMARY OSTEOARTHRITIS OF LEFT SHOULDER: Chronic | ICD-10-CM

## 2023-01-09 DIAGNOSIS — Z00.00 MEDICARE ANNUAL WELLNESS VISIT, SUBSEQUENT: Primary | ICD-10-CM

## 2023-01-09 DIAGNOSIS — E11.649 TYPE 2 DIABETES MELLITUS WITH HYPOGLYCEMIA WITHOUT COMA, WITH LONG-TERM CURRENT USE OF INSULIN: ICD-10-CM

## 2023-01-09 DIAGNOSIS — R80.9 TYPE 2 DIABETES MELLITUS WITH MICROALBUMINURIA, WITHOUT LONG-TERM CURRENT USE OF INSULIN: Chronic | ICD-10-CM

## 2023-01-09 DIAGNOSIS — I10 ESSENTIAL HYPERTENSION: Chronic | ICD-10-CM

## 2023-01-09 DIAGNOSIS — E78.5 HYPERLIPIDEMIA ASSOCIATED WITH TYPE 2 DIABETES MELLITUS: Chronic | ICD-10-CM

## 2023-01-09 DIAGNOSIS — E11.69 HYPERLIPIDEMIA ASSOCIATED WITH TYPE 2 DIABETES MELLITUS: Chronic | ICD-10-CM

## 2023-01-09 DIAGNOSIS — D51.8 VITAMIN B12 DEFICIENCY (DIETARY) ANEMIA: Chronic | ICD-10-CM

## 2023-01-09 DIAGNOSIS — Z23 NEED FOR COVID-19 VACCINE: ICD-10-CM

## 2023-01-09 DIAGNOSIS — Z12.31 BREAST CANCER SCREENING BY MAMMOGRAM: ICD-10-CM

## 2023-01-09 DIAGNOSIS — Z79.4 TYPE 2 DIABETES MELLITUS WITH HYPOGLYCEMIA WITHOUT COMA, WITH LONG-TERM CURRENT USE OF INSULIN: ICD-10-CM

## 2023-01-09 DIAGNOSIS — E78.1 HYPERTRIGLYCERIDEMIA: Chronic | ICD-10-CM

## 2023-01-09 DIAGNOSIS — D50.8 IRON DEFICIENCY ANEMIA SECONDARY TO INADEQUATE DIETARY IRON INTAKE: Chronic | ICD-10-CM

## 2023-01-09 PROCEDURE — G0439 PPPS, SUBSEQ VISIT: HCPCS | Performed by: INTERNAL MEDICINE

## 2023-01-09 PROCEDURE — 0124A PR ADM SARSCOV2 30MCG/0.3ML BST: CPT | Performed by: INTERNAL MEDICINE

## 2023-01-09 PROCEDURE — 1159F MED LIST DOCD IN RCRD: CPT | Performed by: INTERNAL MEDICINE

## 2023-01-09 PROCEDURE — 91312 COVID-19 (PFIZER) BIVALENT BOOSTER 12+YRS: CPT | Performed by: INTERNAL MEDICINE

## 2023-01-09 PROCEDURE — 3044F HG A1C LEVEL LT 7.0%: CPT | Performed by: INTERNAL MEDICINE

## 2023-01-09 RX ORDER — ONDANSETRON 8 MG/1
TABLET, ORALLY DISINTEGRATING ORAL
COMMUNITY
Start: 2022-12-08

## 2023-01-09 RX ORDER — DAPAGLIFLOZIN 10 MG/1
1 TABLET, FILM COATED ORAL DAILY
Qty: 90 TABLET | Refills: 1 | Status: SHIPPED | OUTPATIENT
Start: 2023-01-09

## 2023-01-09 RX ORDER — LISINOPRIL 2.5 MG/1
2.5 TABLET ORAL 2 TIMES DAILY
Qty: 180 TABLET | Refills: 3 | Status: SHIPPED | OUTPATIENT
Start: 2023-01-09

## 2023-01-09 NOTE — PROGRESS NOTES
The ABCs of the Annual Wellness Visit  Subsequent Medicare Wellness Visit    Subjective    Otilia Kenny is a 72 y.o. female who presents for a Subsequent Medicare Wellness Visit.    The following portions of the patient's history were reviewed and   updated as appropriate: allergies, current medications, past family history, past medical history, past social history, past surgical history and problem list.    Compared to one year ago, the patient feels her physical   health is the same.    Compared to one year ago, the patient feels her mental   health is worse.    Recent Hospitalizations:  She was not admitted to the hospital during the last year.       Current Medical Providers:  Patient Care Team:  Alexis Zabala MD as PCP - General  Gail, Baron Klein MD as Surgeon (Orthopedic Surgery)    Outpatient Medications Prior to Visit   Medication Sig Dispense Refill   • aspirin 81 MG EC tablet Take 81 mg by mouth Daily.     • Blood Glucose Monitoring Suppl (ONETOUCH VERIO IQ SYSTEM) w/Device kit 1 each Daily. 1 kit 0   • CALCIUM CARB-CHOLECALCIFEROL PO Take 1 tablet by mouth 2 (two) times a day.     • cetirizine (zyrTEC) 10 MG tablet Take 10 mg by mouth Daily.     • cholecalciferol (VITAMIN D3) 25 MCG (1000 UT) tablet Take 1,000 Units by mouth. 4 Times Per Week     • escitalopram (Lexapro) 10 MG tablet Take 1 tablet by mouth Daily. 90 tablet 3   • ezetimibe (ZETIA) 10 MG tablet TAKE 1 TABLET DAILY 90 tablet 3   • fenofibrate (TRICOR) 145 MG tablet TAKE 1/2 TO 1 TABLET DAILY 90 tablet 0   • glucose blood (OneTouch Verio) test strip 1 each by Other route 3 (Three) Times a Day. 300 each 3   • Insulin Pen Needle 31G X 5 MM misc Use daily with insulin and weekly with Ozempic 100 each 3   • Lancets (OneTouch Delica Plus Fdxefz22B) misc 1 each by Other route 3 (Three) Times a Day. for testing as directed 300 each 3   • meclizine (ANTIVERT) 25 MG tablet Take 1 tablet by mouth 3 (Three) Times a Day As Needed for  Dizziness. 30 tablet 1   • minocycline (MINOCIN,DYNACIN) 100 MG capsule Take 1 capsule by mouth Daily. 90 capsule 3   • Multiple Vitamins-Minerals (MULTIVITAMIN PO) Take 1 tablet by mouth daily.     • nateglinide (Starlix) 120 MG tablet Take 1 tablet by mouth 3 (Three) Times a Day Before Meals. 270 tablet 3   • NovoLOG 70/30 FlexPen ReliOn (70-30) 100 UNIT/ML suspension pen-injector injection Inject 0.1 mL under the skin into the appropriate area as directed Daily With Dinner for 90 days. 9 mL 1   • Omega-3 Fatty Acids (Omega-3 Fish Oil) 1000 MG capsule Take  by mouth 4 (Four) Times a Week.     • omeprazole (priLOSEC) 40 MG capsule Take 1 capsule by mouth Daily. 90 capsule 3   • oxyCODONE-acetaminophen (PERCOCET) 5-325 MG per tablet Take 1 tablet by mouth Every 4 (Four) Hours As Needed for Moderate Pain or Severe Pain. 18 tablet 0   • rosuvastatin (CRESTOR) 20 MG tablet TAKE 1 TABLET DAILY.       REPLACE SIMVASTATIN 90 tablet 3   • Semaglutide, 1 MG/DOSE, (Ozempic, 1 MG/DOSE,) 2 MG/1.5ML solution pen-injector Inject 1 mg under the skin into the appropriate area as directed 1 (One) Time Per Week. 6 pen 3   • TiZANidine (ZANAFLEX) 4 MG capsule Take 1 capsule by mouth 2 (Two) Times a Day As Needed for Muscle Spasms. 180 capsule 3   • famotidine (PEPCID) 20 MG tablet TAKE 1 TABLET EVERY NIGHT 90 tablet 3   • Farxiga 10 MG tablet TAKE 1 TABLET DAILY        (REPLACES INVOKANA) 90 tablet 0   • IRON, FERROUS SULFATE, PO Take 65 mg by mouth 2 (Two) Times a Week.     • lisinopril (PRINIVIL,ZESTRIL) 2.5 MG tablet Take 1 tablet by mouth 2 (Two) Times a Day. 180 tablet 3   • Iron, Ferrous Sulfate, 325 (65 Fe) MG tablet Take 65 mg by mouth 3 (Three) Times a Week.     • ondansetron ODT (ZOFRAN-ODT) 8 MG disintegrating tablet PLACE 1 TABLET ON THE TONGUE EVERY 8 (EIGHT) HOURS AS NEEDED FOR NAUSEA OR VOMITING FOR UP TO 30 DAYS.     • QUEtiapine (SEROquel) 25 MG tablet Take 1 tablet by mouth Every Night. 90 tablet 3     No  facility-administered medications prior to visit.       Opioid medication/s are on active medication list.  and I have evaluated her active treatment plan and pain score trends (see table).  Vitals:    01/09/23 1322   PainSc:   2   PainLoc: Generalized     I have reviewed the chart for potential of high risk medication and harmful drug interactions in the elderly.            Aspirin is on active medication list. Aspirin use is indicated based on review of current medical condition/s. Pros and cons of this therapy have been discussed today. Benefits of this medication outweigh potential harm.  Patient has been encouraged to continue taking this medication.  .      Patient Active Problem List   Diagnosis   • Vitamin B12 deficiency (dietary) anemia   • Spasm of back muscles   • Sleep disorder   • Osteoarthritis of knee   • Irritable bowel syndrome   • Iron deficiency anemia   • Hypertriglyceridemia   • GERD (gastroesophageal reflux disease)   • Generalized anxiety disorder   • Gastritis   • Essential hypertension   • Dysfunction of eustachian tube   • Diverticular disease of colon   • Cervical disc disorder   • Diarrhea, unspecified   • Anemia   • Allergic rhinitis   • Abnormal liver enzymes   • Abdominal pain   • Chronic pain of both knees   • Internal derangement of left knee   • Primary osteoarthritis of left knee   • Chronic pain of left knee   • Type 2 diabetes mellitus with microalbuminuria, without long-term current use of insulin (HCC)   • Meniere's disease of both ears   • Vertigo   • Vitamin D deficiency   • Primary osteoarthritis of right wrist   • Right wrist pain   • Radial styloid tenosynovitis of right hand   • Trigger finger, left middle finger   • Hyperlipidemia associated with type 2 diabetes mellitus (HCC)   • Bilateral hand pain   • Cervical pseudoarthrosis (HCC)   • Neck pain   • S/P cervical spinal fusion   • Pain of right thumb   • Overweight (BMI 25.0-29.9)   • Moderate episode of recurrent  major depressive disorder (HCC)   • Chronic left shoulder pain   • Impingement syndrome of left shoulder   • Osteoarthritis of left AC (acromioclavicular) joint   • Rotator cuff syndrome of left shoulder   • Primary osteoarthritis of left shoulder   • Fatty liver   • Nontraumatic incomplete tear of left rotator cuff   • Degenerative tear of glenoid labrum of left shoulder   • Chronic pain of both shoulders   • Osteoarthritis of right AC (acromioclavicular) joint   • Impingement syndrome of right shoulder     Advance Care Planning  Advance Directive is on file.  ACP discussion was held with the patient during this visit. Patient has an advance directive in EMR which is still valid.      Objective    Vitals:    23 1322   BP: 126/72   Pulse: 83   Temp: 97.6 °F (36.4 °C)   SpO2: 98%   Weight: 75.3 kg (166 lb)   Height: 161.3 cm (63.5\")   PainSc:   2   PainLoc: Generalized     Estimated body mass index is 28.94 kg/m² as calculated from the following:    Height as of this encounter: 161.3 cm (63.5\").    Weight as of this encounter: 75.3 kg (166 lb).    BMI is >= 25 and <30. (Overweight) The following options were offered after discussion;: weight loss educational material (shared in after visit summary)      Does the patient have evidence of cognitive impairment? No    Lab Results   Component Value Date    TRIG 67 2023    LDL 81 2023    HGBA1C 6.60 (H) 2023        HEALTH RISK ASSESSMENT    Smoking Status:  Social History     Tobacco Use   Smoking Status Former   • Packs/day: 1.00   • Years: 10.00   • Pack years: 10.00   • Types: Cigarettes   • Quit date: 1985   • Years since quittin.6   Smokeless Tobacco Never     Alcohol Consumption:  Social History     Substance and Sexual Activity   Alcohol Use No     Fall Risk Screen:    STEADI Fall Risk Assessment was completed, and patient is at LOW risk for falls.Assessment completed on:2023    Depression Screening:  PHQ-2/PHQ-9 Depression  Screening 1/9/2023   Little Interest or Pleasure in Doing Things 0-->not at all   Feeling Down, Depressed or Hopeless 1-->several days   Trouble Falling or Staying Asleep, or Sleeping Too Much -   Feeling Tired or Having Little Energy -   Poor Appetite or Overeating -   Feeling Bad about Yourself - or that You are a Failure or Have Let Yourself or Your Family Down -   Trouble Concentrating on Things, Such as Reading the Newspaper or Watching Television -   Moving or Speaking So Slowly that Other People Could Have Noticed? Or the Opposite - Being So Fidgety -   Thoughts that You Would be Better Off Dead or of Hurting Yourself in Some Way -   PHQ-9: Brief Depression Severity Measure Score 1   If You Checked Off Any Problems, How Difficult Have These Problems Made It For You to Do Your Work, Take Care of Things at Home, or Get Along with Other People? -       Health Habits and Functional and Cognitive Screening:  Functional & Cognitive Status 1/9/2023   Do you have difficulty preparing food and eating? No   Do you have difficulty bathing yourself, getting dressed or grooming yourself? No   Do you have difficulty using the toilet? No   Do you have difficulty moving around from place to place? No   Do you have trouble with steps or getting out of a bed or a chair? Yes   Current Diet Well Balanced Diet   Dental Exam Up to date   Eye Exam Up to date   Exercise (times per week) 5 times per week   Current Exercises Include House Cleaning   Current Exercise Activities Include -   Do you need help using the phone?  No   Are you deaf or do you have serious difficulty hearing?  No   Do you need help with transportation? No   Do you need help shopping? No   Do you need help preparing meals?  No   Do you need help with housework?  No   Do you need help with laundry? No   Do you need help taking your medications? No   Do you need help managing money? No   Do you ever drive or ride in a car without wearing a seat belt? No   Have you  felt unusual stress, anger or loneliness in the last month? Yes   Who do you live with? Spouse   If you need help, do you have trouble finding someone available to you? No   Have you been bothered in the last four weeks by sexual problems? No   Do you have difficulty concentrating, remembering or making decisions? No       Age-appropriate Screening Schedule:  Refer to the list below for future screening recommendations based on patient's age, sex and/or medical conditions. Orders for these recommended tests are listed in the plan section. The patient has been provided with a written plan.    Health Maintenance   Topic Date Due   • DIABETIC FOOT EXAM  Never done   • MAMMOGRAM  12/22/2022   • HEMOGLOBIN A1C  07/03/2023   • DIABETIC EYE EXAM  07/26/2023   • DXA SCAN  12/22/2023   • LIPID PANEL  01/03/2024   • URINE MICROALBUMIN  01/03/2024   • TDAP/TD VACCINES (2 - Td or Tdap) 09/24/2030   • INFLUENZA VACCINE  Completed   • ZOSTER VACCINE  Completed                CMS Preventative Services Quick Reference  Risk Factors Identified During Encounter  Chronic Pain: Natural history and expected course discussed. Questions answered.  Depression/Dysphoria: Current medication is effective, no change recommended  Immunizations Discussed/Encouraged: COVID19     Mammogram is due.  Order is placed for patient to schedule mammogram.      She had diabetic eye exam by Dr. Yuan Baeza, ophthlamologist.  We will request a copy of last office note.        Updated COVID vaccine available and recommended.  After informed verbal consent, patient is given updated COVID vaccine.  She tolerated well.       The above risks/problems have been discussed with the patient.  Pertinent information has been shared with the patient in the After Visit Summary.  An After Visit Summary and PPPS were made available to the patient.    Follow Up:   Next Medicare Wellness visit to be scheduled in 1 year.       Additional E&M Note during same encounter  follows:  Patient has multiple medical problems which are significant and separately identifiable that require additional work above and beyond the Medicare Wellness Visit.      Chief Complaint  Follow-up (6 month), Medicare Wellness-subsequent, covid booster  (2nd covid booster ), Diabetes, Hyperlipidemia, and Hypertension    Subjective        HPI  Otilia Kenny is also being seen today for 6-month follow-up of multiple medical issues including type 2 diabetes, high cholesterol, high triglycerides, hypertension, GERD, depression, iron deficiency anemia and other issues.       They lost a son pass away last year.  In September, we switched patient from Pristiq to Lexapro after she reported several new symptoms including headache, altered taste, nausea, and vomiting, which she felt were side effects from the Pristiq    Patient struggles with chronic bilateral shoulder pain. Morena Garcia at the orthopedist office injected the shoulders last month. She also had injection in the right foot last month to address chronic right foot pain.      Weight is up 1 pound in the past six months. Diabetes management improved with A1c 6.6 decreased from 7.9. However, she is describing a new problem of episodes of hypoglycemia.   She monitors glucose four times daily and brings in her diabetic diary revealing overall excellent control with occasional mild hypoglycemia prior to lunch and bedtime average approximately 150.  She takes 10-12 units of Novolog 70/30 with evening meal and Starlix with all 3 meals    Lab results are reviewed with the patient today.  CBC unremarkable with hemoglobin 12.9.  Fasting glucose 116. Normal renal and liver function.  B-12 at goal.  Iron sat is trending down with twice weekly oral iron.          Objective   Vital Signs:  /72   Pulse 83   Temp 97.6 °F (36.4 °C)   Ht 161.3 cm (63.5\")   Wt 75.3 kg (166 lb)   SpO2 98%   BMI 28.94 kg/m²     Physical Exam  Vitals reviewed.   Constitutional:        General: She is not in acute distress.     Appearance: She is well-developed.      Comments: Pleasant female.  Accompanied by , [place].    HENT:      Head: Normocephalic and atraumatic.      Nose:      Right Sinus: No maxillary sinus tenderness or frontal sinus tenderness.      Left Sinus: No maxillary sinus tenderness or frontal sinus tenderness.      Mouth/Throat:      Mouth: No oral lesions.      Pharynx: Uvula midline.      Tonsils: No tonsillar exudate.   Eyes:      Conjunctiva/sclera: Conjunctivae normal.      Pupils: Pupils are equal, round, and reactive to light.   Neck:      Thyroid: No thyroid mass or thyromegaly.      Vascular: No carotid bruit or JVD.      Trachea: Trachea normal. No tracheal deviation.   Cardiovascular:      Rate and Rhythm: Normal rate and regular rhythm.  No extrasystoles are present.     Chest Wall: PMI is not displaced.      Heart sounds: Normal heart sounds. No murmur heard.  Pulmonary:      Effort: Pulmonary effort is normal. No accessory muscle usage or respiratory distress.      Breath sounds: Normal breath sounds. No decreased breath sounds, wheezing, rhonchi or rales.   Abdominal:      General: Bowel sounds are normal. There is no distension.      Palpations: Abdomen is soft.      Tenderness: There is no abdominal tenderness.      Comments: Mild generalized abdominal tenderness.     Musculoskeletal:      Cervical back: Neck supple.   Lymphadenopathy:      Cervical: No cervical adenopathy.   Skin:     General: Skin is warm and dry.      Findings: No rash.      Nails: There is no clubbing.   Neurological:      Mental Status: She is alert and oriented to person, place, and time.      Cranial Nerves: No cranial nerve deficit.      Coordination: Coordination normal.   Psychiatric:         Speech: Speech normal.         Behavior: Behavior normal.         Thought Content: Thought content normal.         Judgment: Judgment normal.                CMP    CMP 6/14/22 8/25/22  1/3/23   Glucose 226 (A) 105 (A) 116 (A)   BUN 28 (A) 19 22   Creatinine 1.03 0.80 0.93   eGFR 57.2 (A) 77.4 64.6   Sodium 138 130 (A) 132 (A)   Potassium 4.5 4.3 4.2   Chloride 104 97 (A) 97 (A)   Calcium 9.7 9.0 9.2   Total Protein 7.7 7.3 7.1   Albumin 4.60 4.50 4.3   Globulin 3.1 2.8 2.8   Total Bilirubin 0.4 0.5 0.5   Alkaline Phosphatase 97 70 70   AST (SGOT) 30 33 32   ALT (SGPT) 28 34 31   Albumin/Globulin Ratio 1.5 1.6 1.5   BUN/Creatinine Ratio 27.2 (A) 23.8 23.7   Anion Gap 11.0 9.0 9.0   (A) Abnormal value       Comments are available for some flowsheets but are not being displayed.           CBC w/diff    CBC w/Diff 6/14/22 1/3/23   WBC 6.86 5.49   RBC 4.92 4.24   Hemoglobin 14.5 12.9   Hematocrit 43.6 36.6   MCV 88.6 86.3   MCH 29.5 30.4   MCHC 33.3 35.2   RDW 12.8 12.3   Platelets 364 325   Neutrophil Rel % 50.1 60.8   Lymphocyte Rel % 38.3 26.8   Monocyte Rel % 8.5 8.7   Eosinophil Rel % 2.5 2.6   Basophil Rel % 0.6 1.1           Lipid Panel    Lipid Panel 6/14/22 1/3/23 1/3/23     0802 0802   Total Cholesterol 180     Triglycerides 238 (A) 67    HDL Cholesterol 52     VLDL Cholesterol 40     LDL Cholesterol  88  81   LDL/HDL Ratio 1.55     (A) Abnormal value            TSH    TSH 6/14/22   TSH 3.850           A1C Last 3 Results    HGBA1C Last 3 Results 6/14/22 1/3/23   Hemoglobin A1C 7.90 (A) 6.60 (A)   (A) Abnormal value            Data reviewed: Consultant notes Morena Garcia, [facility] orthopedics            Assessment and Plan   Diagnoses and all orders for this visit:    1. Medicare annual wellness visit, subsequent (Primary)    2. Breast cancer screening by mammogram  -     Mammo Screening Digital Tomosynthesis Bilateral With CAD; Future    3. Need for COVID-19 vaccine    4. Type 2 diabetes mellitus with hypoglycemia without coma, with long-term current use of insulin (HCC)  -     Hemoglobin A1c; Future    5. Type 2 diabetes mellitus with microalbuminuria, without long-term current use of insulin  (HCC)  -     CBC Auto Differential; Future  -     Comprehensive Metabolic Panel; Future  -     Hemoglobin A1c; Future  -     Lipid Panel; Future  -     Ferritin; Future  -     Iron Profile; Future  -     TSH; Future  -     Vitamin B12; Future    6. Hyperlipidemia associated with type 2 diabetes mellitus (HCC)  -     Lipid Panel; Future    7. Hypertriglyceridemia  -     Lipid Panel; Future    8. Essential hypertension  -     Comprehensive Metabolic Panel; Future    9. Vitamin D deficiency  -     Vitamin D,25-Hydroxy; Future    10. Overweight (BMI 25.0-29.9)  -     Comprehensive Metabolic Panel; Future    11. Vitamin B12 deficiency (dietary) anemia  -     CBC Auto Differential; Future  -     Vitamin B12; Future    12. Iron deficiency anemia secondary to inadequate dietary iron intake  -     CBC Auto Differential; Future  -     Ferritin; Future  -     Iron Profile; Future    Other orders  -     dapagliflozin Propanediol (Farxiga) 10 MG tablet; Take 10 mg by mouth Daily.  Dispense: 90 tablet; Refill: 1  -     lisinopril (PRINIVIL,ZESTRIL) 2.5 MG tablet; Take 1 tablet by mouth 2 (Two) Times a Day.  Dispense: 180 tablet; Refill: 3  -     COVID-19 Bivalent Booster (Pfizer) 12+yrs           I spent 32 minutes caring for Otilia on this date of service. This time includes time spent by me in the following activities:preparing for the visit, reviewing tests, obtaining and/or reviewing a separately obtained history, performing a medically appropriate examination and/or evaluation , counseling and educating the patient/family/caregiver, ordering medications, tests, or procedures and documenting information in the medical record.  This does not include time spent on her Medicare AWV today    Diabetes improved and excellent, although, patient is experiencing a new issue of hypoglycemia.  We will have patient decrease the Starlix 120 mg to 1/2 tablet q.a.m. with breakfast and continue the whole 120 mg tablet with lunch and supper.   Continue current 70/30 insulin dose with supper.  Continue the farxiga.  Reviewed diabetic diet principles to address overweight BMI..  Continue current diabetic monitoring, which is excellent.     Continue Lexapro for PARMJIT, stable.     Continue Zetia, fenofibrate and Crestor.  Cholesterol at goal. Pursue dietary efforts.    Hypertension is well controlled with low-dose lisinopril.    Continue oral vitamin D/calcuim.  She will be due repeat DEXA 12/2023.      Iron saturation trending down, for which we will have her increase the OTC ferrous sulfate to 3 times weekly.  Her vitamin B12 level is now at goal    Schedule mammogram which is now due.    Return in 6 months for routine follow up with fasting labs one week prior or sooner if needed.      Scribed for Dr. Zabala by V[facility].     Follow Up   Return in about 6 months (around 7/9/2023) for Follow up in six months with labs one week prior., Next scheduled follow up - labs 1 week prior.  Patient was given instructions and counseling regarding her condition or for health maintenance advice. Please see specific information pulled into the AVS if appropriate.

## 2023-01-09 NOTE — PATIENT INSTRUCTIONS
Medicare Wellness  Personal Prevention Plan of Service     Date of Office Visit:    Encounter Provider:  Alexis Zabala MD  Place of Service:  [facility]  Patient Name: Otilia Kenny  :  1950    As part of the Medicare Wellness portion of your visit today, we are providing you with this personalized preventive plan of services (PPPS). This plan is based upon recommendations of the [place] Preventive Services Task Force (USPSTF) and the Advisory Committee on Immunization Practices (ACIP).    This lists the preventive care services that should be considered, and provides dates of when you are due. Items listed as completed are up-to-date and do not require any further intervention.    Health Maintenance   Topic Date Due    DIABETIC FOOT EXAM  Never done    DIABETIC EYE EXAM  2020    COVID-19 Vaccine (4 - Booster for Pfizer series) 2021    ANNUAL WELLNESS VISIT  10/25/2022    MAMMOGRAM  2022    HEMOGLOBIN A1C  2023    DXA SCAN  2023    LIPID PANEL  2024    URINE MICROALBUMIN  2024    COLORECTAL CANCER SCREENING  2026    TDAP/TD VACCINES (2 - Td or Tdap) 2030    HEPATITIS C SCREENING  Completed    INFLUENZA VACCINE  Completed    Pneumococcal Vaccine 65+  Completed    ZOSTER VACCINE  Completed       No orders of the defined types were placed in this encounter.      No follow-ups on file.        Exercising to Lose Weight  Getting regular exercise is important for everyone. It is especially important if you are overweight. Being overweight increases your risk of heart disease, stroke, diabetes, high blood pressure, and several types of cancer. Exercising, and reducing the calories you consume, can help you lose weight and improve fitness and health.  Exercise can be moderate or vigorous intensity. To lose weight, most people need to do a certain amount of moderate or vigorous-intensity exercise each  week.  How can exercise affect me?  You lose weight when you exercise enough to burn more calories than you eat. Exercise also reduces body fat and builds muscle. The more muscle you have, the more calories you burn. Exercise also:  Improves mood.  Reduces stress and tension.  Improves your overall fitness, flexibility, and endurance.  Increases bone strength.  Moderate-intensity exercise  Moderate-intensity exercise is any activity that gets you moving enough to burn at least three times more energy (calories) than if you were sitting.  Examples of moderate exercise include:  Walking a mile in 15 minutes.  Doing light yard work.  Biking at an easy pace.  Most people should get at least 150 minutes of moderate-intensity exercise a week to maintain their body weight.  Vigorous-intensity exercise  Vigorous-intensity exercise is any activity that gets you moving enough to burn at least six times more calories than if you were sitting. When you exercise at this intensity, you should be working hard enough that you are not able to carry on a conversation.  Examples of vigorous exercise include:  Running.  Playing a team sport, such as football, basketball, and soccer.  Jumping rope.  Most people should get at least 75 minutes a week of vigorous exercise to maintain their body weight.  What actions can I take to lose weight?  The amount of exercise you need to lose weight depends on:  Your age.  The type of exercise.  Any health conditions you have.  Your overall physical ability.  Talk to your health care provider about how much exercise you need and what types of activities are safe for you.  Nutrition    Make changes to your diet as told by your health care provider or diet and nutrition specialist (dietitian). This may include:  Eating fewer calories.  Eating more protein.  Eating less unhealthy fats.  Eating a diet that includes fresh fruits and vegetables, whole grains, low-fat dairy products, and lean  protein.  Avoiding foods with added fat, salt, and sugar.  Drink plenty of water while you exercise to prevent dehydration or heat stroke.  Activity  Choose an activity that you enjoy and set realistic goals. Your health care provider can help you make an exercise plan that works for you.  Exercise at a moderate or vigorous intensity most days of the week.  The intensity of exercise may vary from person to person. You can tell how intense a workout is for you by paying attention to your breathing and heartbeat. Most people will notice their breathing and heartbeat get faster with more intense exercise.  Do resistance training twice each week, such as:  Push-ups.  Sit-ups.  Lifting weights.  Using resistance bands.  Getting short amounts of exercise can be just as helpful as long, structured periods of exercise. If you have trouble finding time to exercise, try doing these things as part of your daily routine:  Get up, stretch, and walk around every 30 minutes throughout the day.  Go for a walk during your lunch break.  Park your car farther away from your destination.  If you take public transportation, get off one stop early and walk the rest of the way.  Make phone calls while standing up and walking around.  Take the stairs instead of elevators or escalators.  Wear comfortable clothes and shoes with good support.  Do not exercise so much that you hurt yourself, feel dizzy, or get very short of breath.  Where to find more information  [place] Department of Health and Human Services: www.hhs.gov  Centers for Disease Control and Prevention: www.cdc.gov  Contact a health care provider:  Before starting a new exercise program.  If you have questions or concerns about your weight.  If you have a medical problem that keeps you from exercising.  Get help right away if:  You have any of the following while exercising:  Injury.  Dizziness.  Difficulty breathing or shortness of breath that does not go away when you stop  exercising.  Chest pain.  Rapid heartbeat.  These symptoms may represent a serious problem that is an emergency. Do not wait to see if the symptoms will go away. Get medical help right away. Call your local emergency services (911 in the [place] Do not drive yourself to the hospital.  Summary  Getting regular exercise is especially important if you are overweight.  Being overweight increases your risk of heart disease, stroke, diabetes, high blood pressure, and several types of cancer.  Losing weight happens when you burn more calories than you eat.  Reducing the amount of calories you eat, and getting regular moderate or vigorous exercise each week, helps you lose weight.  This information is not intended to replace advice given to you by your health care provider. Make sure you discuss any questions you have with your health care provider.  Document Revised: 02/13/2022 Document Reviewed: 02/13/2022  [employer] Patient Education © 2022 [employer]  Calorie Counting for Weight Loss  Calories are units of energy. Your body needs a certain number of calories from food to keep going throughout the day. When you eat or drink more calories than your body needs, your body stores the extra calories mostly as fat. When you eat or drink fewer calories than your body needs, your body burns fat to get the energy it needs.  Calorie counting means keeping track of how many calories you eat and drink each day. Calorie counting can be helpful if you need to lose weight. If you eat fewer calories than your body needs, you should lose weight. Ask your health care provider what a healthy weight is for you.  For calorie counting to work, you will need to eat the right number of calories each day to lose a healthy amount of weight per week. A dietitian can help you figure out how many calories you need in a day and will suggest ways to reach your calorie goal.  A healthy amount of weight to lose each week is usually 1-2 lb (0.5-0.9 kg).  This usually means that your daily calorie intake should be reduced by 500-750 calories.  Eating 1,200-1,500 calories a day can help most women lose weight.  Eating 1,500-1,800 calories a day can help most men lose weight.  What do I need to know about calorie counting?  Work with your health care provider or dietitian to determine how many calories you should get each day. To meet your daily calorie goal, you will need to:  Find out how many calories are in each food that you would like to eat. Try to do this before you eat.  Decide how much of the food you plan to eat.  Keep a food log. Do this by writing down what you ate and how many calories it had.  To successfully lose weight, it is important to balance calorie counting with a healthy lifestyle that includes regular activity.  Where do I find calorie information?  The number of calories in a food can be found on a Nutrition Facts label. If a food does not have a Nutrition Facts label, try to look up the calories online or ask your dietitian for help.  Remember that calories are listed per serving. If you choose to have more than one serving of a food, you will have to multiply the calories per serving by the number of servings you plan to eat. For example, the label on a package of bread might say that a serving size is 1 slice and that there are 90 calories in a serving. If you eat 1 slice, you will have eaten 90 calories. If you eat 2 slices, you will have eaten 180 calories.  How do I keep a food log?  After each time that you eat, record the following in your food log as soon as possible:  What you ate. Be sure to include toppings, sauces, and other extras on the food.  How much you ate. This can be measured in cups, ounces, or number of items.  How many calories were in each food and drink.  The total number of calories in the food you ate.  Keep your food log near you, such as in a pocket-sized notebook or on an katerin or website on your mobile phone. Some  programs will calculate calories for you and show you how many calories you have left to meet your daily goal.  What are some portion-control tips?  Know how many calories are in a serving. This will help you know how many servings you can have of a certain food.  Use a measuring cup to measure serving sizes. You could also try weighing out portions on a kitchen scale. With time, you will be able to estimate serving sizes for some foods.  Take time to put servings of different foods on your favorite plates or in your favorite bowls and cups so you know what a serving looks like.  Try not to eat straight from a food's packaging, such as from a bag or box. Eating straight from the package makes it hard to see how much you are eating and can lead to overeating. Put the amount you would like to eat in a cup or on a plate to make sure you are eating the right portion.  Use smaller plates, glasses, and bowls for smaller portions and to prevent overeating.  Try not to multitask. For example, avoid watching TV or using your computer while eating. If it is time to eat, sit down at a table and enjoy your food. This will help you recognize when you are full. It will also help you be more mindful of what and how much you are eating.  What are tips for following this plan?  Reading food labels  Check the calorie count compared with the serving size. The serving size may be smaller than what you are used to eating.  Check the source of the calories. Try to choose foods that are high in protein, fiber, and vitamins, and low in saturated fat, trans fat, and sodium.  Shopping  Read nutrition labels while you shop. This will help you make healthy decisions about which foods to buy.  Pay attention to nutrition labels for low-fat or fat-free foods. These foods sometimes have the same number of calories or more calories than the full-fat versions. They also often have added sugar, starch, or salt to make up for flavor that was removed  with the fat.  Make a grocery list of lower-calorie foods and stick to it.  Cooking  Try to cook your favorite foods in a healthier way. For example, try baking instead of frying.  Use low-fat dairy products.  Meal planning  Use more fruits and vegetables. One-half of your plate should be fruits and vegetables.  Include lean proteins, such as chicken, turkey, and fish.  Lifestyle  Each week, aim to do one of the followin minutes of moderate exercise, such as walking.  75 minutes of vigorous exercise, such as running.  General information  Know how many calories are in the foods you eat most often. This will help you calculate calorie counts faster.  Find a way of tracking calories that works for you. Get creative. Try different apps or programs if writing down calories does not work for you.  What foods should I eat?    Eat nutritious foods. It is better to have a nutritious, high-calorie food, such as an avocado, than a food with few nutrients, such as a bag of potato chips.  Use your calories on foods and drinks that will fill you up and will not leave you hungry soon after eating.  Examples of foods that fill you up are nuts and nut butters, vegetables, lean proteins, and high-fiber foods such as whole grains. High-fiber foods are foods with more than 5 g of fiber per serving.  Pay attention to calories in drinks. Low-calorie drinks include water and unsweetened drinks.  The items listed above may not be a complete list of foods and beverages you can eat. Contact a dietitian for more information.  What foods should I limit?  Limit foods or drinks that are not good sources of vitamins, minerals, or protein or that are high in unhealthy fats. These include:  Candy.  Other sweets.  Sodas, specialty coffee drinks, alcohol, and juice.  The items listed above may not be a complete list of foods and beverages you should avoid. Contact a dietitian for more information.  How do I count calories when eating  out?  Pay attention to portions. Often, portions are much larger when eating out. Try these tips to keep portions smaller:  Consider sharing a meal instead of getting your own.  If you get your own meal, eat only half of it. Before you start eating, ask for a container and put half of your meal into it.  When available, consider ordering smaller portions from the menu instead of full portions.  Pay attention to your food and drink choices. Knowing the way food is cooked and what is included with the meal can help you eat fewer calories.  If calories are listed on the menu, choose the lower-calorie options.  Choose dishes that include vegetables, fruits, whole grains, low-fat dairy products, and lean proteins.  Choose items that are boiled, broiled, grilled, or steamed. Avoid items that are buttered, battered, fried, or served with cream sauce. Items labeled as crispy are usually fried, unless stated otherwise.  Choose water, low-fat milk, unsweetened iced tea, or other drinks without added sugar. If you want an alcoholic beverage, choose a lower-calorie option, such as a glass of wine or light beer.  Ask for dressings, sauces, and syrups on the side. These are usually high in calories, so you should limit the amount you eat.  If you want a salad, choose a garden salad and ask for grilled meats. Avoid extra toppings such as yu, cheese, or fried items. Ask for the dressing on the side, or ask for olive oil and vinegar or lemon to use as dressing.  Estimate how many servings of a food you are given. Knowing serving sizes will help you be aware of how much food you are eating at restaurants.  Where to find more information  Centers for Disease Control and Prevention: www.cdc.gov  [place] Department of Agriculture: myplate.gov  Summary  Calorie counting means keeping track of how many calories you eat and drink each day. If you eat fewer calories than your body needs, you should lose weight.  A healthy amount of weight  to lose per week is usually 1-2 lb (0.5-0.9 kg). This usually means reducing your daily calorie intake by 500-750 calories.  The number of calories in a food can be found on a Nutrition Facts label. If a food does not have a Nutrition Facts label, try to look up the calories online or ask your dietitian for help.  Use smaller plates, glasses, and bowls for smaller portions and to prevent overeating.  Use your calories on foods and drinks that will fill you up and not leave you hungry shortly after a meal.  This information is not intended to replace advice given to you by your health care provider. Make sure you discuss any questions you have with your health care provider.  Document Revised: 01/28/2021 Document Reviewed: 01/28/2021  [employer]vier Patient Education © 2022 [employer]

## 2023-02-18 PROCEDURE — 87186 SC STD MICRODIL/AGAR DIL: CPT | Performed by: STUDENT IN AN ORGANIZED HEALTH CARE EDUCATION/TRAINING PROGRAM

## 2023-02-18 PROCEDURE — 87077 CULTURE AEROBIC IDENTIFY: CPT | Performed by: STUDENT IN AN ORGANIZED HEALTH CARE EDUCATION/TRAINING PROGRAM

## 2023-02-18 PROCEDURE — 87086 URINE CULTURE/COLONY COUNT: CPT | Performed by: STUDENT IN AN ORGANIZED HEALTH CARE EDUCATION/TRAINING PROGRAM

## 2023-02-24 ENCOUNTER — OFFICE VISIT (OUTPATIENT)
Dept: FAMILY MEDICINE CLINIC | Facility: CLINIC | Age: 73
End: 2023-02-24
Payer: MEDICARE

## 2023-02-24 VITALS
OXYGEN SATURATION: 97 % | SYSTOLIC BLOOD PRESSURE: 128 MMHG | TEMPERATURE: 97.4 F | WEIGHT: 164 LBS | BODY MASS INDEX: 28 KG/M2 | HEIGHT: 64 IN | HEART RATE: 81 BPM | DIASTOLIC BLOOD PRESSURE: 80 MMHG

## 2023-02-24 DIAGNOSIS — I10 ESSENTIAL HYPERTENSION: Chronic | ICD-10-CM

## 2023-02-24 DIAGNOSIS — R80.9 TYPE 2 DIABETES MELLITUS WITH MICROALBUMINURIA, WITHOUT LONG-TERM CURRENT USE OF INSULIN: Chronic | ICD-10-CM

## 2023-02-24 DIAGNOSIS — E11.29 TYPE 2 DIABETES MELLITUS WITH MICROALBUMINURIA, WITHOUT LONG-TERM CURRENT USE OF INSULIN: Chronic | ICD-10-CM

## 2023-02-24 DIAGNOSIS — N30.00 ACUTE CYSTITIS WITHOUT HEMATURIA: Primary | ICD-10-CM

## 2023-02-24 PROCEDURE — 99213 OFFICE O/P EST LOW 20 MIN: CPT | Performed by: INTERNAL MEDICINE

## 2023-02-24 PROCEDURE — 3044F HG A1C LEVEL LT 7.0%: CPT | Performed by: INTERNAL MEDICINE

## 2023-02-24 NOTE — PROGRESS NOTES
"Chief Complaint  Follow-up (Urgent care 02/18/23 for UTI. Received Rocephin 1 gm IM and Levaquin 750 mg daily x 7 days.. She takes last pill today. Feels better but not well )    Subjective        History of Present Illness     Otilia Kenny presents to the office to follow up on visit to Urgent Care 02/18/2023 when she was diagnosed with acute UTI treated with 7-day course of Levaquin, which she completed today, and given Rocephin 1 gram IM.  She reports she initially had chills and burning with urination as well increased fatigue.  She reports improvement, but not back to her baseline. The chills and burning have resolved, although, she continues to feel fatigued and ran a low grade fever last night.  She has had some diarrhea with the Levaquin, which has stopped with using some Imodium. Urine culture grew E-coli sensitive to the Levaquin.  She started taking Culturelle probiotic a couple of weeks ago.     Diabetic diary for the past week reveals excellent diabetic control despite the UTI.    Blood pressure is well controlled today with current low-dose lisinopril.      Objective   Vital Signs:  /80   Pulse 81   Temp 97.4 °F (36.3 °C)   Ht 162.6 cm (64\")   Wt 74.4 kg (164 lb)   SpO2 97%   BMI 28.15 kg/m²   Estimated body mass index is 28.15 kg/m² as calculated from the following:    Height as of this encounter: 162.6 cm (64\").    Weight as of this encounter: 74.4 kg (164 lb).             Physical Exam  Constitutional:       General: She is not in acute distress.     Appearance: She is well-developed.   HENT:      Head: Normocephalic and atraumatic.      Nose: Nose normal.      Mouth/Throat:      Pharynx: No oropharyngeal exudate.   Eyes:      Pupils: Pupils are equal, round, and reactive to light.   Neck:      Thyroid: No thyromegaly.      Vascular: No JVD.   Cardiovascular:      Rate and Rhythm: Normal rate and regular rhythm.      Heart sounds: Normal heart sounds.   Pulmonary:      Effort: " Pulmonary effort is normal. No accessory muscle usage or respiratory distress.      Breath sounds: Normal breath sounds. No wheezing or rales.   Abdominal:      General: Bowel sounds are normal. There is no distension.      Palpations: Abdomen is soft.      Tenderness: There is no abdominal tenderness.   Musculoskeletal:      Cervical back: Neck supple.   Lymphadenopathy:      Cervical: No cervical adenopathy.   Neurological:      Mental Status: She is alert and oriented to person, place, and time.      Cranial Nerves: No cranial nerve deficit.   Psychiatric:         Speech: Speech normal.         Behavior: Behavior normal.         Thought Content: Thought content normal.         Judgment: Judgment normal.            Result Review :    UA    Urinalysis 2/18/23   Ketones, UA Trace (A)   Leukocytes, UA Trace (A)   (A) Abnormal value            Urine Culture    Urine Culture 2/18/23   Urine Culture 50,000 CFU/mL Escherichia coli (A)   (A) Abnormal value            Data reviewed: Diabetic diary    Future Appointments   Date Time Provider Department Center   2/27/2023  2:00 PM [facility] MAMM 1 [facility]   2/27/2023  3:00 PM [facility] US 1 [facility] US [facility]   7/17/2023  1:00 PM Alexis Zabala MD [facility]            Assessment and Plan   Diagnoses and all orders for this visit:    1. Acute cystitis without hematuria (Primary)    2. Type 2 diabetes mellitus with microalbuminuria, without long-term current use of insulin (HCC)    3. Essential hypertension                Take the last dose of Levaquin today.  Notify us next week if symptoms are not completely resolved. Continue Culturelle probiotic for an additional month.  Hydrate well.    Continue current diabetic management plan.  Diabetic diary reveals excellent diabetes control.     Blood pressure is well controlled.  Continue low-dose lisinopril daily.  Pursue sodium restriction and additional weight loss.    Return 07/17/2023 for routine follow up with  fasting labs one week prior or sooner if needed.    Scribed for Dr. Zabala by [facility].     Follow Up   Return if symptoms worsen or fail to improve, for Next scheduled follow up.  Patient was given instructions and counseling regarding her condition or for health maintenance advice. Please see specific information pulled into the AVS if appropriate.

## 2023-02-28 ENCOUNTER — TELEPHONE (OUTPATIENT)
Dept: FAMILY MEDICINE CLINIC | Facility: CLINIC | Age: 73
End: 2023-02-28
Payer: MEDICARE

## 2023-02-28 RX ORDER — INSULIN LISPRO 100 [IU]/ML
10 INJECTION, SUSPENSION SUBCUTANEOUS
Qty: 9 ML | Refills: 3 | Status: SHIPPED | OUTPATIENT
Start: 2023-02-28

## 2023-02-28 NOTE — TELEPHONE ENCOUNTER
----- Message from Alexis Zabala MD sent at 2/28/2023 11:40 AM CST -----  It is okay to change NovoLog to Humalog insulin at the same doses.  EB  ----- Message -----  From: Emily Lou RN  Sent: 2/28/2023  11:03 AM CST  To: Alexis Zabala MD    Insurance wanting her to change from Novolog to Humalog. Okay? Emily

## 2023-03-09 ENCOUNTER — OFFICE VISIT (OUTPATIENT)
Dept: ORTHOPEDIC SURGERY | Facility: CLINIC | Age: 73
End: 2023-03-09
Payer: MEDICARE

## 2023-03-09 VITALS — BODY MASS INDEX: 28.17 KG/M2 | WEIGHT: 165 LBS | HEIGHT: 64 IN

## 2023-03-09 DIAGNOSIS — E11.29 TYPE 2 DIABETES MELLITUS WITH MICROALBUMINURIA, WITHOUT LONG-TERM CURRENT USE OF INSULIN: Chronic | ICD-10-CM

## 2023-03-09 DIAGNOSIS — M19.012 PRIMARY OSTEOARTHRITIS OF BOTH SHOULDERS: ICD-10-CM

## 2023-03-09 DIAGNOSIS — M25.511 CHRONIC PAIN OF BOTH SHOULDERS: Primary | ICD-10-CM

## 2023-03-09 DIAGNOSIS — M25.512 CHRONIC PAIN OF BOTH SHOULDERS: Primary | ICD-10-CM

## 2023-03-09 DIAGNOSIS — G89.29 CHRONIC PAIN OF BOTH SHOULDERS: Primary | ICD-10-CM

## 2023-03-09 DIAGNOSIS — M19.011 PRIMARY OSTEOARTHRITIS OF BOTH SHOULDERS: ICD-10-CM

## 2023-03-09 DIAGNOSIS — M75.101 ROTATOR CUFF SYNDROME OF RIGHT SHOULDER: ICD-10-CM

## 2023-03-09 DIAGNOSIS — R80.9 TYPE 2 DIABETES MELLITUS WITH MICROALBUMINURIA, WITHOUT LONG-TERM CURRENT USE OF INSULIN: Chronic | ICD-10-CM

## 2023-03-09 PROCEDURE — 1159F MED LIST DOCD IN RCRD: CPT | Performed by: ORTHOPAEDIC SURGERY

## 2023-03-09 PROCEDURE — 99213 OFFICE O/P EST LOW 20 MIN: CPT | Performed by: ORTHOPAEDIC SURGERY

## 2023-03-09 PROCEDURE — 1160F RVW MEDS BY RX/DR IN RCRD: CPT | Performed by: ORTHOPAEDIC SURGERY

## 2023-03-09 NOTE — PROGRESS NOTES
"Otilia Kenny is a 73 y.o. female returns for     Chief Complaint   Patient presents with   • Left Shoulder - Follow-up, Pain   • Right Shoulder - Follow-up, Pain       HISTORY OF PRESENT ILLNESS:  Reports pain in bilateral shoulders.  Pain is worse in the right shoulder.  She has pain with activity.  She reports pain at night that limits her ability to sleep.  No numbness or tingling.     CONCURRENT MEDICAL HISTORY:    The following portions of the patient's history were reviewed and updated as appropriate: allergies, current medications, past family history, past medical history, past social history, past surgical history and problem list.     ROS  No fevers or chills.  No chest pain or shortness of air.  No GI or  disturbances.    PHYSICAL EXAMINATION:       Ht 162.6 cm (64\")   Wt 74.8 kg (165 lb)   BMI 28.32 kg/m²     Physical Exam  Constitutional:       General: She is not in acute distress.     Appearance: Normal appearance.   Pulmonary:      Effort: Pulmonary effort is normal. No respiratory distress.   Neurological:      Mental Status: She is alert and oriented to person, place, and time.             Right Shoulder Exam     Comments:  Crepitus on exam.  Pain with motion.  Good distal pulses and sensation.  Pain with resisted abduction and resisted external rotation.                  PROCEDURE: XR SHOULDER 2+ VW RIGHT     VIEWS: 3     INDICATION: Pain     COMPARISON: None     FINDINGS:     - fracture: No acute abnormality. Incompletely visualized lower  cervical spinal fixation devices.    - alignment: Within normal limits    - misc: Severe degenerative change of the acromioclavicular  joint with large osteophytes. Milder degenerative changes of the  glenohumeral joint present.Visualized portion of the right thorax  is clear, apart from linear right perihilar opacity which  probably represents atelectasis or scarring        IMPRESSION:  Osteoarthrosis of glenohumeral and acromion  clavicular joints, as " above.        Note:  if pain or symptoms persist beyond reasonable expectations     and follow-up imaging is anticipated,  cross sectional imaging   (CT and/or MRI) is suggested, as is deemed clinically   appropriate.     Electronically signed by:  Latia Rowe MD  12/13/2022 9:31 AM  CST Workstation: [id number]      Three view left shoulder     HISTORY: Worsening left shoulder pain with decreased range of  motion.     AP films with the humerus in internal and external rotation and  scapular Y view were obtained.     COMPARISON: None     FINDINGS:   No fracture or dislocation.   Hypertrophic change acromioclavicular joint.  Degenerative changes humeral head with 2.3 cm osteophyte arising  inferiorly and medially from the humeral head.  Minimal synovial osteochondromatosis.  Extensive postoperative changes in the cervical spine.  No other osseous or articular abnormality.     IMPRESSION:  CONCLUSION:  Hypertrophic change acromioclavicular joint.  Degenerative changes humeral head with 2.3 cm osteophyte arising  inferiorly and medially from the humeral head.  Minimal synovial osteochondromatosis.     [id number]     Electronically signed by:  Ryan Mayo MD  7/22/2022 7:42 PM CDT  Workstation: [id number]       EXAM: MRI SHOULDER LEFT WO CONTRAST      CLINICAL INDICATION: Left shoulder pain     COMPARISON: 7/22/2022     TECHNIQUE: The MRI was done using axial T1 and gradient echo,  coronal T1, PD and T2 fat sat and sagittal T1 and T2 fat sat  images.     FINDINGS: The subscapularis is intact. The long head of the  biceps tendon proximally has a thickened appearance with abnormal  signal suggesting partial tear or tendinosis. There is partial  thickness undersurface tear of the distal supraspinatus tendon  with no full-thickness tear. The infraspinatus and teres minor  are intact. There are moderate degenerative changes of the  glenohumeral and AC joints. The visualized osseous structures  otherwise have normal morphology and  signal characteristics with  no evidence of bone marrow edema, occult fractures, or  marrow-replacing bone lesions. There is a moderate amount of  fluid in the subacromial subdeltoid bursa. Tears of the posterior  and superior labrum are noted. Several loose bodies are seen  within the axillary recess of the joint the largest measuring 5.7  x 3.7 mm.     IMPRESSION:  1. Partial thickness tear of the supraspinatus tendon with no  full-thickness tear.  2. Moderate degenerative joint disease of the glenohumeral and AC  joints including several loose bodies.  3. Partial tear versus tendinosis of the proximal long head of  the biceps tendon.  4. Tears of the superior and posterior glenoid labrum.     Electronically signed by:  Leodan Hagan MD  8/10/2022 5:52 PM CDT  Workstation: [id number]                 ASSESSMENT:    Diagnoses and all orders for this visit:    Chronic pain of both shoulders    Rotator cuff syndrome of right shoulder  -     MRI shoulder right wo contrast; Future    Type 2 diabetes mellitus with microalbuminuria, without long-term current use of insulin (HCC)    Primary osteoarthritis of both shoulders          PLAN    She has arthritic changes in her right shoulder.  She has progressively worsening pain in her right shoulder as well.  She has pain with limited motion and pain with activity.  Long discussion was held with the patient regarding further treatment options.  We discussed proceeding with an MRI to assess the integrity of the rotator cuff and to help determine further treatment options.    Recheck after the MRI.    Return for recheck for MRI results.    Baron Reynolds MD

## 2023-03-20 ENCOUNTER — HOSPITAL ENCOUNTER (OUTPATIENT)
Dept: MRI IMAGING | Facility: HOSPITAL | Age: 73
Discharge: HOME | End: 2023-03-20
Admitting: ORTHOPAEDIC SURGERY
Payer: MEDICARE

## 2023-03-20 DIAGNOSIS — M75.101 ROTATOR CUFF SYNDROME OF RIGHT SHOULDER: ICD-10-CM

## 2023-03-20 PROCEDURE — 73221 MRI JOINT UPR EXTREM W/O DYE: CPT

## 2023-03-28 ENCOUNTER — OFFICE VISIT (OUTPATIENT)
Dept: ORTHOPEDIC SURGERY | Facility: CLINIC | Age: 73
End: 2023-03-28
Payer: MEDICARE

## 2023-03-28 VITALS — HEIGHT: 64 IN | BODY MASS INDEX: 28.34 KG/M2 | WEIGHT: 166 LBS

## 2023-03-28 DIAGNOSIS — M25.512 CHRONIC PAIN OF BOTH SHOULDERS: ICD-10-CM

## 2023-03-28 DIAGNOSIS — E11.29 TYPE 2 DIABETES MELLITUS WITH MICROALBUMINURIA, WITHOUT LONG-TERM CURRENT USE OF INSULIN: ICD-10-CM

## 2023-03-28 DIAGNOSIS — M25.511 CHRONIC PAIN OF BOTH SHOULDERS: ICD-10-CM

## 2023-03-28 DIAGNOSIS — M75.112 NONTRAUMATIC INCOMPLETE TEAR OF LEFT ROTATOR CUFF: ICD-10-CM

## 2023-03-28 DIAGNOSIS — R80.9 TYPE 2 DIABETES MELLITUS WITH MICROALBUMINURIA, WITHOUT LONG-TERM CURRENT USE OF INSULIN: ICD-10-CM

## 2023-03-28 DIAGNOSIS — M75.101 ROTATOR CUFF SYNDROME OF RIGHT SHOULDER: ICD-10-CM

## 2023-03-28 DIAGNOSIS — M19.012 PRIMARY OSTEOARTHRITIS, LEFT SHOULDER: Primary | ICD-10-CM

## 2023-03-28 DIAGNOSIS — F41.1 GENERALIZED ANXIETY DISORDER: Chronic | ICD-10-CM

## 2023-03-28 DIAGNOSIS — G89.29 CHRONIC PAIN OF BOTH SHOULDERS: ICD-10-CM

## 2023-03-28 DIAGNOSIS — K21.9 GASTROESOPHAGEAL REFLUX DISEASE WITHOUT ESOPHAGITIS: Chronic | ICD-10-CM

## 2023-03-28 RX ADMIN — TRIAMCINOLONE ACETONIDE 40 MG: 40 INJECTION, SUSPENSION INTRA-ARTICULAR; INTRAMUSCULAR at 10:00

## 2023-03-28 RX ADMIN — LIDOCAINE HYDROCHLORIDE 2 ML: 10 INJECTION, SOLUTION INFILTRATION; PERINEURAL at 10:00

## 2023-03-28 NOTE — PROGRESS NOTES
"Otilia Kenny is a 73 y.o. female returns for     Chief Complaint   Patient presents with   • Right Shoulder - Follow-up   • Results     MRI  3/20/23       HISTORY OF PRESENT ILLNESS:    Patient continues to have pain in both shoulders but her left shoulder is worse.  She has pain with activity and pain with anything at or above shoulder level.  No new injury.  She has pain at night.  No numbness or tingling.  She has pain with activities of daily living and is unhappy with her quality of life.  Previous steroid injection and physical therapy has not been beneficial.    She also continues having pain in her right shoulder.  She has pain at night and pain with activity.  Increased activity increases her pain.    CONCURRENT MEDICAL HISTORY:    Past Medical History:   Diagnosis Date   • Abdominal pain     Most likely related to functional colonic spasm     • Abnormal liver enzymes     Improved, 3/2015      • Allergic rhinitis     seasonal   • Allergic rhinitis    • Anemia    • Anesthesia complication     states sometime B/P drops   • Arthritis    • Cervical disc disorder     S/P cervical surgeries x2-3. Extensive fusion C 3-7. [facility]      • Depression    • Diarrhea, unspecified     Resolved with discontinuation of metformin.    • Diverticular disease of colon    • Dysfunction of eustachian tube    • Essential hypertension     Increase lisinopril HCT, 10/12.5.      • Gastritis    • Generalized anxiety disorder    • GERD (gastroesophageal reflux disease)    • History of transfusion    • Hyperlipidemia     Intolerant of Lipitor. The patient stopped Zocor due to \"liver pain\", summer 2015. patient instructed to reattempt Zocor every other day, 10/2015    • Hyperlipidemia associated with type 2 diabetes mellitus (HCC) 11/25/2020   • Hypertriglyceridemia     Holding simvastatin 2/2015 due to slightly elevated LFTs.      • Iron deficiency anemia    • Irritable bowel syndrome     Dr. Estrada   • Meniere's " disease of both ears 6/12/2018   • Osteoarthritis of knee     Dr. Reynolds    • Sleep disorder    • Spasm of back muscles     Dr. Castro changed Soma to Zanaflex.     • Type 2 diabetes mellitus with microalbuminuria, without long-term current use of insulin ([facility]) 5/7/2018    Added automatically from request for surgery [id number]   • Vitamin B12 deficiency (dietary) anemia     Currently on multivitamin daily         Allergies   Allergen Reactions   • Allegra [Fexofenadine] Itching   • Amaryl [Glimepiride] Myalgia   • Dyazide [Triamterene-Hctz] Itching   • Hydrochlorothiazide W-Triamterene Itching   • Lipitor [Atorvastatin] Myalgia   • Metformin And Related Diarrhea   • Naproxen Itching   • Actos [Pioglitazone] Rash   • Sulfamethoxazole-Trimethoprim Rash     dizziness       Current Outpatient Medications on File Prior to Visit   Medication Sig   • aspirin 81 MG EC tablet Take 1 tablet by mouth Daily.   • Blood Glucose Monitoring Suppl (ONETOUCH VERIO IQ SYSTEM) w/Device kit 1 each Daily.   • CALCIUM CARB-CHOLECALCIFEROL PO Take 1 tablet by mouth 2 (two) times a day.   • cetirizine (zyrTEC) 10 MG tablet Take 1 tablet by mouth Daily.   • cholecalciferol (VITAMIN D3) 25 MCG (1000 UT) tablet Take 1 tablet by mouth. 4 Times Per Week   • dapagliflozin Propanediol (Farxiga) 10 MG tablet Take 10 mg by mouth Daily.   • escitalopram (Lexapro) 10 MG tablet Take 1 tablet by mouth Daily.   • ezetimibe (ZETIA) 10 MG tablet TAKE 1 TABLET DAILY   • fenofibrate (TRICOR) 145 MG tablet TAKE 1/2 TO 1 TABLET DAILY   • glucose blood (OneTouch Verio) test strip 1 each by Other route 3 (Three) Times a Day.   • Insulin Lispro Prot & Lispro (HumaLOG Mix 75/25 KwikPen) (75-25) 100 UNIT/ML suspension pen-injector pen Inject 10 Units under the skin into the appropriate area as directed Daily With Dinner.   • Insulin Pen Needle 31G X 5 MM misc Use daily with insulin and weekly with Ozempic   • Iron, Ferrous Sulfate, 325 (65 Fe) MG tablet Take 65 mg by  mouth 3 (Three) Times a Week.   • Lancets (OneTouch Delica Plus [id number]) misc 1 each by Other route 3 (Three) Times a Day. for testing as directed   • lisinopril (PRINIVIL,ZESTRIL) 2.5 MG tablet Take 1 tablet by mouth 2 (Two) Times a Day.   • meclizine (ANTIVERT) 25 MG tablet Take 1 tablet by mouth 3 (Three) Times a Day As Needed for Dizziness.   • minocycline (MINOCIN,DYNACIN) 100 MG capsule Take 1 capsule by mouth Daily.   • Multiple Vitamins-Minerals (MULTIVITAMIN PO) Take 1 tablet by mouth Daily.   • nateglinide (Starlix) 120 MG tablet Take 1 tablet by mouth 3 (Three) Times a Day Before Meals.   • Omega-3 Fatty Acids (Omega-3 Fish Oil) 1000 MG capsule Take  by mouth 4 (Four) Times a Week.   • omeprazole (priLOSEC) 40 MG capsule Take 1 capsule by mouth Daily.   • ondansetron ODT (ZOFRAN-ODT) 8 MG disintegrating tablet PLACE 1 TABLET ON THE TONGUE EVERY 8 (EIGHT) HOURS AS NEEDED FOR NAUSEA OR VOMITING FOR UP TO 30 DAYS.   • oxyCODONE-acetaminophen (PERCOCET) 5-325 MG per tablet Take 1 tablet by mouth Every 4 (Four) Hours As Needed for Moderate Pain or Severe Pain.   • rosuvastatin (CRESTOR) 20 MG tablet TAKE 1 TABLET DAILY.       REPLACE SIMVASTATIN   • Semaglutide, 1 MG/DOSE, (Ozempic, 1 MG/DOSE,) 2 MG/1.5ML solution pen-injector Inject 1 mg under the skin into the appropriate area as directed 1 (One) Time Per Week.   • TiZANidine (ZANAFLEX) 4 MG capsule Take 1 capsule by mouth 2 (Two) Times a Day As Needed for Muscle Spasms.     No current facility-administered medications on file prior to visit.       Past Surgical History:   Procedure Laterality Date   • BACK SURGERY      Laminectomy   • BUNIONECTOMY     • CARPAL TUNNEL RELEASE Bilateral    • CERVICAL DISC SURGERY      Laminectomy 2011. Fusion 2013.   • CHOLECYSTECTOMY     • COLONOSCOPY      Diverticulosis found in the sigmoid colon.A single diminutive polyp found in the colon.Mul.biopsies taken.Hemorrhoids found in the anus.   • ENDOSCOPY      Normal  "hypopharynx and esophagus.Marked irregularity of the Z-line,compatable with chronic reflux.Mul.biopsies.A hiatus hernia found in GE junction.Mild nonerosive gastritis.Mul.biopsies.Polyps in fundus.Mul.biopsies.Normal pylorus.Normal duodenum.   • ENDOSCOPY N/A 2021    Procedure: ESOPHAGOGASTRODUODENOSCOPY;  Surgeon: Samra Castillo MD;  Location: [facility] ENDOSCOPY;  Service: Gastroenterology;  Laterality: N/A;   • ENDOSCOPY AND COLONOSCOPY     • FINGER/THUMB LESION/CYST EXCISION     • HEEL SPUR EXCISION     • HYSTERECTOMY     • JOINT REPLACEMENT Bilateral    • KNEE ARTHROSCOPY Bilateral     Arthroscopy of the left knee with debridement of medial meniscus.   • KNEE SURGERY      Right unicompartmental arthroplasty.   • OOPHORECTOMY     • TOTAL KNEE ARTHROPLASTY Left 2018    Procedure: TOTAL KNEE ARTHROPLASTY ATTUNE with adductor canal block ;  Surgeon: Baron Reynolds MD;  Location: [facility] OR;  Service: Orthopedics   • UPPER GASTROINTESTINAL ENDOSCOPY  2021    Dr. Samra Castillo M.D., [place], [place]       Family History   Problem Relation Age of Onset   • Ovarian cancer Sister    • Breast cancer Sister    • Cancer Other         lung   • Diabetes Other    • Hypertension Other    • Stroke Other        Social History     Socioeconomic History   • Marital status:    Tobacco Use   • Smoking status: Former     Packs/day: 1.00     Years: 10.00     Pack years: 10.00     Types: Cigarettes     Quit date: 1985     Years since quittin.8     Passive exposure: Past   • Smokeless tobacco: Never   Vaping Use   • Vaping Use: Never used   Substance and Sexual Activity   • Alcohol use: No   • Drug use: No   • Sexual activity: Defer           ROS  No fevers or chills.  No chest pain or shortness of air.  No GI or  disturbances.  Other than bilateral shoulder pain, all other systems reviewed is negative.    PHYSICAL EXAMINATION:       Ht 162.6 cm (64\")   Wt 75.3 " kg (166 lb)   BMI 28.49 kg/m²     Physical Exam  Vitals reviewed.   Constitutional:       General: She is not in acute distress.     Appearance: Normal appearance. She is well-developed.   Cardiovascular:      Rate and Rhythm: Normal rate and regular rhythm.      Heart sounds: Normal heart sounds.   Pulmonary:      Effort: Pulmonary effort is normal.      Breath sounds: Normal breath sounds.   Abdominal:      General: Bowel sounds are normal.      Palpations: Abdomen is soft.   Neurological:      Mental Status: She is alert and oriented to person, place, and time.   Psychiatric:         Behavior: Behavior normal.         Thought Content: Thought content normal.         Judgment: Judgment normal.         GAIT:     []  Normal  []  Antalgic    Assistive device: []  None  []  Walker     []  Crutches  []  Cane     []  Wheelchair  []  Stretcher    Right Shoulder Exam     Comments:  Crepitus on exam.  Pain with motion.  Good distal pulses and sensation.  Pain with resisted abduction and resisted external rotation.          Left Shoulder Exam     Tenderness   The patient is experiencing tenderness in the acromioclavicular joint and acromion.    Range of Motion   Active abduction: 110   Forward flexion: 120     Muscle Strength   Abduction: 3/5   Supraspinatus: 3/5     Tests   Mckeon test: positive  Cross arm: positive  Impingement: positive    Other   Erythema: absent  Sensation: normal  Pulse: present               MRI Shoulder Right Without Contrast    Result Date: 3/20/2023  Narrative: EXAM: MRI SHOULDER RIGHT WO CONTRAST CLINICAL INDICATION: Right shoulder pain COMPARISON: 12/8/2022 TECHNIQUE: The MRI was done using axial T1 and gradient echo, coronal T1, PD and T2 fat sat and sagittal T1 and T2 fat sat images. FINDINGS: The subscapularis is intact. The long head of the biceps tendon proximally appears thickened with abnormal signal consistent with tendinosis or partial tear. There is partial thickness tear of the  distal supraspinatus tendon at the insertion with no full-thickness tear. Also noted is partial tear of the distal infraspinatus tendon with no full-thickness tear. The teres minor is intact. There are moderate degenerative changes of the glenohumeral and AC joints. The visualized osseous structures otherwise have normal morphology and signal characteristics with no evidence of bone marrow edema, occult fractures, or marrow-replacing bone lesions. The glenoid labrum is grossly unremarkable.     Impression: 1. Partial thickness tears of the distal supraspinatus and infraspinatus tendons with no full-thickness tear. 2. Moderate degenerative joint disease of the glenohumeral and AC joints.  Electronically signed by:  Leodan Hagan MD  3/20/2023 5:21 PM CDT Workstation: [id number]      Study Result    Narrative & Impression   EXAM: MRI SHOULDER LEFT WO CONTRAST      CLINICAL INDICATION: Left shoulder pain     COMPARISON: 7/22/2022     TECHNIQUE: The MRI was done using axial T1 and gradient echo,  coronal T1, PD and T2 fat sat and sagittal T1 and T2 fat sat  images.     FINDINGS: The subscapularis is intact. The long head of the  biceps tendon proximally has a thickened appearance with abnormal  signal suggesting partial tear or tendinosis. There is partial  thickness undersurface tear of the distal supraspinatus tendon  with no full-thickness tear. The infraspinatus and teres minor  are intact. There are moderate degenerative changes of the  glenohumeral and AC joints. The visualized osseous structures  otherwise have normal morphology and signal characteristics with  no evidence of bone marrow edema, occult fractures, or  marrow-replacing bone lesions. There is a moderate amount of  fluid in the subacromial subdeltoid bursa. Tears of the posterior  and superior labrum are noted. Several loose bodies are seen  within the axillary recess of the joint the largest measuring 5.7  x 3.7 mm.     IMPRESSION:  1. Partial  thickness tear of the supraspinatus tendon with no  full-thickness tear.  2. Moderate degenerative joint disease of the glenohumeral and AC  joints including several loose bodies.  3. Partial tear versus tendinosis of the proximal long head of  the biceps tendon.  4. Tears of the superior and posterior glenoid labrum.     Electronically signed by:  Leodan Hagan MD  8/10/2022 5:52 PM CDT  Workstation: [id number]       Study Result    Narrative & Impression      Three view left shoulder     HISTORY: Worsening left shoulder pain with decreased range of  motion.     AP films with the humerus in internal and external rotation and  scapular Y view were obtained.     COMPARISON: None     FINDINGS:   No fracture or dislocation.   Hypertrophic change acromioclavicular joint.  Degenerative changes humeral head with 2.3 cm osteophyte arising  inferiorly and medially from the humeral head.  Minimal synovial osteochondromatosis.  Extensive postoperative changes in the cervical spine.  No other osseous or articular abnormality.     IMPRESSION:  CONCLUSION:  Hypertrophic change acromioclavicular joint.  Degenerative changes humeral head with 2.3 cm osteophyte arising  inferiorly and medially from the humeral head.  Minimal synovial osteochondromatosis.     [id number]     Electronically signed by:  Ryan Mayo MD  7/22/2022 7:42 PM CDT  Workstation: [id number]           ASSESSMENT:    Diagnoses and all orders for this visit:    Primary osteoarthritis, left shoulder  -     CT shoulder left wo contrast; Future  -     Ambulatory Referral to Physical Therapy  -     Case Request; Standing  -     ethyl alcohol 62 % 2 each  -     ceFAZolin (ANCEF) 2 g in sodium chloride 0.9 % 100 mL IVPB  -     Case Request    Rotator cuff syndrome of right shoulder  -     Large Joint Arthrocentesis: R subacromial bursa  -     Ambulatory Referral to Physical Therapy  -     Case Request; Standing  -     ethyl alcohol 62 % 2 each  -     ceFAZolin (ANCEF) 2 g in  sodium chloride 0.9 % 100 mL IVPB  -     Case Request    Chronic pain of both shoulders  -     Large Joint Arthrocentesis: R subacromial bursa  -     CT shoulder left wo contrast; Future  -     Ambulatory Referral to Physical Therapy  -     Case Request; Standing  -     ethyl alcohol 62 % 2 each  -     ceFAZolin (ANCEF) 2 g in sodium chloride 0.9 % 100 mL IVPB  -     Case Request    Type 2 diabetes mellitus with microalbuminuria, without long-term current use of insulin (HCC)  -     CT shoulder left wo contrast; Future  -     Ambulatory Referral to Physical Therapy  -     Case Request; Standing  -     ethyl alcohol 62 % 2 each  -     ceFAZolin (ANCEF) 2 g in sodium chloride 0.9 % 100 mL IVPB  -     Case Request    Gastroesophageal reflux disease without esophagitis  -     Case Request; Standing  -     ethyl alcohol 62 % 2 each  -     ceFAZolin (ANCEF) 2 g in sodium chloride 0.9 % 100 mL IVPB  -     Case Request    Generalized anxiety disorder  -     Case Request; Standing  -     ethyl alcohol 62 % 2 each  -     ceFAZolin (ANCEF) 2 g in sodium chloride 0.9 % 100 mL IVPB  -     Case Request    Nontraumatic incomplete tear of left rotator cuff  -     Case Request; Standing  -     ethyl alcohol 62 % 2 each  -     ceFAZolin (ANCEF) 2 g in sodium chloride 0.9 % 100 mL IVPB  -     Case Request    Other orders  -     Follow Anesthesia Guidelines / Protocol; Future  -     Follow Anesthesia Guidelines / Protocol; Standing  -     Verify NPO Status; Standing  -     Obtain informed consent (if not collected inpatient or PAT); Standing  -     Outpatient In A Bed; Standing  -     Chlorhexidine Skin Prep - Educate and Review With Patient; Future  -     Provide instructions to patient regarding NPO status; Future  -     Provide Instructions/Handout For Benzoyl Peroxide 5% Wash If Having Shoulder/Arm Surgery (If Prescribed)  -     Provide Patient With ERAS Hydration Instructions  -     Nerve Block; Standing          PLAN    Long  discussion was had with the patient and her  regarding further treatment options.  Her main issue is her left shoulder.  Review of the x-ray and MRI shows that she has significant arthritic change with a goats beard deformity in the humeral head of the joint space.  We discussed partial-thickness tearing of the rotator cuff and moderate muscle atrophy noted on the MRI.  These findings are consistent with rotator cuff arthropathy.  Surgical intervention would consist of reverse total shoulder arthroplasty.  She has tried, and failed, nonoperative treatment options and is unhappy with her quality of life.  She wishes to proceed with surgical intervention.    The patient voiced understanding of the risks, benefits, and alternative forms of treatment that were discussed and the patient consents to proceed with surgery.  All risks, benefits and alternatives were discussed.  Risks include, but not exclusive to anesthetic complications, including death, MI, CVA, infection, bleeding DVT, fracture, residual pain and need for future surgery.    This discussion was held with the patient by Baron Reynolds MD and all questions were answered.    Plan left reverse total shoulder arthroplasty.    We will proceed with a CT scan of the left shoulder for bony anatomy and to prepare for surgical planning.    In regards to the right shoulder, we discussed proceeding with a steroid injection and some physical therapy.  Her arthritic change seems to be much less in the right shoulder.  She has a partial-thickness tear and we discussed nonoperative management.  Patient and her  were agreeable with this plan.    PT:  Faith  2 visits to teach HEP for partial RCT    Large Joint Arthrocentesis: R subacromial bursa  Date/Time: 3/28/2023 10:00 AM  Consent given by: patient  Site marked: site marked  Timeout: Immediately prior to procedure a time out was called to verify the correct patient, procedure, equipment, support staff  and site/side marked as required   Supporting Documentation  Indications: pain   Procedure Details  Location: shoulder - R subacromial bursa  Preparation: Patient was prepped and draped in the usual sterile fashion  Needle size: 22 G  Approach: posterior  Medications administered: 40 mg triamcinolone acetonide 40 MG/ML; 2 mL lidocaine 1 %  Patient tolerance: patient tolerated the procedure well with no immediate complications          Return in about 6 weeks (around 5/9/2023) for recheck.    Baron Reynolds MD

## 2023-03-30 PROBLEM — M19.012 PRIMARY OSTEOARTHRITIS, LEFT SHOULDER: Status: ACTIVE | Noted: 2023-03-30

## 2023-03-30 RX ORDER — TRIAMCINOLONE ACETONIDE 40 MG/ML
40 INJECTION, SUSPENSION INTRA-ARTICULAR; INTRAMUSCULAR
Status: COMPLETED | OUTPATIENT
Start: 2023-03-28 | End: 2023-03-28

## 2023-03-30 RX ORDER — LIDOCAINE HYDROCHLORIDE 10 MG/ML
2 INJECTION, SOLUTION INFILTRATION; PERINEURAL
Status: COMPLETED | OUTPATIENT
Start: 2023-03-28 | End: 2023-03-28

## 2023-04-11 ENCOUNTER — HOSPITAL ENCOUNTER (OUTPATIENT)
Dept: CT IMAGING | Facility: HOSPITAL | Age: 73
Discharge: HOME | End: 2023-04-11
Admitting: ORTHOPAEDIC SURGERY
Payer: MEDICARE

## 2023-04-11 DIAGNOSIS — E11.29 TYPE 2 DIABETES MELLITUS WITH MICROALBUMINURIA, WITHOUT LONG-TERM CURRENT USE OF INSULIN: ICD-10-CM

## 2023-04-11 DIAGNOSIS — R80.9 TYPE 2 DIABETES MELLITUS WITH MICROALBUMINURIA, WITHOUT LONG-TERM CURRENT USE OF INSULIN: ICD-10-CM

## 2023-04-11 DIAGNOSIS — M25.511 CHRONIC PAIN OF BOTH SHOULDERS: ICD-10-CM

## 2023-04-11 DIAGNOSIS — M19.012 PRIMARY OSTEOARTHRITIS, LEFT SHOULDER: ICD-10-CM

## 2023-04-11 DIAGNOSIS — M25.512 CHRONIC PAIN OF BOTH SHOULDERS: ICD-10-CM

## 2023-04-11 DIAGNOSIS — G89.29 CHRONIC PAIN OF BOTH SHOULDERS: ICD-10-CM

## 2023-04-11 PROBLEM — M75.101 ROTATOR CUFF SYNDROME OF RIGHT SHOULDER: Status: ACTIVE | Noted: 2023-04-11

## 2023-04-11 PROCEDURE — 73200 CT UPPER EXTREMITY W/O DYE: CPT

## 2023-04-19 ENCOUNTER — OFFICE VISIT (OUTPATIENT)
Dept: PODIATRY | Facility: CLINIC | Age: 73
End: 2023-04-19
Payer: MEDICARE

## 2023-04-19 VITALS
WEIGHT: 166 LBS | BODY MASS INDEX: 28.34 KG/M2 | HEART RATE: 71 BPM | OXYGEN SATURATION: 98 % | DIASTOLIC BLOOD PRESSURE: 81 MMHG | SYSTOLIC BLOOD PRESSURE: 144 MMHG | HEIGHT: 64 IN

## 2023-04-19 DIAGNOSIS — M19.079 ARTHRITIS OF MIDFOOT: Primary | ICD-10-CM

## 2023-04-19 RX ORDER — DEXAMETHASONE SODIUM PHOSPHATE 4 MG/ML
2 INJECTION, SOLUTION INTRA-ARTICULAR; INTRALESIONAL; INTRAMUSCULAR; INTRAVENOUS; SOFT TISSUE ONCE
Status: COMPLETED | OUTPATIENT
Start: 2023-04-19 | End: 2023-04-19

## 2023-04-19 RX ORDER — TRIAMCINOLONE ACETONIDE 40 MG/ML
20 INJECTION, SUSPENSION INTRA-ARTICULAR; INTRAMUSCULAR ONCE
Status: COMPLETED | OUTPATIENT
Start: 2023-04-19 | End: 2023-04-19

## 2023-04-19 RX ADMIN — DEXAMETHASONE SODIUM PHOSPHATE 2 MG: 4 INJECTION, SOLUTION INTRA-ARTICULAR; INTRALESIONAL; INTRAMUSCULAR; INTRAVENOUS; SOFT TISSUE at 16:20

## 2023-04-19 RX ADMIN — TRIAMCINOLONE ACETONIDE 20 MG: 40 INJECTION, SUSPENSION INTRA-ARTICULAR; INTRAMUSCULAR at 16:20

## 2023-04-19 NOTE — PROGRESS NOTES
"Otilia Kenny  1950  73 y.o. female    04/19/2023    Chief Complaint   Patient presents with   • Right Foot - Follow-up, Pain       History of Present Illness    Otilia Kenyn is a 73 y.o.female presents to the office for right midfoot pain. Patient is requesting a steroid injection.     Past Medical History:   Diagnosis Date   • Abdominal pain     Most likely related to functional colonic spasm     • Abnormal liver enzymes     Improved, 3/2015      • Allergic rhinitis     seasonal   • Allergic rhinitis    • Anemia    • Anesthesia complication     states sometime B/P drops   • Arthritis    • Cervical disc disorder     S/P cervical surgeries x2-3. Extensive fusion C 3-7. [place] [facility]      • Depression    • Diarrhea, unspecified     Resolved with discontinuation of metformin.    • Diverticular disease of colon    • Dysfunction of eustachian tube    • Essential hypertension     Increase lisinopril HCT, 10/12.5.      • Gastritis    • Generalized anxiety disorder    • GERD (gastroesophageal reflux disease)    • History of transfusion    • Hyperlipidemia     Intolerant of Lipitor. The patient stopped Zocor due to \"liver pain\", summer 2015. patient instructed to reattempt Zocor every other day, 10/2015    • Hyperlipidemia associated with type 2 diabetes mellitus 11/25/2020   • Hypertriglyceridemia     Holding simvastatin 2/2015 due to slightly elevated LFTs.      • Iron deficiency anemia    • Irritable bowel syndrome     Dr. 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 Reynolds    • Sleep disorder    • Spasm of back muscles     Dr. Castro changed Soma to Zanaflex.     • Type 2 diabetes mellitus with microalbuminuria, without long-term current use of insulin 5/7/2018    Added automatically from request for surgery [id number]   • Vitamin B12 deficiency (dietary) anemia     Currently on multivitamin daily           Past Surgical History:   Procedure Laterality Date   • " BACK SURGERY      Laminectomy   • BUNIONECTOMY     • CARPAL TUNNEL RELEASE Bilateral    • CERVICAL DISC SURGERY      Laminectomy 2011. Fusion 2013.   • CHOLECYSTECTOMY     • COLONOSCOPY      Diverticulosis found in the sigmoid colon.A single diminutive polyp found in the colon.Mul.biopsies taken.Hemorrhoids found in the anus.   • ENDOSCOPY      Normal hypopharynx and esophagus.Marked irregularity of the Z-line,compatable with chronic reflux.Mul.biopsies.A hiatus hernia found in GE junction.Mild nonerosive gastritis.Mul.biopsies.Polyps in fundus.Mul.biopsies.Normal pylorus.Normal duodenum.   • ENDOSCOPY N/A 12/28/2021    Procedure: ESOPHAGOGASTRODUODENOSCOPY;  Surgeon: Samra Castillo MD;  Location: [facility] ENDOSCOPY;  Service: Gastroenterology;  Laterality: N/A;   • ENDOSCOPY AND COLONOSCOPY     • FINGER/THUMB LESION/CYST EXCISION     • HEEL SPUR EXCISION     • HYSTERECTOMY     • JOINT REPLACEMENT Bilateral    • KNEE ARTHROSCOPY Bilateral     Arthroscopy of the left knee with debridement of medial meniscus.   • KNEE SURGERY      Right unicompartmental arthroplasty.   • OOPHORECTOMY     • TOTAL KNEE ARTHROPLASTY Left 06/04/2018    Procedure: TOTAL KNEE ARTHROPLASTY ATTUNE with adductor canal block ;  Surgeon: Baron Reynolds MD;  Location: [facility] OR;  Service: Orthopedics   • UPPER GASTROINTESTINAL ENDOSCOPY  12/28/2021    Dr. Samra Castillo M.D., [facility], [place], [place]         Family History   Problem Relation Age of Onset   • Ovarian cancer Sister    • Breast cancer Sister    • Cancer Other         lung   • Diabetes Other    • Hypertension Other    • Stroke Other        Allergies   Allergen Reactions   • Allegra [Fexofenadine] Itching   • Amaryl [Glimepiride] Myalgia   • Dyazide [Triamterene-Hctz] Itching   • Hydrochlorothiazide W-Triamterene Itching   • Lipitor [Atorvastatin] Myalgia   • Metformin And Related Diarrhea   • Naproxen Itching   • Actos [Pioglitazone] Rash   •  Sulfamethoxazole-Trimethoprim Rash     dizziness       Social History     Socioeconomic History   • Marital status:    Tobacco Use   • Smoking status: Former     Packs/day: 1.00     Years: 10.00     Pack years: 10.00     Types: Cigarettes     Quit date: 1985     Years since quittin.9     Passive exposure: Past   • Smokeless tobacco: Never   Vaping Use   • Vaping Use: Never used   Substance and Sexual Activity   • Alcohol use: No   • Drug use: No   • Sexual activity: Defer         Current Outpatient Medications   Medication Sig Dispense Refill   • aspirin 81 MG EC tablet Take 1 tablet by mouth Daily.     • Blood Glucose Monitoring Suppl (ONETOUCH VERIO IQ SYSTEM) w/Device kit 1 each Daily. 1 kit 0   • CALCIUM CARB-CHOLECALCIFEROL PO Take 1 tablet by mouth 2 (two) times a day.     • cetirizine (zyrTEC) 10 MG tablet Take 1 tablet by mouth Daily.     • cholecalciferol (VITAMIN D3) 25 MCG (1000 UT) tablet Take 1 tablet by mouth. 4 Times Per Week     • dapagliflozin Propanediol (Farxiga) 10 MG tablet Take 10 mg by mouth Daily. 90 tablet 1   • escitalopram (Lexapro) 10 MG tablet Take 1 tablet by mouth Daily. 90 tablet 3   • ezetimibe (ZETIA) 10 MG tablet TAKE 1 TABLET DAILY 90 tablet 3   • fenofibrate (TRICOR) 145 MG tablet TAKE 1/2 TO 1 TABLET DAILY 90 tablet 0   • glucose blood (OneTouch Verio) test strip 1 each by Other route 3 (Three) Times a Day. 300 each 3   • Insulin Lispro Prot & Lispro (HumaLOG Mix 75/25 KwikPen) (75-25) 100 UNIT/ML suspension pen-injector pen Inject 10 Units under the skin into the appropriate area as directed Daily With Dinner. 9 mL 3   • Insulin Pen Needle 31G X 5 MM misc Use daily with insulin and weekly with Ozempic 100 each 3   • Iron, Ferrous Sulfate, 325 (65 Fe) MG tablet Take 65 mg by mouth 3 (Three) Times a Week.     • Lancets (OneTouch Delica Plus [id number]) misc 1 each by Other route 3 (Three) Times a Day. for testing as directed 300 each 3   • lisinopril  "(PRINIVIL,ZESTRIL) 2.5 MG tablet Take 1 tablet by mouth 2 (Two) Times a Day. 180 tablet 3   • meclizine (ANTIVERT) 25 MG tablet Take 1 tablet by mouth 3 (Three) Times a Day As Needed for Dizziness. 30 tablet 1   • minocycline (MINOCIN,DYNACIN) 100 MG capsule Take 1 capsule by mouth Daily. 90 capsule 3   • Multiple Vitamins-Minerals (MULTIVITAMIN PO) Take 1 tablet by mouth Daily.     • nateglinide (Starlix) 120 MG tablet Take 1 tablet by mouth 3 (Three) Times a Day Before Meals. 270 tablet 3   • Omega-3 Fatty Acids (Omega-3 Fish Oil) 1000 MG capsule Take  by mouth 4 (Four) Times a Week.     • omeprazole (priLOSEC) 40 MG capsule Take 1 capsule by mouth Daily. 90 capsule 3   • ondansetron ODT (ZOFRAN-ODT) 8 MG disintegrating tablet PLACE 1 TABLET ON THE TONGUE EVERY 8 (EIGHT) HOURS AS NEEDED FOR NAUSEA OR VOMITING FOR UP TO 30 DAYS.     • oxyCODONE-acetaminophen (PERCOCET) 5-325 MG per tablet Take 1 tablet by mouth Every 4 (Four) Hours As Needed for Moderate Pain or Severe Pain. 18 tablet 0   • rosuvastatin (CRESTOR) 20 MG tablet TAKE 1 TABLET DAILY.       REPLACE SIMVASTATIN 90 tablet 3   • Semaglutide, 1 MG/DOSE, (Ozempic, 1 MG/DOSE,) 2 MG/1.5ML solution pen-injector Inject 1 mg under the skin into the appropriate area as directed 1 (One) Time Per Week. 6 pen 3   • TiZANidine (ZANAFLEX) 4 MG capsule Take 1 capsule by mouth 2 (Two) Times a Day As Needed for Muscle Spasms. 180 capsule 3     No current facility-administered medications for this visit.       Review of Systems   Constitutional: Negative.    HENT: Negative.    Eyes: Negative.    Respiratory: Negative.    Cardiovascular: Negative.    Gastrointestinal: Negative.    Endocrine: Negative.    Genitourinary: Negative.    Musculoskeletal:        Foot pain   Skin: Negative.    Allergic/Immunologic: Negative.    Neurological: Negative.    Hematological: Negative.    Psychiatric/Behavioral: Negative.          OBJECTIVE    /81   Pulse 71   Ht 162.6 cm (64\")   " Wt 75.3 kg (166 lb)   SpO2 98%   BMI 28.49 kg/m²     Physical Exam  Vitals reviewed.   Constitutional:       General: She is not in acute distress.     Appearance: She is well-developed.   HENT:      Head: Normocephalic and atraumatic.      Nose: Nose normal.   Eyes:      Conjunctiva/sclera: Conjunctivae normal.      Pupils: Pupils are equal, round, and reactive to light.   Cardiovascular:      Pulses:           Dorsalis pedis pulses are 2+ on the right side.        Posterior tibial pulses are 2+ on the right side.   Pulmonary:      Effort: Pulmonary effort is normal. No respiratory distress.      Breath sounds: No wheezing.   Musculoskeletal:      Right foot: Decreased range of motion. No Charcot foot or foot drop.        Feet:    Feet:      Right foot:      Skin integrity: Skin integrity normal.   Skin:     General: Skin is warm and dry.      Capillary Refill: Capillary refill takes less than 2 seconds.   Neurological:      Mental Status: She is alert and oriented to person, place, and time.   Psychiatric:         Behavior: Behavior normal.         Thought Content: Thought content normal.                Small Joint Arthrocentesis: R intertarsal  Site marked: site marked  Timeout: Immediately prior to procedure a time out was called to verify the correct patient, procedure, equipment, support staff and site/side marked as required   Supporting Documentation  Indications: pain   Procedure Details  Location: foot - R intertarsal  Needle size: 27 G  Approach: dorsal  Patient tolerance: patient tolerated the procedure well with no immediate complications              ASSESSMENT AND PLAN    Diagnoses and all orders for this visit:    1. Arthritis of midfoot (Primary)  -     dexamethasone (DECADRON) injection 2 mg  -     triamcinolone acetonide (KENALOG-40) injection 20 mg    Other orders  -     Small Joint Arthrocentesis        -Repeat steroid injection right midfoot.  Recheck as needed.          This document has  been electronically signed by Arcenio Remy DPM on April 29, 2023 13:41 CDT     4/29/2023  13:41 CDT

## 2023-05-05 DIAGNOSIS — E78.1 HYPERTRIGLYCERIDEMIA: Primary | Chronic | ICD-10-CM

## 2023-05-05 RX ORDER — EZETIMIBE 10 MG/1
10 TABLET ORAL DAILY
Qty: 90 TABLET | Refills: 3 | Status: SHIPPED | OUTPATIENT
Start: 2023-05-05

## 2023-05-17 DIAGNOSIS — Z79.4 TYPE 2 DIABETES MELLITUS TREATED WITH INSULIN: ICD-10-CM

## 2023-05-17 DIAGNOSIS — E11.9 TYPE 2 DIABETES MELLITUS TREATED WITH INSULIN: ICD-10-CM

## 2023-05-17 RX ORDER — BLOOD SUGAR DIAGNOSTIC
STRIP MISCELLANEOUS
Qty: 300 EACH | Refills: 3 | Status: SHIPPED | OUTPATIENT
Start: 2023-05-17

## 2023-05-17 RX ORDER — LANCETS 33 GAUGE
EACH MISCELLANEOUS
Qty: 300 EACH | Refills: 3 | Status: SHIPPED | OUTPATIENT
Start: 2023-05-17

## 2023-05-17 RX ORDER — FENOFIBRATE 145 MG/1
72.5-145 TABLET, COATED ORAL DAILY
Qty: 90 TABLET | Refills: 3 | Status: SHIPPED | OUTPATIENT
Start: 2023-05-17

## 2023-06-13 ENCOUNTER — PRE-ADMISSION TESTING (OUTPATIENT)
Dept: PREADMISSION TESTING | Facility: HOSPITAL | Age: 73
End: 2023-06-13
Payer: MEDICARE

## 2023-06-13 ENCOUNTER — OFFICE VISIT (OUTPATIENT)
Dept: ORTHOPEDIC SURGERY | Facility: CLINIC | Age: 73
End: 2023-06-13
Payer: MEDICARE

## 2023-06-13 VITALS
SYSTOLIC BLOOD PRESSURE: 124 MMHG | OXYGEN SATURATION: 95 % | DIASTOLIC BLOOD PRESSURE: 72 MMHG | HEART RATE: 68 BPM | RESPIRATION RATE: 16 BRPM

## 2023-06-13 VITALS — WEIGHT: 168 LBS | HEIGHT: 64 IN | BODY MASS INDEX: 28.68 KG/M2

## 2023-06-13 DIAGNOSIS — M19.012 PRIMARY OSTEOARTHRITIS OF LEFT SHOULDER: Primary | Chronic | ICD-10-CM

## 2023-06-13 LAB
ANION GAP SERPL CALCULATED.3IONS-SCNC: 11 MMOL/L (ref 5–15)
BUN SERPL-MCNC: 22 MG/DL (ref 8–23)
BUN/CREAT SERPL: 26.8 (ref 7–25)
CALCIUM SPEC-SCNC: 9.4 MG/DL (ref 8.6–10.5)
CHLORIDE SERPL-SCNC: 101 MMOL/L (ref 98–107)
CO2 SERPL-SCNC: 25 MMOL/L (ref 22–29)
CREAT SERPL-MCNC: 0.82 MG/DL (ref 0.57–1)
EGFRCR SERPLBLD CKD-EPI 2021: 74.7 ML/MIN/1.73
GLUCOSE SERPL-MCNC: 83 MG/DL (ref 65–99)
POTASSIUM SERPL-SCNC: 4.5 MMOL/L (ref 3.5–5.2)
QT INTERVAL: 386 MS
QTC INTERVAL: 407 MS
SODIUM SERPL-SCNC: 137 MMOL/L (ref 136–145)

## 2023-06-13 PROCEDURE — 80048 BASIC METABOLIC PNL TOTAL CA: CPT

## 2023-06-13 PROCEDURE — 93005 ELECTROCARDIOGRAM TRACING: CPT

## 2023-06-13 PROCEDURE — 36415 COLL VENOUS BLD VENIPUNCTURE: CPT

## 2023-06-13 RX ORDER — FENOFIBRATE 145 MG/1
145 TABLET, COATED ORAL DAILY
Status: ON HOLD | COMMUNITY

## 2023-06-13 RX ORDER — ROSUVASTATIN CALCIUM 20 MG/1
20 TABLET, COATED ORAL NIGHTLY
Status: ON HOLD | COMMUNITY

## 2023-06-13 RX ORDER — SODIUM CHLORIDE 9 MG/ML
1000 INJECTION, SOLUTION INTRAVENOUS CONTINUOUS
Status: CANCELLED | OUTPATIENT
Start: 2023-06-19

## 2023-06-13 RX ORDER — EZETIMIBE 10 MG/1
10 TABLET ORAL NIGHTLY
Status: ON HOLD | COMMUNITY

## 2023-06-13 RX ORDER — FERROUS SULFATE 325(65) MG
325 TABLET ORAL 3 TIMES WEEKLY
Status: ON HOLD | COMMUNITY

## 2023-06-13 RX ORDER — MINOCYCLINE HYDROCHLORIDE 100 MG/1
100 CAPSULE ORAL EVERY EVENING
Status: ON HOLD | COMMUNITY

## 2023-06-13 NOTE — PROGRESS NOTES
"Otilia Kenny is a 73 y.o. female returns for     Chief Complaint   Patient presents with   • Right Shoulder - Follow-up   • Results     MRI  3/20/23       HISTORY OF PRESENT ILLNESS:    Patient continues to have pain in both shoulders but her left shoulder is worse.  She has pain with activity and pain with anything at or above shoulder level.  No new injury.  She has pain at night.  No numbness or tingling.  She has pain with activities of daily living and is unhappy with her quality of life.  Previous steroid injection and physical therapy has not been beneficial.    She also continues having pain in her right shoulder.  She has pain at night and pain with activity.  Increased activity increases her pain.    CONCURRENT MEDICAL HISTORY:    Past Medical History:   Diagnosis Date   • Abdominal pain     Most likely related to functional colonic spasm     • Abnormal liver enzymes     Improved, 3/2015      • Allergic rhinitis     seasonal   • Allergic rhinitis    • Anemia    • Anesthesia complication     states sometime B/P drops   • Arthritis    • Cervical disc disorder     S/P cervical surgeries x2-3. Extensive fusion C 3-7. [facility]      • Depression    • Diarrhea, unspecified     Resolved with discontinuation of metformin.    • Diverticular disease of colon    • Dysfunction of eustachian tube    • Essential hypertension     Increase lisinopril HCT, 10/12.5.      • Gastritis    • Generalized anxiety disorder    • GERD (gastroesophageal reflux disease)    • History of transfusion    • Hyperlipidemia     Intolerant of Lipitor. The patient stopped Zocor due to \"liver pain\", summer 2015. patient instructed to reattempt Zocor every other day, 10/2015    • Hyperlipidemia associated with type 2 diabetes mellitus (HCC) 11/25/2020   • Hypertriglyceridemia     Holding simvastatin 2/2015 due to slightly elevated LFTs.      • Iron deficiency anemia    • Irritable bowel syndrome     Dr. Estrada   • Meniere's " disease of both ears 6/12/2018   • Osteoarthritis of knee     Dr. Reynolds    • Sleep disorder    • Spasm of back muscles     Dr. Castro changed Soma to Zanaflex.     • Type 2 diabetes mellitus with microalbuminuria, without long-term current use of insulin ([facility]) 5/7/2018    Added automatically from request for surgery [id number]   • Vitamin B12 deficiency (dietary) anemia     Currently on multivitamin daily         Allergies   Allergen Reactions   • Allegra [Fexofenadine] Itching   • Amaryl [Glimepiride] Myalgia   • Dyazide [Triamterene-Hctz] Itching   • Hydrochlorothiazide W-Triamterene Itching   • Lipitor [Atorvastatin] Myalgia   • Metformin And Related Diarrhea   • Naproxen Itching   • Actos [Pioglitazone] Rash   • Sulfamethoxazole-Trimethoprim Rash     dizziness       Current Outpatient Medications on File Prior to Visit   Medication Sig   • aspirin 81 MG EC tablet Take 1 tablet by mouth Daily.   • Blood Glucose Monitoring Suppl (ONETOUCH VERIO IQ SYSTEM) w/Device kit 1 each Daily.   • CALCIUM CARB-CHOLECALCIFEROL PO Take 1 tablet by mouth 2 (two) times a day.   • cetirizine (zyrTEC) 10 MG tablet Take 1 tablet by mouth Daily.   • cholecalciferol (VITAMIN D3) 25 MCG (1000 UT) tablet Take 1 tablet by mouth. 4 Times Per Week   • dapagliflozin Propanediol (Farxiga) 10 MG tablet Take 10 mg by mouth Daily.   • escitalopram (Lexapro) 10 MG tablet Take 1 tablet by mouth Daily.   • ezetimibe (ZETIA) 10 MG tablet TAKE 1 TABLET DAILY   • fenofibrate (TRICOR) 145 MG tablet TAKE 1/2 TO 1 TABLET DAILY   • glucose blood (OneTouch Verio) test strip 1 each by Other route 3 (Three) Times a Day.   • Insulin Lispro Prot & Lispro (HumaLOG Mix 75/25 KwikPen) (75-25) 100 UNIT/ML suspension pen-injector pen Inject 10 Units under the skin into the appropriate area as directed Daily With Dinner.   • Insulin Pen Needle 31G X 5 MM misc Use daily with insulin and weekly with Ozempic   • Iron, Ferrous Sulfate, 325 (65 Fe) MG tablet Take 65 mg by  mouth 3 (Three) Times a Week.   • Lancets (OneTouch Delica Plus [id number]) misc 1 each by Other route 3 (Three) Times a Day. for testing as directed   • lisinopril (PRINIVIL,ZESTRIL) 2.5 MG tablet Take 1 tablet by mouth 2 (Two) Times a Day.   • meclizine (ANTIVERT) 25 MG tablet Take 1 tablet by mouth 3 (Three) Times a Day As Needed for Dizziness.   • minocycline (MINOCIN,DYNACIN) 100 MG capsule Take 1 capsule by mouth Daily.   • Multiple Vitamins-Minerals (MULTIVITAMIN PO) Take 1 tablet by mouth Daily.   • nateglinide (Starlix) 120 MG tablet Take 1 tablet by mouth 3 (Three) Times a Day Before Meals.   • Omega-3 Fatty Acids (Omega-3 Fish Oil) 1000 MG capsule Take  by mouth 4 (Four) Times a Week.   • omeprazole (priLOSEC) 40 MG capsule Take 1 capsule by mouth Daily.   • ondansetron ODT (ZOFRAN-ODT) 8 MG disintegrating tablet PLACE 1 TABLET ON THE TONGUE EVERY 8 (EIGHT) HOURS AS NEEDED FOR NAUSEA OR VOMITING FOR UP TO 30 DAYS.   • oxyCODONE-acetaminophen (PERCOCET) 5-325 MG per tablet Take 1 tablet by mouth Every 4 (Four) Hours As Needed for Moderate Pain or Severe Pain.   • rosuvastatin (CRESTOR) 20 MG tablet TAKE 1 TABLET DAILY.       REPLACE SIMVASTATIN   • Semaglutide, 1 MG/DOSE, (Ozempic, 1 MG/DOSE,) 2 MG/1.5ML solution pen-injector Inject 1 mg under the skin into the appropriate area as directed 1 (One) Time Per Week.   • TiZANidine (ZANAFLEX) 4 MG capsule Take 1 capsule by mouth 2 (Two) Times a Day As Needed for Muscle Spasms.     No current facility-administered medications on file prior to visit.       Past Surgical History:   Procedure Laterality Date   • BACK SURGERY      Laminectomy   • BUNIONECTOMY     • CARPAL TUNNEL RELEASE Bilateral    • CERVICAL DISC SURGERY      Laminectomy 2011. Fusion 2013.   • CHOLECYSTECTOMY     • COLONOSCOPY      Diverticulosis found in the sigmoid colon.A single diminutive polyp found in the colon.Mul.biopsies taken.Hemorrhoids found in the anus.   • ENDOSCOPY      Normal  "hypopharynx and esophagus.Marked irregularity of the Z-line,compatable with chronic reflux.Mul.biopsies.A hiatus hernia found in GE junction.Mild nonerosive gastritis.Mul.biopsies.Polyps in fundus.Mul.biopsies.Normal pylorus.Normal duodenum.   • ENDOSCOPY N/A 2021    Procedure: ESOPHAGOGASTRODUODENOSCOPY;  Surgeon: Samra Castillo MD;  Location: [facility] ENDOSCOPY;  Service: Gastroenterology;  Laterality: N/A;   • ENDOSCOPY AND COLONOSCOPY     • FINGER/THUMB LESION/CYST EXCISION     • HEEL SPUR EXCISION     • HYSTERECTOMY     • JOINT REPLACEMENT Bilateral    • KNEE ARTHROSCOPY Bilateral     Arthroscopy of the left knee with debridement of medial meniscus.   • KNEE SURGERY      Right unicompartmental arthroplasty.   • OOPHORECTOMY     • TOTAL KNEE ARTHROPLASTY Left 2018    Procedure: TOTAL KNEE ARTHROPLASTY ATTUNE with adductor canal block ;  Surgeon: Baron Reynolds MD;  Location: [facility] OR;  Service: Orthopedics   • UPPER GASTROINTESTINAL ENDOSCOPY  2021    Dr. Samra Castillo M.D., [place], [place]       Family History   Problem Relation Age of Onset   • Ovarian cancer Sister    • Breast cancer Sister    • Cancer Other         lung   • Diabetes Other    • Hypertension Other    • Stroke Other        Social History     Socioeconomic History   • Marital status:    Tobacco Use   • Smoking status: Former     Packs/day: 1.00     Years: 10.00     Pack years: 10.00     Types: Cigarettes     Quit date: 1985     Years since quittin.8     Passive exposure: Past   • Smokeless tobacco: Never   Vaping Use   • Vaping Use: Never used   Substance and Sexual Activity   • Alcohol use: No   • Drug use: No   • Sexual activity: Defer           ROS  No fevers or chills.  No chest pain or shortness of air.  No GI or  disturbances.  Other than bilateral shoulder pain, all other systems reviewed is negative.    PHYSICAL EXAMINATION:       Ht 162.6 cm (64\")   Wt 75.3 " kg (166 lb)   BMI 28.49 kg/m²     Physical Exam  Vitals reviewed.   Constitutional:       General: She is not in acute distress.     Appearance: Normal appearance. She is well-developed.   Cardiovascular:      Rate and Rhythm: Normal rate and regular rhythm.      Heart sounds: Normal heart sounds.   Pulmonary:      Effort: Pulmonary effort is normal.      Breath sounds: Normal breath sounds.   Abdominal:      General: Bowel sounds are normal.      Palpations: Abdomen is soft.   Neurological:      Mental Status: She is alert and oriented to person, place, and time.   Psychiatric:         Behavior: Behavior normal.         Thought Content: Thought content normal.         Judgment: Judgment normal.         GAIT:     [x]  Normal  []  Antalgic    Assistive device: [x]  None  []  Walker     []  Crutches  []  Cane     []  Wheelchair  []  Stretcher    Right Shoulder Exam     Comments:  Crepitus on exam.  Pain with motion.  Good distal pulses and sensation.  Pain with resisted abduction and resisted external rotation.          Left Shoulder Exam     Tenderness   The patient is experiencing tenderness in the acromioclavicular joint and acromion.    Range of Motion   Active abduction: 110   Forward flexion: 120     Muscle Strength   Abduction: 3/5   Supraspinatus: 3/5     Tests   Mckeon test: positive  Cross arm: positive  Impingement: positive    Other   Erythema: absent  Sensation: normal  Pulse: present               MRI Shoulder Right Without Contrast    Result Date: 3/20/2023  Narrative: EXAM: MRI SHOULDER RIGHT WO CONTRAST CLINICAL INDICATION: Right shoulder pain COMPARISON: 12/8/2022 TECHNIQUE: The MRI was done using axial T1 and gradient echo, coronal T1, PD and T2 fat sat and sagittal T1 and T2 fat sat images. FINDINGS: The subscapularis is intact. The long head of the biceps tendon proximally appears thickened with abnormal signal consistent with tendinosis or partial tear. There is partial thickness tear of the  distal supraspinatus tendon at the insertion with no full-thickness tear. Also noted is partial tear of the distal infraspinatus tendon with no full-thickness tear. The teres minor is intact. There are moderate degenerative changes of the glenohumeral and AC joints. The visualized osseous structures otherwise have normal morphology and signal characteristics with no evidence of bone marrow edema, occult fractures, or marrow-replacing bone lesions. The glenoid labrum is grossly unremarkable.     Impression: 1. Partial thickness tears of the distal supraspinatus and infraspinatus tendons with no full-thickness tear. 2. Moderate degenerative joint disease of the glenohumeral and AC joints.  Electronically signed by:  Leodan Hagan MD  3/20/2023 5:21 PM CDT Workstation: [id number]      Study Result    Narrative & Impression   EXAM: MRI SHOULDER LEFT WO CONTRAST      CLINICAL INDICATION: Left shoulder pain     COMPARISON: 7/22/2022     TECHNIQUE: The MRI was done using axial T1 and gradient echo,  coronal T1, PD and T2 fat sat and sagittal T1 and T2 fat sat  images.     FINDINGS: The subscapularis is intact. The long head of the  biceps tendon proximally has a thickened appearance with abnormal  signal suggesting partial tear or tendinosis. There is partial  thickness undersurface tear of the distal supraspinatus tendon  with no full-thickness tear. The infraspinatus and teres minor  are intact. There are moderate degenerative changes of the  glenohumeral and AC joints. The visualized osseous structures  otherwise have normal morphology and signal characteristics with  no evidence of bone marrow edema, occult fractures, or  marrow-replacing bone lesions. There is a moderate amount of  fluid in the subacromial subdeltoid bursa. Tears of the posterior  and superior labrum are noted. Several loose bodies are seen  within the axillary recess of the joint the largest measuring 5.7  x 3.7 mm.     IMPRESSION:  1. Partial  thickness tear of the supraspinatus tendon with no  full-thickness tear.  2. Moderate degenerative joint disease of the glenohumeral and AC  joints including several loose bodies.  3. Partial tear versus tendinosis of the proximal long head of  the biceps tendon.  4. Tears of the superior and posterior glenoid labrum.     Electronically signed by:  Leodan Hagan MD  8/10/2022 5:52 PM CDT  Workstation: [id number]       Study Result    Narrative & Impression      Three view left shoulder     HISTORY: Worsening left shoulder pain with decreased range of  motion.     AP films with the humerus in internal and external rotation and  scapular Y view were obtained.     COMPARISON: None     FINDINGS:   No fracture or dislocation.   Hypertrophic change acromioclavicular joint.  Degenerative changes humeral head with 2.3 cm osteophyte arising  inferiorly and medially from the humeral head.  Minimal synovial osteochondromatosis.  Extensive postoperative changes in the cervical spine.  No other osseous or articular abnormality.     IMPRESSION:  CONCLUSION:  Hypertrophic change acromioclavicular joint.  Degenerative changes humeral head with 2.3 cm osteophyte arising  inferiorly and medially from the humeral head.  Minimal synovial osteochondromatosis.     [id number]     Electronically signed by:  Ryan Mayo MD  7/22/2022 7:42 PM CDT  Workstation: [id number]           ASSESSMENT:    Diagnoses and all orders for this visit:    Primary osteoarthritis, left shoulder  -     CT shoulder left wo contrast; Future  -     Ambulatory Referral to Physical Therapy  -     Case Request; Standing  -     ethyl alcohol 62 % 2 each  -     ceFAZolin (ANCEF) 2 g in sodium chloride 0.9 % 100 mL IVPB  -     Case Request    Rotator cuff syndrome of right shoulder  -     Large Joint Arthrocentesis: R subacromial bursa  -     Ambulatory Referral to Physical Therapy  -     Case Request; Standing  -     ethyl alcohol 62 % 2 each  -     ceFAZolin (ANCEF) 2 g in  sodium chloride 0.9 % 100 mL IVPB  -     Case Request    Chronic pain of both shoulders  -     Large Joint Arthrocentesis: R subacromial bursa  -     CT shoulder left wo contrast; Future  -     Ambulatory Referral to Physical Therapy  -     Case Request; Standing  -     ethyl alcohol 62 % 2 each  -     ceFAZolin (ANCEF) 2 g in sodium chloride 0.9 % 100 mL IVPB  -     Case Request    Type 2 diabetes mellitus with microalbuminuria, without long-term current use of insulin (HCC)  -     CT shoulder left wo contrast; Future  -     Ambulatory Referral to Physical Therapy  -     Case Request; Standing  -     ethyl alcohol 62 % 2 each  -     ceFAZolin (ANCEF) 2 g in sodium chloride 0.9 % 100 mL IVPB  -     Case Request    Gastroesophageal reflux disease without esophagitis  -     Case Request; Standing  -     ethyl alcohol 62 % 2 each  -     ceFAZolin (ANCEF) 2 g in sodium chloride 0.9 % 100 mL IVPB  -     Case Request    Generalized anxiety disorder  -     Case Request; Standing  -     ethyl alcohol 62 % 2 each  -     ceFAZolin (ANCEF) 2 g in sodium chloride 0.9 % 100 mL IVPB  -     Case Request    Nontraumatic incomplete tear of left rotator cuff  -     Case Request; Standing  -     ethyl alcohol 62 % 2 each  -     ceFAZolin (ANCEF) 2 g in sodium chloride 0.9 % 100 mL IVPB  -     Case Request    Other orders  -     Follow Anesthesia Guidelines / Protocol; Future  -     Follow Anesthesia Guidelines / Protocol; Standing  -     Verify NPO Status; Standing  -     Obtain informed consent (if not collected inpatient or PAT); Standing  -     Outpatient In A Bed; Standing  -     Chlorhexidine Skin Prep - Educate and Review With Patient; Future  -     Provide instructions to patient regarding NPO status; Future  -     Provide Instructions/Handout For Benzoyl Peroxide 5% Wash If Having Shoulder/Arm Surgery (If Prescribed)  -     Provide Patient With ERAS Hydration Instructions  -     Nerve Block; Standing          PLAN    Long  discussion was had with the patient and her  regarding further treatment options.  Her main issue is her left shoulder.  Review of the x-ray and MRI shows that she has significant arthritic change with a goats beard deformity in the humeral head of the joint space.  We discussed partial-thickness tearing of the rotator cuff and moderate muscle atrophy noted on the MRI.  These findings are consistent with rotator cuff arthropathy.  Surgical intervention would consist of reverse total shoulder arthroplasty.  She has tried, and failed, nonoperative treatment options and is unhappy with her quality of life.  She wishes to proceed with surgical intervention.    The patient voiced understanding of the risks, benefits, and alternative forms of treatment that were discussed and the patient consents to proceed with surgery.  All risks, benefits and alternatives were discussed.  Risks include, but not exclusive to anesthetic complications, including death, MI, CVA, infection, bleeding DVT, fracture, residual pain and need for future surgery.    This discussion was held with the patient by Baron Reynolds MD and all questions were answered.    Plan left reverse total shoulder arthroplasty.    We will proceed with a CT scan of the left shoulder for bony anatomy and to prepare for surgical planning.      Return for postop    Electronically signed by Baron Reynolds MD on 06/13/23 at 18:26 CDT

## 2023-06-13 NOTE — PAT
Chlorhexidine scrub given with instruction sheet. Instructions reviewed in PAT, understanding verbalized.    Patient instructed per MD that she does not have to stop Aspirin prior to procedure.    Patient instructed to hold this week's dose of Ozempic so she is off for 10 days prior to procedure.    ERAS instructions given.

## 2023-06-19 ENCOUNTER — ANESTHESIA (OUTPATIENT)
Dept: PERIOP | Facility: HOSPITAL | Age: 73
End: 2023-06-19
Payer: MEDICARE

## 2023-06-19 ENCOUNTER — ANESTHESIA EVENT (OUTPATIENT)
Dept: PERIOP | Facility: HOSPITAL | Age: 73
End: 2023-06-19
Payer: MEDICARE

## 2023-06-19 PROBLEM — M19.012 OSTEOARTHRITIS OF LEFT SHOULDER, UNSPECIFIED OSTEOARTHRITIS TYPE: Status: ACTIVE | Noted: 2023-06-19

## 2023-06-19 PROCEDURE — 25010000002 PHENYLEPHRINE 10 MG/ML SOLUTION 5 ML VIAL: Performed by: NURSE ANESTHETIST, CERTIFIED REGISTERED

## 2023-06-19 PROCEDURE — 25010000002 PHENYLEPHRINE 10 MG/ML SOLUTION: Performed by: NURSE ANESTHETIST, CERTIFIED REGISTERED

## 2023-06-19 PROCEDURE — 25010000002 NEOSTIGMINE 10 MG/10ML SOLUTION: Performed by: NURSE ANESTHETIST, CERTIFIED REGISTERED

## 2023-06-19 PROCEDURE — 25010000002 FENTANYL CITRATE (PF) 100 MCG/2ML SOLUTION: Performed by: NURSE ANESTHETIST, CERTIFIED REGISTERED

## 2023-06-19 PROCEDURE — 25010000002 DEXAMETHASONE PER 1 MG: Performed by: NURSE ANESTHETIST, CERTIFIED REGISTERED

## 2023-06-19 PROCEDURE — 25010000002 ONDANSETRON PER 1 MG: Performed by: NURSE ANESTHETIST, CERTIFIED REGISTERED

## 2023-06-19 PROCEDURE — 25010000002 ROPIVACAINE PER 1 MG: Performed by: ANESTHESIOLOGY

## 2023-06-19 PROCEDURE — 25010000002 SUCCINYLCHOLINE PER 20 MG: Performed by: NURSE ANESTHETIST, CERTIFIED REGISTERED

## 2023-06-19 PROCEDURE — 25010000002 PROPOFOL 200 MG/20ML EMULSION: Performed by: NURSE ANESTHETIST, CERTIFIED REGISTERED

## 2023-06-19 RX ORDER — DEXAMETHASONE SODIUM PHOSPHATE 4 MG/ML
INJECTION, SOLUTION INTRA-ARTICULAR; INTRALESIONAL; INTRAMUSCULAR; INTRAVENOUS; SOFT TISSUE AS NEEDED
Status: DISCONTINUED | OUTPATIENT
Start: 2023-06-19 | End: 2023-06-19 | Stop reason: SURG

## 2023-06-19 RX ORDER — ONDANSETRON 2 MG/ML
INJECTION INTRAMUSCULAR; INTRAVENOUS AS NEEDED
Status: DISCONTINUED | OUTPATIENT
Start: 2023-06-19 | End: 2023-06-19 | Stop reason: SURG

## 2023-06-19 RX ORDER — FENTANYL CITRATE 50 UG/ML
INJECTION, SOLUTION INTRAMUSCULAR; INTRAVENOUS AS NEEDED
Status: DISCONTINUED | OUTPATIENT
Start: 2023-06-19 | End: 2023-06-19 | Stop reason: SURG

## 2023-06-19 RX ORDER — LIDOCAINE HYDROCHLORIDE 10 MG/ML
INJECTION, SOLUTION EPIDURAL; INFILTRATION; INTRACAUDAL; PERINEURAL
Status: COMPLETED | OUTPATIENT
Start: 2023-06-19 | End: 2023-06-19

## 2023-06-19 RX ORDER — PHENYLEPHRINE HCL IN 0.9% NACL 0.5 MG/5ML
SYRINGE (ML) INTRAVENOUS AS NEEDED
Status: DISCONTINUED | OUTPATIENT
Start: 2023-06-19 | End: 2023-06-19 | Stop reason: SURG

## 2023-06-19 RX ORDER — NEOSTIGMINE METHYLSULFATE 1 MG/ML
INJECTION, SOLUTION INTRAVENOUS AS NEEDED
Status: DISCONTINUED | OUTPATIENT
Start: 2023-06-19 | End: 2023-06-19 | Stop reason: SURG

## 2023-06-19 RX ORDER — ROPIVACAINE HYDROCHLORIDE 5 MG/ML
INJECTION, SOLUTION EPIDURAL; INFILTRATION; PERINEURAL
Status: COMPLETED | OUTPATIENT
Start: 2023-06-19 | End: 2023-06-19

## 2023-06-19 RX ORDER — PROPOFOL 10 MG/ML
INJECTION, EMULSION INTRAVENOUS AS NEEDED
Status: DISCONTINUED | OUTPATIENT
Start: 2023-06-19 | End: 2023-06-19 | Stop reason: SURG

## 2023-06-19 RX ORDER — LIDOCAINE HYDROCHLORIDE 20 MG/ML
INJECTION, SOLUTION EPIDURAL; INFILTRATION; INTRACAUDAL; PERINEURAL AS NEEDED
Status: DISCONTINUED | OUTPATIENT
Start: 2023-06-19 | End: 2023-06-19 | Stop reason: SURG

## 2023-06-19 RX ORDER — SUCCINYLCHOLINE CHLORIDE 20 MG/ML
INJECTION INTRAMUSCULAR; INTRAVENOUS AS NEEDED
Status: DISCONTINUED | OUTPATIENT
Start: 2023-06-19 | End: 2023-06-19 | Stop reason: SURG

## 2023-06-19 RX ORDER — ROCURONIUM BROMIDE 10 MG/ML
INJECTION, SOLUTION INTRAVENOUS AS NEEDED
Status: DISCONTINUED | OUTPATIENT
Start: 2023-06-19 | End: 2023-06-19 | Stop reason: SURG

## 2023-06-19 RX ADMIN — GLYCOPYRROLATE 0.6 MCG: 0.2 INJECTION, SOLUTION INTRAMUSCULAR; INTRAVITREAL at 17:16

## 2023-06-19 RX ADMIN — PROPOFOL 130 MG: 10 INJECTION, EMULSION INTRAVENOUS at 14:56

## 2023-06-19 RX ADMIN — LIDOCAINE HYDROCHLORIDE 80 MG: 20 INJECTION, SOLUTION EPIDURAL; INFILTRATION; INTRACAUDAL; PERINEURAL at 14:56

## 2023-06-19 RX ADMIN — FENTANYL CITRATE 25 MCG: 50 INJECTION, SOLUTION INTRAMUSCULAR; INTRAVENOUS at 17:00

## 2023-06-19 RX ADMIN — SUCCINYLCHOLINE CHLORIDE 100 MG: 20 INJECTION, SOLUTION INTRAMUSCULAR; INTRAVENOUS at 14:56

## 2023-06-19 RX ADMIN — ROCURONIUM BROMIDE 10 MG: 10 INJECTION INTRAVENOUS at 16:06

## 2023-06-19 RX ADMIN — Medication 200 MCG: at 15:13

## 2023-06-19 RX ADMIN — LIDOCAINE HYDROCHLORIDE 30 MG: 10 INJECTION, SOLUTION EPIDURAL; INFILTRATION; INTRACAUDAL; PERINEURAL at 13:30

## 2023-06-19 RX ADMIN — Medication 2 G: at 14:59

## 2023-06-19 RX ADMIN — NEOSTIGMINE METHYLSULFATE 3 MG: 0.5 INJECTION INTRAVENOUS at 17:16

## 2023-06-19 RX ADMIN — ROCURONIUM BROMIDE 35 MG: 10 INJECTION INTRAVENOUS at 15:04

## 2023-06-19 RX ADMIN — ONDANSETRON 4 MG: 2 INJECTION INTRAMUSCULAR; INTRAVENOUS at 17:10

## 2023-06-19 RX ADMIN — PHENYLEPHRINE HYDROCHLORIDE 15 MCG/MIN: 10 INJECTION INTRAVENOUS at 15:21

## 2023-06-19 RX ADMIN — DEXAMETHASONE SODIUM PHOSPHATE 4 MG: 4 INJECTION, SOLUTION INTRAMUSCULAR; INTRAVENOUS at 15:31

## 2023-06-19 RX ADMIN — ROCURONIUM BROMIDE 5 MG: 10 INJECTION INTRAVENOUS at 14:56

## 2023-06-19 RX ADMIN — FENTANYL CITRATE 25 MCG: 50 INJECTION, SOLUTION INTRAMUSCULAR; INTRAVENOUS at 17:30

## 2023-06-19 RX ADMIN — ROPIVACAINE HYDROCHLORIDE 20 ML: 5 INJECTION, SOLUTION EPIDURAL; INFILTRATION; PERINEURAL at 13:30

## 2023-06-19 RX ADMIN — FENTANYL CITRATE 50 MCG: 50 INJECTION, SOLUTION INTRAMUSCULAR; INTRAVENOUS at 14:54

## 2023-06-19 RX ADMIN — SODIUM CHLORIDE: 9 INJECTION, SOLUTION INTRAVENOUS at 16:08

## 2023-06-19 NOTE — ANESTHESIA PROCEDURE NOTES
"Peripheral Block      Patient reassessed immediately prior to procedure    Patient location during procedure: pre-op  Start time: 6/19/2023 1:30 PM  Stop time: 6/19/2023 1:40 PM  Reason for block: procedure for pain, at surgeon's request, post-op pain management and secondary anesthetic  Performed by  Anesthesiologist: Brenda Brenner DO  Preanesthetic Checklist  Completed: patient identified, IV checked, site marked, risks and benefits discussed, surgical consent, monitors and equipment checked, pre-op evaluation and timeout performed  Prep:  Pt Position: supine  Sterile barriers:cap, gloves and sterile barriers  Prep: ChloraPrep  Patient monitoring: blood pressure monitoring, continuous pulse oximetry and EKG  Procedure    Sedation: no  Performed under: PNB  Guidance:ultrasound guided    ULTRASOUND INTERPRETATION.  Using ultrasound guidance a 20 G gauge needle was placed in close proximity to the brachial plexus nerve, at which point, under ultrasound guidance anesthetic was injected in the area of the nerve and spread of the anesthesia was seen on ultrasound in close proximity thereto.  There were no abnormalities seen on ultrasound; a digital image was taken; and the patient tolerated the procedure with no complications. Images:still images obtained, printed/placed on chart    Laterality:left  Block Type:interscalene  Injection Technique:single-shot  Needle Type:echogenic  Needle Gauge:20 G  Resistance on Injection: none    Medications Used: lidocaine PF 1% (XYLOCAINE) injection - Injection   30 mg - 6/19/2023 1:30:00 PM  ropivacaine (NAROPIN) 0.5 % injection - Injection   20 mL - 6/19/2023 1:30:00 PM      Post Assessment  Injection Assessment: negative aspiration for heme, no paresthesia on injection and incremental injection  Patient Tolerance:comfortable throughout block  Complications:no  Additional Notes  Pt & side identified  Side marked with \"A\"  U/S used throughout and needle seen throughout  No " complications  Pt tolerated procedure well

## 2023-06-19 NOTE — ANESTHESIA PREPROCEDURE EVALUATION
Anesthesia Evaluation     Patient summary reviewed and Nursing notes reviewed   history of anesthetic complications:  PONV  NPO Solid Status: > 8 hours  NPO Liquid Status: > 2 hours           Airway   Mallampati: II  TM distance: >3 FB  Neck ROM: limited  No difficulty expected  Comment: Successful airway: I-gel  Size 4   Number of attempts at approach: 1    Dental    (+) lower dentures and upper dentures    Pulmonary     breath sounds clear to auscultation  (+) a smoker Former,decreased breath sounds  (-) COPD, asthma, shortness of breath, recent URI, rhonchi, wheezes  Cardiovascular   Exercise tolerance: good (4-7 METS)    ECG reviewed  Rhythm: regular  Rate: normal    (+) hypertension 2 medications or greaterhyperlipidemia  (-) pacemaker, past MI, dysrhythmias, angina, murmur, cardiac stents, CABG, DVT    ROS comment: Normal sinus rhythm  Normal ECG  When compared with ECG of 14-MAY-2014 13:16,  No significant change was found  Confirmed by VETTIANKAL     Neuro/Psych  (+) dizziness/light headedness, psychiatric history Anxiety and Depression  (-) seizures, TIA, CVA  GI/Hepatic/Renal/Endo    (+) hiatal hernia, GERD well controlled, liver disease, diabetes mellitus type 2 well controlled using insulin  (-)  obesity, morbid obesity    Musculoskeletal     (+) arthralgias, back pain, neck pain  Abdominal    Substance History      OB/GYN          Other   arthritis (left knee),     ROS/Med Hx Other: MRI left shoulder:  Impression: 1. Partial thickness tears of the distal supraspinatus and infraspinatus tendons with no full-thickness tear. 2. Moderate degenerative joint disease of the glenohumeral and AC joints.    S/P cervical surgeries x2-3. Extensive fusion C 3-7. [facility]    Hx of DM: last UbbH0C=9.60. Pt on ozempic & farxiga. Pt has been on both for a couple years. Ozempic last taken 6/7/2023 and Farxiga was yesterday. Pt doesn't have a recent change in dosage or either medication. Pt has a hx of  hiatal hernia & GERD symptoms are controlled by omeprazole.       Phys Exam Other: Decreased extension of cervical spine due to fusion of multiple cervical vertebrae. Also struggles with rotation B/L of cervical spine                Anesthesia Plan    ASA 3     general with block   Rapid sequence  (Previous back and neck surgery.  Discussed peripheral nerve block (interscalene) for post op pain relief and patient understands possible complications, risks, & agrees.  )  intravenous induction     Anesthetic plan, risks, benefits, and alternatives have been provided, discussed and informed consent has been obtained with: patient and spouse/significant other.  Pre-procedure education provided  Use of blood products discussed with patient and spouse/significant other  Consented to blood products.    Plan discussed with CRNA.

## 2023-06-19 NOTE — ANESTHESIA PROCEDURE NOTES
Airway  Urgency: elective    Date/Time: 6/19/2023 2:57 PM  End Time:6/19/2023 2:58 PM  Airway not difficult    General Information and Staff    Patient location during procedure: OR  CRNA/CAA: Osmar Beltran CRNA  SRNA: Jennifer Small SRNA  Indications and Patient Condition  Indications for airway management: airway protection    Preoxygenated: yes  MILS maintained throughout  Mask difficulty assessment: 0 - not attempted    Final Airway Details  Final airway type: endotracheal airway      Successful airway: ETT  Cuffed: yes   Successful intubation technique: RSI and video laryngoscopy  Facilitating devices/methods: intubating stylet and cricoid pressure  Endotracheal tube insertion site: oral  Blade: Day  Blade size: 3  ETT size (mm): 7.0  Cormack-Lehane Classification: grade I - full view of glottis  Placement verified by: chest auscultation, capnometry and palpation of cuff   Cuff volume (mL): 5  Measured from: lips  ETT/EBT  to lips (cm): 20  Number of attempts at approach: 1  Assessment: lips, teeth, and gum same as pre-op and atraumatic intubation    Additional Comments  Day X3 used r/t hx of cervical issues/surgery

## 2023-06-19 NOTE — ANESTHESIA POSTPROCEDURE EVALUATION
Patient: Otilia Kenny    Procedure Summary     Date: 06/19/23 Room / Location: [facility] OR [address] [place] [address] OR    Anesthesia Start: 1452 Anesthesia Stop: 1743    Procedure: LEFT REVERSE TOTAL SHOULDER ARTHROPLASTY. (Left: Shoulder) Diagnosis:       Rotator cuff syndrome of right shoulder      Chronic pain of both shoulders      Type 2 diabetes mellitus with microalbuminuria, without long-term current use of insulin      Gastroesophageal reflux disease without esophagitis      Generalized anxiety disorder      Nontraumatic incomplete tear of left rotator cuff      Primary osteoarthritis, left shoulder      (Rotator cuff syndrome of right shoulder [M75.101])      (Chronic pain of both shoulders [M25.511, G89.29, M25.512])      (Type 2 diabetes mellitus with microalbuminuria, without long-term current use of insulin (HCC) [E11.29, R80.9])      (Gastroesophageal reflux disease without esophagitis [K21.9])      (Generalized anxiety disorder [F41.1])      (Nontraumatic incomplete tear of left rotator cuff [M75.112])      (Primary osteoarthritis, left shoulder [M19.012])    Surgeons: Baron Reynolds MD Provider: Osmar Beltran CRNA    Anesthesia Type: general with block ASA Status: 3          Anesthesia Type: general with block    Vitals  No vitals data found for the desired time range.          Post Anesthesia Care and Evaluation    Patient location during evaluation: PACU  Patient participation: waiting for patient participation  Level of consciousness: awake  Pain score: 0  Pain management: adequate    Airway patency: patent  Anesthetic complications: No anesthetic complications  PONV Status: none  Cardiovascular status: acceptable  Respiratory status: acceptable  Hydration status: acceptable    Comments: 97.3, HR 78, 153/67, 95%, RR 14

## 2023-06-27 PROBLEM — M75.101 ROTATOR CUFF SYNDROME OF RIGHT SHOULDER: Chronic | Status: ACTIVE | Noted: 2023-04-11

## 2023-06-27 PROBLEM — M19.011 OSTEOARTHRITIS OF RIGHT AC (ACROMIOCLAVICULAR) JOINT: Chronic | Status: ACTIVE | Noted: 2022-12-08

## 2023-07-05 LAB
QT INTERVAL: 386 MS
QTC INTERVAL: 407 MS

## 2023-07-26 ENCOUNTER — OFFICE VISIT (OUTPATIENT)
Dept: FAMILY MEDICINE CLINIC | Facility: CLINIC | Age: 73
End: 2023-07-26
Payer: MEDICARE

## 2023-07-26 ENCOUNTER — OFFICE VISIT (OUTPATIENT)
Dept: PODIATRY | Facility: CLINIC | Age: 73
End: 2023-07-26
Payer: MEDICARE

## 2023-07-26 VITALS
HEART RATE: 74 BPM | HEIGHT: 64 IN | WEIGHT: 165 LBS | SYSTOLIC BLOOD PRESSURE: 138 MMHG | BODY MASS INDEX: 28.17 KG/M2 | OXYGEN SATURATION: 98 % | DIASTOLIC BLOOD PRESSURE: 82 MMHG

## 2023-07-26 VITALS
DIASTOLIC BLOOD PRESSURE: 70 MMHG | WEIGHT: 168 LBS | SYSTOLIC BLOOD PRESSURE: 122 MMHG | TEMPERATURE: 98.2 F | OXYGEN SATURATION: 98 % | HEIGHT: 64 IN | BODY MASS INDEX: 28.68 KG/M2 | HEART RATE: 63 BPM

## 2023-07-26 DIAGNOSIS — E55.9 VITAMIN D DEFICIENCY: Chronic | ICD-10-CM

## 2023-07-26 DIAGNOSIS — M19.079 ARTHRITIS OF MIDFOOT: Primary | ICD-10-CM

## 2023-07-26 DIAGNOSIS — E78.5 HYPERLIPIDEMIA ASSOCIATED WITH TYPE 2 DIABETES MELLITUS: Chronic | ICD-10-CM

## 2023-07-26 DIAGNOSIS — D51.8 VITAMIN B12 DEFICIENCY (DIETARY) ANEMIA: Chronic | ICD-10-CM

## 2023-07-26 DIAGNOSIS — I10 ESSENTIAL HYPERTENSION: Chronic | ICD-10-CM

## 2023-07-26 DIAGNOSIS — E78.1 HYPERTRIGLYCERIDEMIA: Chronic | ICD-10-CM

## 2023-07-26 DIAGNOSIS — F41.1 GENERALIZED ANXIETY DISORDER: Chronic | ICD-10-CM

## 2023-07-26 DIAGNOSIS — E11.69 HYPERLIPIDEMIA ASSOCIATED WITH TYPE 2 DIABETES MELLITUS: Chronic | ICD-10-CM

## 2023-07-26 DIAGNOSIS — Z79.4 TYPE 2 DIABETES MELLITUS WITH MICROALBUMINURIA, WITH LONG-TERM CURRENT USE OF INSULIN: Primary | Chronic | ICD-10-CM

## 2023-07-26 DIAGNOSIS — D50.9 IRON DEFICIENCY ANEMIA, UNSPECIFIED IRON DEFICIENCY ANEMIA TYPE: Chronic | ICD-10-CM

## 2023-07-26 DIAGNOSIS — M79.671 RIGHT FOOT PAIN: ICD-10-CM

## 2023-07-26 DIAGNOSIS — K21.9 GASTROESOPHAGEAL REFLUX DISEASE WITHOUT ESOPHAGITIS: Chronic | ICD-10-CM

## 2023-07-26 DIAGNOSIS — E11.29 TYPE 2 DIABETES MELLITUS WITH MICROALBUMINURIA, WITH LONG-TERM CURRENT USE OF INSULIN: Primary | Chronic | ICD-10-CM

## 2023-07-26 DIAGNOSIS — R80.9 TYPE 2 DIABETES MELLITUS WITH MICROALBUMINURIA, WITH LONG-TERM CURRENT USE OF INSULIN: Primary | Chronic | ICD-10-CM

## 2023-07-26 DIAGNOSIS — F33.1 MODERATE EPISODE OF RECURRENT MAJOR DEPRESSIVE DISORDER: Chronic | ICD-10-CM

## 2023-07-26 DIAGNOSIS — E66.3 OVERWEIGHT (BMI 25.0-29.9): Chronic | ICD-10-CM

## 2023-07-26 RX ORDER — BLOOD-GLUCOSE METER
KIT MISCELLANEOUS
Qty: 1 EACH | Refills: 0 | Status: SHIPPED | OUTPATIENT
Start: 2023-07-26

## 2023-07-26 NOTE — PROGRESS NOTES
"Chief Complaint  Follow-up (6 month)    Subjective        History of Present Illness    Otilia Kenny presents to the office for 6-month follow-up of multiple medical issues including type 2 diabetes, high cholesterol, high triglycerides, hypertension, GERD, depression, iron deficiency anemia and other issues.  Patient is getting episodic foot injects by Dr. Aureliano Rapp to treat mid-foot arthritis.     Patient underwent left reverse total shoulder arthroplasty by Dr. Baron Reynolds 06/19/2023. She has some mild soreness, but overall good pain relief and good progress with physical therapy.  Her hemoglobin had trended down after surgery at 11.4, which is now up a unit at 12.4 with increasing her oral iron to daily.  Iron saturation remains a little low at 15.  B-12 at goal.        Weight is up 2 pounds in the past six months.  Diabetes excellent control.  She reports a couple of hypoglycemic episodes with glucose dipping in the 70s and the episodes occurred prior to mid-day meal.  She has a diabetic eye exam scheduled next week.      Patient has benign-appearing SKs on her bilateral hands, which are not currently bothering her.  We can address with cryotherapy when needed.      BP at goal in the office today at 122/70.      The patient's relevant past medical, surgical, and social history was reviewed in Epic.  Lab results are reviewed with the patient today.  CBC reveals improved hemoglobin 12.4.  A1c excellent at 6.3.  Fasting glucose 117.  Normal renal and liver function.  Cholesterol acceptable with combination of Crestor and Zetia.     Objective   Vital Signs:  /70   Pulse 63   Temp 98.2 °F (36.8 °C)   Ht 162.6 cm (64\")   Wt 76.2 kg (168 lb)   SpO2 98%   BMI 28.84 kg/m²   Estimated body mass index is 28.84 kg/m² as calculated from the following:    Height as of this encounter: 162.6 cm (64\").    Weight as of this encounter: 76.2 kg (168 lb).           Physical Exam  Vitals reviewed. "   Constitutional:       General: She is not in acute distress.     Appearance: She is well-developed.      Comments: Pleasant female.  Accompanied by , [place].     HENT:      Head: Normocephalic and atraumatic.      Nose:      Right Sinus: No maxillary sinus tenderness or frontal sinus tenderness.      Left Sinus: No maxillary sinus tenderness or frontal sinus tenderness.      Mouth/Throat:      Mouth: No oral lesions.      Pharynx: Uvula midline.      Tonsils: No tonsillar exudate.   Eyes:      Conjunctiva/sclera: Conjunctivae normal.      Pupils: Pupils are equal, round, and reactive to light.   Neck:      Thyroid: No thyroid mass or thyromegaly.      Vascular: No carotid bruit or JVD.      Trachea: Trachea normal. No tracheal deviation.   Cardiovascular:      Rate and Rhythm: Normal rate and regular rhythm. No extrasystoles are present.     Chest Wall: PMI is not displaced.      Heart sounds: Normal heart sounds. No murmur heard.  Pulmonary:      Effort: Pulmonary effort is normal. No accessory muscle usage or respiratory distress.      Breath sounds: Normal breath sounds. No decreased breath sounds, wheezing, rhonchi or rales.   Abdominal:      General: Bowel sounds are normal. There is no distension.      Palpations: Abdomen is soft.      Tenderness: There is no abdominal tenderness.   Musculoskeletal:      Cervical back: Neck supple.      Comments: Immobilizer left upper extremity.    Lymphadenopathy:      Cervical: No cervical adenopathy.   Skin:     General: Skin is warm and dry.      Findings: No rash.      Nails: There is no clubbing.      Comments: benign-appearing SKs on her bilateral hands   Neurological:      Mental Status: She is alert and oriented to person, place, and time.      Cranial Nerves: No cranial nerve deficit.      Coordination: Coordination normal.   Psychiatric:         Speech: Speech normal.         Behavior: Behavior normal.         Thought Content: Thought content normal.          Judgment: Judgment normal.          Result Review :    CMP          1/3/2023    08:02 6/13/2023    09:03 7/18/2023    08:27   CMP   Glucose 116  83  117    BUN 22  22  18    Creatinine 0.93  0.82  0.85    EGFR 64.6  74.7  71.5    Sodium 132  137  138    Potassium 4.2  4.5  4.2    Chloride 97  101  101    Calcium 9.2  9.4  9.0    Total Protein 7.1   6.9    Albumin 4.3   4.0    Globulin 2.8   2.9    Total Bilirubin 0.5   0.5    Alkaline Phosphatase 70   76    AST (SGOT) 32   26    ALT (SGPT) 31   21    Albumin/Globulin Ratio 1.5   1.4    BUN/Creatinine Ratio 23.7  26.8  21.2    Anion Gap 9.0  11.0  7.0      CBC w/diff          1/3/2023    08:02 6/27/2023    12:06 7/18/2023    08:27   CBC w/Diff   WBC 5.49  7.14  4.20    RBC 4.24  3.71  4.07    Hemoglobin 12.9  11.4  12.4    Hematocrit 36.6  34.5  38.4    MCV 86.3  93.0  94.3    MCH 30.4  30.7  30.5    MCHC 35.2  33.0  32.3    RDW 12.3  12.8  12.9    Platelets 325  536  338    Neutrophil Rel % 60.8  70.8  48.1    Lymphocyte Rel % 26.8  16.8  34.5    Monocyte Rel % 8.7  9.7  10.2    Eosinophil Rel % 2.6  2.1  6.0    Basophil Rel % 1.1  0.6  1.2      Lipid Panel          1/3/2023    08:02 7/18/2023    08:27   Lipid Panel   Total Cholesterol  165    Triglycerides 67  158    HDL Cholesterol  44    VLDL Cholesterol  28    LDL Cholesterol  81  93    LDL/HDL Ratio  2.03      TSH          7/18/2023    08:27   TSH   TSH 3.170      A1C Last 3 Results          1/3/2023    08:02 7/18/2023    08:27   HGBA1C Last 3 Results   Hemoglobin A1C 6.60  6.30      Data reviewed : Consultant notes Dr. Baron Reynolds             Assessment and Plan   Diagnoses and all orders for this visit:    1. Type 2 diabetes mellitus with microalbuminuria, with long-term current use of insulin (Primary)  -     glucose monitor monitoring kit; Check glucose 3 times  daily  Dispense: 1 each; Refill: 0  -     glucose blood test strip; Check 3 times  daily  Dispense: 300 each; Refill: 3  -     Lancets 30G misc;  Check 3 times daily  Dispense: 100 each; Refill: 3  -     CBC Auto Differential; Future  -     Comprehensive Metabolic Panel; Future  -     Hemoglobin A1c; Future  -     Ferritin; Future  -     Iron Profile; Future  -     LDL Cholesterol, Direct; Future  -     Microalbumin / Creatinine Urine Ratio - Urine, Clean Catch; Future    2. Hyperlipidemia associated with type 2 diabetes mellitus  -     LDL Cholesterol, Direct; Future    3. Essential hypertension  -     Comprehensive Metabolic Panel; Future    4. Hypertriglyceridemia  -     Triglycerides; Future    5. Overweight (BMI 25.0-29.9)  -     Comprehensive Metabolic Panel; Future    6. Vitamin D deficiency  -     Vitamin D,25-Hydroxy; Future    7. Gastroesophageal reflux disease without esophagitis    8. Iron deficiency anemia, unspecified iron deficiency anemia type  -     CBC Auto Differential; Future  -     Ferritin; Future  -     Iron Profile; Future    9. Vitamin B12 deficiency (dietary) anemia  -     CBC Auto Differential; Future    10. Moderate episode of recurrent major depressive disorder    11. Generalized anxiety disorder               Continue current diabetic medications and management plan.  Diabetes excellent control.  We will send a prescription for new glucometer and test strips to mail order pharmacy.  She is scheduled for diabetic eye exam next week.       Post-operative iron deficiency anemia improved, although, iron saturation remains low.  Continue daily oral iron and oral B-12 supplements.  B-12 at goal.      Continue Lexapro for depression/PARMJIT, stable.      Continue Zetia, fenofibrate and Crestor.  Cholesterol at goal.  Triglycerides still slightly elevated.  Pursue dietary efforts.      Hypertension is well controlled with low-dose lisinopril.    Continue oral vitamin D/calcuim.  She will be due repeat DEXA 12/2023.       Return in 6 months for routine follow up with fasting labs one week prior or sooner if needed.     Scribed for Dr. Zabala  by Danilo oRse.      Follow Up   Return in about 6 months (around 1/26/2024) for Next scheduled follow up - labs 1 week prior, Follow up in six months with labs one week prior..  Patient was given instructions and counseling regarding her condition or for health maintenance advice. Please see specific information pulled into the AVS if appropriate.

## 2023-07-26 NOTE — PATIENT INSTRUCTIONS
Exercising to Lose Weight  Getting regular exercise is important for everyone. It is especially important if you are overweight. Being overweight increases your risk of heart disease, stroke, diabetes, high blood pressure, and several types of cancer. Exercising, and reducing the calories you consume, can help you lose weight and improve fitness and health.  Exercise can be moderate or vigorous intensity. To lose weight, most people need to do a certain amount of moderate or vigorous-intensity exercise each week.  How can exercise affect me?  You lose weight when you exercise enough to burn more calories than you eat. Exercise also reduces body fat and builds muscle. The more muscle you have, the more calories you burn. Exercise also:  Improves mood.  Reduces stress and tension.  Improves your overall fitness, flexibility, and endurance.  Increases bone strength.  Moderate-intensity exercise    Moderate-intensity exercise is any activity that gets you moving enough to burn at least three times more energy (calories) than if you were sitting.  Examples of moderate exercise include:  Walking a mile in 15 minutes.  Doing light yard work.  Biking at an easy pace.  Most people should get at least 150 minutes of moderate-intensity exercise a week to maintain their body weight.  Vigorous-intensity exercise  Vigorous-intensity exercise is any activity that gets you moving enough to burn at least six times more calories than if you were sitting. When you exercise at this intensity, you should be working hard enough that you are not able to carry on a conversation.  Examples of vigorous exercise include:  Running.  Playing a team sport, such as football, basketball, and soccer.  Jumping rope.  Most people should get at least 75 minutes a week of vigorous exercise to maintain their body weight.  What actions can I take to lose weight?  The amount of exercise you need to lose weight depends on:  Your age.  The type of  exercise.  Any health conditions you have.  Your overall physical ability.  Talk to your health care provider about how much exercise you need and what types of activities are safe for you.  Nutrition    Make changes to your diet as told by your health care provider or diet and nutrition specialist (dietitian). This may include:  Eating fewer calories.  Eating more protein.  Eating less unhealthy fats.  Eating a diet that includes fresh fruits and vegetables, whole grains, low-fat dairy products, and lean protein.  Avoiding foods with added fat, salt, and sugar.  Drink plenty of water while you exercise to prevent dehydration or heat stroke.  Activity  Choose an activity that you enjoy and set realistic goals. Your health care provider can help you make an exercise plan that works for you.  Exercise at a moderate or vigorous intensity most days of the week.  The intensity of exercise may vary from person to person. You can tell how intense a workout is for you by paying attention to your breathing and heartbeat. Most people will notice their breathing and heartbeat get faster with more intense exercise.  Do resistance training twice each week, such as:  Push-ups.  Sit-ups.  Lifting weights.  Using resistance bands.  Getting short amounts of exercise can be just as helpful as long, structured periods of exercise. If you have trouble finding time to exercise, try doing these things as part of your daily routine:  Get up, stretch, and walk around every 30 minutes throughout the day.  Go for a walk during your lunch break.  Park your car farther away from your destination.  If you take public transportation, get off one stop early and walk the rest of the way.  Make phone calls while standing up and walking around.  Take the stairs instead of elevators or escalators.  Wear comfortable clothes and shoes with good support.  Do not exercise so much that you hurt yourself, feel dizzy, or get very short of breath.  Where to  find more information  [place] Department of Health and Human Services: www.hhs.gov  Centers for Disease Control and Prevention: www.cdc.gov  Contact a health care provider:  Before starting a new exercise program.  If you have questions or concerns about your weight.  If you have a medical problem that keeps you from exercising.  Get help right away if:  You have any of the following while exercising:  Injury.  Dizziness.  Difficulty breathing or shortness of breath that does not go away when you stop exercising.  Chest pain.  Rapid heartbeat.  These symptoms may represent a serious problem that is an emergency. Do not wait to see if the symptoms will go away. Get medical help right away. Call your local emergency services (911 in the [place] Do not drive yourself to the hospital.  Summary  Getting regular exercise is especially important if you are overweight.  Being overweight increases your risk of heart disease, stroke, diabetes, high blood pressure, and several types of cancer.  Losing weight happens when you burn more calories than you eat.  Reducing the amount of calories you eat, and getting regular moderate or vigorous exercise each week, helps you lose weight.  This information is not intended to replace advice given to you by your health care provider. Make sure you discuss any questions you have with your health care provider.  Document Revised: 02/13/2022 Document Reviewed: 02/13/2022  Elsevier Patient Education © 2023 [employer]  Calorie Counting for Weight Loss  Calories are units of energy. Your body needs a certain number of calories from food to keep going throughout the day. When you eat or drink more calories than your body needs, your body stores the extra calories mostly as fat. When you eat or drink fewer calories than your body needs, your body burns fat to get the energy it needs.  Calorie counting means keeping track of how many calories you eat and drink each day. Calorie counting can be  helpful if you need to lose weight. If you eat fewer calories than your body needs, you should lose weight. Ask your health care provider what a healthy weight is for you.  For calorie counting to work, you will need to eat the right number of calories each day to lose a healthy amount of weight per week. A dietitian can help you figure out how many calories you need in a day and will suggest ways to reach your calorie goal.  A healthy amount of weight to lose each week is usually 1-2 lb (0.5-0.9 kg). This usually means that your daily calorie intake should be reduced by 500-750 calories.  Eating 1,200-1,500 calories a day can help most women lose weight.  Eating 1,500-1,800 calories a day can help most men lose weight.  What do I need to know about calorie counting?  Work with your health care provider or dietitian to determine how many calories you should get each day. To meet your daily calorie goal, you will need to:  Find out how many calories are in each food that you would like to eat. Try to do this before you eat.  Decide how much of the food you plan to eat.  Keep a food log. Do this by writing down what you ate and how many calories it had.  To successfully lose weight, it is important to balance calorie counting with a healthy lifestyle that includes regular activity.  Where do I find calorie information?    The number of calories in a food can be found on a Nutrition Facts label. If a food does not have a Nutrition Facts label, try to look up the calories online or ask your dietitian for help.  Remember that calories are listed per serving. If you choose to have more than one serving of a food, you will have to multiply the calories per serving by the number of servings you plan to eat. For example, the label on a package of bread might say that a serving size is 1 slice and that there are 90 calories in a serving. If you eat 1 slice, you will have eaten 90 calories. If you eat 2 slices, you will have  eaten 180 calories.  How do I keep a food log?  After each time that you eat, record the following in your food log as soon as possible:  What you ate. Be sure to include toppings, sauces, and other extras on the food.  How much you ate. This can be measured in cups, ounces, or number of items.  How many calories were in each food and drink.  The total number of calories in the food you ate.  Keep your food log near you, such as in a pocket-sized notebook or on an katerin or website on your mobile phone. Some programs will calculate calories for you and show you how many calories you have left to meet your daily goal.  What are some portion-control tips?  Know how many calories are in a serving. This will help you know how many servings you can have of a certain food.  Use a measuring cup to measure serving sizes. You could also try weighing out portions on a kitchen scale. With time, you will be able to estimate serving sizes for some foods.  Take time to put servings of different foods on your favorite plates or in your favorite bowls and cups so you know what a serving looks like.  Try not to eat straight from a food's packaging, such as from a bag or box. Eating straight from the package makes it hard to see how much you are eating and can lead to overeating. Put the amount you would like to eat in a cup or on a plate to make sure you are eating the right portion.  Use smaller plates, glasses, and bowls for smaller portions and to prevent overeating.  Try not to multitask. For example, avoid watching TV or using your computer while eating. If it is time to eat, sit down at a table and enjoy your food. This will help you recognize when you are full. It will also help you be more mindful of what and how much you are eating.  What are tips for following this plan?  Reading food labels  Check the calorie count compared with the serving size. The serving size may be smaller than what you are used to eating.  Check the  source of the calories. Try to choose foods that are high in protein, fiber, and vitamins, and low in saturated fat, trans fat, and sodium.  Shopping  Read nutrition labels while you shop. This will help you make healthy decisions about which foods to buy.  Pay attention to nutrition labels for low-fat or fat-free foods. These foods sometimes have the same number of calories or more calories than the full-fat versions. They also often have added sugar, starch, or salt to make up for flavor that was removed with the fat.  Make a grocery list of lower-calorie foods and stick to it.  Cooking  Try to cook your favorite foods in a healthier way. For example, try baking instead of frying.  Use low-fat dairy products.  Meal planning  Use more fruits and vegetables. One-half of your plate should be fruits and vegetables.  Include lean proteins, such as chicken, turkey, and fish.  Lifestyle  Each week, aim to do one of the followin minutes of moderate exercise, such as walking.  75 minutes of vigorous exercise, such as running.  General information  Know how many calories are in the foods you eat most often. This will help you calculate calorie counts faster.  Find a way of tracking calories that works for you. Get creative. Try different apps or programs if writing down calories does not work for you.  What foods should I eat?    Eat nutritious foods. It is better to have a nutritious, high-calorie food, such as an avocado, than a food with few nutrients, such as a bag of potato chips.  Use your calories on foods and drinks that will fill you up and will not leave you hungry soon after eating.  Examples of foods that fill you up are nuts and nut butters, vegetables, lean proteins, and high-fiber foods such as whole grains. High-fiber foods are foods with more than 5 g of fiber per serving.  Pay attention to calories in drinks. Low-calorie drinks include water and unsweetened drinks.  The items listed above may not be  a complete list of foods and beverages you can eat. Contact a dietitian for more information.  What foods should I limit?  Limit foods or drinks that are not good sources of vitamins, minerals, or protein or that are high in unhealthy fats. These include:  Candy.  Other sweets.  Sodas, specialty coffee drinks, alcohol, and juice.  The items listed above may not be a complete list of foods and beverages you should avoid. Contact a dietitian for more information.  How do I count calories when eating out?  Pay attention to portions. Often, portions are much larger when eating out. Try these tips to keep portions smaller:  Consider sharing a meal instead of getting your own.  If you get your own meal, eat only half of it. Before you start eating, ask for a container and put half of your meal into it.  When available, consider ordering smaller portions from the menu instead of full portions.  Pay attention to your food and drink choices. Knowing the way food is cooked and what is included with the meal can help you eat fewer calories.  If calories are listed on the menu, choose the lower-calorie options.  Choose dishes that include vegetables, fruits, whole grains, low-fat dairy products, and lean proteins.  Choose items that are boiled, broiled, grilled, or steamed. Avoid items that are buttered, battered, fried, or served with cream sauce. Items labeled as crispy are usually fried, unless stated otherwise.  Choose water, low-fat milk, unsweetened iced tea, or other drinks without added sugar. If you want an alcoholic beverage, choose a lower-calorie option, such as a glass of wine or light beer.  Ask for dressings, sauces, and syrups on the side. These are usually high in calories, so you should limit the amount you eat.  If you want a salad, choose a garden salad and ask for grilled meats. Avoid extra toppings such as yu, cheese, or fried items. Ask for the dressing on the side, or ask for olive oil and vinegar or  lemon to use as dressing.  Estimate how many servings of a food you are given. Knowing serving sizes will help you be aware of how much food you are eating at restaurants.  Where to find more information  Centers for Disease Control and Prevention: www.cdc.gov  [place] Department of Agriculture: myplate.gov  Summary  Calorie counting means keeping track of how many calories you eat and drink each day. If you eat fewer calories than your body needs, you should lose weight.  A healthy amount of weight to lose per week is usually 1-2 lb (0.5-0.9 kg). This usually means reducing your daily calorie intake by 500-750 calories.  The number of calories in a food can be found on a Nutrition Facts label. If a food does not have a Nutrition Facts label, try to look up the calories online or ask your dietitian for help.  Use smaller plates, glasses, and bowls for smaller portions and to prevent overeating.  Use your calories on foods and drinks that will fill you up and not leave you hungry shortly after a meal.  This information is not intended to replace advice given to you by your health care provider. Make sure you discuss any questions you have with your health care provider.  Document Revised: 01/28/2021 Document Reviewed: 01/28/2021  Elsevier Patient Education © 2023 [employer]

## 2023-07-26 NOTE — PROGRESS NOTES
"Otilia Kenny  1950  73 y.o. female  BS:124  PCP:Vj       07/26/2023    Chief Complaint   Patient presents with    Right Foot - Pain, Follow-up       History of Present Illness    Otilia Kenny is a 73 y.o.female Patient presents to clinic for right foot pain.      Past Medical History:   Diagnosis Date    Abdominal pain     Most likely related to functional colonic spasm      Abnormal liver enzymes     Improved, 3/2015       Allergic rhinitis     seasonal    Allergic rhinitis     Anemia     Anesthesia complication     states sometime B/P drops    Arthritis     Cervical disc disorder     S/P cervical surgeries x2-3. Extensive fusion C 3-7. [place] [facility]       Depression     Diarrhea, unspecified     Resolved with discontinuation of metformin.     Diverticular disease of colon     Dysfunction of eustachian tube     Elevated cholesterol     Essential hypertension     Increase lisinopril HCT, 10/12.5.       Gastritis     Generalized anxiety disorder     GERD (gastroesophageal reflux disease)     History of transfusion     Hyperlipidemia     Intolerant of Lipitor. The patient stopped Zocor due to \"liver pain\", summer 2015. patient instructed to reattempt Zocor every other day, 10/2015     Hyperlipidemia associated with type 2 diabetes mellitus 11/25/2020    Hypertriglyceridemia     Holding simvastatin 2/2015 due to slightly elevated LFTs.       Iron deficiency anemia     Irritable bowel syndrome     Dr. Estrada    Meniere's disease of both ears 06/12/2018    Osteoarthritis of knee     Dr. Reynolds     PONDAVID (postoperative nausea and vomiting)     Sleep disorder     Spasm of back muscles     Dr. Castro changed Soma to Zanaflex.      Type 2 diabetes mellitus with microalbuminuria, without long-term current use of insulin 05/07/2018    Added automatically from request for surgery [id number]    Vitamin B12 deficiency (dietary) anemia     Currently on multivitamin daily           Past Surgical History: "   Procedure Laterality Date    BACK SURGERY      Lumbar Laminectomy    BUNIONECTOMY Left     CARPAL TUNNEL RELEASE Bilateral     CERVICAL DISC SURGERY      Laminectomy 2011. Fusion 2013.    CHOLECYSTECTOMY      COLONOSCOPY      Diverticulosis found in the sigmoid colon.A single diminutive polyp found in the colon.Mul.biopsies taken.Hemorrhoids found in the anus.    CYST REMOVAL      forehead    ENDOSCOPY      Normal hypopharynx and esophagus.Marked irregularity of the Z-line,compatable with chronic reflux.Mul.biopsies.A hiatus hernia found in GE junction.Mild nonerosive gastritis.Mul.biopsies.Polyps in fundus.Mul.biopsies.Normal pylorus.Normal duodenum.    ENDOSCOPY N/A 12/28/2021    Procedure: ESOPHAGOGASTRODUODENOSCOPY;  Surgeon: Samra Castillo MD;  Location: [facility] ENDOSCOPY;  Service: Gastroenterology;  Laterality: N/A;    ENDOSCOPY AND COLONOSCOPY      FINGER/THUMB LESION/CYST EXCISION Right     right thumb    HEEL SPUR EXCISION Bilateral     HYSTERECTOMY      KNEE ARTHROSCOPY Bilateral     Arthroscopy of the left knee with debridement of medial meniscus.    KNEE SURGERY Right     Right unicompartmental arthroplasty.    OOPHORECTOMY      TONSILLECTOMY      TOTAL KNEE ARTHROPLASTY Left 06/04/2018    Procedure: TOTAL KNEE ARTHROPLASTY ATTUNE with adductor canal block ;  Surgeon: Baron Reynolds MD;  Location: [facility] OR;  Service: Orthopedics    TOTAL SHOULDER ARTHROPLASTY W/ DISTAL CLAVICLE EXCISION Left 6/19/2023    Procedure: LEFT REVERSE TOTAL SHOULDER ARTHROPLASTY.;  Surgeon: Baron Reynolds MD;  Location: [facility] OR;  Service: Orthopedics;  Laterality: Left;    TRIGGER FINGER RELEASE Bilateral     UPPER GASTROINTESTINAL ENDOSCOPY  12/28/2021    Dr. Samra Castillo M.D., [facility], [place], [place]         Family History   Problem Relation Age of Onset    Ovarian cancer Sister     Breast cancer Sister     Cancer Other         lung    Diabetes Other     Hypertension Other      Stroke Other        Allergies   Allergen Reactions    Allegra [Fexofenadine] Itching    Amaryl [Glimepiride] Myalgia    Dyazide [Triamterene-Hctz] Itching    Hydrochlorothiazide W-Triamterene Itching    Lipitor [Atorvastatin] Myalgia    Metformin And Related Diarrhea    Naproxen Itching    Actos [Pioglitazone] Rash    Sulfamethoxazole-Trimethoprim Rash     dizziness       Social History     Socioeconomic History    Marital status:    Tobacco Use    Smoking status: Former     Packs/day: 1.00     Years: 10.00     Pack years: 10.00     Types: Cigarettes     Quit date: 1985     Years since quittin.1     Passive exposure: Past    Smokeless tobacco: Never   Vaping Use    Vaping Use: Never used   Substance and Sexual Activity    Alcohol use: No    Drug use: No    Sexual activity: Defer         Current Outpatient Medications   Medication Sig Dispense Refill    aspirin 81 MG EC tablet Take 1 tablet by mouth 2 (Two) Times a Day. 60 tablet 0    Blood Glucose Monitoring Suppl (ONETOUCH VERIO IQ SYSTEM) w/Device kit 1 each Daily. 1 kit 0    CALCIUM CARB-CHOLECALCIFEROL PO Take 1 tablet by mouth 2 (two) times a day.      cetirizine (zyrTEC) 10 MG tablet Take 1 tablet by mouth Every Night.      cholecalciferol (VITAMIN D3) 25 MCG (1000 UT) tablet Take 1 tablet by mouth 2 (Two) Times a Week.      dapagliflozin Propanediol (Farxiga) 10 MG tablet Take 10 mg by mouth Daily. 90 tablet 1    escitalopram (Lexapro) 10 MG tablet Take 1 tablet by mouth Daily. 90 tablet 3    ezetimibe (ZETIA) 10 MG tablet Take 1 tablet by mouth Every Night.      fenofibrate (TRICOR) 145 MG tablet Take 1 tablet by mouth Daily.      ferrous sulfate 325 (65 FE) MG tablet Take 1 tablet by mouth 3 (Three) Times a Week.      Insulin Lispro Prot & Lispro (HumaLOG Mix 75/25 KwikPen) (75-25) 100 UNIT/ML suspension pen-injector pen Inject 10 Units under the skin into the appropriate area as directed Daily With Dinner. 9 mL 3    Insulin Pen Needle 31G X  "5 MM misc Use daily with insulin and weekly with Ozempic 100 each 3    Lancets (OneTouch Delica Plus [id number]) misc USE AND DISCARD 1 LANCET 3 TIMES DAILY , FOR TESTING  AS DIRECTED 300 each 3    lisinopril (PRINIVIL,ZESTRIL) 2.5 MG tablet Take 1 tablet by mouth 2 (Two) Times a Day. 180 tablet 3    meclizine (ANTIVERT) 25 MG tablet Take 1 tablet by mouth 3 (Three) Times a Day As Needed for Dizziness. 30 tablet 1    minocycline (MINOCIN,DYNACIN) 100 MG capsule TAKE 1 CAPSULE DAILY 90 capsule 3    Multiple Vitamins-Minerals (MULTIVITAMIN PO) Take 1 tablet by mouth Daily.      nateglinide (Starlix) 120 MG tablet Take 1 tablet by mouth 3 (Three) Times a Day Before Meals. 270 tablet 3    Omega-3 Fatty Acids (Omega-3 Fish Oil) 1000 MG capsule Take 1,000 mg by mouth 4 (Four) Times a Week.      omeprazole (priLOSEC) 40 MG capsule Take 1 capsule by mouth Daily. 90 capsule 3    ondansetron ODT (ZOFRAN-ODT) 8 MG disintegrating tablet Place 1 tablet on the tongue Every 8 (Eight) Hours As Needed.      OneTouch Verio test strip USE AND DISCARD 1 TEST     STRIP 3 TIMES DAILY 300 each 3    oxyCODONE-acetaminophen (PERCOCET) 7.5-325 MG per tablet Take 1 tablet by mouth Every 6 (Six) Hours As Needed for Severe Pain. 30 tablet 0    rosuvastatin (CRESTOR) 20 MG tablet Take 1 tablet by mouth Every Night.      Semaglutide, 1 MG/DOSE, (Ozempic, 1 MG/DOSE,) 2 MG/1.5ML solution pen-injector Inject 1 mg under the skin into the appropriate area as directed 1 (One) Time Per Week. 6 pen 3    TiZANidine (ZANAFLEX) 4 MG capsule Take 1 capsule by mouth 2 (Two) Times a Day As Needed for Muscle Spasms. 180 capsule 3     No current facility-administered medications for this visit.       Review of Systems      OBJECTIVE    /82   Pulse 74   Ht 162.6 cm (64\")   Wt 74.8 kg (165 lb)   SpO2 98%   BMI 28.32 kg/m²     Body mass index is 28.32 kg/m².        Physical Exam  Vitals reviewed.   Constitutional:       Appearance: Normal appearance. She " is well-developed.   HENT:      Head: Normocephalic and atraumatic.   Neck:      Trachea: Trachea and phonation normal.   Cardiovascular:      Pulses:           Dorsalis pedis pulses are 2+ on the right side and 2+ on the left side.        Posterior tibial pulses are 2+ on the right side and 2+ on the left side.   Pulmonary:      Effort: Pulmonary effort is normal. No respiratory distress.   Abdominal:      General: There is no distension.      Palpations: Abdomen is soft.   Feet:      Right foot:      Toenail Condition: Right toenails are normal.      Left foot:      Skin integrity: Skin integrity normal.      Toenail Condition: Left toenails are normal.   Skin:     General: Skin is warm and dry.   Neurological:      Mental Status: She is alert and oriented to person, place, and time.      GCS: GCS eye subscore is 4. GCS verbal subscore is 5. GCS motor subscore is 6.   Psychiatric:         Speech: Speech normal.         Behavior: Behavior normal. Behavior is cooperative.         Thought Content: Thought content normal.         Judgment: Judgment normal.      Reviewed prior exam notes/labs/tracings        Procedures  Intertarsal injection, right midfoot    Date/Time: 07/26/2023  Performed by: Aureliano Rapp  Authorized by: Aureliano Rapp   Consent: Verbal consent obtained. Written consent obtained.  Risks and benefits: risks, benefits and alternatives were discussed  Consent given by: patient  Patient identity confirmed: verbally with patient  Indications: pain relief    Sedation:  Patient sedated: no    Patient position: sitting  Needle size: 27 G  Local anesthetic: 0.5% Marcaine plain, Kenalog 40 mg/ml , Decadron 4 mg/mL.   Outcome: pain improved  Patient tolerance: Patient tolerated the procedure well with no immediate complications      ASSESSMENT AND PLAN    Diagnoses and all orders for this visit:    1. Arthritis of midfoot (Primary)    2. Right foot pain      Body mass index is 28.32 kg/m².    Recommend  follow-up with primary care to discuss BMI greater than 30    After risk benefits treatment options were discussed with the patient in detail proceeded with intra-articular injection in the intertarsal joint which patient tolerated well.  Recommend follow-up in 2 weeks for recheck and contact office for worsening symptoms        This document has been electronically signed by Aureliano RENTERIA, FNP-C, ONP-C on July 26, 2023 11:30 CDT

## 2023-08-03 ENCOUNTER — OFFICE VISIT (OUTPATIENT)
Dept: ORTHOPEDIC SURGERY | Facility: CLINIC | Age: 73
End: 2023-08-03
Payer: MEDICARE

## 2023-08-03 VITALS — WEIGHT: 168 LBS | BODY MASS INDEX: 28.68 KG/M2 | HEIGHT: 64 IN

## 2023-08-03 DIAGNOSIS — R80.9 TYPE 2 DIABETES MELLITUS WITH MICROALBUMINURIA, WITHOUT LONG-TERM CURRENT USE OF INSULIN: ICD-10-CM

## 2023-08-03 DIAGNOSIS — K21.9 GASTROESOPHAGEAL REFLUX DISEASE WITHOUT ESOPHAGITIS: ICD-10-CM

## 2023-08-03 DIAGNOSIS — Z96.611 STATUS POST REVERSE TOTAL ARTHROPLASTY OF RIGHT SHOULDER: Primary | ICD-10-CM

## 2023-08-03 DIAGNOSIS — E11.29 TYPE 2 DIABETES MELLITUS WITH MICROALBUMINURIA, WITHOUT LONG-TERM CURRENT USE OF INSULIN: ICD-10-CM

## 2023-08-03 PROCEDURE — 1160F RVW MEDS BY RX/DR IN RCRD: CPT | Performed by: ORTHOPAEDIC SURGERY

## 2023-08-03 PROCEDURE — 1159F MED LIST DOCD IN RCRD: CPT | Performed by: ORTHOPAEDIC SURGERY

## 2023-08-03 PROCEDURE — 99024 POSTOP FOLLOW-UP VISIT: CPT | Performed by: ORTHOPAEDIC SURGERY

## 2023-08-09 ENCOUNTER — OFFICE VISIT (OUTPATIENT)
Dept: PODIATRY | Facility: CLINIC | Age: 73
End: 2023-08-09
Payer: MEDICARE

## 2023-08-09 VITALS
HEIGHT: 64 IN | BODY MASS INDEX: 28.68 KG/M2 | WEIGHT: 168 LBS | SYSTOLIC BLOOD PRESSURE: 122 MMHG | OXYGEN SATURATION: 98 % | HEART RATE: 60 BPM | DIASTOLIC BLOOD PRESSURE: 80 MMHG

## 2023-08-09 DIAGNOSIS — M19.079 ARTHRITIS OF MIDFOOT: Primary | ICD-10-CM

## 2023-08-09 DIAGNOSIS — M79.671 RIGHT FOOT PAIN: ICD-10-CM

## 2023-08-09 RX ORDER — TIZANIDINE HYDROCHLORIDE 4 MG/1
CAPSULE, GELATIN COATED ORAL
Qty: 180 CAPSULE | Refills: 3 | Status: SHIPPED | OUTPATIENT
Start: 2023-08-09

## 2023-08-09 RX ORDER — ROSUVASTATIN CALCIUM 20 MG/1
TABLET, COATED ORAL
Qty: 90 TABLET | Refills: 3 | Status: SHIPPED | OUTPATIENT
Start: 2023-08-09

## 2023-08-09 NOTE — PROGRESS NOTES
"Otilia Kenny  1950  73 y.o. female  BS:80    PCP:Vj       08/09/2023    Chief Complaint   Patient presents with    Right Foot - Follow-up       History of Present Illness    Otilia Kenny is a 73 y.o.female Patient presents to clinic for follow up on right midfoot injection. Patient states she is doing better.       Past Medical History:   Diagnosis Date    Abdominal pain     Most likely related to functional colonic spasm      Abnormal liver enzymes     Improved, 3/2015       Allergic rhinitis     seasonal    Allergic rhinitis     Anemia     Anesthesia complication     states sometime B/P drops    Arthritis     Cervical disc disorder     S/P cervical surgeries x2-3. Extensive fusion C 3-7. [place] [facility]       Depression     Diarrhea, unspecified     Resolved with discontinuation of metformin.     Diverticular disease of colon     Dysfunction of eustachian tube     Elevated cholesterol     Essential hypertension     Increase lisinopril HCT, 10/12.5.       Gastritis     Generalized anxiety disorder     GERD (gastroesophageal reflux disease)     History of transfusion     Hyperlipidemia     Intolerant of Lipitor. The patient stopped Zocor due to \"liver pain\", summer 2015. patient instructed to reattempt Zocor every other day, 10/2015     Hyperlipidemia associated with type 2 diabetes mellitus 11/25/2020    Hypertriglyceridemia     Holding simvastatin 2/2015 due to slightly elevated LFTs.       Iron deficiency anemia     Irritable bowel syndrome     Dr. Estrada    Meniere's disease of both ears 06/12/2018    Osteoarthritis of knee     Dr. Gail WALDEN (postoperative nausea and vomiting)     Sleep disorder     Spasm of back muscles     Dr. Castro changed Soma to Zanaflex.      Type 2 diabetes mellitus with microalbuminuria, without long-term current use of insulin 05/07/2018    Added automatically from request for surgery [id number]    Vitamin B12 deficiency (dietary) anemia     Currently on " multivitamin daily           Past Surgical History:   Procedure Laterality Date    BACK SURGERY      Lumbar Laminectomy    BUNIONECTOMY Left     CARPAL TUNNEL RELEASE Bilateral     CERVICAL DISC SURGERY      Laminectomy 2011. Fusion 2013.    CHOLECYSTECTOMY      COLONOSCOPY      Diverticulosis found in the sigmoid colon.A single diminutive polyp found in the colon.Mul.biopsies taken.Hemorrhoids found in the anus.    CYST REMOVAL      forehead    ENDOSCOPY      Normal hypopharynx and esophagus.Marked irregularity of the Z-line,compatable with chronic reflux.Mul.biopsies.A hiatus hernia found in GE junction.Mild nonerosive gastritis.Mul.biopsies.Polyps in fundus.Mul.biopsies.Normal pylorus.Normal duodenum.    ENDOSCOPY N/A 12/28/2021    Procedure: ESOPHAGOGASTRODUODENOSCOPY;  Surgeon: Samra Castillo MD;  Location: [facility] ENDOSCOPY;  Service: Gastroenterology;  Laterality: N/A;    ENDOSCOPY AND COLONOSCOPY      FINGER/THUMB LESION/CYST EXCISION Right     right thumb    HEEL SPUR EXCISION Bilateral     HYSTERECTOMY      KNEE ARTHROSCOPY Bilateral     Arthroscopy of the left knee with debridement of medial meniscus.    KNEE SURGERY Right     Right unicompartmental arthroplasty.    OOPHORECTOMY      TONSILLECTOMY      TOTAL KNEE ARTHROPLASTY Left 06/04/2018    Procedure: TOTAL KNEE ARTHROPLASTY ATTUNE with adductor canal block ;  Surgeon: Baron Reynolds MD;  Location: [facility] OR;  Service: Orthopedics    TOTAL SHOULDER ARTHROPLASTY W/ DISTAL CLAVICLE EXCISION Left 6/19/2023    Procedure: LEFT REVERSE TOTAL SHOULDER ARTHROPLASTY.;  Surgeon: Baron Reynolds MD;  Location: [facility] OR;  Service: Orthopedics;  Laterality: Left;    TRIGGER FINGER RELEASE Bilateral     UPPER GASTROINTESTINAL ENDOSCOPY  12/28/2021    Dr. Samra Castillo M.D., [facility], [place], [place]         Family History   Problem Relation Age of Onset    Ovarian cancer Sister     Breast cancer Sister     Cancer Other          lung    Diabetes Other     Hypertension Other     Stroke Other        Allergies   Allergen Reactions    Allegra [Fexofenadine] Itching    Amaryl [Glimepiride] Myalgia    Dyazide [Triamterene-Hctz] Itching    Hydrochlorothiazide W-Triamterene Itching    Lipitor [Atorvastatin] Myalgia    Metformin And Related Diarrhea    Naproxen Itching    Actos [Pioglitazone] Rash    Sulfamethoxazole-Trimethoprim Rash     dizziness       Social History     Socioeconomic History    Marital status:    Tobacco Use    Smoking status: Former     Packs/day: 1.00     Years: 10.00     Pack years: 10.00     Types: Cigarettes     Quit date: 1985     Years since quittin.2     Passive exposure: Past    Smokeless tobacco: Never   Vaping Use    Vaping Use: Never used   Substance and Sexual Activity    Alcohol use: No    Drug use: No    Sexual activity: Defer         Current Outpatient Medications   Medication Sig Dispense Refill    aspirin 81 MG EC tablet Take 1 tablet by mouth 2 (Two) Times a Day. (Patient taking differently: Take 1 tablet by mouth Daily.) 60 tablet 0    Blood Glucose Monitoring Suppl (ONETOUCH VERIO IQ SYSTEM) w/Device kit 1 each Daily. 1 kit 0    CALCIUM CARB-CHOLECALCIFEROL PO Take 1 tablet by mouth 2 (two) times a day.      cetirizine (zyrTEC) 10 MG tablet Take 1 tablet by mouth Every Night.      cholecalciferol (VITAMIN D3) 25 MCG (1000 UT) tablet Take 1 tablet by mouth 4 (Four) Times a Week.      dapagliflozin Propanediol (Farxiga) 10 MG tablet Take 10 mg by mouth Daily. 90 tablet 1    escitalopram (Lexapro) 10 MG tablet Take 1 tablet by mouth Daily. 90 tablet 3    ezetimibe (ZETIA) 10 MG tablet Take 1 tablet by mouth Every Night.      fenofibrate (TRICOR) 145 MG tablet Take 1 tablet by mouth Daily.      ferrous sulfate 325 (65 FE) MG tablet Take 1 tablet by mouth Daily With Breakfast.      glucose blood test strip Check 3 times  daily 300 each 3    glucose monitor monitoring kit Check glucose 3 times   daily 1 each 0    Insulin Lispro Prot & Lispro (HumaLOG Mix 75/25 KwikPen) (75-25) 100 UNIT/ML suspension pen-injector pen Inject 10 Units under the skin into the appropriate area as directed Daily With Dinner. 9 mL 3    Insulin Pen Needle 31G X 5 MM misc Use daily with insulin and weekly with Ozempic 100 each 3    Lancets (OneTouch Delica Plus [id number]) misc USE AND DISCARD 1 LANCET 3 TIMES DAILY , FOR TESTING  AS DIRECTED 300 each 3    Lancets 30G misc Check 3 times daily 100 each 3    lisinopril (PRINIVIL,ZESTRIL) 2.5 MG tablet Take 1 tablet by mouth 2 (Two) Times a Day. 180 tablet 3    meclizine (ANTIVERT) 25 MG tablet Take 1 tablet by mouth 3 (Three) Times a Day As Needed for Dizziness. 30 tablet 1    minocycline (MINOCIN,DYNACIN) 100 MG capsule TAKE 1 CAPSULE DAILY 90 capsule 3    Multiple Vitamins-Minerals (MULTIVITAMIN PO) Take 1 tablet by mouth Daily.      nateglinide (Starlix) 120 MG tablet Take 1 tablet by mouth 3 (Three) Times a Day Before Meals. 270 tablet 3    Omega-3 Fatty Acids (Omega-3 Fish Oil) 1000 MG capsule Take 1,000 mg by mouth 4 (Four) Times a Week.      omeprazole (priLOSEC) 40 MG capsule Take 1 capsule by mouth Daily. 90 capsule 3    ondansetron ODT (ZOFRAN-ODT) 8 MG disintegrating tablet Place 1 tablet on the tongue Every 8 (Eight) Hours As Needed.      oxyCODONE-acetaminophen (PERCOCET) 7.5-325 MG per tablet Take 1 tablet by mouth Every 6 (Six) Hours As Needed for Severe Pain. 30 tablet 0    rosuvastatin (CRESTOR) 20 MG tablet Take 1 tablet by mouth Every Night.      Semaglutide, 1 MG/DOSE, (Ozempic, 1 MG/DOSE,) 2 MG/1.5ML solution pen-injector Inject 1 mg under the skin into the appropriate area as directed 1 (One) Time Per Week. 6 pen 3    TiZANidine (ZANAFLEX) 4 MG capsule Take 1 capsule by mouth 2 (Two) Times a Day As Needed for Muscle Spasms. 180 capsule 3     No current facility-administered medications for this visit.       Review of Systems      OBJECTIVE    /80   Pulse 60  "  Ht 162.6 cm (64\")   Wt 76.2 kg (168 lb)   SpO2 98%   BMI 28.84 kg/mý     Body mass index is 28.84 kg/mý.        Physical Exam  Vitals reviewed.   Constitutional:       Appearance: Normal appearance. She is well-developed.   HENT:      Head: Normocephalic and atraumatic.   Neck:      Trachea: Trachea and phonation normal.   Cardiovascular:      Pulses:           Dorsalis pedis pulses are 2+ on the right side and 2+ on the left side.        Posterior tibial pulses are 2+ on the right side and 2+ on the left side.   Pulmonary:      Effort: Pulmonary effort is normal. No respiratory distress.   Abdominal:      General: There is no distension.      Palpations: Abdomen is soft.   Feet:      Right foot:      Toenail Condition: Right toenails are normal.      Left foot:      Skin integrity: Skin integrity normal.      Toenail Condition: Left toenails are normal.   Skin:     General: Skin is warm and dry.   Neurological:      Mental Status: She is alert and oriented to person, place, and time.      GCS: GCS eye subscore is 4. GCS verbal subscore is 5. GCS motor subscore is 6.   Psychiatric:         Speech: Speech normal.         Behavior: Behavior normal. Behavior is cooperative.         Thought Content: Thought content normal.         Judgment: Judgment normal.      Reviewed prior exam notes/labs/tracings        Procedures      ASSESSMENT AND PLAN    Diagnoses and all orders for this visit:    1. Arthritis of midfoot (Primary)    2. Right foot pain      Body mass index is 28.84 kg/mý.    Recommend follow-up with primary care to discuss BMI greater than 30      Overall improved.  Will recommend continued progression of range of motion and activity as tolerated based on pain.  The patient will follow up after as  Ready to proceed with surgical options.  Contact office in the meantime for worsening symptoms            This document has been electronically signed by Aureliano RENTERIA, FNPROCK, ONP-C on August 9, 2023 " 14:02 CDT

## 2023-09-12 RX ORDER — OMEPRAZOLE 40 MG/1
40 CAPSULE, DELAYED RELEASE ORAL DAILY
Qty: 90 CAPSULE | Refills: 3 | Status: SHIPPED | OUTPATIENT
Start: 2023-09-12

## 2023-09-26 DIAGNOSIS — M19.012 PRIMARY OSTEOARTHRITIS OF LEFT SHOULDER: Primary | ICD-10-CM

## 2023-09-28 ENCOUNTER — OFFICE VISIT (OUTPATIENT)
Dept: ORTHOPEDIC SURGERY | Facility: CLINIC | Age: 73
End: 2023-09-28
Payer: MEDICARE

## 2023-09-28 VITALS — HEIGHT: 64 IN | BODY MASS INDEX: 28.8 KG/M2 | WEIGHT: 168.7 LBS

## 2023-09-28 DIAGNOSIS — R80.9 TYPE 2 DIABETES MELLITUS WITH MICROALBUMINURIA, WITHOUT LONG-TERM CURRENT USE OF INSULIN: ICD-10-CM

## 2023-09-28 DIAGNOSIS — E11.29 TYPE 2 DIABETES MELLITUS WITH MICROALBUMINURIA, WITHOUT LONG-TERM CURRENT USE OF INSULIN: ICD-10-CM

## 2023-09-28 DIAGNOSIS — I10 ESSENTIAL HYPERTENSION: Chronic | ICD-10-CM

## 2023-09-28 DIAGNOSIS — Z96.611 STATUS POST REVERSE TOTAL ARTHROPLASTY OF RIGHT SHOULDER: Primary | ICD-10-CM

## 2023-09-28 PROCEDURE — 1159F MED LIST DOCD IN RCRD: CPT | Performed by: ORTHOPAEDIC SURGERY

## 2023-09-28 PROCEDURE — 99212 OFFICE O/P EST SF 10 MIN: CPT | Performed by: ORTHOPAEDIC SURGERY

## 2023-09-28 PROCEDURE — 1160F RVW MEDS BY RX/DR IN RCRD: CPT | Performed by: ORTHOPAEDIC SURGERY

## 2023-09-28 NOTE — PROGRESS NOTES
"Otilia Kenny is a 73 y.o. female returns for     Chief Complaint   Patient presents with    Left Shoulder - Follow-up       HISTORY OF PRESENT ILLNESS:   06/19/23 (3m 9d) Baron Reynolds MD   Left Reverse Total Shoulder Arthroplasty. - Left     Patient states that she is doing \"great\".  She states that she is back to doing all of her activities.  She states that she is doing better than she was prior to surgery.    No fevers or chills.  No numbness or tingling.     CONCURRENT MEDICAL HISTORY:    The following portions of the patient's history were reviewed and updated as appropriate: allergies, current medications, past family history, past medical history, past social history, past surgical history and problem list.     ROS  No fevers or chills.  No chest pain or shortness of air.  No GI or  disturbances.    PHYSICAL EXAMINATION:       Ht 162.6 cm (64\")   Wt 76.5 kg (168 lb 11.2 oz)   BMI 28.96 kg/m²     Physical Exam  Constitutional:       General: She is not in acute distress.     Appearance: Normal appearance.   Pulmonary:      Effort: Pulmonary effort is normal. No respiratory distress.   Neurological:      Mental Status: She is alert and oriented to person, place, and time.       GAIT:     [x]  Normal  []  Antalgic    Assistive device: [x]  None  []  Walker     []  Crutches  []  Cane     []  Wheelchair  []  Stretcher    Left Shoulder Exam      Comments:  Incision line is well-healed.  No erythema.  Shoulder flexion 120 degrees.  Abduction 110 degrees.  Good finger motion.  Good stability on exam.  Good distal pulses and sensation.            XR Shoulder 2+ View Left    Result Date: 9/28/2023  Narrative: Ordering Provider:  Baron Reynolds MD Ordering Diagnosis/Indication:  Primary osteoarthritis of left shoulder Procedure:  XR SHOULDER 2+ VW LEFT Exam Date:  9/28/23 COMPARISON:  Todays X-rays were compared to previous images dated June 30, 2023.     Impression:  3 views of the left " shoulder show acceptable position alignment of a reverse total shoulder arthroplasty.  No sign of implant loosening or failure is noted.  Appropriate sizing is noted on each view.  No interval changes from prior x-ray.  Extensive postsurgical changes are noted in the cervical spine with multiple level spinal fusion.  Mild diffuse osteopenia noted throughout the x-rays.  No acute findings. Baron Reynolds MD 9/28/23            ASSESSMENT:    Diagnoses and all orders for this visit:    Status post reverse total arthroplasty of right shoulder    Type 2 diabetes mellitus with microalbuminuria, without long-term current use of insulin    Essential hypertension          PLAN    We discussed continued general strength and conditioning exercises.  Slowly increase activity as tolerated.  Continue conditioning and transition to endurance type exercises.  No true restrictions.  Follow-up 1 year postoperatively with repeat x-rays.    Return in about 9 months (around 6/28/2024) for Recheck with repeat xrays.    Baron Reynolds MD

## (undated) DEVICE — SINGLE-USE BIOPSY FORCEPS: Brand: RADIAL JAW 4

## (undated) DEVICE — GLV SURG TRIUMPH PF LTX 7 STRL

## (undated) DEVICE — RECIPROCATING BLADE, DOUBLE SIDED, OFFSET  (70.0 X 1.0 X 12.5MM)

## (undated) DEVICE — SUT VIC 2/0 CT1 36IN

## (undated) DEVICE — DISPOSABLE TOURNIQUET CUFF SINGLE BLADDER, DUAL PORT AND QUICK CONNECT CONNECTOR: Brand: COLOR CUFF

## (undated) DEVICE — GLV SURG SENSICARE ALOE LF PF SZ7.5 GRN

## (undated) DEVICE — GOWN,AURORA,NOREINF,RAGLAN,XL,STERILE: Brand: MEDLINE

## (undated) DEVICE — SOL IRR NACL 0.9PCT BT 1000ML

## (undated) DEVICE — SUT ETHIB 0/0 CT1 30IN X424H

## (undated) DEVICE — SPNG DRN AMD EXCILON 6PLY 4X4IN PK/2

## (undated) DEVICE — GLV SURG TRIUMPH NATURAL W/ALOE PF LTX 6.5 STRL

## (undated) DEVICE — STRYKER PERFORMANCE SERIES SAGITTAL BLADE: Brand: STRYKER PERFORMANCE SERIES

## (undated) DEVICE — DRSNG GZ CURAD XEROFORM NONADHS 5X9IN STRL

## (undated) DEVICE — DRSNG WND BORDR/ADHS NONADHR/GZ LF 4X10IN STRL

## (undated) DEVICE — 3 BONE CEMENT MIXER: Brand: MIXEVAC

## (undated) DEVICE — GLV SURG TRIUMPH LT PF LTX 8 STRL

## (undated) DEVICE — SUT VIC 0 CT1 36IN J946H

## (undated) DEVICE — SUT ETHIB 0 CT1 CR8 18IN CX21D

## (undated) DEVICE — SOL IRR NACL 0.9PCT 3000ML

## (undated) DEVICE — CATHETER,URETHRAL,REDRUBBER,STRL,16FR: Brand: MEDLINE

## (undated) DEVICE — CLTH CLENS READYCLEANSE PERI CARE PK/5

## (undated) DEVICE — GLV SURG SENSICARE GREEN W/ALOE PF LF 7 STRL

## (undated) DEVICE — NO-SCRATCH ™ SMALL WHITNEY CURETTE ™ IS A SINGLE-USE, PLASTIC CURETTE FOR QUICKLY APPLYING, MANIPULATING AND REMOVING BONE CEMENT DURING HIP AND KNEE REPLACEMENT SURGERY. THE PLASTIC IS SOFTER THAN STEEL INSTRUMENTS, REDUCING THE RISK OF DAMAGING THE PROSTHESIS WITH METAL INSTRUMENTS.  THE CURETTE’S 6MM TIP REMOVES EXCESS CEMENT FROM REPLACEMENT HIPS AND KNEES. EASY-TO-MANEUVER, THE SMALL BLUE CURETTE LETS YOU REMOVE CEMENT FROM ALL EDGES OF THE PROSTHESIS.NO-SCRATCH WHITNEY SMALL CURETTE FEATURES:SAFER THAN STEEL- MADE OF PLASTIC - STURDY YET SOFTER THAN SURGICAL STEEL.HANDIER- EACH TOOL HAS A MOLDED-IN THUMB INDENTATION INSTANTLY ORIENTING THE TOOL.- EASIER TO MANEUVER IN HARD TO SEE PLACES.- COLOR-CODED FOR EASY IDENTIFICATION.FASTER- COMES INDIVIDUALLY PACKAGED IN STERILE, PEEL OPEN POUCH, READY TO GO.- APPLIES, MANIPULATES, OR REMOVES CEMENT WITH FINGERTIP PRECISION.ECONOMICAL- THE COST OF A SINGLE REVISION DWARFS THE COST OF A SINGLE-USE CURETTE. - DISPOSABLE – THERE’S NO NEED TO WASTE TIME REMOVING HARDENED CEMENT OR RE-STERILIZING TOOLS.- LESS EXPENSIVE TO BUY AND INVENTORY - ORDER ONLY THE TOOL YOU USE.- PACKAGED 25 INDIVIDUALLY WRAPPED TOOLS TO A CARTON FOR CONVENIENT SHELF STORAGE.: Brand: WHITNEY NO-SCRATCH CURETTE (SMALL)

## (undated) DEVICE — PK KN TOTL LF 60

## (undated) DEVICE — KT DRN EVAC WND PVC PCH WTROC RND 10F400

## (undated) DEVICE — NDL HYPO PRECISIONGLIDE/REG 18G 1IN PNK

## (undated) DEVICE — TRAY,FOLEY INSERTION,PVP,10ML SYRINGE: Brand: MEDLINE

## (undated) DEVICE — BITEBLOCK ENDO W/STRAP 60F A/ LF DISP

## (undated) DEVICE — CANN SMPL SOFTECH BIFLO ETCO2 A/M 7FT

## (undated) DEVICE — HANDPIECE SET WITH COAXIAL HIGH FLOW TIP AND SUCTION TUBE: Brand: INTERPULSE

## (undated) DEVICE — HOOD, PEEL-AWAY: Brand: FLYTE

## (undated) DEVICE — APPL CHLORAPREP W/TINT 26ML ORNG

## (undated) DEVICE — STPLR SKIN VISISTAT WD 35CT

## (undated) DEVICE — GLV SURG SENSICARE GREEN W/ALOE PF LF 6.5 STRL

## (undated) DEVICE — GLV SURG TRIUMPH LT PF LTX 6.5 STRL

## (undated) DEVICE — STERILE POLYISOPRENE POWDER-FREE SURGICAL GLOVES WITH EMOLLIENT COATING: Brand: PROTEXIS